# Patient Record
Sex: MALE | Race: WHITE | NOT HISPANIC OR LATINO | Employment: UNEMPLOYED | URBAN - METROPOLITAN AREA
[De-identification: names, ages, dates, MRNs, and addresses within clinical notes are randomized per-mention and may not be internally consistent; named-entity substitution may affect disease eponyms.]

---

## 2017-04-24 ENCOUNTER — HOSPITAL ENCOUNTER (OUTPATIENT)
Dept: RADIOLOGY | Facility: MEDICAL CENTER | Age: 62
End: 2017-04-24
Attending: INTERNAL MEDICINE
Payer: MEDICAID

## 2017-04-24 DIAGNOSIS — N18.30 CHRONIC KIDNEY DISEASE, STAGE III (MODERATE) (HCC): ICD-10-CM

## 2017-04-24 PROCEDURE — 76775 US EXAM ABDO BACK WALL LIM: CPT

## 2017-05-30 ENCOUNTER — HOSPITAL ENCOUNTER (OUTPATIENT)
Dept: CARDIOLOGY | Facility: MEDICAL CENTER | Age: 62
End: 2017-05-30
Attending: FAMILY MEDICINE
Payer: MEDICAID

## 2017-05-30 DIAGNOSIS — I25.2 OLD MYOCARDIAL INFARCTION: ICD-10-CM

## 2017-05-30 DIAGNOSIS — I25.10 ASCVD (ARTERIOSCLEROTIC CARDIOVASCULAR DISEASE): ICD-10-CM

## 2017-05-30 DIAGNOSIS — R60.0 LOCALIZED EDEMA: ICD-10-CM

## 2017-05-30 LAB
LV EJECT FRACT  99904: 60
LV EJECT FRACT MOD 2C 99903: 65.08
LV EJECT FRACT MOD 4C 99902: 52.84
LV EJECT FRACT MOD BP 99901: 58.17

## 2017-05-30 PROCEDURE — 93306 TTE W/DOPPLER COMPLETE: CPT

## 2017-05-30 PROCEDURE — 93306 TTE W/DOPPLER COMPLETE: CPT | Mod: 26 | Performed by: INTERNAL MEDICINE

## 2021-03-03 DIAGNOSIS — Z23 NEED FOR VACCINATION: ICD-10-CM

## 2021-10-11 RX ORDER — AMLODIPINE BESYLATE 10 MG/1
10 TABLET ORAL DAILY
COMMUNITY
End: 2021-10-11 | Stop reason: SDUPTHER

## 2021-10-11 RX ORDER — AMITRIPTYLINE HYDROCHLORIDE 10 MG/1
10 TABLET, FILM COATED ORAL NIGHTLY
COMMUNITY
End: 2021-10-11 | Stop reason: SDUPTHER

## 2021-10-11 NOTE — TELEPHONE ENCOUNTER
Received request via: Patient    Was the patient seen in the last year in this department? Yes    Does the patient have an active prescription (recently filled or refills available) for medication(s) requested? No     Pt requesting refill on Amlodapine 10mg and Amitriptyline 10mg

## 2021-10-14 RX ORDER — AMITRIPTYLINE HYDROCHLORIDE 10 MG/1
10 TABLET, FILM COATED ORAL 2 TIMES DAILY
Qty: 120 TABLET | Refills: 0 | Status: SHIPPED | OUTPATIENT
Start: 2021-10-14 | End: 2021-11-15

## 2021-10-14 RX ORDER — AMLODIPINE BESYLATE 10 MG/1
10 TABLET ORAL DAILY
Qty: 30 TABLET | Refills: 0 | Status: SHIPPED | OUTPATIENT
Start: 2021-10-14 | End: 2021-12-30

## 2021-11-15 RX ORDER — AMITRIPTYLINE HYDROCHLORIDE 10 MG/1
TABLET, FILM COATED ORAL
Qty: 120 TABLET | Refills: 0 | Status: SHIPPED | OUTPATIENT
Start: 2021-11-15 | End: 2022-07-07 | Stop reason: SDUPTHER

## 2021-12-02 RX ORDER — ATORVASTATIN CALCIUM 40 MG/1
40 TABLET, FILM COATED ORAL NIGHTLY
COMMUNITY
End: 2021-12-02 | Stop reason: SDUPTHER

## 2021-12-03 RX ORDER — ATORVASTATIN CALCIUM 40 MG/1
40 TABLET, FILM COATED ORAL DAILY
Qty: 90 TABLET | Refills: 3 | Status: SHIPPED | OUTPATIENT
Start: 2021-12-03 | End: 2022-03-03

## 2021-12-30 RX ORDER — AMLODIPINE BESYLATE 10 MG/1
TABLET ORAL
Qty: 30 TABLET | Refills: 0 | Status: SHIPPED | OUTPATIENT
Start: 2021-12-30 | End: 2022-02-01 | Stop reason: SDUPTHER

## 2022-02-01 ENCOUNTER — OFFICE VISIT (OUTPATIENT)
Dept: MEDICAL GROUP | Facility: CLINIC | Age: 67
End: 2022-02-01
Payer: MEDICARE

## 2022-02-01 VITALS
HEIGHT: 70 IN | SYSTOLIC BLOOD PRESSURE: 130 MMHG | BODY MASS INDEX: 45.1 KG/M2 | DIASTOLIC BLOOD PRESSURE: 70 MMHG | OXYGEN SATURATION: 95 % | HEART RATE: 102 BPM | TEMPERATURE: 98.6 F | WEIGHT: 315 LBS

## 2022-02-01 DIAGNOSIS — N18.30 TYPE 2 DIABETES MELLITUS WITH STAGE 3 CHRONIC KIDNEY DISEASE, UNSPECIFIED WHETHER LONG TERM INSULIN USE, UNSPECIFIED WHETHER STAGE 3A OR 3B CKD (HCC): ICD-10-CM

## 2022-02-01 DIAGNOSIS — I10 ESSENTIAL HYPERTENSION: ICD-10-CM

## 2022-02-01 DIAGNOSIS — E11.22 TYPE 2 DIABETES MELLITUS WITH STAGE 3 CHRONIC KIDNEY DISEASE, UNSPECIFIED WHETHER LONG TERM INSULIN USE, UNSPECIFIED WHETHER STAGE 3A OR 3B CKD (HCC): ICD-10-CM

## 2022-02-01 PROCEDURE — 99214 OFFICE O/P EST MOD 30 MIN: CPT | Mod: GC | Performed by: STUDENT IN AN ORGANIZED HEALTH CARE EDUCATION/TRAINING PROGRAM

## 2022-02-01 RX ORDER — ALBUTEROL SULFATE 90 UG/1
AEROSOL, METERED RESPIRATORY (INHALATION)
COMMUNITY
End: 2023-01-19

## 2022-02-01 RX ORDER — ASPIRIN 81 MG/1
81 TABLET ORAL DAILY
COMMUNITY
End: 2023-01-19

## 2022-02-01 RX ORDER — AMLODIPINE BESYLATE 10 MG/1
10 TABLET ORAL DAILY
Qty: 90 TABLET | Refills: 3 | Status: SHIPPED | OUTPATIENT
Start: 2022-02-01 | End: 2023-01-19

## 2022-02-01 ASSESSMENT — PATIENT HEALTH QUESTIONNAIRE - PHQ9: CLINICAL INTERPRETATION OF PHQ2 SCORE: 0

## 2022-02-01 NOTE — ASSESSMENT & PLAN NOTE
- Hx of DM2   - Metformin 1000mg BID  - Patient states he uses 100U in the am, 75U in afternoon, and 50U at night.    Follows up with Dr. Puente   Doesn't know what type of insulin  - Doesn't know last time he had blood work done  - He follows with nephrology   - Saw podiatry 12/2021  - Saw optometry 09/2021    - Labs ordered today  - F/u after lab work done

## 2022-02-01 NOTE — ASSESSMENT & PLAN NOTE
- Patient w/ HTN hx   metoprolol 25mg BID    Amlodipine 10mg daily   - BP normally 120s systolic at home    - Continue current regiment

## 2022-02-01 NOTE — PROGRESS NOTES
[facility] MEDICINE     PATIENT ID:  NAME:  Yoandy Peace  MRN:               [id number]  YOB: 1955    Date: 9:27 AM      Resident: Michael Frias DO    CC:  F/u DM      HPI: Yoandy Peace is a 66 y.o. male who presented for DM2 f/u     Problem   Essential Hypertension    - Patient w/ HTN hx   metoprolol 25mg BID    Amlodipine 10mg daily   - BP normally 120s systolic at home     Type 2 Diabetes Mellitus With Diabetic Chronic Kidney Disease (Hcc)    - Hx of DM2   - Metformin 1000mg BID  - Patient states he uses 100U in the am, 75U in afternoon, and 50U at night.    Follows up with Dr. Puente   Doesn't know what type of insulin  - Doesn't know last time he had blood work done  - He follows with nephrology   - Saw podiatry 12/2021  - Saw optometry 09/2021         REVIEW OF SYSTEMS:   Ten systems reviewed and were negative except as noted in the HPI.                PROBLEM LIST  Patient Active Problem List   Diagnosis   • Hematochezia   • Hematuria   • Chest pressure   • CKD (chronic kidney disease) stage 3, GFR 30-59 ml/min ([facility])   • BPH (benign prostatic hyperplasia)   • Essential hypertension   • Type 2 diabetes mellitus with diabetic chronic kidney disease (HCC)        PAST SURGICAL HISTORY:  No past surgical history on file.    FAMILY HISTORY:  No family history on file.    SOCIAL HISTORY:   Social History     Tobacco Use   • Smoking status: Current Every Day Smoker     Packs/day: 1.00     Types: Cigarettes   • Smokeless tobacco: Never Used   Substance Use Topics   • Alcohol use: No       ALLERGIES:  No Known Allergies    OUTPATIENT MEDICATIONS:    Current Outpatient Medications:   •  albuterol 108 (90 Base) MCG/ACT Aero Soln inhalation aerosol, albuterol sulfate HFA 90 mcg/actuation aerosol inhaler  INHALE 2 PUFFS BY MOUTH 4 TIMES DAILY AS NEEDED FOR WHEEZING, Disp: , Rfl:   •  aspirin (ASPIR-LOW) 81 MG EC tablet, Take 81 mg by mouth every day. 1 po daily, Disp: , Rfl:   •  amLODIPine (NORVASC) 10 MG Tab,  "Take 1 Tablet by mouth every day., Disp: 90 Tablet, Rfl: 3  •  atorvastatin (LIPITOR) 40 MG Tab, Take 1 Tablet by mouth every day for 90 days. 1 po at bed time, Disp: 90 Tablet, Rfl: 3  •  amitriptyline (ELAVIL) 10 MG Tab, Take 1 tablet by mouth twice daily, Disp: 120 Tablet, Rfl: 0  •  tramadol (ULTRAM) 50 MG Tab, Take 50 mg by mouth every four hours as needed., Disp: , Rfl:   •  tamsulosin (FLOMAX) 0.4 MG capsule, Take 0.4 mg by mouth ONE-HALF HOUR AFTER BREAKFAST., Disp: , Rfl:   •  metformin (GLUCOPHAGE) 1000 MG tablet, Take 1,000 mg by mouth 2 times a day, with meals., Disp: , Rfl:   •  metoprolol (LOPRESSOR) 25 MG TABS, Take 1 Tab by mouth 2 Times a Day., Disp: 60 Tab, Rfl: 11  •  finasteride (PROSCAR) 5 MG TABS, Take 1 Tab by mouth every day., Disp: 30 Tab, Rfl: 0  •  lisinopril-hydrochlorothiazide (PRINZIDE, ZESTORETIC) 20-12.5 MG per tablet, Take 2 Tabs by mouth every day., Disp: , Rfl:   •  vitamin D (CHOLECALCIFEROL) 1000 UNIT TABS, Take 2,000 Units by mouth every day., Disp: , Rfl:     PHYSICAL EXAM:  Vitals:    02/01/22 0843   BP: 130/70   BP Location: Right arm   Pulse: (!) 102   Temp: 37 °C (98.6 °F)   SpO2: 95%   Weight: (!) 146 kg (321 lb)   Height: 1.778 m (5' 10\")       General: Pt resting in NAD, cooperative   Skin:  Pink, warm and dry.  HEENT: NC/AT. EOMI.  Lungs:  Symmetrical.  CTAB, good air movement   Cardiovascular:  S1/S2 RRR   Extremities:  Full range of motion.  CNS:  Muscle tone is normal. No gross focal neurologic deficits      ASSESSMENT/PLAN:   66 y.o. male     Problem List Items Addressed This Visit     Essential hypertension     - Patient w/ HTN hx   metoprolol 25mg BID    Amlodipine 10mg daily   - BP normally 120s systolic at home    - Continue current regiment          Relevant Medications    amLODIPine (NORVASC) 10 MG Tab    Type 2 diabetes mellitus with diabetic chronic kidney disease (HCC)     - Hx of DM2   - Metformin 1000mg BID  - Patient states he uses 100U in the am, 75U in " afternoon, and 50U at night.    Follows up with Dr. Puente   Doesn't know what type of insulin  - Doesn't know last time he had blood work done  - He follows with nephrology   - Saw podiatry 12/2021  - Saw optometry 09/2021    - Labs ordered today  - F/u after lab work done           Relevant Orders    MICROALBUM. UR RANDOM    Comp Metabolic Panel    HEMOGLOBIN A1C    Lipid Profile    Referral for Orthotics          Michael Frias, DO  PGY-3  [facility]

## 2022-02-22 LAB — HBA1C MFR BLD: 7.1 % (ref 0–5.6)

## 2022-03-28 ENCOUNTER — OFFICE VISIT (OUTPATIENT)
Dept: MEDICAL GROUP | Facility: CLINIC | Age: 67
End: 2022-03-28
Payer: COMMERCIAL

## 2022-03-28 VITALS
TEMPERATURE: 97.6 F | RESPIRATION RATE: 18 BRPM | BODY MASS INDEX: 45.1 KG/M2 | DIASTOLIC BLOOD PRESSURE: 80 MMHG | HEART RATE: 100 BPM | SYSTOLIC BLOOD PRESSURE: 122 MMHG | OXYGEN SATURATION: 95 % | WEIGHT: 315 LBS | HEIGHT: 70 IN

## 2022-03-28 DIAGNOSIS — M77.11 EPICONDYLITIS, LATERAL, RIGHT: ICD-10-CM

## 2022-03-28 PROCEDURE — 99213 OFFICE O/P EST LOW 20 MIN: CPT | Mod: GE

## 2022-03-28 NOTE — PROGRESS NOTES
[facility] MEDICINE     PATIENT ID:  NAME:  Yoandy Peace  MRN:               [id number]  YOB: 1955    Date: 11:32 AM      Resident: Cristhian Walter MD     CC:  Right elbow pain      HPI: Yoandy Peace is a 66 y.o. male who presents to clinic complaining of right-sided elbow pain going on for a little over the past month.  The patient states that the pain comes and goes but seems to be exacerbated when he is more active and somewhat relieved with rest.  He reports no history of similar pain in the past.  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  He denies any swelling or redness to the elbow.  He denies any recent trauma, fever, chills or any other significant complaints or concerns.  The patient has not taken any medications at home for this pain but does take tramadol daily.      REVIEW OF SYSTEMS:   Ten systems reviewed and were negative except as noted in the HPI.                PROBLEM LIST  Patient Active Problem List   Diagnosis   • Hematochezia   • Hematuria   • Chest pressure   • CKD (chronic kidney disease) stage 3, GFR 30-59 ml/min ([facility])   • BPH (benign prostatic hyperplasia)   • Essential hypertension   • Type 2 diabetes mellitus with diabetic chronic kidney disease (HCC)        PAST SURGICAL HISTORY:  No past surgical history on file.    FAMILY HISTORY:  No family history on file.    SOCIAL HISTORY:   Social History     Tobacco Use   • Smoking status: Current Every Day Smoker     Packs/day: 1.00     Types: Cigarettes   • Smokeless tobacco: Never Used   Substance Use Topics   • Alcohol use: No       ALLERGIES:  No Known Allergies    OUTPATIENT MEDICATIONS:    Current Outpatient Medications:   •  albuterol 108 (90 Base) MCG/ACT Aero Soln inhalation aerosol, albuterol sulfate HFA 90 mcg/actuation aerosol inhaler  INHALE 2 PUFFS BY MOUTH 4 TIMES DAILY AS NEEDED FOR WHEEZING, Disp: , Rfl:   •  aspirin 81 MG EC tablet, Take 81 mg by mouth every  "day. 1 po daily, Disp: , Rfl:   •  amLODIPine (NORVASC) 10 MG Tab, Take 1 Tablet by mouth every day., Disp: 90 Tablet, Rfl: 3  •  amitriptyline (ELAVIL) 10 MG Tab, Take 1 tablet by mouth twice daily, Disp: 120 Tablet, Rfl: 0  •  tramadol (ULTRAM) 50 MG Tab, Take 50 mg by mouth every four hours as needed., Disp: , Rfl:   •  vitamin D (CHOLECALCIFEROL) 1000 UNIT TABS, Take 2,000 Units by mouth every day., Disp: , Rfl:   •  tamsulosin (FLOMAX) 0.4 MG capsule, Take 0.4 mg by mouth ONE-HALF HOUR AFTER BREAKFAST., Disp: , Rfl:   •  metformin (GLUCOPHAGE) 1000 MG tablet, Take 1,000 mg by mouth 2 times a day, with meals., Disp: , Rfl:   •  metoprolol (LOPRESSOR) 25 MG TABS, Take 1 Tab by mouth 2 Times a Day., Disp: 60 Tab, Rfl: 11  •  finasteride (PROSCAR) 5 MG TABS, Take 1 Tab by mouth every day., Disp: 30 Tab, Rfl: 0  •  lisinopril-hydrochlorothiazide (PRINZIDE, ZESTORETIC) 20-12.5 MG per tablet, Take 2 Tabs by mouth every day., Disp: , Rfl:     PHYSICAL EXAM:  Vitals:    03/28/22 1051   BP: 122/80   BP Location: Left arm   Patient Position: Sitting   BP Cuff Size: Large adult   Pulse: 100   Resp: 18   Temp: 36.4 °C (97.6 °F)   TempSrc: Temporal   SpO2: 95%   Weight: (!) 145 kg (320 lb 11.2 oz)   Height: 1.778 m (5' 10\")       General: Pt resting in NAD, pleasant and cooperative   Skin:  Pink, warm and dry.  HEENT: NC/AT. EOMI. PERRLA, neck supple.  Lungs:  Symmetrical.  CTAB, good air movement, no respiratory distress  Cardiovascular:  S1/S2 RRR, no murmurs rubs or gallops, warm and well-perfused  Abdomen:  Abdomen is soft, nontender  Extremities:  Full range of motion.  Mild tenderness to palpation of the right lateral epicondyle, no tenderness to palpation of the muscles of the right forearm or right biceps/triceps, no swelling or redness of the right elbow.  CNS:  Muscle tone is normal. No gross focal neurologic deficits      ASSESSMENT/PLAN:   66 y.o. male who presents to the clinic complaining of right elbow pain " over the past month.  Patient denies any recent trauma, swelling or redness.  Patient denies any similar pain in the past.  On exam the patient does have some tenderness of the lateral epicondyle.  Will provide patient with stretching exercises targeted at lateral epicondylitis and have advised him to try capsaicin cream and Tylenol for increased pain control.  We will have the patient try this regimen over the next 2 weeks and if his symptoms are not improving throughout this time we will have him return to clinic for discussion of possible Voltaren gel versus joint injection.  Have discussed with patient that due to his history of chronic kidney disease we will attempt other modalities before topical NSAID therapy to preserve kidney function.  Patient states he understands and agrees with this course.  Patient will follow up between 2 to 4 weeks if symptoms or not improving.      Visit Diagnoses     Epicondylitis, lateral, right              Cristhian Walter MD   PGY-1  [facility]

## 2022-03-28 NOTE — PATIENT INSTRUCTIONS
[facility]  Ask your health care provider which exercises are safe for you. Do exercises exactly as told by your health care provider and adjust them as directed. It is normal to feel mild stretching, pulling, tightness, or discomfort as you do these exercises. Stop right away if you feel sudden pain or your pain gets worse. Do not begin these exercises until told by your health care provider.  Stretching and range-of-motion exercises  These exercises warm up your muscles and joints and improve the movement and flexibility of your elbow. These exercises also help to relieve pain, numbness, and tingling.  Wrist flexion, assisted    1. Straighten your left / right elbow in front of you with your palm facing down toward the floor.  ? If told by your health care provider, bend your left / right elbow to a 90-degree angle (right angle) at your side.  2. With your other hand, gently push over the back of your left / right hand so your fingers point toward the floor (flexion). Stop when you feel a gentle stretch on the back of your forearm.  3. Hold this position for __________ seconds.  Repeat __________ times. Complete this exercise __________ times a day.  Wrist extension, assisted    1. Straighten your left / right elbow in front of you with your palm facing up toward the ceiling.  ? If told by your health care provider, bend your left / right elbow to a 90-degree angle (right angle) at your side.  2. With your other hand, gently pull your left / right hand and fingers toward the floor (extension). Stop when you feel a gentle stretch on the palm side of your forearm.  3. Hold this position for __________ seconds.  Repeat __________ times. Complete this exercise __________ times a day.  Assisted forearm rotation, supination  1. Sit or stand with your left / right elbow bent to a 90-degree angle (right angle) at your side.  2. Using your uninjured hand, turn (rotate) your left / right palm up toward the ceiling  (supination) until you feel a gentle stretch along the inside of your forearm.  3. Hold this position for __________ seconds.  Repeat __________ times. Complete this exercise __________ times a day.  Assisted forearm rotation, pronation  1. Sit or stand with your left / right elbow bent to a 90-degree angle (right angle) at your side.  2. Using your uninjured hand, rotate your left / right palm down toward the floor (pronation) until you feel a gentle stretch along the outside of your forearm.  3. Hold this position for __________ seconds.  Repeat __________ times. Complete this exercise __________ times a day.  Strengthening exercises  These exercises build strength and endurance in your forearm and elbow. Endurance is the ability to use your muscles for a long time, even after they get tired.  Radial deviation    1. Stand with a __________ weight or a hammer in your left / right hand. Or, sit while holding a rubber exercise band or tubing, with your left / right forearm supported on a table or countertop.  ? If you are standing, position your forearm so that your thumb is facing forward. If you are sitting, position your forearm so that the thumb is facing the ceiling. This is the neutral position.  2. Raise your hand upward in front of you so your thumb moves toward the ceiling (radial deviation), or pull up on the rubber tubing. Keep your forearm and elbow still while you move your wrist only.  3. Hold this position for __________ seconds.  4. Slowly return to the starting position.  Repeat __________ times. Complete this exercise __________ times a day.  Wrist extension, eccentric  1. Sit with your left / right forearm palm-down and supported on a table or other surface. Let your left / right wrist extend over the edge of the surface.  2. Hold a __________ weight or a piece of exercise band or tubing in your left / right hand.  ? If using a rubber exercise band or tubing, hold the other end of the tubing with  your other hand.  3. Use your uninjured hand to move your left / right hand up toward the ceiling.  4. Take your uninjured hand away and slowly return to the starting position using only your left / right hand. Lowering your arm under tension is called eccentric extension.  Repeat __________ times. Complete this exercise __________ times a day.  Wrist extension  Do not do this exercise if it causes pain at the outside of your elbow. Only do this exercise once instructed by your health care provider.  1. Sit with your left / right forearm supported on a table or other surface and your palm turned down toward the floor. Let your left / right wrist extend over the edge of the surface.  2. Hold a __________ weight or a piece of rubber exercise band or tubing.  ? If you are using a rubber exercise band or tubing, hold the band or tubing in place with your other hand to provide resistance.  3. Slowly bend your wrist so your hand moves up toward the ceiling (extension). Move only your wrist, keeping your forearm and elbow still.  4. Hold this position for __________ seconds.  5. Slowly return to the starting position.  Repeat __________ times. Complete this exercise __________ times a day.  Forearm rotation, supination  To do this exercise, you will need a lightweight hammer or rubber mallet.  1. Sit with your left / right forearm supported on a table or other surface. Bend your elbow to a 90-degree angle (right angle). Position your forearm so that your palm is facing down toward the floor, with your hand resting over the edge of the table.  2. Hold a hammer in your left / right hand.  ? To make this exercise easier, hold the hammer near the head of the hammer.  ? To make this exercise harder, hold the hammer near the end of the handle.  3. Without moving your wrist or elbow, slowly rotate your forearm so your palm faces up toward the ceiling (supination).  4. Hold this position for __________ seconds.  5. Slowly return  to the starting position.  Repeat __________ times. Complete this exercise __________ times a day.  Shoulder blade squeeze  1. Sit in a stable chair or stand with good posture. If you are sitting down, do not let your back touch the back of the chair.  2. Your arms should be at your sides with your elbows bent to a 90-degree angle (right angle). Position your forearms so that your thumbs are facing the ceiling (neutral position).  3. Without lifting your shoulders up, squeeze your shoulder blades tightly together.  4. Hold this position for __________ seconds.  5. Slowly release and return to the starting position.  Repeat __________ times. Complete this exercise __________ times a day.  This information is not intended to replace advice given to you by your health care provider. Make sure you discuss any questions you have with your health care provider.  Document Released: 12/18/2006 Document Revised: 04/09/2020 Document Reviewed: 02/11/2020  Elsevier Patient Education © 2020 [employer]

## 2022-07-01 RX ORDER — AMITRIPTYLINE HYDROCHLORIDE 10 MG/1
10 TABLET, FILM COATED ORAL 2 TIMES DAILY
Qty: 120 TABLET | Refills: 0 | OUTPATIENT
Start: 2022-07-01

## 2022-07-07 RX ORDER — AMITRIPTYLINE HYDROCHLORIDE 10 MG/1
10 TABLET, FILM COATED ORAL 2 TIMES DAILY
Qty: 120 TABLET | Refills: 0 | Status: SHIPPED | OUTPATIENT
Start: 2022-07-07 | End: 2022-10-25

## 2022-07-15 ENCOUNTER — OFFICE VISIT (OUTPATIENT)
Dept: MEDICAL GROUP | Facility: CLINIC | Age: 67
End: 2022-07-15
Payer: COMMERCIAL

## 2022-07-15 VITALS
OXYGEN SATURATION: 99 % | BODY MASS INDEX: 45.1 KG/M2 | TEMPERATURE: 97.2 F | WEIGHT: 315 LBS | HEIGHT: 70 IN | SYSTOLIC BLOOD PRESSURE: 122 MMHG | HEART RATE: 94 BPM | DIASTOLIC BLOOD PRESSURE: 74 MMHG

## 2022-07-15 DIAGNOSIS — N18.30 TYPE 2 DIABETES MELLITUS WITH STAGE 3 CHRONIC KIDNEY DISEASE, UNSPECIFIED WHETHER LONG TERM INSULIN USE, UNSPECIFIED WHETHER STAGE 3A OR 3B CKD (HCC): ICD-10-CM

## 2022-07-15 DIAGNOSIS — I10 ESSENTIAL HYPERTENSION: ICD-10-CM

## 2022-07-15 DIAGNOSIS — E11.22 TYPE 2 DIABETES MELLITUS WITH STAGE 3 CHRONIC KIDNEY DISEASE, UNSPECIFIED WHETHER LONG TERM INSULIN USE, UNSPECIFIED WHETHER STAGE 3A OR 3B CKD (HCC): ICD-10-CM

## 2022-07-15 DIAGNOSIS — N18.32 STAGE 3B CHRONIC KIDNEY DISEASE: ICD-10-CM

## 2022-07-15 PROCEDURE — 99213 OFFICE O/P EST LOW 20 MIN: CPT | Mod: GE | Performed by: STUDENT IN AN ORGANIZED HEALTH CARE EDUCATION/TRAINING PROGRAM

## 2022-07-15 RX ORDER — DAPAGLIFLOZIN 5 MG/1
1 TABLET, FILM COATED ORAL DAILY
COMMUNITY
Start: 2021-07-28 | End: 2023-01-19

## 2022-07-15 RX ORDER — LINAGLIPTIN 5 MG/1
1 TABLET, FILM COATED ORAL DAILY
COMMUNITY
End: 2023-01-19

## 2022-07-15 RX ORDER — ATORVASTATIN CALCIUM 40 MG/1
1 TABLET, FILM COATED ORAL DAILY
COMMUNITY
End: 2023-01-19

## 2022-07-15 NOTE — ASSESSMENT & PLAN NOTE
Will request labs from [email or web address] on [address]  Continue to follow with nephrology  Continue to take losartan, and amlodipine. HTN well controlled.

## 2022-07-15 NOTE — ASSESSMENT & PLAN NOTE
Longstanding history of DM2, follows with endocrinology Dr. Puente  Regimen:   - Metformin 1000mg BID  - Patient states he uses Humalin 100U in the am, 75U in afternoon, and 50U at night.   - Trulicity and farxiga  - follows with podiatry  - follows with optometry: 09/2021

## 2022-07-15 NOTE — PROGRESS NOTES
Subjective:     CC: routine follow up    HPI:   Yoandy presents today for routine follow up    Problem   Ckd (Chronic Kidney Disease) Stage 3, Gfr 30-59 Ml/Min (Hcc)    Longstanding history of CKD, secondary to T2DM and HTN. Followed by Dr. Benton. He reports that he sees nephrology at least 3 times per year and they monitor his labs.              Essential Hypertension    Longstanding history of HTN, follows with nephrology.   metoprolol 25mg BID    Amlodipine 10mg daily    Losartan 25 mg daily  BP at home typically 120s systolic      Type 2 Diabetes Mellitus With Diabetic Chronic Kidney Disease (Hcc)    Longstanding history of DM2, follows with endocrinology Dr. Puente  Regimen:   - Metformin 1000mg BID  - Patient states he uses Humalin 100U in the am, 75U in afternoon, and 50U at night.   - On trulicity and farxiga  - follows with podiatry  - follows with optometry: 09/2021         Current Outpatient Medications Ordered in Epic   Medication Sig Dispense Refill   • amitriptyline (ELAVIL) 10 MG Tab Take 1 Tablet by mouth 2 times a day. 120 Tablet 0   • albuterol 108 (90 Base) MCG/ACT Aero Soln inhalation aerosol albuterol sulfate HFA 90 mcg/actuation aerosol inhaler   INHALE 2 PUFFS BY MOUTH 4 TIMES DAILY AS NEEDED FOR WHEEZING     • aspirin 81 MG EC tablet Take 81 mg by mouth every day. 1 po daily     • amLODIPine (NORVASC) 10 MG Tab Take 1 Tablet by mouth every day. 90 Tablet 3   • tramadol (ULTRAM) 50 MG Tab Take 50 mg by mouth every four hours as needed.     • vitamin D (CHOLECALCIFEROL) 1000 UNIT TABS Take 2,000 Units by mouth every day.     • tamsulosin (FLOMAX) 0.4 MG capsule Take 0.4 mg by mouth ONE-HALF HOUR AFTER BREAKFAST.     • metformin (GLUCOPHAGE) 1000 MG tablet Take 1,000 mg by mouth 2 times a day, with meals.     • metoprolol (LOPRESSOR) 25 MG TABS Take 1 Tab by mouth 2 Times a Day. 60 Tab 11   • finasteride (PROSCAR) 5 MG TABS Take 1 Tab by mouth every day. 30 Tab 0   •  "lisinopril-hydrochlorothiazide (PRINZIDE, ZESTORETIC) 20-12.5 MG per tablet Take 2 Tabs by mouth every day.       No current [facility]-ordered facility-administered medications on file.       Health Maintenance:   -Colonoscopy last done 3 years ago with no abnormal result per patient  -S/p COVID x 3 doses  -Needs zoster, has pneumococcal up to date  -Optometry due 09/22  -Follows with podiatry for routine foot exams  -Still smoking 1+ ppd cigarettes, not interested in quitting at this time, he knows cutting back is still good for his health.     ROS:  Gen: no fevers/chills, no changes in weight  Eyes: no changes in vision  ENT: no sore throat, no hearing loss, no bloody nose  Pulm: no sob, no cough  CV: no chest pain, no palpitations  GI: no nausea/vomiting, no diarrhea  : no dysuria  MSk: no myalgias  Skin: no rash  Neuro: no headaches, no numbness/tingling  Heme/Lymph: no easy bruising      Objective:     Exam:  /74 (BP Location: Left arm, BP Cuff Size: Large adult)   Pulse 94   Temp 36.2 °C (97.2 °F)   Ht 1.778 m (5' 10\")   Wt (!) 147 kg (323 lb)   SpO2 99%   BMI 46.35 kg/m²  Body mass index is 46.35 kg/m².    Gen: Alert and oriented, No apparent distress.  Neck: Neck is supple without lymphadenopathy.  Lungs: Normal effort, CTA bilaterally, no wheezes, rhonchi, or rales  CV: Regular rate and rhythm. No murmurs, rubs, or gallops.  Ext: No clubbing, cyanosis, edema.      Labs: A1C 7.1 in 2/22/22    Assessment & Plan:     67 y.o. male with the following -     Problem List Items Addressed This Visit     CKD (chronic kidney disease) stage 3, GFR 30-59 ml/min ([facility])     Will request labs from [facility] on [address]  Continue to follow with nephrology  Continue to take losartan, and amlodipine. HTN well controlled.            Essential hypertension     Continue current regimen, request records from nephrology labs           Relevant Medications    atorvastatin (LIPITOR) 40 MG Tab    Other Relevant Orders    Lipid " Profile    Type 2 diabetes mellitus with diabetic chronic kidney disease (HCC)     Longstanding history of DM2, follows with endocrinology Dr. Puente  Regimen:   - Metformin 1000mg BID  - Patient states he uses Humalin 100U in the am, 75U in afternoon, and 50U at night.   - Trulicity and farxiga  - follows with podiatry  - follows with optometry: 09/2021           Relevant Medications    Dapagliflozin Propanediol (FARXIGA) 5 MG Tab    linagliptin (TRADJENTA) 5 MG Tab tablet        Cholesterol panel due, I suspect his endocrinologist and nephrologist have not completed this lab and will order while requesting records from [email or web address]

## 2022-09-06 NOTE — RESULT ENCOUNTER NOTE
Mandi Lyon,    Would you call to let Yoandy know that I received his cholesterol labs and that his triglycerides are a little elevated but not concerning. His total cholesterol level is normal. His healthy cholesterol is a little low. He should continue to avoid saturated fats, high sugar and high calorie foods, and continue taking his cholesterol medications.      Thank you,  Kacy Sesay

## 2022-10-25 RX ORDER — AMITRIPTYLINE HYDROCHLORIDE 10 MG/1
TABLET, FILM COATED ORAL
Qty: 120 TABLET | Refills: 0 | Status: SHIPPED | OUTPATIENT
Start: 2022-10-25 | End: 2023-01-21

## 2022-12-15 ENCOUNTER — APPOINTMENT (OUTPATIENT)
Dept: MEDICAL GROUP | Facility: CLINIC | Age: 67
End: 2022-12-15
Payer: COMMERCIAL

## 2022-12-22 ENCOUNTER — TELEPHONE (OUTPATIENT)
Dept: MEDICAL GROUP | Facility: CLINIC | Age: 67
End: 2022-12-22
Payer: COMMERCIAL

## 2022-12-22 NOTE — TELEPHONE ENCOUNTER
VOICEMAIL  1. Caller Name: Yoandy Peace                       Call Back Number: [phone] (home)      2. Message:   Patient state he was on [facility] for 5 days , he was discharge on 12/02/2022. Pt needs a referral for a Nurse come home, to [facility] .   Discharge hospital notes is been scanned on the chart.  Advise tp to schedule to schedule apt . But he says he can not walk and he is wearing oxigen .  Forward to Dr Sesay.

## 2022-12-23 ENCOUNTER — APPOINTMENT (OUTPATIENT)
Dept: RADIOLOGY | Facility: MEDICAL CENTER | Age: 67
DRG: 291 | End: 2022-12-23
Attending: EMERGENCY MEDICINE
Payer: COMMERCIAL

## 2022-12-23 ENCOUNTER — HOSPITAL ENCOUNTER (INPATIENT)
Facility: MEDICAL CENTER | Age: 67
LOS: 21 days | DRG: 291 | End: 2023-01-13
Attending: EMERGENCY MEDICINE | Admitting: STUDENT IN AN ORGANIZED HEALTH CARE EDUCATION/TRAINING PROGRAM
Payer: COMMERCIAL

## 2022-12-23 DIAGNOSIS — N17.9 ACUTE RENAL FAILURE, UNSPECIFIED ACUTE RENAL FAILURE TYPE (HCC): ICD-10-CM

## 2022-12-23 DIAGNOSIS — Z99.2 ANEMIA DUE TO CHRONIC KIDNEY DISEASE, ON CHRONIC DIALYSIS (HCC): ICD-10-CM

## 2022-12-23 DIAGNOSIS — A41.9 SEPSIS, DUE TO UNSPECIFIED ORGANISM, UNSPECIFIED WHETHER ACUTE ORGAN DYSFUNCTION PRESENT (HCC): ICD-10-CM

## 2022-12-23 DIAGNOSIS — J96.21 ACUTE ON CHRONIC RESPIRATORY FAILURE WITH HYPOXIA AND HYPERCAPNIA (HCC): ICD-10-CM

## 2022-12-23 DIAGNOSIS — I48.91 ATRIAL FIBRILLATION, UNSPECIFIED TYPE (HCC): ICD-10-CM

## 2022-12-23 DIAGNOSIS — J96.01 ACUTE RESPIRATORY FAILURE WITH HYPOXIA (HCC): ICD-10-CM

## 2022-12-23 DIAGNOSIS — N18.6 ANEMIA DUE TO CHRONIC KIDNEY DISEASE, ON CHRONIC DIALYSIS (HCC): ICD-10-CM

## 2022-12-23 DIAGNOSIS — I25.10 CORONARY ARTERY DISEASE INVOLVING NATIVE CORONARY ARTERY OF NATIVE HEART WITHOUT ANGINA PECTORIS: ICD-10-CM

## 2022-12-23 DIAGNOSIS — D63.1 ANEMIA DUE TO CHRONIC KIDNEY DISEASE, ON CHRONIC DIALYSIS (HCC): ICD-10-CM

## 2022-12-23 DIAGNOSIS — J96.22 ACUTE ON CHRONIC RESPIRATORY FAILURE WITH HYPOXIA AND HYPERCAPNIA (HCC): ICD-10-CM

## 2022-12-23 PROBLEM — J81.0 ACUTE PULMONARY EDEMA (HCC): Status: ACTIVE | Noted: 2022-12-23

## 2022-12-23 PROBLEM — I10 HYPERTENSION: Status: ACTIVE | Noted: 2022-12-23

## 2022-12-23 PROBLEM — E11.9 DM (DIABETES MELLITUS) (HCC): Status: ACTIVE | Noted: 2022-12-23

## 2022-12-23 PROBLEM — J96.90 RESPIRATORY FAILURE (HCC): Status: ACTIVE | Noted: 2022-12-23

## 2022-12-23 PROBLEM — J44.1 ACUTE EXACERBATION OF CHRONIC OBSTRUCTIVE PULMONARY DISEASE (COPD) (HCC): Status: ACTIVE | Noted: 2022-12-23

## 2022-12-23 PROBLEM — I50.9 ACUTE ON CHRONIC CONGESTIVE HEART FAILURE (HCC): Status: ACTIVE | Noted: 2022-12-23

## 2022-12-23 PROBLEM — R60.0 BILATERAL LEG EDEMA: Status: ACTIVE | Noted: 2022-12-23

## 2022-12-23 PROBLEM — I13.0 CARDIORENAL SYNDROME, STAGE 1-4 OR UNSPECIFIED CHRONIC KIDNEY DISEASE, WITH HEART FAILURE (HCC): Status: ACTIVE | Noted: 2022-12-23

## 2022-12-23 PROBLEM — R79.89 ELEVATED TROPONIN: Status: ACTIVE | Noted: 2022-12-23

## 2022-12-23 PROBLEM — G47.33 OSA ON CPAP: Status: ACTIVE | Noted: 2022-12-23

## 2022-12-23 PROBLEM — Z71.89 ACP (ADVANCE CARE PLANNING): Status: ACTIVE | Noted: 2022-12-23

## 2022-12-23 PROBLEM — E66.01 CLASS 3 SEVERE OBESITY IN ADULT (HCC): Status: ACTIVE | Noted: 2022-12-23

## 2022-12-23 PROBLEM — Z72.0 TOBACCO ABUSE: Status: ACTIVE | Noted: 2022-12-23

## 2022-12-23 LAB
ALBUMIN SERPL BCP-MCNC: 3.6 G/DL (ref 3.2–4.9)
ALBUMIN/GLOB SERPL: 0.9 G/DL
ALP SERPL-CCNC: 100 U/L (ref 30–99)
ALT SERPL-CCNC: 18 U/L (ref 2–50)
AMORPH CRY #/AREA URNS HPF: PRESENT /HPF
ANION GAP SERPL CALC-SCNC: 11 MMOL/L (ref 7–16)
APPEARANCE UR: ABNORMAL
AST SERPL-CCNC: 16 U/L (ref 12–45)
BASE EXCESS BLDV CALC-SCNC: -7 MMOL/L
BASOPHILS # BLD AUTO: 0 % (ref 0–1.8)
BASOPHILS # BLD: 0 K/UL (ref 0–0.12)
BILIRUB SERPL-MCNC: 0.2 MG/DL (ref 0.1–1.5)
BILIRUB UR QL STRIP.AUTO: NEGATIVE
BODY TEMPERATURE: 36.2 CENTIGRADE
BUN SERPL-MCNC: 34 MG/DL (ref 8–22)
CALCIUM ALBUM COR SERPL-MCNC: 8.2 MG/DL (ref 8.5–10.5)
CALCIUM SERPL-MCNC: 7.9 MG/DL (ref 8.5–10.5)
CHLORIDE SERPL-SCNC: 105 MMOL/L (ref 96–112)
CO2 SERPL-SCNC: 21 MMOL/L (ref 20–33)
COLOR UR: YELLOW
CORTIS SERPL-MCNC: 23.8 UG/DL (ref 0–23)
CREAT SERPL-MCNC: 3.31 MG/DL (ref 0.5–1.4)
CRP SERPL HS-MCNC: 1.13 MG/DL (ref 0–0.75)
EKG IMPRESSION: NORMAL
EKG IMPRESSION: NORMAL
EOSINOPHIL # BLD AUTO: 0 K/UL (ref 0–0.51)
EOSINOPHIL NFR BLD: 0 % (ref 0–6.9)
EPI CELLS #/AREA URNS HPF: ABNORMAL /HPF
ERYTHROCYTE [DISTWIDTH] IN BLOOD BY AUTOMATED COUNT: 49.7 FL (ref 35.9–50)
FLUAV RNA SPEC QL NAA+PROBE: NEGATIVE
FLUBV RNA SPEC QL NAA+PROBE: NEGATIVE
GFR SERPLBLD CREATININE-BSD FMLA CKD-EPI: 19 ML/MIN/1.73 M 2
GLOBULIN SER CALC-MCNC: 4 G/DL (ref 1.9–3.5)
GLUCOSE SERPL-MCNC: 125 MG/DL (ref 65–99)
GLUCOSE UR STRIP.AUTO-MCNC: NEGATIVE MG/DL
GRAM STN SPEC: NORMAL
GRAN CASTS #/AREA URNS LPF: ABNORMAL /LPF
HCO3 BLDV-SCNC: 20 MMOL/L (ref 24–28)
HCT VFR BLD AUTO: 33.4 % (ref 42–52)
HGB BLD-MCNC: 9.9 G/DL (ref 14–18)
HYALINE CASTS #/AREA URNS LPF: ABNORMAL /LPF
KETONES UR STRIP.AUTO-MCNC: NEGATIVE MG/DL
LACTATE SERPL-SCNC: 1.6 MMOL/L (ref 0.5–2)
LACTATE SERPL-SCNC: 1.9 MMOL/L (ref 0.5–2)
LEUKOCYTE ESTERASE UR QL STRIP.AUTO: ABNORMAL
LYMPHOCYTES # BLD AUTO: 5.48 K/UL (ref 1–4.8)
LYMPHOCYTES NFR BLD: 31.3 % (ref 22–41)
MANUAL DIFF BLD: NORMAL
MCH RBC QN AUTO: 26.2 PG (ref 27–33)
MCHC RBC AUTO-ENTMCNC: 29.6 G/DL (ref 33.7–35.3)
MCV RBC AUTO: 88.4 FL (ref 81.4–97.8)
MICRO URNS: ABNORMAL
MONOCYTES # BLD AUTO: 1.19 K/UL (ref 0–0.85)
MONOCYTES NFR BLD AUTO: 6.8 % (ref 0–13.4)
MORPHOLOGY BLD-IMP: NORMAL
NEUTROPHILS # BLD AUTO: 10.83 K/UL (ref 1.82–7.42)
NEUTROPHILS NFR BLD: 61.9 % (ref 44–72)
NITRITE UR QL STRIP.AUTO: NEGATIVE
NRBC # BLD AUTO: 0 K/UL
NRBC BLD-RTO: 0 /100 WBC
NT-PROBNP SERPL IA-MCNC: 6487 PG/ML (ref 0–125)
PCO2 BLDV: 51.1 MMHG (ref 41–51)
PCO2 TEMP ADJ BLDV: 49.3 MMHG (ref 41–51)
PH BLDV: 7.22 [PH] (ref 7.31–7.45)
PH TEMP ADJ BLDV: 7.23 [PH] (ref 7.31–7.45)
PH UR STRIP.AUTO: 5 [PH] (ref 5–8)
PLATELET # BLD AUTO: 474 K/UL (ref 164–446)
PLATELET BLD QL SMEAR: NORMAL
PMV BLD AUTO: 9.3 FL (ref 9–12.9)
PO2 BLDV: 23.1 MMHG (ref 25–40)
PO2 TEMP ADJ BLDV: 21.8 MMHG (ref 25–40)
POTASSIUM SERPL-SCNC: 4.7 MMOL/L (ref 3.6–5.5)
PROCALCITONIN SERPL-MCNC: 0.15 NG/ML
PROCALCITONIN SERPL-MCNC: 0.16 NG/ML
PROT SERPL-MCNC: 7.6 G/DL (ref 6–8.2)
PROT UR QL STRIP: 300 MG/DL
RBC # BLD AUTO: 3.78 M/UL (ref 4.7–6.1)
RBC # URNS HPF: ABNORMAL /HPF
RBC BLD AUTO: NORMAL
RBC UR QL AUTO: ABNORMAL
RSV RNA SPEC QL NAA+PROBE: NEGATIVE
SAO2 % BLDV: 35.1 %
SARS-COV-2 RNA RESP QL NAA+PROBE: NOTDETECTED
SIGNIFICANT IND 70042: NORMAL
SITE SITE: NORMAL
SODIUM SERPL-SCNC: 137 MMOL/L (ref 135–145)
SOURCE SOURCE: NORMAL
SP GR UR STRIP.AUTO: 1.01
SPECIMEN SOURCE: NORMAL
TROPONIN T SERPL-MCNC: 459 NG/L (ref 6–19)
TROPONIN T SERPL-MCNC: 473 NG/L (ref 6–19)
UROBILINOGEN UR STRIP.AUTO-MCNC: 0.2 MG/DL
WBC # BLD AUTO: 17.5 K/UL (ref 4.8–10.8)
WBC #/AREA URNS HPF: ABNORMAL /HPF

## 2022-12-23 PROCEDURE — 96376 TX/PRO/DX INJ SAME DRUG ADON: CPT

## 2022-12-23 PROCEDURE — 82533 TOTAL CORTISOL: CPT

## 2022-12-23 PROCEDURE — 81001 URINALYSIS AUTO W/SCOPE: CPT

## 2022-12-23 PROCEDURE — 93005 ELECTROCARDIOGRAM TRACING: CPT | Performed by: EMERGENCY MEDICINE

## 2022-12-23 PROCEDURE — 700111 HCHG RX REV CODE 636 W/ 250 OVERRIDE (IP): Performed by: STUDENT IN AN ORGANIZED HEALTH CARE EDUCATION/TRAINING PROGRAM

## 2022-12-23 PROCEDURE — 96368 THER/DIAG CONCURRENT INF: CPT

## 2022-12-23 PROCEDURE — 82803 BLOOD GASES ANY COMBINATION: CPT

## 2022-12-23 PROCEDURE — 94640 AIRWAY INHALATION TREATMENT: CPT

## 2022-12-23 PROCEDURE — 700101 HCHG RX REV CODE 250: Performed by: STUDENT IN AN ORGANIZED HEALTH CARE EDUCATION/TRAINING PROGRAM

## 2022-12-23 PROCEDURE — 96365 THER/PROPH/DIAG IV INF INIT: CPT

## 2022-12-23 PROCEDURE — 71045 X-RAY EXAM CHEST 1 VIEW: CPT

## 2022-12-23 PROCEDURE — A9270 NON-COVERED ITEM OR SERVICE: HCPCS | Performed by: STUDENT IN AN ORGANIZED HEALTH CARE EDUCATION/TRAINING PROGRAM

## 2022-12-23 PROCEDURE — 86738 MYCOPLASMA ANTIBODY: CPT

## 2022-12-23 PROCEDURE — 86140 C-REACTIVE PROTEIN: CPT

## 2022-12-23 PROCEDURE — 93005 ELECTROCARDIOGRAM TRACING: CPT

## 2022-12-23 PROCEDURE — C9803 HOPD COVID-19 SPEC COLLECT: HCPCS | Performed by: EMERGENCY MEDICINE

## 2022-12-23 PROCEDURE — 84484 ASSAY OF TROPONIN QUANT: CPT | Mod: 91

## 2022-12-23 PROCEDURE — 700111 HCHG RX REV CODE 636 W/ 250 OVERRIDE (IP): Performed by: EMERGENCY MEDICINE

## 2022-12-23 PROCEDURE — 0241U HCHG SARS-COV-2 COVID-19 NFCT DS RESP RNA 4 TRGT MIC: CPT

## 2022-12-23 PROCEDURE — 770022 HCHG ROOM/CARE - ICU (200)

## 2022-12-23 PROCEDURE — 96366 THER/PROPH/DIAG IV INF ADDON: CPT

## 2022-12-23 PROCEDURE — 96375 TX/PRO/DX INJ NEW DRUG ADDON: CPT

## 2022-12-23 PROCEDURE — 700105 HCHG RX REV CODE 258: Performed by: EMERGENCY MEDICINE

## 2022-12-23 PROCEDURE — 85025 COMPLETE CBC W/AUTO DIFF WBC: CPT

## 2022-12-23 PROCEDURE — 83880 ASSAY OF NATRIURETIC PEPTIDE: CPT

## 2022-12-23 PROCEDURE — 94760 N-INVAS EAR/PLS OXIMETRY 1: CPT

## 2022-12-23 PROCEDURE — 87205 SMEAR GRAM STAIN: CPT

## 2022-12-23 PROCEDURE — 94660 CPAP INITIATION&MGMT: CPT

## 2022-12-23 PROCEDURE — 96372 THER/PROPH/DIAG INJ SC/IM: CPT

## 2022-12-23 PROCEDURE — 80053 COMPREHEN METABOLIC PANEL: CPT

## 2022-12-23 PROCEDURE — 83605 ASSAY OF LACTIC ACID: CPT

## 2022-12-23 PROCEDURE — 700102 HCHG RX REV CODE 250 W/ 637 OVERRIDE(OP): Performed by: STUDENT IN AN ORGANIZED HEALTH CARE EDUCATION/TRAINING PROGRAM

## 2022-12-23 PROCEDURE — 99291 CRITICAL CARE FIRST HOUR: CPT | Performed by: EMERGENCY MEDICINE

## 2022-12-23 PROCEDURE — 99291 CRITICAL CARE FIRST HOUR: CPT

## 2022-12-23 PROCEDURE — 85007 BL SMEAR W/DIFF WBC COUNT: CPT

## 2022-12-23 PROCEDURE — 99292 CRITICAL CARE ADDL 30 MIN: CPT | Performed by: EMERGENCY MEDICINE

## 2022-12-23 PROCEDURE — 36415 COLL VENOUS BLD VENIPUNCTURE: CPT

## 2022-12-23 PROCEDURE — 87040 BLOOD CULTURE FOR BACTERIA: CPT | Mod: 91

## 2022-12-23 PROCEDURE — 84145 PROCALCITONIN (PCT): CPT

## 2022-12-23 PROCEDURE — 99223 1ST HOSP IP/OBS HIGH 75: CPT | Mod: AI | Performed by: STUDENT IN AN ORGANIZED HEALTH CARE EDUCATION/TRAINING PROGRAM

## 2022-12-23 RX ORDER — ONDANSETRON 4 MG/1
4 TABLET, ORALLY DISINTEGRATING ORAL EVERY 4 HOURS PRN
Status: DISCONTINUED | OUTPATIENT
Start: 2022-12-23 | End: 2023-01-13 | Stop reason: HOSPADM

## 2022-12-23 RX ORDER — FUROSEMIDE 10 MG/ML
60 INJECTION INTRAMUSCULAR; INTRAVENOUS
Status: DISCONTINUED | OUTPATIENT
Start: 2022-12-24 | End: 2022-12-24

## 2022-12-23 RX ORDER — AMLODIPINE BESYLATE 10 MG/1
10 TABLET ORAL EVERY MORNING
Status: ON HOLD | COMMUNITY
End: 2023-01-09

## 2022-12-23 RX ORDER — LOSARTAN POTASSIUM 25 MG/1
25 TABLET ORAL 2 TIMES DAILY
Status: ON HOLD | COMMUNITY
End: 2023-01-09

## 2022-12-23 RX ORDER — NICOTINE 21 MG/24HR
21 PATCH, TRANSDERMAL 24 HOURS TRANSDERMAL
Status: DISCONTINUED | OUTPATIENT
Start: 2022-12-24 | End: 2023-01-13 | Stop reason: HOSPADM

## 2022-12-23 RX ORDER — DAPAGLIFLOZIN 10 MG/1
5 TABLET, FILM COATED ORAL EVERY MORNING
Status: DISCONTINUED | OUTPATIENT
Start: 2022-12-24 | End: 2022-12-24

## 2022-12-23 RX ORDER — IPRATROPIUM BROMIDE AND ALBUTEROL SULFATE 2.5; .5 MG/3ML; MG/3ML
3 SOLUTION RESPIRATORY (INHALATION)
Status: DISCONTINUED | OUTPATIENT
Start: 2022-12-23 | End: 2022-12-24

## 2022-12-23 RX ORDER — UMECLIDINIUM BROMIDE AND VILANTEROL TRIFENATATE 62.5; 25 UG/1; UG/1
1 POWDER RESPIRATORY (INHALATION) DAILY
COMMUNITY
End: 2023-01-21

## 2022-12-23 RX ORDER — HEPARIN SODIUM 5000 [USP'U]/ML
5000 INJECTION, SOLUTION INTRAVENOUS; SUBCUTANEOUS EVERY 8 HOURS
Status: DISCONTINUED | OUTPATIENT
Start: 2022-12-23 | End: 2022-12-26

## 2022-12-23 RX ORDER — DOXYCYCLINE HYCLATE 100 MG
100 TABLET ORAL 2 TIMES DAILY
Status: ON HOLD | COMMUNITY
Start: 2022-12-02 | End: 2023-01-09

## 2022-12-23 RX ORDER — AMMONIUM LACTATE 12 G/100G
1 LOTION TOPICAL 2 TIMES DAILY
Status: ON HOLD | COMMUNITY
End: 2023-01-09

## 2022-12-23 RX ORDER — NITROGLYCERIN 20 MG/100ML
0-200 INJECTION INTRAVENOUS CONTINUOUS
Status: DISCONTINUED | OUTPATIENT
Start: 2022-12-23 | End: 2022-12-24

## 2022-12-23 RX ORDER — FLUTICASONE PROPIONATE 44 UG/1
2 AEROSOL, METERED RESPIRATORY (INHALATION)
Status: DISCONTINUED | OUTPATIENT
Start: 2022-12-24 | End: 2022-12-28

## 2022-12-23 RX ORDER — DAPAGLIFLOZIN 5 MG/1
5 TABLET, FILM COATED ORAL EVERY MORNING
Status: ON HOLD | COMMUNITY
End: 2023-01-09

## 2022-12-23 RX ORDER — LABETALOL HYDROCHLORIDE 5 MG/ML
10 INJECTION, SOLUTION INTRAVENOUS EVERY 4 HOURS PRN
Status: DISCONTINUED | OUTPATIENT
Start: 2022-12-23 | End: 2022-12-24

## 2022-12-23 RX ORDER — METHYLPREDNISOLONE SODIUM SUCCINATE 125 MG/2ML
62.5 INJECTION, POWDER, LYOPHILIZED, FOR SOLUTION INTRAMUSCULAR; INTRAVENOUS EVERY 8 HOURS
Status: DISCONTINUED | OUTPATIENT
Start: 2022-12-24 | End: 2022-12-24

## 2022-12-23 RX ORDER — AMOXICILLIN 250 MG
2 CAPSULE ORAL 2 TIMES DAILY
Status: DISCONTINUED | OUTPATIENT
Start: 2022-12-23 | End: 2022-12-23

## 2022-12-23 RX ORDER — ALBUTEROL SULFATE 90 UG/1
1-2 AEROSOL, METERED RESPIRATORY (INHALATION) EVERY 6 HOURS PRN
COMMUNITY

## 2022-12-23 RX ORDER — METHYLPREDNISOLONE SODIUM SUCCINATE 125 MG/2ML
62.5 INJECTION, POWDER, LYOPHILIZED, FOR SOLUTION INTRAMUSCULAR; INTRAVENOUS EVERY 8 HOURS
Status: DISCONTINUED | OUTPATIENT
Start: 2022-12-23 | End: 2022-12-23

## 2022-12-23 RX ORDER — IPRATROPIUM BROMIDE AND ALBUTEROL SULFATE 2.5; .5 MG/3ML; MG/3ML
3 SOLUTION RESPIRATORY (INHALATION)
Status: DISCONTINUED | OUTPATIENT
Start: 2022-12-23 | End: 2023-01-08

## 2022-12-23 RX ORDER — IPRATROPIUM BROMIDE AND ALBUTEROL SULFATE 2.5; .5 MG/3ML; MG/3ML
3 SOLUTION RESPIRATORY (INHALATION)
Status: DISCONTINUED | OUTPATIENT
Start: 2022-12-23 | End: 2022-12-23

## 2022-12-23 RX ORDER — BISACODYL 10 MG
10 SUPPOSITORY, RECTAL RECTAL
Status: DISCONTINUED | OUTPATIENT
Start: 2022-12-23 | End: 2022-12-23

## 2022-12-23 RX ORDER — ATORVASTATIN CALCIUM 40 MG/1
40 TABLET, FILM COATED ORAL NIGHTLY
COMMUNITY
End: 2023-05-10

## 2022-12-23 RX ORDER — ALBUTEROL SULFATE 90 UG/1
2 AEROSOL, METERED RESPIRATORY (INHALATION) EVERY 6 HOURS PRN
Status: DISCONTINUED | OUTPATIENT
Start: 2022-12-23 | End: 2022-12-24

## 2022-12-23 RX ORDER — AMOXICILLIN 250 MG
2 CAPSULE ORAL 2 TIMES DAILY
Status: DISCONTINUED | OUTPATIENT
Start: 2022-12-23 | End: 2023-01-13 | Stop reason: HOSPADM

## 2022-12-23 RX ORDER — FUROSEMIDE 10 MG/ML
40 INJECTION INTRAMUSCULAR; INTRAVENOUS ONCE
Status: COMPLETED | OUTPATIENT
Start: 2022-12-23 | End: 2022-12-23

## 2022-12-23 RX ORDER — NITROGLYCERIN 0.4 MG/1
0.4 TABLET SUBLINGUAL
Status: DISCONTINUED | OUTPATIENT
Start: 2022-12-23 | End: 2022-12-24

## 2022-12-23 RX ORDER — LINAGLIPTIN 5 MG/1
5 TABLET, FILM COATED ORAL DAILY
COMMUNITY

## 2022-12-23 RX ORDER — GUAIFENESIN/DEXTROMETHORPHAN 100-10MG/5
10 SYRUP ORAL EVERY 6 HOURS PRN
Status: DISCONTINUED | OUTPATIENT
Start: 2022-12-23 | End: 2023-01-13 | Stop reason: HOSPADM

## 2022-12-23 RX ORDER — ONDANSETRON 2 MG/ML
4 INJECTION INTRAMUSCULAR; INTRAVENOUS EVERY 4 HOURS PRN
Status: DISCONTINUED | OUTPATIENT
Start: 2022-12-23 | End: 2023-01-13 | Stop reason: HOSPADM

## 2022-12-23 RX ORDER — POLYETHYLENE GLYCOL 3350 17 G/17G
1 POWDER, FOR SOLUTION ORAL
Status: DISCONTINUED | OUTPATIENT
Start: 2022-12-23 | End: 2023-01-13 | Stop reason: HOSPADM

## 2022-12-23 RX ORDER — KETOCONAZOLE 20 MG/G
1 CREAM TOPICAL 2 TIMES DAILY
Status: ON HOLD | COMMUNITY
End: 2023-01-09

## 2022-12-23 RX ORDER — ATORVASTATIN CALCIUM 40 MG/1
40 TABLET, FILM COATED ORAL NIGHTLY
Status: DISCONTINUED | OUTPATIENT
Start: 2022-12-23 | End: 2023-01-13 | Stop reason: HOSPADM

## 2022-12-23 RX ORDER — BISACODYL 10 MG
10 SUPPOSITORY, RECTAL RECTAL
Status: DISCONTINUED | OUTPATIENT
Start: 2022-12-23 | End: 2023-01-13 | Stop reason: HOSPADM

## 2022-12-23 RX ORDER — FLUTICASONE FUROATE, UMECLIDINIUM BROMIDE AND VILANTEROL TRIFENATATE 100; 62.5; 25 UG/1; UG/1; UG/1
1 POWDER RESPIRATORY (INHALATION) EVERY MORNING
COMMUNITY

## 2022-12-23 RX ORDER — AMITRIPTYLINE HYDROCHLORIDE 10 MG/1
10 TABLET, FILM COATED ORAL 2 TIMES DAILY
COMMUNITY
End: 2023-01-19

## 2022-12-23 RX ORDER — ISOSORBIDE MONONITRATE 30 MG/1
30 TABLET, EXTENDED RELEASE ORAL
Status: DISCONTINUED | OUTPATIENT
Start: 2022-12-24 | End: 2022-12-23

## 2022-12-23 RX ORDER — AMITRIPTYLINE HYDROCHLORIDE 10 MG/1
10 TABLET, FILM COATED ORAL 2 TIMES DAILY
Status: DISCONTINUED | OUTPATIENT
Start: 2022-12-23 | End: 2023-01-13 | Stop reason: HOSPADM

## 2022-12-23 RX ORDER — AMLODIPINE BESYLATE 10 MG/1
10 TABLET ORAL EVERY MORNING
Status: DISCONTINUED | OUTPATIENT
Start: 2022-12-24 | End: 2022-12-27

## 2022-12-23 RX ORDER — MORPHINE SULFATE 4 MG/ML
2-4 INJECTION INTRAVENOUS
Status: DISCONTINUED | OUTPATIENT
Start: 2022-12-23 | End: 2022-12-24

## 2022-12-23 RX ORDER — POLYETHYLENE GLYCOL 3350 17 G/17G
1 POWDER, FOR SOLUTION ORAL
Status: DISCONTINUED | OUTPATIENT
Start: 2022-12-23 | End: 2022-12-23

## 2022-12-23 RX ORDER — ACETAMINOPHEN 325 MG/1
650 TABLET ORAL EVERY 6 HOURS PRN
Status: DISCONTINUED | OUTPATIENT
Start: 2022-12-23 | End: 2023-01-13 | Stop reason: HOSPADM

## 2022-12-23 RX ORDER — METHYLPREDNISOLONE 4 MG/1
4-8 TABLET ORAL DAILY
Status: ON HOLD | COMMUNITY
Start: 2022-12-02 | End: 2023-01-09

## 2022-12-23 RX ORDER — FUROSEMIDE 10 MG/ML
40 INJECTION INTRAMUSCULAR; INTRAVENOUS
Status: DISCONTINUED | OUTPATIENT
Start: 2022-12-24 | End: 2022-12-23

## 2022-12-23 RX ORDER — DOXYCYCLINE 100 MG/1
100 TABLET ORAL EVERY 12 HOURS
Status: DISCONTINUED | OUTPATIENT
Start: 2022-12-23 | End: 2022-12-24

## 2022-12-23 RX ADMIN — IPRATROPIUM BROMIDE AND ALBUTEROL SULFATE 3 ML: 2.5; .5 SOLUTION RESPIRATORY (INHALATION) at 19:39

## 2022-12-23 RX ADMIN — PIPERACILLIN AND TAZOBACTAM 4.5 G: 4; .5 INJECTION, POWDER, LYOPHILIZED, FOR SOLUTION INTRAVENOUS; PARENTERAL at 16:32

## 2022-12-23 RX ADMIN — FUROSEMIDE 40 MG: 10 INJECTION, SOLUTION INTRAMUSCULAR; INTRAVENOUS at 14:15

## 2022-12-23 RX ADMIN — HEPARIN SODIUM 5000 UNITS: 5000 INJECTION, SOLUTION INTRAVENOUS; SUBCUTANEOUS at 21:20

## 2022-12-23 RX ADMIN — IPRATROPIUM BROMIDE AND ALBUTEROL SULFATE 3 ML: 2.5; .5 SOLUTION RESPIRATORY (INHALATION) at 23:31

## 2022-12-23 RX ADMIN — AMITRIPTYLINE HYDROCHLORIDE 10 MG: 10 TABLET, FILM COATED ORAL at 18:39

## 2022-12-23 RX ADMIN — NITROGLYCERIN 50 MCG/MIN: 20 INJECTION INTRAVENOUS at 14:20

## 2022-12-23 RX ADMIN — FUROSEMIDE 40 MG: 10 INJECTION, SOLUTION INTRAMUSCULAR; INTRAVENOUS at 18:34

## 2022-12-23 ASSESSMENT — ENCOUNTER SYMPTOMS
BRUISES/BLEEDS EASILY: 0
PALPITATIONS: 0
DIZZINESS: 0
MYALGIAS: 0
SPUTUM PRODUCTION: 0
SHORTNESS OF BREATH: 1
ABDOMINAL PAIN: 0
BLURRED VISION: 0
WEAKNESS: 1
FEVER: 0
DOUBLE VISION: 0
NAUSEA: 0
DEPRESSION: 0
CHILLS: 1
HEADACHES: 0
ORTHOPNEA: 1
HEARTBURN: 0
COUGH: 1
VOMITING: 0

## 2022-12-23 ASSESSMENT — LIFESTYLE VARIABLES
DO YOU DRINK ALCOHOL: NO
SUBSTANCE_ABUSE: 0

## 2022-12-23 ASSESSMENT — PULMONARY FUNCTION TESTS
EPAP_CMH2O: 10
EPAP_CMH2O: 10
EPAP_CMH2O: 7

## 2022-12-23 NOTE — ED NOTES
Lab called with critical result of trop: 473 at 1455. Critical lab result read back to lab.   Dr. Mayorga notified of critical lab result at 1455.  Critical lab result read back by Dr. Mayorga.

## 2022-12-23 NOTE — ED PROVIDER NOTES
ED Provider Note    CHIEF COMPLAINT  Chief Complaint   Patient presents with    Shortness of Breath     BIB EMS from home for SOB x 1 hour. Hx of COPD.   O2 SAT 88% on baseline 4L PTA. Improved with CPAP.   Recevied 125 mg solumedrol, 2 duoneb and 2 albuterol.     Edema     Abd swelling, BLE pitting edema.        LIMITATION TO HISTORY   Select: Altered mental status / Confusion    [facility]  Lionel Hi is a 67 y.o. male who presents with shortness of breath.  Patient reportedly was diagnosed with pneumonia 1 week ago and took a course of antibiotics.  He was improving but over the course of the past day he became more short of breath.  He notes that he has been coughing but denies any fever or chest pain.  He has not had any nausea, vomiting, diarrhea.  He smokes cigarettes daily.  He has a history of COPD.  He does not have a history of CHF nor is he prescribed a diuretic.  EMS was contacted and he was found to be 88% on his baseline 4 L of supplemental oxygen.  He was placed on a nonrebreather but his oxygen saturations did not improve and so he was started on noninvasive positive pressure ventilation.  He was noted to be wheezy and was given 2 DuoNeb's, and 2 albuterol treatments, and 125 mg of Solu-Medrol.  He otherwise cannot provide any information about his history.    OUTSIDE HISTORIAN(S):  Select: EMS      EXTERNAL RECORDS REVIEWED  Select: Other -none available    REVIEW OF SYSTEMS  See HPI for further details.  Shortness of breath.  Cough.  All other systems are negative.     PAST MEDICAL HISTORY   has a past medical history of Chronic obstructive pulmonary disease (HCC).    SURGICAL HISTORY  patient denies any surgical history    FAMILY HISTORY  History reviewed. No pertinent family history.    SOCIAL HISTORY  Social History     Tobacco Use    Smoking status: Every Day     Types: Cigarettes    Smokeless tobacco: Never   Substance and Sexual Activity    Alcohol use: Not Currently    Drug use: Yes      "Types: Inhaled     Comment: thc    Sexual activity: Not on file       CURRENT MEDICATIONS  Home Medications       Reviewed by Peewee Mcmillan R.N. (Registered Nurse) on 12/23/22 at 1409  Med List Status: Partial     Medication Last Dose Status        Patient Enrico Taking any Medications                           ALLERGIES  No Known Allergies    PHYSICAL EXAM  VITAL SIGNS: BP (!) 182/86   Pulse (!) 121   Resp (!) 28   Ht 1.778 m (5' 10\")   Wt (!) 147 kg (325 lb)   SpO2 100%   BMI 46.63 kg/m²    Physical Exam  Vitals and nursing note reviewed.   Constitutional:       General: He is in acute distress.      Appearance: He is obese. He is ill-appearing and toxic-appearing.   HENT:      Head: Normocephalic and atraumatic.      Mouth/Throat:      Comments: Dry mucous membranes  Eyes:      Extraocular Movements: Extraocular movements intact.      Pupils: Pupils are equal, round, and reactive to light.   Cardiovascular:      Rate and Rhythm: Regular rhythm. Tachycardia present.      Heart sounds: No murmur heard.    No friction rub. No gallop.   Pulmonary:      Breath sounds: Decreased breath sounds, wheezing and rhonchi present.      Comments: Increased work of breathing with tachypnea, nasal flaring, accessory muscle use.  CPAP mask in place.  Decreased breath sounds bilaterally.  Rhonchorous and wheezing bilaterally.  Abdominal:      Comments: Distended and nontender.  Normal bowel sounds.  Soft, easily reducible umbilical hernia.   Musculoskeletal:      Cervical back: Normal range of motion and neck supple.      Comments: 3+ pitting edema in bilateral lower extremities.   Skin:     General: Skin is warm and dry.      Comments: Erythematous plaques over the bilateral anterior lower extremities.   Neurological:      General: No focal deficit present.      Mental Status: He is alert.   Psychiatric:         Mood and Affect: Mood is anxious.     DIAGNOSTIC STUDIES / PROCEDURES    LABS  Results for orders placed or " performed during the hospital encounter of 12/23/22   CBC WITH DIFFERENTIAL   Result Value Ref Range    WBC 17.5 (H) 4.8 - 10.8 K/uL    RBC 3.78 (L) 4.70 - 6.10 M/uL    Hemoglobin 9.9 (L) 14.0 - 18.0 g/dL    Hematocrit 33.4 (L) 42.0 - 52.0 %    MCV 88.4 81.4 - 97.8 fL    MCH 26.2 (L) 27.0 - 33.0 pg    MCHC 29.6 (L) 33.7 - 35.3 g/dL    RDW 49.7 35.9 - 50.0 fL    Platelet Count 474 (H) 164 - 446 K/uL    MPV 9.3 9.0 - 12.9 fL    Neutrophils-Polys 61.90 44.00 - 72.00 %    Lymphocytes 31.30 22.00 - 41.00 %    Monocytes 6.80 0.00 - 13.40 %    Eosinophils 0.00 0.00 - 6.90 %    Basophils 0.00 0.00 - 1.80 %    Nucleated RBC 0.00 /100 WBC    Neutrophils (Absolute) 10.83 (H) 1.82 - 7.42 K/uL    Lymphs (Absolute) 5.48 (H) 1.00 - 4.80 K/uL    Monos (Absolute) 1.19 (H) 0.00 - 0.85 K/uL    Eos (Absolute) 0.00 0.00 - 0.51 K/uL    Baso (Absolute) 0.00 0.00 - 0.12 K/uL    NRBC (Absolute) 0.00 K/uL   Comp Metabolic Panel   Result Value Ref Range    Sodium 137 135 - 145 mmol/L    Potassium 4.7 3.6 - 5.5 mmol/L    Chloride 105 96 - 112 mmol/L    Co2 21 20 - 33 mmol/L    Anion Gap 11.0 7.0 - 16.0    Glucose 125 (H) 65 - 99 mg/dL    Bun 34 (H) 8 - 22 mg/dL    Creatinine 3.31 (H) 0.50 - 1.40 mg/dL    Calcium 7.9 (L) 8.5 - 10.5 mg/dL    AST(SGOT) 16 12 - 45 U/L    ALT(SGPT) 18 2 - 50 U/L    Alkaline Phosphatase 100 (H) 30 - 99 U/L    Total Bilirubin 0.2 0.1 - 1.5 mg/dL    Albumin 3.6 3.2 - 4.9 g/dL    Total Protein 7.6 6.0 - 8.2 g/dL    Globulin 4.0 (H) 1.9 - 3.5 g/dL    A-G Ratio 0.9 g/dL   TROPONIN   Result Value Ref Range    Troponin T 473 (H) 6 - 19 ng/L   Lactic Acid   Result Value Ref Range    Lactic Acid 1.9 0.5 - 2.0 mmol/L   proBrain Natriuretic Peptide, NT   Result Value Ref Range    NT-proBNP 6487 (H) 0 - 125 pg/mL   PROCALCITONIN   Result Value Ref Range    Procalcitonin 0.15 <0.25 ng/mL   LACTIC ACID   Result Value Ref Range    Lactic Acid 1.6 0.5 - 2.0 mmol/L   CORRECTED CALCIUM   Result Value Ref Range    Correct Calcium 8.2  (L) 8.5 - 10.5 mg/dL   ESTIMATED GFR   Result Value Ref Range    GFR (CKD-EPI) 19 (A) >60 mL/min/1.73 m 2   DIFFERENTIAL MANUAL   Result Value Ref Range    Manual Diff Status PERFORMED    PERIPHERAL SMEAR REVIEW   Result Value Ref Range    Peripheral Smear Review see below    PLATELET ESTIMATE   Result Value Ref Range    Plt Estimation Increased    MORPHOLOGY   Result Value Ref Range    RBC Morphology Normal    CoV-2, FLU A/B, and RSV by PCR (2-4 Hours CEPHEID) : Collect NP swab in VTM    Specimen: Respirate   Result Value Ref Range    Influenza virus A RNA Negative Negative    Influenza virus B, PCR Negative Negative    RSV, PCR Negative Negative    SARS-CoV-2 by PCR NotDetected     SARS-CoV-2 Source NP Swab    TROPONIN   Result Value Ref Range    Troponin T 459 (H) 6 - 19 ng/L   Cortisol   Result Value Ref Range    Cortisol 23.8 (H) 0.0 - 23.0 ug/dL   Procalcitonin   Result Value Ref Range    Procalcitonin 0.16 <0.25 ng/mL   CRP QUANTITIVE (NON-CARDIAC)   Result Value Ref Range    Stat C-Reactive Protein 1.13 (H) 0.00 - 0.75 mg/dL   VENOUS BLOOD GAS   Result Value Ref Range    Venous Bg Ph 7.22 (L) 7.31 - 7.45    Venous Bg Ph Temp Corrected 7.23 (L) 7.31 - 7.45    Venous Bg Pco2 51.1 (H) 41.0 - 51.0 mmHg    Venous Bg Pco2 Temp Corrected 49.3 41.0 - 51.0 mmHg    Venous Bg Po2 23.1 (L) 25.0 - 40.0 mmHg    Venous Bg Po2 Temp Corrected 21.8 (L) 25.0 - 40.0 mmHg    Venous Bg O2 Saturation 35.1 %    Venous Bg Hco3 20 (L) 24 - 28 mmol/L    Venous Bg Base Excess -7 mmol/L    Body Temp 36.2 Centigrade   EKG   Result Value Ref Range    Report       [facility] Emergency Dept.    Test Date:  2022  Pt Name:    BROOK CAIN            Department: ER  MRN:        [id number]                      Room:       RD 10  Gender:     Male                         Technician: [id number]  :        1955                   Requested By:DECLAN MARSH  Order #:    [id number]                    Reading MD: Declan  MD Mukesh    Measurements  Intervals                                Axis  Rate:       117                          P:          73  IL:         185                          QRS:        88  QRSD:       134                          T:          -82  QT:         353  QTc:        493    Interpretive Statements  Sinus tachycardia  Right bundle branch block  Lateral leads are also involved  No previous ECG available for comparison  Electronically Signed On 2022 15:08:25 PST by Declan Marsh MD     EKG (NOW)   Result Value Ref Range    Report       [facility] Emergency Dept.    Test Date:  2022  Pt Name:    BROOK CAIN            Department: ER  MRN:        [id number]                      Room:        10  Gender:     Male                         Technician: [id number]  :        1955                   Requested By:DECLAN MARSH  Order #:    [id number]                    Reading MD:    Measurements  Intervals                                Axis  Rate:       104                          P:          74  IL:         186                          QRS:        88  QRSD:       135                          T:          240  QT:         336  QTc:        442    Interpretive Statements  Sinus tachycardia  Right bundle branch block  Nonspecific repol abnormality, lateral leads  Compared to ECG 2022 14:12:06  Early repolarization now present       RADIOLOGY  I have independently interpreted the diagnostic imaging associated with this visit and am waiting the final reading from the radiologist. Select: X-ray(s) -diffuse pulmonary edema  DX-CHEST-PORTABLE (1 VIEW)   Final Result      1.  Cardiomegaly with interstitial infiltrates likely representing pulmonary edema.      2.  Bibasilar atelectasis and small bilateral pleural effusions.      EC-ECHOCARDIOGRAM COMPLETE W/O CONT    (Results Pending)     COURSE & MEDICAL DECISION MAKING  Pertinent Labs & Imaging studies reviewed. (See chart for  details)  This is a 67-year-old gentleman who I was asked to emergently evaluate as he came in and acute hypoxic respiratory failure requiring noninvasive positive pressure ventilation.  He arrives tachycardic but afebrile.  He is tachypneic, using accessory muscles to breathe, and has nasal flaring.  He has decreased breath sounds bilaterally with expiratory wheezes.  His bilateral lower extremities have significant pitting.  He denies any history of CHF.  I attempted to review his prior records and there unfortunately are none available as he is here under an ileus.  Unclear whether or not he has a history of heart failure and he denies taking a diuretic but clinically this appears to represent CHF with fluid overload.  A chest x-ray was ordered stat to the bedside and my read is consistent with diffuse pulmonary edema.  Patient was given a dose of Lasix and started on a nitroglycerin drip as he was hypertensive.    EKG reveals sinus tachycardia.  There is no evidence for STEMI but there are some ST depressions in his anterior lateral leads.    CBC reveals leukocytosis with neutrophilic predominance. Metabolic panel demonstrates normal electrolytes. He has a normal CO2 and anion gap. Glucose is slightly elevated to 125. BUN/Cr elevated to 34/3.31. BNP elevated to 6487 and troponin elevated to 473. Procalcitonin is negative.    Given the patient's clinical picture as well as leukocytosis, I elected to start him on zosyn to cover for pneumonia. He does have pseudomonas risk factors. Blood cultures were drawn before administration.    Viral swabs are all negative.    Lactic acid x2 are negative.    Patient was able to transition off NIPPV to nasal cannula but was noted to have continued increased work of breathing although his O2 sats were improved to the mid 90s on 5L.     Patient was discussed with Dr. Stover, the intensivist. He has recommended initiation of HFNC which was done with improvement in patient's work  of breathing.     I discussed the patient with on-call cardiologist, Dr. Singer, who reviewed the EKG. No STEMI. He has recommended a second troponin and if increasing we will start heparin. He does not feel the patient requires cardiac catheterization presently.    Patient reevaluated multiple times and continues to appear improved. I spoke with the  resident to discuss admission to their service. Dr. Stover will see the patient in the ER to determine if he thinks he requires ICU level care.    Patient care turned over to my partner, Dr. Barnes pending evaluation by critical care. He will determine final dispo based on recommendations from Dr. Stover.    Patient is critically ill.   The patient continues to have: Hypoxic respiratory failure with elevated troponin  The vital organ system that is affected is the: All are at risk  If untreated there is a high chance of deterioration into: Cardiac arrest, respiratory arrest, and eventually death.   The critical care that I am providing today is: Review of medical records (once patient's chart was reconciled), extensive initial bedside evaluation and resuscitation, interpretation of lab/imaging results, medication management, frequent and multiple bedside reevaluations, discussion with cardiologist, intensivist, and family medicine resident, and preparation of the medical record.  The critical that has been undertaken is medically complex.   There has been no overlap in critical care time.   Critical Care Time not including procedures: 47 minutes    FINAL IMPRESSION  Fluid overload  NSTEMI  FARIHA  Hypoxic respiratory failure       Electronically signed by: Juan Mayorga M.D., 12/23/2022 2:12 PM

## 2022-12-24 ENCOUNTER — APPOINTMENT (OUTPATIENT)
Dept: RADIOLOGY | Facility: MEDICAL CENTER | Age: 67
DRG: 291 | End: 2022-12-24
Attending: STUDENT IN AN ORGANIZED HEALTH CARE EDUCATION/TRAINING PROGRAM
Payer: COMMERCIAL

## 2022-12-24 ENCOUNTER — APPOINTMENT (OUTPATIENT)
Dept: CARDIOLOGY | Facility: MEDICAL CENTER | Age: 67
DRG: 291 | End: 2022-12-24
Attending: STUDENT IN AN ORGANIZED HEALTH CARE EDUCATION/TRAINING PROGRAM
Payer: COMMERCIAL

## 2022-12-24 LAB
ALBUMIN SERPL BCP-MCNC: 3 G/DL (ref 3.2–4.9)
ALBUMIN/GLOB SERPL: 0.9 G/DL
ALP SERPL-CCNC: 82 U/L (ref 30–99)
ALT SERPL-CCNC: 18 U/L (ref 2–50)
AMPHET UR QL SCN: NEGATIVE
ANION GAP SERPL CALC-SCNC: 12 MMOL/L (ref 7–16)
AST SERPL-CCNC: 20 U/L (ref 12–45)
BARBITURATES UR QL SCN: NEGATIVE
BASOPHILS # BLD AUTO: 0.2 % (ref 0–1.8)
BASOPHILS # BLD: 0.02 K/UL (ref 0–0.12)
BENZODIAZ UR QL SCN: NEGATIVE
BILIRUB SERPL-MCNC: 0.2 MG/DL (ref 0.1–1.5)
BUN SERPL-MCNC: 37 MG/DL (ref 8–22)
BZE UR QL SCN: NEGATIVE
CALCIUM ALBUM COR SERPL-MCNC: 8.4 MG/DL (ref 8.5–10.5)
CALCIUM SERPL-MCNC: 7.6 MG/DL (ref 8.5–10.5)
CANNABINOIDS UR QL SCN: NEGATIVE
CHLORIDE SERPL-SCNC: 106 MMOL/L (ref 96–112)
CHOLEST SERPL-MCNC: 90 MG/DL (ref 100–199)
CO2 SERPL-SCNC: 17 MMOL/L (ref 20–33)
CREAT SERPL-MCNC: 3.66 MG/DL (ref 0.5–1.4)
EKG IMPRESSION: NORMAL
EOSINOPHIL # BLD AUTO: 0 K/UL (ref 0–0.51)
EOSINOPHIL NFR BLD: 0 % (ref 0–6.9)
ERYTHROCYTE [DISTWIDTH] IN BLOOD BY AUTOMATED COUNT: 48.9 FL (ref 35.9–50)
GFR SERPLBLD CREATININE-BSD FMLA CKD-EPI: 17 ML/MIN/1.73 M 2
GLOBULIN SER CALC-MCNC: 3.5 G/DL (ref 1.9–3.5)
GLUCOSE BLD STRIP.AUTO-MCNC: 192 MG/DL (ref 65–99)
GLUCOSE BLD STRIP.AUTO-MCNC: 202 MG/DL (ref 65–99)
GLUCOSE BLD STRIP.AUTO-MCNC: 229 MG/DL (ref 65–99)
GLUCOSE BLD STRIP.AUTO-MCNC: 249 MG/DL (ref 65–99)
GLUCOSE SERPL-MCNC: 125 MG/DL (ref 65–99)
HCT VFR BLD AUTO: 27.3 % (ref 42–52)
HDLC SERPL-MCNC: 40 MG/DL
HGB BLD-MCNC: 8.3 G/DL (ref 14–18)
IMM GRANULOCYTES # BLD AUTO: 0.07 K/UL (ref 0–0.11)
IMM GRANULOCYTES NFR BLD AUTO: 0.6 % (ref 0–0.9)
LDLC SERPL CALC-MCNC: 42 MG/DL
LYMPHOCYTES # BLD AUTO: 0.66 K/UL (ref 1–4.8)
LYMPHOCYTES NFR BLD: 5.5 % (ref 22–41)
MAGNESIUM SERPL-MCNC: 1.8 MG/DL (ref 1.5–2.5)
MCH RBC QN AUTO: 26.4 PG (ref 27–33)
MCHC RBC AUTO-ENTMCNC: 30.4 G/DL (ref 33.7–35.3)
MCV RBC AUTO: 86.9 FL (ref 81.4–97.8)
METHADONE UR QL SCN: NEGATIVE
MONOCYTES # BLD AUTO: 0.09 K/UL (ref 0–0.85)
MONOCYTES NFR BLD AUTO: 0.7 % (ref 0–13.4)
NEUTROPHILS # BLD AUTO: 11.21 K/UL (ref 1.82–7.42)
NEUTROPHILS NFR BLD: 93 % (ref 44–72)
NRBC # BLD AUTO: 0 K/UL
NRBC BLD-RTO: 0 /100 WBC
OPIATES UR QL SCN: NEGATIVE
OXYCODONE UR QL SCN: NEGATIVE
PCP UR QL SCN: NEGATIVE
PHOSPHATE SERPL-MCNC: 3.6 MG/DL (ref 2.5–4.5)
PLATELET # BLD AUTO: 344 K/UL (ref 164–446)
PMV BLD AUTO: 9.3 FL (ref 9–12.9)
POTASSIUM SERPL-SCNC: 5.1 MMOL/L (ref 3.6–5.5)
PROPOXYPH UR QL SCN: NEGATIVE
PROT SERPL-MCNC: 6.5 G/DL (ref 6–8.2)
RBC # BLD AUTO: 3.14 M/UL (ref 4.7–6.1)
SODIUM SERPL-SCNC: 135 MMOL/L (ref 135–145)
TRIGL SERPL-MCNC: 41 MG/DL (ref 0–149)
TROPONIN T SERPL-MCNC: 404 NG/L (ref 6–19)
WBC # BLD AUTO: 12.1 K/UL (ref 4.8–10.8)

## 2022-12-24 PROCEDURE — 84100 ASSAY OF PHOSPHORUS: CPT

## 2022-12-24 PROCEDURE — 700102 HCHG RX REV CODE 250 W/ 637 OVERRIDE(OP): Performed by: EMERGENCY MEDICINE

## 2022-12-24 PROCEDURE — 96366 THER/PROPH/DIAG IV INF ADDON: CPT

## 2022-12-24 PROCEDURE — 93970 EXTREMITY STUDY: CPT

## 2022-12-24 PROCEDURE — 94660 CPAP INITIATION&MGMT: CPT

## 2022-12-24 PROCEDURE — 87899 AGENT NOS ASSAY W/OPTIC: CPT

## 2022-12-24 PROCEDURE — 94760 N-INVAS EAR/PLS OXIMETRY 1: CPT

## 2022-12-24 PROCEDURE — 94640 AIRWAY INHALATION TREATMENT: CPT

## 2022-12-24 PROCEDURE — 770022 HCHG ROOM/CARE - ICU (200)

## 2022-12-24 PROCEDURE — 700105 HCHG RX REV CODE 258: Performed by: INTERNAL MEDICINE

## 2022-12-24 PROCEDURE — 700111 HCHG RX REV CODE 636 W/ 250 OVERRIDE (IP): Performed by: INTERNAL MEDICINE

## 2022-12-24 PROCEDURE — 99406 BEHAV CHNG SMOKING 3-10 MIN: CPT

## 2022-12-24 PROCEDURE — 80053 COMPREHEN METABOLIC PANEL: CPT

## 2022-12-24 PROCEDURE — 99291 CRITICAL CARE FIRST HOUR: CPT | Performed by: INTERNAL MEDICINE

## 2022-12-24 PROCEDURE — A9270 NON-COVERED ITEM OR SERVICE: HCPCS | Performed by: STUDENT IN AN ORGANIZED HEALTH CARE EDUCATION/TRAINING PROGRAM

## 2022-12-24 PROCEDURE — 80307 DRUG TEST PRSMV CHEM ANLYZR: CPT

## 2022-12-24 PROCEDURE — 85025 COMPLETE CBC W/AUTO DIFF WBC: CPT

## 2022-12-24 PROCEDURE — 93970 EXTREMITY STUDY: CPT | Mod: 26 | Performed by: INTERNAL MEDICINE

## 2022-12-24 PROCEDURE — 700102 HCHG RX REV CODE 250 W/ 637 OVERRIDE(OP): Performed by: INTERNAL MEDICINE

## 2022-12-24 PROCEDURE — 96376 TX/PRO/DX INJ SAME DRUG ADON: CPT

## 2022-12-24 PROCEDURE — 700102 HCHG RX REV CODE 250 W/ 637 OVERRIDE(OP): Performed by: STUDENT IN AN ORGANIZED HEALTH CARE EDUCATION/TRAINING PROGRAM

## 2022-12-24 PROCEDURE — A9270 NON-COVERED ITEM OR SERVICE: HCPCS | Performed by: INTERNAL MEDICINE

## 2022-12-24 PROCEDURE — 96375 TX/PRO/DX INJ NEW DRUG ADDON: CPT

## 2022-12-24 PROCEDURE — A9270 NON-COVERED ITEM OR SERVICE: HCPCS | Performed by: EMERGENCY MEDICINE

## 2022-12-24 PROCEDURE — 87449 NOS EACH ORGANISM AG IA: CPT

## 2022-12-24 PROCEDURE — 93010 ELECTROCARDIOGRAM REPORT: CPT | Performed by: INTERNAL MEDICINE

## 2022-12-24 PROCEDURE — 93306 TTE W/DOPPLER COMPLETE: CPT

## 2022-12-24 PROCEDURE — 700101 HCHG RX REV CODE 250: Performed by: STUDENT IN AN ORGANIZED HEALTH CARE EDUCATION/TRAINING PROGRAM

## 2022-12-24 PROCEDURE — 84484 ASSAY OF TROPONIN QUANT: CPT

## 2022-12-24 PROCEDURE — 700111 HCHG RX REV CODE 636 W/ 250 OVERRIDE (IP): Performed by: STUDENT IN AN ORGANIZED HEALTH CARE EDUCATION/TRAINING PROGRAM

## 2022-12-24 PROCEDURE — 82962 GLUCOSE BLOOD TEST: CPT | Mod: 91

## 2022-12-24 PROCEDURE — 94664 DEMO&/EVAL PT USE INHALER: CPT

## 2022-12-24 PROCEDURE — 36415 COLL VENOUS BLD VENIPUNCTURE: CPT

## 2022-12-24 PROCEDURE — 83735 ASSAY OF MAGNESIUM: CPT

## 2022-12-24 PROCEDURE — 700117 HCHG RX CONTRAST REV CODE 255: Performed by: STUDENT IN AN ORGANIZED HEALTH CARE EDUCATION/TRAINING PROGRAM

## 2022-12-24 PROCEDURE — 96372 THER/PROPH/DIAG INJ SC/IM: CPT

## 2022-12-24 PROCEDURE — 80061 LIPID PANEL: CPT

## 2022-12-24 PROCEDURE — 93005 ELECTROCARDIOGRAM TRACING: CPT | Performed by: STUDENT IN AN ORGANIZED HEALTH CARE EDUCATION/TRAINING PROGRAM

## 2022-12-24 RX ORDER — METHYLPREDNISOLONE SODIUM SUCCINATE 40 MG/ML
40 INJECTION, POWDER, LYOPHILIZED, FOR SOLUTION INTRAMUSCULAR; INTRAVENOUS EVERY 8 HOURS
Status: DISCONTINUED | OUTPATIENT
Start: 2022-12-24 | End: 2022-12-25

## 2022-12-24 RX ORDER — IPRATROPIUM BROMIDE AND ALBUTEROL SULFATE 2.5; .5 MG/3ML; MG/3ML
3 SOLUTION RESPIRATORY (INHALATION)
Status: DISCONTINUED | OUTPATIENT
Start: 2022-12-25 | End: 2022-12-27

## 2022-12-24 RX ORDER — FUROSEMIDE 10 MG/ML
60 INJECTION INTRAMUSCULAR; INTRAVENOUS EVERY 8 HOURS
Status: DISCONTINUED | OUTPATIENT
Start: 2022-12-24 | End: 2022-12-25

## 2022-12-24 RX ADMIN — ASPIRIN 81 MG: 81 TABLET, COATED ORAL at 08:33

## 2022-12-24 RX ADMIN — IPRATROPIUM BROMIDE AND ALBUTEROL SULFATE 3 ML: 2.5; .5 SOLUTION RESPIRATORY (INHALATION) at 16:17

## 2022-12-24 RX ADMIN — INSULIN HUMAN 3 UNITS: 100 INJECTION, SOLUTION PARENTERAL at 11:50

## 2022-12-24 RX ADMIN — IPRATROPIUM BROMIDE AND ALBUTEROL SULFATE 3 ML: 2.5; .5 SOLUTION RESPIRATORY (INHALATION) at 19:39

## 2022-12-24 RX ADMIN — HUMAN ALBUMIN MICROSPHERES AND PERFLUTREN 3 ML: 10; .22 INJECTION, SOLUTION INTRAVENOUS at 21:15

## 2022-12-24 RX ADMIN — METOPROLOL TARTRATE 25 MG: 25 TABLET, FILM COATED ORAL at 17:20

## 2022-12-24 RX ADMIN — FUROSEMIDE 60 MG: 10 INJECTION, SOLUTION INTRAMUSCULAR; INTRAVENOUS at 13:13

## 2022-12-24 RX ADMIN — NITROGLYCERIN 0.5 INCH: 20 OINTMENT TOPICAL at 11:55

## 2022-12-24 RX ADMIN — IPRATROPIUM BROMIDE AND ALBUTEROL SULFATE 3 ML: 2.5; .5 SOLUTION RESPIRATORY (INHALATION) at 06:02

## 2022-12-24 RX ADMIN — FUROSEMIDE 60 MG: 10 INJECTION, SOLUTION INTRAMUSCULAR; INTRAVENOUS at 08:38

## 2022-12-24 RX ADMIN — METHYLPREDNISOLONE SODIUM SUCCINATE 40 MG: 40 INJECTION, POWDER, FOR SOLUTION INTRAMUSCULAR; INTRAVENOUS at 05:12

## 2022-12-24 RX ADMIN — AMLODIPINE BESYLATE 10 MG: 10 TABLET ORAL at 08:34

## 2022-12-24 RX ADMIN — IPRATROPIUM BROMIDE AND ALBUTEROL SULFATE 3 ML: 2.5; .5 SOLUTION RESPIRATORY (INHALATION) at 11:51

## 2022-12-24 RX ADMIN — METHYLPREDNISOLONE SODIUM SUCCINATE 40 MG: 40 INJECTION, POWDER, FOR SOLUTION INTRAMUSCULAR; INTRAVENOUS at 13:13

## 2022-12-24 RX ADMIN — IPRATROPIUM BROMIDE AND ALBUTEROL SULFATE 3 ML: 2.5; .5 SOLUTION RESPIRATORY (INHALATION) at 02:31

## 2022-12-24 RX ADMIN — FUROSEMIDE 60 MG: 10 INJECTION, SOLUTION INTRAMUSCULAR; INTRAVENOUS at 21:06

## 2022-12-24 RX ADMIN — NITROGLYCERIN 0.5 INCH: 20 OINTMENT TOPICAL at 08:50

## 2022-12-24 RX ADMIN — CHLOROTHIAZIDE SODIUM 500 MG: 500 INJECTION INTRAVENOUS at 09:28

## 2022-12-24 RX ADMIN — METOPROLOL TARTRATE 25 MG: 25 TABLET, FILM COATED ORAL at 08:33

## 2022-12-24 RX ADMIN — HEPARIN SODIUM 5000 UNITS: 5000 INJECTION, SOLUTION INTRAVENOUS; SUBCUTANEOUS at 05:11

## 2022-12-24 RX ADMIN — METHYLPREDNISOLONE SODIUM SUCCINATE 40 MG: 40 INJECTION, POWDER, FOR SOLUTION INTRAMUSCULAR; INTRAVENOUS at 21:06

## 2022-12-24 RX ADMIN — INSULIN HUMAN 2 UNITS: 100 INJECTION, SOLUTION PARENTERAL at 08:46

## 2022-12-24 RX ADMIN — INSULIN HUMAN 3 UNITS: 100 INJECTION, SOLUTION PARENTERAL at 23:39

## 2022-12-24 RX ADMIN — ATORVASTATIN CALCIUM 40 MG: 40 TABLET, FILM COATED ORAL at 21:07

## 2022-12-24 RX ADMIN — AMITRIPTYLINE HYDROCHLORIDE 10 MG: 10 TABLET, FILM COATED ORAL at 17:20

## 2022-12-24 RX ADMIN — HEPARIN SODIUM 5000 UNITS: 5000 INJECTION, SOLUTION INTRAVENOUS; SUBCUTANEOUS at 13:13

## 2022-12-24 RX ADMIN — HEPARIN SODIUM 5000 UNITS: 5000 INJECTION, SOLUTION INTRAVENOUS; SUBCUTANEOUS at 21:07

## 2022-12-24 RX ADMIN — INSULIN HUMAN 3 UNITS: 100 INJECTION, SOLUTION PARENTERAL at 17:20

## 2022-12-24 ASSESSMENT — PAIN DESCRIPTION - PAIN TYPE
TYPE: ACUTE PAIN

## 2022-12-24 ASSESSMENT — ENCOUNTER SYMPTOMS
DEPRESSION: 0
WEIGHT LOSS: 0
NERVOUS/ANXIOUS: 0
HEADACHES: 0
ABDOMINAL PAIN: 0
COUGH: 1
PND: 1
SHORTNESS OF BREATH: 1
WHEEZING: 1
EYE DISCHARGE: 0
ORTHOPNEA: 1
EYE REDNESS: 0
BLURRED VISION: 0
MYALGIAS: 0
BACK PAIN: 0
MEMORY LOSS: 0
BRUISES/BLEEDS EASILY: 0
DIZZINESS: 0
CHILLS: 0
INSOMNIA: 1
PALPITATIONS: 0
SPUTUM PRODUCTION: 0
NAUSEA: 0
SORE THROAT: 0
VOMITING: 0
FEVER: 0
SPEECH CHANGE: 0
SENSORY CHANGE: 0

## 2022-12-24 ASSESSMENT — PULMONARY FUNCTION TESTS
EPAP_CMH2O: 10
EPAP_CMH2O: 10

## 2022-12-24 ASSESSMENT — FIBROSIS 4 INDEX: FIB4 SCORE: 0.93

## 2022-12-24 NOTE — ED NOTES
Medicated Pt according to MAR.    Rounded on Pt.    Informed Pt of need for sputum recollect.    Updated Pt on plan of care. Pt verbalized understanding.  No acute distress at this time.  Will continue to monitor.

## 2022-12-24 NOTE — ED NOTES
Pt medicated per MAR. Tolerated well. Pt provided sandwich at this time. Awaiting breakfast tray.   Pt states he feels fine without bipap and on 5L NC. sats WNL. Informed pt to notify RN if feeling more SOB or in distress. Pt agreeable. Remains on all monitoring, call light within reach.

## 2022-12-24 NOTE — ED NOTES
Labs collected and sent.   Sputum sample collected and sent.   Pt attempting to use urinal now.     Admitting MD at bedside.

## 2022-12-24 NOTE — ASSESSMENT & PLAN NOTE
Suspected  Cr 3.31 now slightly worse 3.66   Reduce Diuresis to IV lasix 60 mg BID  Monitor renal function while on Diuretic

## 2022-12-24 NOTE — PROGRESS NOTES
Patient to S-121 on transport monitor with ACLS RN. Arrived to unit at 0945.   Vital Signs:   HR: 103  BP: 121/72  O2 saturation: 92% on 5L nasal cannula  RR: 27  Temp: 97.3   Weight:  153.3 kg.      Patient belongings: Socks, flannel shirt, sweatpants. Cash: $1475--counted with patient and Hao and Marielle HUNG RN. Patient verbalized he will have his girlfriend take the cash home once she arrives, in the meantime, patient verbalized consent with storing cash in locked storage drawer.     4 Eyes Skin Assessment Completed by MICHAEL Bui and Marielle HUNG RN.    Head WDL  Ears WDL  Nose WDL  Mouth WDL  Neck WDL  Breast/Chest WDL  Shoulder Blades WDL  Spine WDL  (R) Arm/Elbow/Hand WDL  (L) Arm/Elbow/Hand WDL  Abdomen WDL  Groin WDL  Scrotum/Coccyx/Buttocks WDL  (R) Leg Redness, Rash, and Swelling--excoriation to shin, patient states this resulted from wearing compression stockings.  (L) Leg Redness, Rash, and Swelling--excoriation to shin, patient states this is from wearing compression stockings.  (R) Heel/Foot/Toe WDL  (L) Heel/Foot/Toe WDL        Devices In Places ECG, Blood Pressure Cuff, Pulse Ox, SCD's, and Nasal Cannula      Interventions In Place Gray Ear Foams, NC W/Ear Foams, Sacral Mepilex, Pillows, Q2 Turns, Heels Loaded W/Pillows, and Pressure Redistribution Mattress    Possible Skin Injury Yes    Pictures Uploaded Into Epic Yes  Wound Consult Placed Yes  RN Wound Prevention Protocol Ordered Yes

## 2022-12-24 NOTE — CONSULTS
Critical Care Consultation    Date of consult: 12/23/2022    Referring Physician  Hao Urena M.D.    Reason for Consultation  [facility]    History of Presenting Illness  67 y.o. male who presented 12/23/2022 with history of COPD, 4 L dependence who also has , DM CAD, CHF, ALONDRA OHS, uses nighttime CPAP presented with dyspnea and lower extremity edema that has been progressive over days.  Patient denies any chest pain, but says that his breathing has become worsening.  On arrival to ED he was hypoxemic, reportedly 88% on his home O2, and was working to breathe and was in respiratory distress at arrival to the emergency department.  He was given Solu-Medrol, nebulized bronchodilators and placed on bilevel ventilation for work of breathing and respiratory failure.    MI evaluation the patient has been weaned to high flow nasal oxygen looks fairly comfortable but does have very shallow respirations, and mild work of breathing with a little bit of accessory muscle use.  He says he feels better denies chest pain denies fevers or chills has no prodromal symptoms.    Patient was on high flow nasal oxygen but had increasing work of breathing and respiratory acidosis was placed back on BiPAP and admitted to ICU for BiPAP.    In the ED was found to have a creatinine of 3.31, unknown baseline at this time, bicarbonate of 21 glucose of 125 BUN of 34 troponin was 473 and then 459, BNP 6487.  EKG nonischemic.  He was given 40 of Lasix prior to arrival and I gave him 40 more at my evaluation.      Code Status  Full Code    Review of Systems  Review of Systems   All other systems reviewed and are negative.    Past Medical History   has a past medical history of Chronic obstructive pulmonary disease (HCC).    Surgical History   has no past surgical history on file.    Family History  family history is not on file.    Social History   reports that he has been smoking cigarettes. He has never used smokeless tobacco. He reports that he does  not currently use alcohol. He reports current drug use. Drug: Inhaled.    Medications  Home Medications       Reviewed by Pasquale Hess (Pharmacy Tech) on 12/23/22 at 1633  Med List Status: Complete     Medication Last Dose Status   albuterol 108 (90 Base) MCG/ACT Aero Soln inhalation aerosol PRN Active   amitriptyline (ELAVIL) 10 MG Tab 12/23/2022 Active   amLODIPine (NORVASC) 10 MG Tab 12/23/2022 Active   ammonium lactate (LAC-HYDRIN) 12 % Lotion UNK Active   aspirin EC (ECOTRIN) 81 MG Tablet Delayed Response 12/23/2022 Active   atorvastatin (LIPITOR) 40 MG Tab 12/22/2022 Active   dapagliflozin propanediol (FARXIGA) 5 MG Tab 12/23/2022 Active   doxycycline (VIBRAMYCIN) 100 MG Tab COMPLETE Active   fluticasone-umeclidin-vilant (TRELEGY ELLIPTA) 100-62.5-25 MCG/ACT AEROSOL POWDER, BREATH ACTIVATED inhalation 12/23/2022 Active   insulin NPH (HUMULIN/NOVOLIN) 100 UNIT/ML Suspension UNK Active   ketoconazole (NIZORAL) 2 % Cream UNK Active   linagliptin (TRADJENTA) 5 MG Tab tablet 12/23/2022 Active   losartan (COZAAR) 25 MG Tab 12/23/2022 Active   methylPREDNISolone (MEDROL DOSEPAK) 4 MG Tablet Therapy Pack COMPLETE Active   umeclidinium-vilanterol (ANORO ELLIPTA) 62.5-25 MCG/ACT AEROSOL POWDER, BREATH ACTIVATED inhaler 12/23/2022 Active                  Current Facility-Administered Medications   Medication Dose Route Frequency Provider Last Rate Last Admin    nitroglycerin 50 mg in D5W 250 ml infusion  0-200 mcg/min Intravenous Continuous Hao Urena M.D. 6 mL/hr at 12/23/22 1531 20 mcg/min at 12/23/22 1531    senna-docusate (PERICOLACE or SENOKOT S) 8.6-50 MG per tablet 2 Tablet  2 Tablet Oral BID Hao Urena M.D.        And    polyethylene glycol/lytes (MIRALAX) PACKET 1 Packet  1 Packet Oral QDAY PRN Hao Urena M.D.        And    magnesium hydroxide (MILK OF MAGNESIA) suspension 30 mL  30 mL Oral QDAY PRN Hao Urena M.D.        And    bisacodyl (DULCOLAX) suppository 10 mg  10 mg Rectal QDAY PRN  Hao Urena M.D.        heparin injection 5,000 Units  5,000 Units Subcutaneous Q8HRS Hao Urena M.D.   5,000 Units at 12/23/22 2120    acetaminophen (Tylenol) tablet 650 mg  650 mg Oral Q6HRS PRN Hao Urena M.D.        labetalol (NORMODYNE/TRANDATE) injection 10 mg  10 mg Intravenous Q4HRS PRN Hao Urena M.D.        ondansetron (ZOFRAN) syringe/vial injection 4 mg  4 mg Intravenous Q4HRS PRN Hao Urena M.D.        ondansetron (ZOFRAN ODT) dispertab 4 mg  4 mg Oral Q4HRS PRN Hao Urena M.D.        guaiFENesin dextromethorphan (ROBITUSSIN DM) 100-10 MG/5ML syrup 10 mL  10 mL Oral Q6HRS PRN Hao Urena M.D.        albuterol inhaler 2 Puff  2 Puff Inhalation Q6HRS PRN Hao Urena M.D.        amitriptyline (ELAVIL) tablet 10 mg  10 mg Oral BID Hao Urena M.D.   10 mg at 12/23/22 1839    [START ON 12/24/2022] aspirin EC (ECOTRIN) tablet 81 mg  81 mg Oral QAM Hao Urena M.D.        atorvastatin (LIPITOR) tablet 40 mg  40 mg Oral Nightly Hao Urena M.D.        ipratropium-albuterol (DUONEB) nebulizer solution  3 mL Nebulization Q4HRS (RT) Hao Urena M.D.   3 mL at 12/23/22 2331    ipratropium-albuterol (DUONEB) nebulizer solution  3 mL Nebulization Q2HRS PRN (RT) Hao Urena M.D.        [START ON 12/24/2022] umeclidinium-vilanterol (Anoro Ellipta) inhaler 1 Puff  1 Puff Inhalation QDAILY (RT) Hao Urena M.D.        And    [START ON 12/24/2022] fluticasone (FLOVENT HFA) 44 MCG/ACT inhaler 88 mcg  2 Puff Inhalation QDAILY (RT) Hao Urena M.D.        nitroglycerin (NITROSTAT) tablet 0.4 mg  0.4 mg Sublingual Q5 MIN PRN Hao Urena M.D.        morphine 4 MG/ML injection 2-4 mg  2-4 mg Intravenous Q5 MIN PRN Hao Urena M.D.        [START ON 12/24/2022] furosemide (LASIX) injection 60 mg  60 mg Intravenous BID DIURETIC Hao Urena M.D.        [START ON 12/24/2022] nicotine (NICODERM) 21 MG/24HR 21 mg  21 mg Transdermal Daily-0600 Hao Urena M.D.        And    nicotine polacrilex  (NICORETTE) 2 MG piece 2 mg  2 mg Oral Q HOUR PRN Hao Urena M.D.        [START ON 12/24/2022] methylPREDNISolone sod succ (SOLU-MEDROL) 125 MG injection 62.5 mg  62.5 mg Intravenous Q8HRS Hao Urena M.D.        Respiratory Therapy Consult   Nebulization Continuous RT Gary Stover M.D.        insulin regular (HumuLIN R,NovoLIN R) injection  2-9 Units Subcutaneous Q6HRS Gary Stover M.D.        And    dextrose 10 % BOLUS 25 g  25 g Intravenous Q15 MIN PRN Gary Stover M.D.        [START ON 12/24/2022] amLODIPine (NORVASC) tablet 10 mg  10 mg Oral ARIANNE Stover M.D.        [START ON 12/24/2022] dapagliflozin propanediol (Farxiga) tablet 5 mg  5 mg Oral ARIANNE Stover M.D.        [START ON 12/24/2022] SITagliptin (JANUVIA) tablet 50 mg  50 mg Oral DAILY Gary Stover M.D.        doxycycline monohydrate (ADOXA) tablet 100 mg  100 mg Oral Q12HRS Gary Stover M.D.         Current Outpatient Medications   Medication Sig Dispense Refill    aspirin EC (ECOTRIN) 81 MG Tablet Delayed Response Take 81 mg by mouth every morning.      fluticasone-umeclidin-vilant (TRELEGY ELLIPTA) 100-62.5-25 MCG/ACT AEROSOL POWDER, BREATH ACTIVATED inhalation Inhale 1 Inhalation every morning.      doxycycline (VIBRAMYCIN) 100 MG Tab Take 100 mg by mouth 2 times a day. 5 days      methylPREDNISolone (MEDROL DOSEPAK) 4 MG Tablet Therapy Pack Take 4-8 mg by mouth every day. Day 1  2 tablets before breakfast, 1 tablet after lunch and after supper, 2 tablets at bedtime  Day 2  1 tablet before breakfast, 1 tablet after lunch and after supper, 2 tablets at bedtime  Day 3  1 tablet before breakfast and 1 tablet after lunch, after supper and at bedtime  Day 4  1 tablet before breakfast, after lunch, and at bedtime  Day 5  1 tablet before breakfast and at bedtime  Day 6  1 tablet before breakfast      dapagliflozin propanediol (FARXIGA) 5 MG Tab Take 5 mg by mouth every morning.      atorvastatin  (LIPITOR) 40 MG Tab Take 40 mg by mouth every evening.      umeclidinium-vilanterol (ANORO ELLIPTA) 62.5-25 MCG/ACT AEROSOL POWDER, BREATH ACTIVATED inhaler Inhale 1 Puff every day.      amitriptyline (ELAVIL) 10 MG Tab Take 10 mg by mouth 2 times a day.      amLODIPine (NORVASC) 10 MG Tab Take 10 mg by mouth every morning.      losartan (COZAAR) 25 MG Tab Take 25 mg by mouth 2 times a day.      ammonium lactate (LAC-HYDRIN) 12 % Lotion Apply 1 Application topically 2 times a day.      linagliptin (TRADJENTA) 5 MG Tab tablet Take 5 mg by mouth every day.      ketoconazole (NIZORAL) 2 % Cream Apply 1 Application topically 2 times a day.      albuterol 108 (90 Base) MCG/ACT Aero Soln inhalation aerosol Inhale 2 Puffs every 6 hours as needed for Shortness of Breath.      insulin NPH (HUMULIN/NOVOLIN) 100 UNIT/ML Suspension Inject  Units under the skin 3 times a day. 100 units in the morning, 75 units at lunch & 50 units at bedtime         Allergies  No Known Allergies    Vital Signs last 24 hours  Temp:  [36.2 °C (97.1 °F)-36.5 °C (97.7 °F)] 36.2 °C (97.1 °F)  Pulse:  [] 108  Resp:  [20-39] 23  BP: ()/(50-89) 142/71  SpO2:  [91 %-100 %] 95 %    Physical Exam  Physical Exam  Constitutional:       General: He is in acute distress.      Appearance: He is ill-appearing.   HENT:      Head: Normocephalic and atraumatic.      Nose: Nose normal.      Mouth/Throat:      Mouth: Mucous membranes are dry.   Eyes:      Extraocular Movements: Extraocular movements intact.      Pupils: Pupils are equal, round, and reactive to light.   Cardiovascular:      Rate and Rhythm: Normal rate and regular rhythm.      Pulses: Normal pulses.      Heart sounds: No murmur heard.  Pulmonary:      Comments: Severe respiratory distress, with very minimal air exchange, some expiratory sounds but also rhonchi and rales throughout lung fields  Abdominal:      General: There is distension.      Tenderness: There is no abdominal  tenderness.   Musculoskeletal:      Cervical back: Neck supple.      Right lower leg: Edema present.      Left lower leg: Edema present.   Skin:     General: Skin is warm.      Capillary Refill: Capillary refill takes less than 2 seconds.   Neurological:      General: No focal deficit present.      Mental Status: He is alert and oriented to person, place, and time.       Fluids  No intake or output data in the 24 hours ending 12/23/22 2348    Laboratory  Recent Results (from the past 48 hour(s))   CBC WITH DIFFERENTIAL    Collection Time: 12/23/22  2:00 PM   Result Value Ref Range    WBC 17.5 (H) 4.8 - 10.8 K/uL    RBC 3.78 (L) 4.70 - 6.10 M/uL    Hemoglobin 9.9 (L) 14.0 - 18.0 g/dL    Hematocrit 33.4 (L) 42.0 - 52.0 %    MCV 88.4 81.4 - 97.8 fL    MCH 26.2 (L) 27.0 - 33.0 pg    MCHC 29.6 (L) 33.7 - 35.3 g/dL    RDW 49.7 35.9 - 50.0 fL    Platelet Count 474 (H) 164 - 446 K/uL    MPV 9.3 9.0 - 12.9 fL    Neutrophils-Polys 61.90 44.00 - 72.00 %    Lymphocytes 31.30 22.00 - 41.00 %    Monocytes 6.80 0.00 - 13.40 %    Eosinophils 0.00 0.00 - 6.90 %    Basophils 0.00 0.00 - 1.80 %    Nucleated RBC 0.00 /100 WBC    Neutrophils (Absolute) 10.83 (H) 1.82 - 7.42 K/uL    Lymphs (Absolute) 5.48 (H) 1.00 - 4.80 K/uL    Monos (Absolute) 1.19 (H) 0.00 - 0.85 K/uL    Eos (Absolute) 0.00 0.00 - 0.51 K/uL    Baso (Absolute) 0.00 0.00 - 0.12 K/uL    NRBC (Absolute) 0.00 K/uL   Comp Metabolic Panel    Collection Time: 12/23/22  2:00 PM   Result Value Ref Range    Sodium 137 135 - 145 mmol/L    Potassium 4.7 3.6 - 5.5 mmol/L    Chloride 105 96 - 112 mmol/L    Co2 21 20 - 33 mmol/L    Anion Gap 11.0 7.0 - 16.0    Glucose 125 (H) 65 - 99 mg/dL    Bun 34 (H) 8 - 22 mg/dL    Creatinine 3.31 (H) 0.50 - 1.40 mg/dL    Calcium 7.9 (L) 8.5 - 10.5 mg/dL    AST(SGOT) 16 12 - 45 U/L    ALT(SGPT) 18 2 - 50 U/L    Alkaline Phosphatase 100 (H) 30 - 99 U/L    Total Bilirubin 0.2 0.1 - 1.5 mg/dL    Albumin 3.6 3.2 - 4.9 g/dL    Total Protein 7.6 6.0 -  8.2 g/dL    Globulin 4.0 (H) 1.9 - 3.5 g/dL    A-G Ratio 0.9 g/dL   TROPONIN    Collection Time: 22  2:00 PM   Result Value Ref Range    Troponin T 473 (H) 6 - 19 ng/L   proBrain Natriuretic Peptide, NT    Collection Time: 22  2:00 PM   Result Value Ref Range    NT-proBNP 6487 (H) 0 - 125 pg/mL   PROCALCITONIN    Collection Time: 22  2:00 PM   Result Value Ref Range    Procalcitonin 0.15 <0.25 ng/mL   LACTIC ACID    Collection Time: 22  2:00 PM   Result Value Ref Range    Lactic Acid 1.6 0.5 - 2.0 mmol/L   CORRECTED CALCIUM    Collection Time: 22  2:00 PM   Result Value Ref Range    Correct Calcium 8.2 (L) 8.5 - 10.5 mg/dL   ESTIMATED GFR    Collection Time: 22  2:00 PM   Result Value Ref Range    GFR (CKD-EPI) 19 (A) >60 mL/min/1.73 m 2   DIFFERENTIAL MANUAL    Collection Time: 22  2:00 PM   Result Value Ref Range    Manual Diff Status PERFORMED    PERIPHERAL SMEAR REVIEW    Collection Time: 22  2:00 PM   Result Value Ref Range    Peripheral Smear Review see below    PLATELET ESTIMATE    Collection Time: 22  2:00 PM   Result Value Ref Range    Plt Estimation Increased    MORPHOLOGY    Collection Time: 22  2:00 PM   Result Value Ref Range    RBC Morphology Normal    EKG    Collection Time: 22  2:12 PM   Result Value Ref Range    Report       [facility] Emergency Dept[facility]    Test Date:  2022  Pt Name:    BROOK CAIN            Department: ER  MRN:        [id number]                      Room:       RD 10  Gender:     Male                         Technician: [id number]  :        1955                   Requested By:DECLAN MARSH  Order #:    [id number]                    Reading MD: Declan Marsh MD    Measurements  Intervals                                Axis  Rate:       117                          P:          73  TX:         185                          QRS:        88  QRSD:       134                          T:           -82  QT:         353  QTc:        493    Interpretive Statements  Sinus tachycardia  Right bundle branch block  Lateral leads are also involved  No previous ECG available for comparison  Electronically Signed On 2022 15:08:25 PST by Declan Marsh MD     EKG (NOW)    Collection Time: 22  3:15 PM   Result Value Ref Range    Report       [facility] Emergency Dept.    Test Date:  2022  Pt Name:    FERNANDO VAZQUEZ                   Department: ER  MRN:        [id number]                      Room:        10  Gender:     M                            Technician: [id number]  :        1955                   Requested By:DECLAN MARSH  Order #:    [id number]                    Reading MD: Declan Marsh MD    Measurements  Intervals                                Axis  Rate:       104                          P:          74  TX:         186                          QRS:        88  QRSD:       135                          T:          240  QT:         336  QTc:        442    Interpretive Statements  Sinus tachycardia  Right bundle branch block  Nonspecific repol abnormality, lateral leads  Compared to ECG 2022 14:12:06  Early repolarization now present  Electronically Signed On 2022 18:58:19 PST by Declan Marsh MD     CoV-2, FLU A/B, and RSV by PCR (2-4 Hours CEPHEID) : Collect NP swab in VTM    Collection Time: 22  3:36 PM    Specimen: Respirate   Result Value Ref Range    Influenza virus A RNA Negative Negative    Influenza virus B, PCR Negative Negative    RSV, PCR Negative Negative    SARS-CoV-2 by PCR NotDetected     SARS-CoV-2 Source NP Swab    Lactic Acid    Collection Time: 22  4:24 PM   Result Value Ref Range    Lactic Acid 1.9 0.5 - 2.0 mmol/L   TROPONIN    Collection Time: 22  4:24 PM   Result Value Ref Range    Troponin T 459 (H) 6 - 19 ng/L   Cortisol    Collection Time: 22  5:49 PM   Result Value Ref Range    Cortisol 23.8 (H) 0.0 - 23.0 ug/dL    Procalcitonin    Collection Time: 12/23/22  5:49 PM   Result Value Ref Range    Procalcitonin 0.16 <0.25 ng/mL   CRP QUANTITIVE (NON-CARDIAC)    Collection Time: 12/23/22  5:49 PM   Result Value Ref Range    Stat C-Reactive Protein 1.13 (H) 0.00 - 0.75 mg/dL   VENOUS BLOOD GAS    Collection Time: 12/23/22  5:49 PM   Result Value Ref Range    Venous Bg Ph 7.22 (L) 7.31 - 7.45    Venous Bg Ph Temp Corrected 7.23 (L) 7.31 - 7.45    Venous Bg Pco2 51.1 (H) 41.0 - 51.0 mmHg    Venous Bg Pco2 Temp Corrected 49.3 41.0 - 51.0 mmHg    Venous Bg Po2 23.1 (L) 25.0 - 40.0 mmHg    Venous Bg Po2 Temp Corrected 21.8 (L) 25.0 - 40.0 mmHg    Venous Bg O2 Saturation 35.1 %    Venous Bg Hco3 20 (L) 24 - 28 mmol/L    Venous Bg Base Excess -7 mmol/L    Body Temp 36.2 Centigrade   GRAM STAIN    Collection Time: 12/23/22  6:00 PM    Specimen: Respirate   Result Value Ref Range    Significant Indicator .     Source RESP     Site SPUTUM     Gram Stain Result       Sputum Gram stain quality score is <1, probable  oropharyngeal contamination. Culture not performed.  Recollect if clinically indicated.     Urinalysis    Collection Time: 12/23/22  8:04 PM    Specimen: Urine   Result Value Ref Range    Color Yellow     Character Cloudy (A)     Specific Gravity 1.012 <1.035    Ph 5.0 5.0 - 8.0    Glucose Negative Negative mg/dL    Ketones Negative Negative mg/dL    Protein 300 (A) Negative mg/dL    Bilirubin Negative Negative    Urobilinogen, Urine 0.2 Negative    Nitrite Negative Negative    Leukocyte Esterase Trace (A) Negative    Occult Blood Trace (A) Negative    Micro Urine Req Microscopic    URINE MICROSCOPIC (W/UA)    Collection Time: 12/23/22  8:04 PM   Result Value Ref Range    WBC 2-5 (A) /hpf    RBC 0-2 (A) /hpf    Epithelial Cells Few /hpf    Amorphous Crystal Present /hpf    Hyaline Cast 0-2 /lpf    Granular Casts 0-2 /lpf       Imaging  DX-CHEST-PORTABLE (1 VIEW)   Final Result      1.  Cardiomegaly with interstitial infiltrates  likely representing pulmonary edema.      2.  Bibasilar atelectasis and small bilateral pleural effusions.      EC-ECHOCARDIOGRAM COMPLETE W/O CONT    (Results Pending)   US-EXTREMITY VENOUS LOWER BILAT    (Results Pending)       Assessment/Plan  Elevated troponin  Assessment & Plan  Patient with elevated troponin in the 400s, stable and repeated.  Absence of any characteristic chest pain or syndrome consistent with acute coronary syndrome, EKG nonischemic    Likely felt related to hypoxemia or demand  Follow-up TTE    Acute exacerbation of chronic obstructive pulmonary disease (COPD) (HCC)  Assessment & Plan  Patient with a history of COPD and home O2 requirement of 4 L.  Clinical exam suggestive of bronchospasm, sponsored to bilevel ventilation steroids and bronchodilators in the ED.  Currently BiPAP dependent    - F/u Sputum Cx, BCx  - Continuous pulse Ox  - O2 via NC to maintain SpO2 between 88-92%,   - Duonebs q4hrs ATC and prn  - Methylprednisolone 125mg IV now  - Start Methylpred/prednisone 60mg daily, will be tapered q2-3 days  - Chest physiotherapy  - Pneumovax prior to DC      ALONDRA on CPAP  Assessment & Plan  CPAP at bedtime  Currently on rescue BiPAP    Acute on chronic congestive heart failure (HCC)  Assessment & Plan  Patient presents with hypoxemic respiratory failure, with a known history of CHF and COPD.  Patient's with elevated BNP, pulmonary edema on chest x-ray, peripheral edema.  Not clear what prior function or type of CHF this is.  Not currently on GDMT.    Follow-up TTE ordered  Pending results consider addition of GDMT     CAD (coronary artery disease)  Assessment & Plan  Per chart review patient had a cardiac stent placed RCA many years ago in the 90s.  No evidence of acute coronary syndrome today    Resume statin and home medications as soon as clinically stable  Trop 473>>459, flat  BNP 6487     Likely secondary to acute illness/CHF/volume overload  Trend CE, EKG  ASA/statin  May consider  heparin gtt and cardiology consult if rising trop      DM (diabetes mellitus) (HCC)  Assessment & Plan  Home meds  ISS        Discussed patient condition and risk of morbidity and/or mortality with Family, RN, RT, Therapies, and Patient.    The patient remains critically ill.  Critical care time = 85 minutes in directly providing and coordinating critical care and extensive data review.  No time overlap and excludes procedures.

## 2022-12-24 NOTE — PROGRESS NOTES
"Critical Care Progress Note    Date of admission  12/23/2022    Chief Complaint  67 y.o. male admitted 12/23/2022 with acute on chronic respiratory failure    Hospital Course  \"67 y.o. male who presented 12/23/2022 with history of COPD, 4 L dependence who also has , DM CAD, CHF, ALONDRA OHS, uses nighttime CPAP presented with dyspnea and lower extremity edema that has been progressive over days.  Patient denies any chest pain, but says that his breathing has become worsening.  On arrival to ED he was hypoxemic, reportedly 88% on his home O2, and was working to breathe and was in respiratory distress at arrival to the emergency department.  He was given Solu-Medrol, nebulized bronchodilators and placed on bilevel ventilation for work of breathing and respiratory failure.     MI evaluation the patient has been weaned to high flow nasal oxygen looks fairly comfortable but does have very shallow respirations, and mild work of breathing with a little bit of accessory muscle use.  He says he feels better denies chest pain denies fevers or chills has no prodromal symptoms.     Patient was on high flow nasal oxygen but had increasing work of breathing and respiratory acidosis was placed back on BiPAP and admitted to ICU for BiPAP.\" - Dr. Stover 12/23    Interval Problem Update  Reviewed last 24 hour events:   - remained on BiPAP in the ED overnight   - AF   - sinus tach, SBP    - WBC down to 12   - Hgb down to 8.3   - FARIHA unchanged   - low Mag   - troponin trending down   - AAOx4, denies CP    Review of Systems  Review of Systems   Constitutional:  Positive for malaise/fatigue. Negative for chills and fever.   HENT:  Negative for congestion and sore throat.    Eyes:  Negative for blurred vision.   Respiratory:  Positive for cough and shortness of breath. Negative for sputum production.    Cardiovascular:  Positive for orthopnea, leg swelling and PND. Negative for chest pain and palpitations.   Gastrointestinal:  Negative " for abdominal pain, nausea and vomiting.   Genitourinary:  Negative for dysuria.   Musculoskeletal:  Negative for back pain and myalgias.   Skin:  Negative for rash.   Neurological:  Negative for dizziness, sensory change, speech change and headaches.   Endo/Heme/Allergies:  Does not bruise/bleed easily.   Psychiatric/Behavioral:  Negative for depression. The patient has insomnia. The patient is not nervous/anxious.    All other systems reviewed and are negative.     Vital Signs for last 24 hours   Temp:  [36.2 °C (97.1 °F)-36.5 °C (97.7 °F)] 36.2 °C (97.1 °F)  Pulse:  [] 108  Resp:  [12-39] 27  BP: ()/(50-89) 139/79  SpO2:  [89 %-100 %] 97 %    Respiratory Information for the last 24 hours   BiPAP 14/10, 35%    Physical Exam   Physical Exam  Vitals and nursing note reviewed.   Constitutional:       General: He is awake. He is in acute distress.      Appearance: He is well-developed. He is obese. He is not toxic-appearing.      Interventions: Face mask in place.   HENT:      Head: Normocephalic and atraumatic.      Nose: Nose normal. No congestion.      Mouth/Throat:      Mouth: Mucous membranes are moist.      Pharynx: Oropharynx is clear. No oropharyngeal exudate.      Comments: Full face BiPAP mask in place  Eyes:      General: No scleral icterus.     Conjunctiva/sclera: Conjunctivae normal.      Pupils: Pupils are equal, round, and reactive to light.   Neck:      Vascular: No JVD.   Cardiovascular:      Rate and Rhythm: Regular rhythm. Tachycardia present. Occasional Extrasystoles are present.     Chest Wall: PMI is displaced.      Pulses: Normal pulses.      Heart sounds: Heart sounds are distant. No murmur heard.  Pulmonary:      Effort: Respiratory distress present. No tachypnea.      Breath sounds: No stridor. Examination of the right-middle field reveals rales. Examination of the left-middle field reveals rales. Examination of the right-lower field reveals rales. Examination of the left-lower  field reveals rales. Decreased breath sounds and rales present. No wheezing.      Comments: No wheezing at this time, crackles, tolerating BiPAP with adequate tidal volumes  Abdominal:      General: Bowel sounds are normal. There is no distension.      Palpations: Abdomen is soft.      Tenderness: There is no abdominal tenderness. There is no guarding.   Musculoskeletal:         General: No tenderness.      Cervical back: Neck supple. No tenderness.      Right lower le+ Pitting Edema present.      Left lower le+ Pitting Edema present.      Comments: Chronic venous stasis changes of lower extremities   Skin:     General: Skin is warm and dry.      Capillary Refill: Capillary refill takes less than 2 seconds.      Coloration: Skin is not pale.   Neurological:      General: No focal deficit present.      Mental Status: He is alert and oriented to person, place, and time.      Cranial Nerves: No cranial nerve deficit.      Sensory: No sensory deficit.   Psychiatric:         Mood and Affect: Mood normal.         Behavior: Behavior normal. Behavior is cooperative.         Thought Content: Thought content normal.       Medications  Current Facility-Administered Medications   Medication Dose Route Frequency Provider Last Rate Last Admin    methylPREDNISolone (SOLU-MEDROL) 40 MG injection 40 mg  40 mg Intravenous Q8HRS Philip Kumar M.D.   40 mg at 22 0512    nitroglycerin 50 mg in D5W 250 ml infusion  0-200 mcg/min Intravenous Continuous Hao Urena M.D. 6 mL/hr at 22 1531 20 mcg/min at 22 1531    senna-docusate (PERICOLACE or SENOKOT S) 8.6-50 MG per tablet 2 Tablet  2 Tablet Oral BID Hao Urena M.D.        And    polyethylene glycol/lytes (MIRALAX) PACKET 1 Packet  1 Packet Oral QDAY PRN Hao Urena M.D.        And    magnesium hydroxide (MILK OF MAGNESIA) suspension 30 mL  30 mL Oral QDAY PRN Hao Urena M.D.        And    bisacodyl (DULCOLAX) suppository 10 mg  10 mg Rectal QDAY  PRN Hao Urena M.D.        heparin injection 5,000 Units  5,000 Units Subcutaneous Q8HRS Hao Urena M.D.   5,000 Units at 12/24/22 0511    acetaminophen (Tylenol) tablet 650 mg  650 mg Oral Q6HRS PRN Hao Urena M.D.        ondansetron (ZOFRAN) syringe/vial injection 4 mg  4 mg Intravenous Q4HRS PRN Hao Urena M.D.        ondansetron (ZOFRAN ODT) dispertab 4 mg  4 mg Oral Q4HRS PRN Hao Urena M.D.        guaiFENesin dextromethorphan (ROBITUSSIN DM) 100-10 MG/5ML syrup 10 mL  10 mL Oral Q6HRS PRN Hao Urena M.D.        amitriptyline (ELAVIL) tablet 10 mg  10 mg Oral BID Hao Urena M.D.   10 mg at 12/23/22 1839    aspirin EC (ECOTRIN) tablet 81 mg  81 mg Oral QAM Hao Urena M.D.        atorvastatin (LIPITOR) tablet 40 mg  40 mg Oral Nightly Hao Urena M.D.        ipratropium-albuterol (DUONEB) nebulizer solution  3 mL Nebulization Q4HRS (RT) Hao Urena M.D.   3 mL at 12/24/22 0602    ipratropium-albuterol (DUONEB) nebulizer solution  3 mL Nebulization Q2HRS PRN (RT) Hao Urena M.D.        umeclidinium-vilanterol (Anoro Ellipta) inhaler 1 Puff  1 Puff Inhalation QDAILY (RT) Hao Urena M.D.        And    fluticasone (FLOVENT HFA) 44 MCG/ACT inhaler 88 mcg  2 Puff Inhalation QDAILY (RT) Hao Urena M.D.        furosemide (LASIX) injection 60 mg  60 mg Intravenous BID DIURETIC Hao Urena M.D.        nicotine (NICODERM) 21 MG/24HR 21 mg  21 mg Transdermal Daily-0600 Hao Urena M.D.        And    nicotine polacrilex (NICORETTE) 2 MG piece 2 mg  2 mg Oral Q HOUR PRN Hao Urena M.D.        Respiratory Therapy Consult   Nebulization Continuous RT Gary Stover M.D.        insulin regular (HumuLIN R,NovoLIN R) injection  2-9 Units Subcutaneous Q6HRS Gary Stover M.D.        And    dextrose 10 % BOLUS 25 g  25 g Intravenous Q15 MIN PRN Gary Stover M.D.        amLODIPine (NORVASC) tablet 10 mg  10 mg Oral QAM Gary Stover M.D.         Current Outpatient  Medications   Medication Sig Dispense Refill    aspirin EC (ECOTRIN) 81 MG Tablet Delayed Response Take 81 mg by mouth every morning.      fluticasone-umeclidin-vilant (TRELEGY ELLIPTA) 100-62.5-25 MCG/ACT AEROSOL POWDER, BREATH ACTIVATED inhalation Inhale 1 Inhalation every morning.      doxycycline (VIBRAMYCIN) 100 MG Tab Take 100 mg by mouth 2 times a day. 5 days      methylPREDNISolone (MEDROL DOSEPAK) 4 MG Tablet Therapy Pack Take 4-8 mg by mouth every day. Day 1  2 tablets before breakfast, 1 tablet after lunch and after supper, 2 tablets at bedtime  Day 2  1 tablet before breakfast, 1 tablet after lunch and after supper, 2 tablets at bedtime  Day 3  1 tablet before breakfast and 1 tablet after lunch, after supper and at bedtime  Day 4  1 tablet before breakfast, after lunch, and at bedtime  Day 5  1 tablet before breakfast and at bedtime  Day 6  1 tablet before breakfast      dapagliflozin propanediol (FARXIGA) 5 MG Tab Take 5 mg by mouth every morning.      atorvastatin (LIPITOR) 40 MG Tab Take 40 mg by mouth every evening.      umeclidinium-vilanterol (ANORO ELLIPTA) 62.5-25 MCG/ACT AEROSOL POWDER, BREATH ACTIVATED inhaler Inhale 1 Puff every day.      amitriptyline (ELAVIL) 10 MG Tab Take 10 mg by mouth 2 times a day.      amLODIPine (NORVASC) 10 MG Tab Take 10 mg by mouth every morning.      losartan (COZAAR) 25 MG Tab Take 25 mg by mouth 2 times a day.      ammonium lactate (LAC-HYDRIN) 12 % Lotion Apply 1 Application topically 2 times a day.      linagliptin (TRADJENTA) 5 MG Tab tablet Take 5 mg by mouth every day.      ketoconazole (NIZORAL) 2 % Cream Apply 1 Application topically 2 times a day.      albuterol 108 (90 Base) MCG/ACT Aero Soln inhalation aerosol Inhale 2 Puffs every 6 hours as needed for Shortness of Breath.      insulin NPH (HUMULIN/NOVOLIN) 100 UNIT/ML Suspension Inject  Units under the skin 3 times a day. 100 units in the morning, 75 units at lunch & 50 units at bedtime          Fluids  No intake or output data in the 24 hours ending 12/24/22 0653    Laboratory          Recent Labs     12/23/22  1400 12/24/22  0015   SODIUM 137 135   POTASSIUM 4.7 5.1   CHLORIDE 105 106   CO2 21 17*   BUN 34* 37*   CREATININE 3.31* 3.66*   MAGNESIUM  --  1.8   PHOSPHORUS  --  3.6   CALCIUM 7.9* 7.6*     Recent Labs     12/23/22  1400 12/24/22  0015   ALTSGPT 18 18   ASTSGOT 16 20   ALKPHOSPHAT 100* 82   TBILIRUBIN 0.2 0.2   GLUCOSE 125* 125*     Recent Labs     12/23/22  1400 12/24/22  0015   WBC 17.5* 12.1*   NEUTSPOLYS 61.90 93.00*   LYMPHOCYTES 31.30 5.50*   MONOCYTES 6.80 0.70   EOSINOPHILS 0.00 0.00   BASOPHILS 0.00 0.20   ASTSGOT 16 20   ALTSGPT 18 18   ALKPHOSPHAT 100* 82   TBILIRUBIN 0.2 0.2     Recent Labs     12/23/22  1400 12/24/22  0015   RBC 3.78* 3.14*   HEMOGLOBIN 9.9* 8.3*   HEMATOCRIT 33.4* 27.3*   PLATELETCT 474* 344       Imaging  X-Ray:  I have personally reviewed the images and compared with prior images. and My impression is: Enlarged cardiac silhouette with moderate diffuse pulmonary edema and bilateral effusions, no tubes nor lines    Assessment/Plan  * Acute on chronic respiratory failure with hypoxia (HCC)  Assessment & Plan  Secondary to COPD exacerbation and congestive heart failure  Continue noninvasive ventilatory support with breaks today for diet  RT/O2 protocol  Continue to correct underlying causes  Avoid sedatives    Acute exacerbation of chronic obstructive pulmonary disease (COPD) (HCC)  Assessment & Plan  Patient with a history of COPD and home O2 requirement of 4 L with acute exacerbation  Continue systemic steroids and scheduled and as needed bronchodilators  RT/O2 protocol  Outpatient COPD follow-up  Continue Flovent, Ellipta    Acute on chronic congestive heart failure (HCC)  Assessment & Plan  Continue aggressive diuresis, added Diuril today  Resume outpatient metoprolol and losartan  Echocardiogram    Acute pulmonary edema (HCC)  Assessment & Plan  Continue  aggressive forced diuresis today with strict ins and outs    Hypertension  Assessment & Plan  Discontinue nitroglycerin drip  Continue amlodipine, metoprolol  Resume losartan    Class 3 severe obesity in adult ([facility])  Assessment & Plan  Encourage behavioral and dietary modification for weight loss  RD consultation    Cardiorenal syndrome, stage 1-4 or unspecified chronic kidney disease, with heart failure ([facility])  Assessment & Plan  Unchanged  Monitor with maintenance of perfusion and oxygenation to the kidneys  Avoid nephrotoxins  Monitor creatinine, urine output, electrolytes closely    CAD (coronary artery disease)  Assessment & Plan  With acute demand ischemia  Cardiology consulted by ER physician, troponin improving  Follow-up on echo  Continue statin aspirin, resume beta-blocker      DM (diabetes mellitus) ([facility])  Assessment & Plan  Continue insulin sliding scale for now  Diabetic diet    Elevated troponin  Assessment & Plan  Secondary to demand ischemia    Sepsis ([facility])  Assessment & Plan  Ruled out    Bilateral leg edema  Assessment & Plan  Diuresis    Tobacco abuse  Assessment & Plan  Tobacco cessation education and resources to be provided when appropriate  Nicotine replacement patch    ACP (advance care planning)  Assessment & Plan  Full code    ALONDRA on CPAP  Assessment & Plan  Resume nocturnal CPAP once no longer needs rescue BiPAP         VTE:  Heparin  Ulcer: Not Indicated  Lines: None    I have performed a physical exam and reviewed and updated ROS and Plan today (12/24/2022). In review of yesterday's note (12/23/2022), there are no changes except as documented above.     Patient remains critically ill today requiring active titration of noninvasive ventilatory support as well as acute exacerbation of CHF and COPD exacerbation management. High risk of deterioration and worsening vital organ dysfunction and death without the above critical care interventions.    Discussed patient condition and risk of  morbidity and/or mortality with RN, Charge nurse / hot rounds, Patient, and cardiology.  Critical care time = 32 minutes in directly providing and coordinating critical care and extensive data review.  No time overlap and excludes procedures.    Please note that this dictation was created using voice recognition software. I have made every reasonable attempt to correct obvious errors, but there may be errors of grammar and possibly content that I did not discover before finalizing the note.

## 2022-12-24 NOTE — HOSPITAL COURSE
"\"67 y.o. male who presented 12/23/2022 with history of COPD, 4 L dependence who also has , DM CAD, CHF, ALONDRA OHS, uses nighttime CPAP presented with dyspnea and lower extremity edema that has been progressive over days.  Patient denies any chest pain, but says that his breathing has become worsening.  On arrival to ED he was hypoxemic, reportedly 88% on his home O2, and was working to breathe and was in respiratory distress at arrival to the emergency department.  He was given Solu-Medrol, nebulized bronchodilators and placed on bilevel ventilation for work of breathing and respiratory failure.     MI evaluation the patient has been weaned to high flow nasal oxygen looks fairly comfortable but does have very shallow respirations, and mild work of breathing with a little bit of accessory muscle use.  He says he feels better denies chest pain denies fevers or chills has no prodromal symptoms.     Patient was on high flow nasal oxygen but had increasing work of breathing and respiratory acidosis was placed back on BiPAP and admitted to ICU for BiPAP.\" - Dr. Stover 12/23  "

## 2022-12-24 NOTE — ED PROVIDER NOTES
ED PROVIDER NOTE    Scribed for Almas Ansari M.D. by Teresa Johnson. 12/23/2022, 5:38 PM.    This is an addendum to the note on Yo[facility] Peace. For further details and full chart entry, see the previously signed ED Provider Note written by Dr. Mayorga (ERP).      5:20 PM - I discussed the patient's case with Dr. Mayorga  (ERP) who will transfer care of the patient to me at this time.        5:25 - The patient's continuing management included evaluation by Critical Care. Critical Care feels he can go to [facility] [place].      5:39 PM I discussed the patient's case and the above findings with Dr. Urena (Hospitalist) who will assess the patient for hospitalization.       FINAL IMPRESSION   1. Acute respiratory failure with hypoxia (HCC)         Teresa RENDON (Scribe), am scribing for, and in the presence of, Hao Urena M.D..    Electronically signed by: Teresa Johnson (Scribe), 12/23/2022    Hao RENDON M.D. personally performed the services described in this documentation, as scribed by Teresa Johnson in my presence, and it is both accurate and complete.    The note accurately reflects work and decisions made by me.  Almas Ansari M.D.  12/24/2022  1:23 AM

## 2022-12-24 NOTE — CONSULTS
Pulmonary Consultation    Date of consult: 12/24/2022    Referring Physician  Jeremy M Gonda, M.D.    Reason for Consultation  COPD exacerbation    History of Presenting Illness  67 y.o. male who presented 12/23/2022 with a hx of progressive SOB and bilateral lower ext swelling. Recently admitted to OSH for 5 days aout 1 month ago for pneumonia and /or COPD exacerbation. Since discharge has been declining to the point of not being able to make it to the bathroom.  At the ED was given steroids IV, albuterol, multiple nebs and was place on bipap alternating with HFNC with a cxr concerning for vascular congestion and pulm edema and pro bno into 6k range. Patient was admitted under ICU care  and transferred to the floor on 12/25/22.  His respiratory status improved and only on 6lt NC (baseline is 4 lt nc)  He carried the diagnosis of COPD, HOT 4 lts, CAD, CHF, ALONDRA/OHS on CPAP.  Today he feels better, back to home baseline 4 lt, no wheezing    Code Status  Full Code    Review of Systems  Review of Systems   Constitutional:  Negative for chills, fever and weight loss.   HENT:  Negative for congestion, nosebleeds and sore throat.    Eyes:  Negative for discharge and redness.   Respiratory:  Positive for cough, shortness of breath and wheezing.    Cardiovascular:  Negative for chest pain, palpitations and leg swelling.   Gastrointestinal:  Negative for nausea and vomiting.   Genitourinary:  Negative for dysuria.   Musculoskeletal:  Negative for myalgias.   Skin:  Negative for rash.   Neurological:  Negative for dizziness and headaches.   Endo/Heme/Allergies:  Does not bruise/bleed easily.   Psychiatric/Behavioral:  Negative for memory loss.    All other systems reviewed and are negative.    Past Medical History   has a past medical history of CAD (coronary artery disease), CHF (congestive heart failure) (HCC), Chronic obstructive pulmonary disease (HCC), Diabetes (HCC), Hyperlipidemia, ALONDRA on CPAP, Pneumonia, and Tobacco  abuse.    Surgical History   has no past surgical history on file.    Family History  family history is not on file.    Social History   reports that he has been smoking cigarettes. He has never used smokeless tobacco. He reports that he does not currently use alcohol. He reports current drug use. Drug: Inhaled.    Medications  Home Medications       Reviewed by Pasquale Hess (Pharmacy Tech) on 12/23/22 at 1633  Med List Status: Complete     Medication Last Dose Status   albuterol 108 (90 Base) MCG/ACT Aero Soln inhalation aerosol PRN Active   amitriptyline (ELAVIL) 10 MG Tab 12/23/2022 Active   amLODIPine (NORVASC) 10 MG Tab 12/23/2022 Active   ammonium lactate (LAC-HYDRIN) 12 % Lotion UNK Active   aspirin EC (ECOTRIN) 81 MG Tablet Delayed Response 12/23/2022 Active   atorvastatin (LIPITOR) 40 MG Tab 12/22/2022 Active   dapagliflozin propanediol (FARXIGA) 5 MG Tab 12/23/2022 Active   doxycycline (VIBRAMYCIN) 100 MG Tab COMPLETE Active   fluticasone-umeclidin-vilant (TRELEGY ELLIPTA) 100-62.5-25 MCG/ACT AEROSOL POWDER, BREATH ACTIVATED inhalation 12/23/2022 Active   insulin NPH (HUMULIN/NOVOLIN) 100 UNIT/ML Suspension UNK Active   ketoconazole (NIZORAL) 2 % Cream UNK Active   linagliptin (TRADJENTA) 5 MG Tab tablet 12/23/2022 Active   losartan (COZAAR) 25 MG Tab 12/23/2022 Active   methylPREDNISolone (MEDROL DOSEPAK) 4 MG Tablet Therapy Pack COMPLETE Active   umeclidinium-vilanterol (ANORO ELLIPTA) 62.5-25 MCG/ACT AEROSOL POWDER, BREATH ACTIVATED inhaler 12/23/2022 Active                  Current Facility-Administered Medications   Medication Dose Route Frequency Provider Last Rate Last Admin    methylPREDNISolone (SOLU-MEDROL) 40 MG injection 40 mg  40 mg Intravenous Q12HRS Elieser Centeno M.D.   40 mg at 12/25/22 0530    metoprolol tartrate (LOPRESSOR) tablet 25 mg  25 mg Oral TWICE DAILY Jeremy M Gonda, M.D.   25 mg at 12/25/22 0607    ipratropium-albuterol (DUONEB) nebulizer solution  3 mL Nebulization  4X/DAY (RT) Jeremy M Gonda, M.D.   3 mL at 12/25/22 1117    senna-docusate (PERICOLACE or SENOKOT S) 8.6-50 MG per tablet 2 Tablet  2 Tablet Oral BID Hao Urena M.D.        And    polyethylene glycol/lytes (MIRALAX) PACKET 1 Packet  1 Packet Oral QDAY PRN Hao Urena M.D.        And    magnesium hydroxide (MILK OF MAGNESIA) suspension 30 mL  30 mL Oral QDAY PRN Hao Urena M.D.        And    bisacodyl (DULCOLAX) suppository 10 mg  10 mg Rectal QDAY PRN Hao Urena M.D.        heparin injection 5,000 Units  5,000 Units Subcutaneous Q8HRS Hao Urena M.D.   5,000 Units at 12/25/22 1556    acetaminophen (Tylenol) tablet 650 mg  650 mg Oral Q6HRS PRN Hao Urena M.D.        ondansetron (ZOFRAN) syringe/vial injection 4 mg  4 mg Intravenous Q4HRS PRN Hao Urena M.D.        ondansetron (ZOFRAN ODT) dispertab 4 mg  4 mg Oral Q4HRS PRN Hao Urena M.D.        guaiFENesin dextromethorphan (ROBITUSSIN DM) 100-10 MG/5ML syrup 10 mL  10 mL Oral Q6HRS PRN Hao Urena M.D.        amitriptyline (ELAVIL) tablet 10 mg  10 mg Oral BID Hao Urena M.D.   10 mg at 12/25/22 0532    aspirin EC (ECOTRIN) tablet 81 mg  81 mg Oral QAM Hao Urena M.D.   81 mg at 12/25/22 0531    atorvastatin (LIPITOR) tablet 40 mg  40 mg Oral Nightly Hao Urena M.D.   40 mg at 12/24/22 2107    ipratropium-albuterol (DUONEB) nebulizer solution  3 mL Nebulization Q2HRS PRN (RT) Hao Urena M.D.        umeclidinium-vilanterol (Anoro Ellipta) inhaler 1 Puff  1 Puff Inhalation QDAILY (RT) Hao Urena M.D.        And    fluticasone (FLOVENT HFA) 44 MCG/ACT inhaler 88 mcg  2 Puff Inhalation QDAILY (RT) Hao Urena M.D.        nicotine (NICODERM) 21 MG/24HR 21 mg  21 mg Transdermal Daily-0600 Hao Urena M.D.   21 mg at 12/25/22 0536    And    nicotine polacrilex (NICORETTE) 2 MG piece 2 mg  2 mg Oral Q HOUR PRN Hao Urena M.D.        Respiratory Therapy Consult   Nebulization Continuous RT Gary Stover M.D.         insulin regular (HumuLIN R,NovoLIN R) injection  2-9 Units Subcutaneous Q6HRS Gary Stover M.D.   5 Units at 12/25/22 1144    And    dextrose 10 % BOLUS 25 g  25 g Intravenous Q15 MIN PRN Gary Stover M.D.        amLODIPine (NORVASC) tablet 10 mg  10 mg Oral QAM Gary Stover M.D.   10 mg at 12/25/22 0532       Allergies  No Known Allergies    Vital Signs last 24 hours  Temp:  [36.4 °C (97.5 °F)-37.1 °C (98.7 °F)] 36.4 °C (97.5 °F)  Pulse:  [] 96  Resp:  [18-46] 18  BP: (108-156)/(60-87) 122/74  SpO2:  [60 %-100 %] 94 %    Physical Exam  Physical Exam  Vitals and nursing note reviewed.   Constitutional:       General: He is not in acute distress.     Appearance: Normal appearance. He is not toxic-appearing.   HENT:      Head: Normocephalic and atraumatic.      Nose: Nose normal.      Mouth/Throat:      Mouth: Mucous membranes are moist.   Eyes:      Extraocular Movements: Extraocular movements intact.   Cardiovascular:      Rate and Rhythm: Normal rate and regular rhythm.      Pulses: Normal pulses.      Heart sounds: No murmur heard.    No friction rub. No gallop.   Pulmonary:      Effort: Pulmonary effort is normal.      Breath sounds: Normal breath sounds.   Abdominal:      General: Bowel sounds are normal.   Musculoskeletal:         General: Normal range of motion.      Cervical back: Normal range of motion. No rigidity.      Right lower leg: No edema.      Left lower leg: No edema.   Skin:     General: Skin is warm.      Capillary Refill: Capillary refill takes less than 2 seconds.      Findings: No rash.   Neurological:      General: No focal deficit present.      Mental Status: He is alert and oriented to person, place, and time.   Psychiatric:         Mood and Affect: Mood normal.       Fluids    Intake/Output Summary (Last 24 hours) at 12/25/2022 0379  Last data filed at 12/25/2022 1400  Gross per 24 hour   Intake 610 ml   Output 1125 ml   Net -515 ml       Laboratory  Recent Results  (from the past 48 hour(s))   GRAM STAIN    Collection Time: 12/23/22  6:00 PM    Specimen: Respirate   Result Value Ref Range    Significant Indicator .     Source RESP     Site SPUTUM     Gram Stain Result       Sputum Gram stain quality score is <1, probable  oropharyngeal contamination. Culture not performed.  Recollect if clinically indicated.     Urinalysis    Collection Time: 12/23/22  8:04 PM    Specimen: Urine   Result Value Ref Range    Color Yellow     Character Cloudy (A)     Specific Gravity 1.012 <1.035    Ph 5.0 5.0 - 8.0    Glucose Negative Negative mg/dL    Ketones Negative Negative mg/dL    Protein 300 (A) Negative mg/dL    Bilirubin Negative Negative    Urobilinogen, Urine 0.2 Negative    Nitrite Negative Negative    Leukocyte Esterase Trace (A) Negative    Occult Blood Trace (A) Negative    Micro Urine Req Microscopic    URINE MICROSCOPIC (W/UA)    Collection Time: 12/23/22  8:04 PM   Result Value Ref Range    WBC 2-5 (A) /hpf    RBC 0-2 (A) /hpf    Epithelial Cells Few /hpf    Amorphous Crystal Present /hpf    Hyaline Cast 0-2 /lpf    Granular Casts 0-2 /lpf   CBC WITH DIFFERENTIAL    Collection Time: 12/24/22 12:15 AM   Result Value Ref Range    WBC 12.1 (H) 4.8 - 10.8 K/uL    RBC 3.14 (L) 4.70 - 6.10 M/uL    Hemoglobin 8.3 (L) 14.0 - 18.0 g/dL    Hematocrit 27.3 (L) 42.0 - 52.0 %    MCV 86.9 81.4 - 97.8 fL    MCH 26.4 (L) 27.0 - 33.0 pg    MCHC 30.4 (L) 33.7 - 35.3 g/dL    RDW 48.9 35.9 - 50.0 fL    Platelet Count 344 164 - 446 K/uL    MPV 9.3 9.0 - 12.9 fL    Neutrophils-Polys 93.00 (H) 44.00 - 72.00 %    Lymphocytes 5.50 (L) 22.00 - 41.00 %    Monocytes 0.70 0.00 - 13.40 %    Eosinophils 0.00 0.00 - 6.90 %    Basophils 0.20 0.00 - 1.80 %    Immature Granulocytes 0.60 0.00 - 0.90 %    Nucleated RBC 0.00 /100 WBC    Neutrophils (Absolute) 11.21 (H) 1.82 - 7.42 K/uL    Lymphs (Absolute) 0.66 (L) 1.00 - 4.80 K/uL    Monos (Absolute) 0.09 0.00 - 0.85 K/uL    Eos (Absolute) 0.00 0.00 - 0.51 K/uL     Baso (Absolute) 0.02 0.00 - 0.12 K/uL    Immature Granulocytes (abs) 0.07 0.00 - 0.11 K/uL    NRBC (Absolute) 0.00 K/uL   Comp Metabolic Panel    Collection Time: 12/24/22 12:15 AM   Result Value Ref Range    Sodium 135 135 - 145 mmol/L    Potassium 5.1 3.6 - 5.5 mmol/L    Chloride 106 96 - 112 mmol/L    Co2 17 (L) 20 - 33 mmol/L    Anion Gap 12.0 7.0 - 16.0    Glucose 125 (H) 65 - 99 mg/dL    Bun 37 (H) 8 - 22 mg/dL    Creatinine 3.66 (H) 0.50 - 1.40 mg/dL    Calcium 7.6 (L) 8.5 - 10.5 mg/dL    AST(SGOT) 20 12 - 45 U/L    ALT(SGPT) 18 2 - 50 U/L    Alkaline Phosphatase 82 30 - 99 U/L    Total Bilirubin 0.2 0.1 - 1.5 mg/dL    Albumin 3.0 (L) 3.2 - 4.9 g/dL    Total Protein 6.5 6.0 - 8.2 g/dL    Globulin 3.5 1.9 - 3.5 g/dL    A-G Ratio 0.9 g/dL   MAGNESIUM    Collection Time: 12/24/22 12:15 AM   Result Value Ref Range    Magnesium 1.8 1.5 - 2.5 mg/dL   PHOSPHORUS    Collection Time: 12/24/22 12:15 AM   Result Value Ref Range    Phosphorus 3.6 2.5 - 4.5 mg/dL   TROPONIN    Collection Time: 12/24/22 12:15 AM   Result Value Ref Range    Troponin T 404 (H) 6 - 19 ng/L   Lipid Profile (Tomorrow AM)    Collection Time: 12/24/22 12:15 AM   Result Value Ref Range    Cholesterol,Tot 90 (L) 100 - 199 mg/dL    Triglycerides 41 0 - 149 mg/dL    HDL 40 >=40 mg/dL    LDL 42 <100 mg/dL   CORRECTED CALCIUM    Collection Time: 12/24/22 12:15 AM   Result Value Ref Range    Correct Calcium 8.4 (L) 8.5 - 10.5 mg/dL   ESTIMATED GFR    Collection Time: 12/24/22 12:15 AM   Result Value Ref Range    GFR (CKD-EPI) 17 (A) >60 mL/min/1.73 m 2   Urine Drug Screen    Collection Time: 12/24/22  3:20 AM   Result Value Ref Range    Amphetamines Urine Negative Negative    Barbiturates Negative Negative    Benzodiazepines Negative Negative    Cocaine Metabolite Negative Negative    Methadone Negative Negative    Opiates Negative Negative    Oxycodone Negative Negative    Phencyclidine -Pcp Negative Negative    Propoxyphene Negative Negative     Cannabinoid Metab Negative Negative   POCT glucose device results    Collection Time: 22  8:32 AM   Result Value Ref Range    POC Glucose, Blood 192 (H) 65 - 99 mg/dL   EKG    Collection Time: 22 10:13 AM   Result Value Ref Range    Report       [facility]    Test Date:  2022  Pt Name:    FERNANDO VAZQUEZ                   Department: ER  MRN:        [id number]                      Room:       [facility]  Gender:     Male                         Technician: TXM  :        [id number]                   Requested By:ROHITH SHELDON  Order #:    [id number]                    Reading MD: Sean Asencio MD    Measurements  Intervals                                Axis  Rate:       103                          P:          84  MI:         189                          QRS:        93  QRSD:       142                          T:          -59  QT:         363  QTc:        475    Interpretive Statements  Sinus tachycardia  RBBB and LPFB  Nonspecific ST-T wave changes  Electronically Signed On 2022 14:09:17 PST by Sean Asencio MD     POCT glucose device results    Collection Time: 22 11:33 AM   Result Value Ref Range    POC Glucose, Blood 229 (H) 65 - 99 mg/dL   POCT glucose device results    Collection Time: 22  4:45 PM   Result Value Ref Range    POC Glucose, Blood 202 (H) 65 - 99 mg/dL   POCT glucose device results    Collection Time: 22 11:38 PM   Result Value Ref Range    POC Glucose, Blood 249 (H) 65 - 99 mg/dL   CBC With Differential    Collection Time: 22  5:36 AM   Result Value Ref Range    WBC 17.3 (H) 4.8 - 10.8 K/uL    RBC 3.39 (L) 4.70 - 6.10 M/uL    Hemoglobin 8.8 (L) 14.0 - 18.0 g/dL    Hematocrit 29.9 (L) 42.0 - 52.0 %    MCV 88.2 81.4 - 97.8 fL    MCH 26.0 (L) 27.0 - 33.0 pg    MCHC 29.4 (L) 33.7 - 35.3 g/dL    RDW 50.5 (H) 35.9 - 50.0 fL    Platelet Count 417 164 - 446 K/uL    MPV 9.6 9.0 - 12.9 fL    Neutrophils-Polys 80.50 (H) 44.00 - 72.00 %    Lymphocytes 13.60 (L) 22.00 -  41.00 %    Monocytes 2.50 0.00 - 13.40 %    Eosinophils 0.00 0.00 - 6.90 %    Basophils 0.00 0.00 - 1.80 %    Nucleated RBC 0.20 /100 WBC    Neutrophils (Absolute) 13.93 (H) 1.82 - 7.42 K/uL    Lymphs (Absolute) 2.35 1.00 - 4.80 K/uL    Monos (Absolute) 0.43 0.00 - 0.85 K/uL    Eos (Absolute) 0.00 0.00 - 0.51 K/uL    Baso (Absolute) 0.00 0.00 - 0.12 K/uL    NRBC (Absolute) 0.04 K/uL   Magnesium    Collection Time: 12/25/22  5:36 AM   Result Value Ref Range    Magnesium 2.3 1.5 - 2.5 mg/dL   Phosphorus    Collection Time: 12/25/22  5:36 AM   Result Value Ref Range    Phosphorus 6.7 (H) 2.5 - 4.5 mg/dL   Basic Metabolic Panel    Collection Time: 12/25/22  5:36 AM   Result Value Ref Range    Sodium 133 (L) 135 - 145 mmol/L    Potassium 5.9 (H) 3.6 - 5.5 mmol/L    Chloride 100 96 - 112 mmol/L    Co2 17 (L) 20 - 33 mmol/L    Glucose 224 (H) 65 - 99 mg/dL    Bun 68 (H) 8 - 22 mg/dL    Creatinine 4.57 (HH) 0.50 - 1.40 mg/dL    Calcium 8.1 (L) 8.5 - 10.5 mg/dL    Anion Gap 16.0 7.0 - 16.0   TROPONIN    Collection Time: 12/25/22  5:36 AM   Result Value Ref Range    Troponin T 446 (H) 6 - 19 ng/L   ESTIMATED GFR    Collection Time: 12/25/22  5:36 AM   Result Value Ref Range    GFR (CKD-EPI) 13 (A) >60 mL/min/1.73 m 2   DIFFERENTIAL MANUAL    Collection Time: 12/25/22  5:36 AM   Result Value Ref Range    Progranulocytes 3.40 %    Manual Diff Status PERFORMED    PERIPHERAL SMEAR REVIEW    Collection Time: 12/25/22  5:36 AM   Result Value Ref Range    Peripheral Smear Review see below    PLATELET ESTIMATE    Collection Time: 12/25/22  5:36 AM   Result Value Ref Range    Plt Estimation Normal    MORPHOLOGY    Collection Time: 12/25/22  5:36 AM   Result Value Ref Range    RBC Morphology Present     Polychromia 1+    PROCALCITONIN    Collection Time: 12/25/22  5:36 AM   Result Value Ref Range    Procalcitonin 0.26 (H) <0.25 ng/mL   POCT glucose device results    Collection Time: 12/25/22  5:56 AM   Result Value Ref Range    POC  Glucose, Blood 215 (H) 65 - 99 mg/dL   URINE SODIUM RANDOM    Collection Time: 12/25/22 10:35 AM   Result Value Ref Range    Sodium, Urine -per volume 46 mmol/L   URINE POTASSIUM RANDOM    Collection Time: 12/25/22 10:35 AM   Result Value Ref Range    Potassium 53.0 mmol/L   URINE CHLORIDE RANDOM    Collection Time: 12/25/22 10:35 AM   Result Value Ref Range    Chloride, Urine-per volume 65 mmol/L   URINE CREATININE RANDOM    Collection Time: 12/25/22 10:35 AM   Result Value Ref Range    Creatinine, Random Urine 83.02 mg/dL   URINE PROTEIN RANDOM    Collection Time: 12/25/22 10:35 AM   Result Value Ref Range    Total Protein, Urine 126.0 (H) 0.0 - 15.0 mg/dL   POCT glucose device results    Collection Time: 12/25/22 11:43 AM   Result Value Ref Range    POC Glucose, Blood 263 (H) 65 - 99 mg/dL   Basic Metabolic Panel    Collection Time: 12/25/22  1:13 PM   Result Value Ref Range    Sodium 133 (L) 135 - 145 mmol/L    Potassium 5.4 3.6 - 5.5 mmol/L    Chloride 97 96 - 112 mmol/L    Co2 17 (L) 20 - 33 mmol/L    Glucose 290 (H) 65 - 99 mg/dL    Bun 81 (H) 8 - 22 mg/dL    Creatinine 4.44 (HH) 0.50 - 1.40 mg/dL    Calcium 7.9 (L) 8.5 - 10.5 mg/dL    Anion Gap 19.0 (H) 7.0 - 16.0   ESTIMATED GFR    Collection Time: 12/25/22  1:13 PM   Result Value Ref Range    GFR (CKD-EPI) 14 (A) >60 mL/min/1.73 m 2       Imaging  US-RENAL   Final Result      1.  Echogenic renal parenchyma bilaterally could relate to medical renal disease.      2.  No hydronephrosis. No renal calculus.      DX-CHEST-PORTABLE (1 VIEW)   Final Result      1.  Enlarged cardiac silhouette and small amounts of bilateral pleural fluid with diffuse interstitial opacities most consistent with pulmonary edema/CHF.   2.  Bibasilar opacities could be due to edema, atelectasis or pneumonitis.   3.  There is atherosclerosis.   4.  Questionable linear calcific plaque in the proximal descending thoracic aorta versus some other device intrinsic or extrinsic to the  patient.      EC-ECHOCARDIOGRAM COMPLETE W/ CONT         US-EXTREMITY VENOUS LOWER BILAT   Final Result      DX-CHEST-PORTABLE (1 VIEW)   Final Result      1.  Cardiomegaly with interstitial infiltrates likely representing pulmonary edema.      2.  Bibasilar atelectasis and small bilateral pleural effusions.          Assessment/Plan  Acute on chronic respiratory failure with hypoxia (HCC)  Assessment & Plan  COPD team recommendations:   -I believe is multifactorial including CHF and COPD, but mainly due to  CHF due to history.  -Patient with acute hypoxic respiratory failure and need for bipap at admission  -Continue titration of oxygen to keep sats 88-92%, no need to increase more than that  -At home he is on trelegy inhalers and should continue while inpatient on ICS/LAMA/LABA  -Continue duonebs  -Agree with steroids, complete a short course with a fast taper  -Will need follow up as outpatient with pulmonary (please provide a non urgent referral if he doesn't have a pulmonologist)  -Will need to continue his home inhaler (trelegy ellipta) at discharge, please provide a prescription and also another one for albuterol inhaler at discharge.  -Viral panel negat for influenza, rsv and covid on 12/23  -No signs of infection on CXR  -consider IS/fluter valve  -Patient will need PFTs in 2-3 months when stable.  -he tells me that follows with [facility] pulmonologist and believes he has an appointment in January.          Given that we have a plan in place with recommendations and addressed the questions initially posted, we will sign off.  Thanks for letting us participate in the care of your patient, please do not hesitate in calling regarding questions or concerns.  We are also available through [employer].      Discussed patient condition and risk of morbidity and/or mortality with Hospitalist, RN, and Patient.    care time = 60 minutes in directly providing and coordinating care and extensive data review.  No time  overlap and excludes procedures.      Ming Bay MD FACP  Pulmonary/Critical Care

## 2022-12-24 NOTE — ASSESSMENT & PLAN NOTE
Tobacco cessation education and resources to be provided when appropriate  Nicotine replacement patch   75

## 2022-12-24 NOTE — ED NOTES
Received report; assumed care of Pt.    Introduced self to Pt; informed him that I am part of his care team.    Respiratory at bedside.    No acute distress at this time.  Will continue to monitor.

## 2022-12-24 NOTE — ASSESSMENT & PLAN NOTE
Patient with a history of COPD and home O2 requirement of 4 L with acute exacerbation  Continue systemic steroids and scheduled and as needed bronchodilators  RT/O2 protocol  Outpatient COPD follow-up  Continue Flovent, Ellipta

## 2022-12-24 NOTE — RESPIRATORY CARE
"COPD EDUCATION by COPD CLINICAL EDUCATOR  12/24/2022  at  3:24 PM by Clary Justice, RRT     Patient interviewed by COPD education team.  Patient unable to participate in full program.  A short intervention has been conducted.  A comprehensive packet including information about COPD, types of treatments to manage their disease and safe home Oxygen usage was provided and reviewed with patient at the bedside.  Reviewed medication and action plan.       Smoking Cessation Intervention and education completed, 8  minutes spent on smoking cessation education with patient.  Provided smoking cessation packet with \"Tips to Quit\" and brochure for \"Free Smoking Cessation Classes\".      COPD Screen  COPD Risk Screening  Do you have a history of COPD?: Yes  Do you have a Pulmonologist?: Yes    COPD Assessment  COPD Clinical Specialists ONLY  COPD Education Initiated: Yes--Short Intervention  DME Company: Doesnt recall name  DME Equipment Type: oxygen 4L and concentrator  Physician Name: SHELL CASTLE M.D.  Pulmonologist Name: [facility]  Referrals Initiated: Yes  Pulmonary Rehab: Yes (referral placed by MD on admit)  Smoking Cessation: Yes  $ Smoking Cessation 3-10 Minutes: Symptomatic  Palliative Care: Yes (ordered 12/23/2022)  Hospice: N/A  Home Health Care:  (TBD)  [facility] Outreach: N/A  [facility]: N/A  [facility]: N/A  Private In-Home Care Agency: N/A  Is this a COPD exacerbation patient?: Yes (per h&p, order set used, ip pulm notified)  $ Demo/Eval of SVN's, MDI's and Aerosols: Yes (spacer)  (OP) Pulmonary Function Testing:  (had one years ago at [facility] (no data) has one schedule at [facility])    PFT Results    No results found for: PFT    Meds to Beds  Would the patient like to opt in for Bedside Medication Delivery at Discharge?: Yes, interested     MY COPD ACTION PLAN     It is recommended that patients and physicians /healthcare providers complete this action plan " "together. This plan should be discussed at each physician visit and updated as needed.    The green, yellow and red zones show groups of symptoms of COPD. This list of symptoms is not comprehensive, and you may experience other symptoms. In the \"Actions\" column, your healthcare provider has recommended actions for you to take based on your symptoms.    Patient Name: Yoandy Peace   YOB: 1955   Last Updated on: 12/24/2022  3:24 PM   Green Zone:  I am doing well today Actions     Usual activitiy and exercise level   Take daily medications     Usual amounts of cough and phlegm/mucus   Use oxygen as prescribed     Sleep well at night   Continue regular exercise/diet plan     Appetite is good   At all times avoid cigarette smoke, inhaled irritants     Daily Medications (these medications are taken every day):   Fluticasone and Umeclidinium and Vilanterol (Trelogy) 1 Puff Once daily     Additional Information:  Rinse and spit after each treatment     Yellow Zone:  I am having a bad day or a COPD flare Actions     More breathless than usual   Continue daily medications     I have less energy for my daily activities   Use quick relief inhaler as ordered     Increased or thicker phlegm/mucus   Use oxygen as prescribed     Using quick relief inhaler/nebulizer more often   Get plenty of rest     Swelling of ankles more than usual   Use pursed lip breathing     More coughing than usual   At all times avoid cigarette smoke, inhaled irritants     I feel like I have a \"chest cold\"     Poor sleep and my symptoms woke me up     My appetite is not good     My medicine is not helping      Call provider immediately if symptoms don’t improve     Continue daily medications, add rescue medications:   Albuterol 2 Puffs Every 6 hours PRN       Medications to be used during a flare up, (as Discussed with Provider):           Additional Information:  Use spacer with each treatment     Red Zone:  I need urgent medical care " Actions     Severe shortness of breath even at rest   Call 911 or seek medical care immediately     Not able to do any activity because of breathing      Fever or shaking chills      Feeling confused or very drowsy       Chest pains      Coughing up blood

## 2022-12-24 NOTE — ASSESSMENT & PLAN NOTE
Trop 473>>459>>404  BNP 6487  Likely secondary to CHF  ASA/statin  Echo unremarkable  Cardiology following

## 2022-12-24 NOTE — ED NOTES
Responded to call bell.  Provided Pt urinal.  Pt tolerating BiPAP well.    Rounded on Pt.    Updated Pt on plan of care. Pt verbalized understanding.  No acute distress at this time.  Will continue to monitor.

## 2022-12-24 NOTE — ASSESSMENT & PLAN NOTE
Unchanged  Monitor with maintenance of perfusion and oxygenation to the kidneys  Avoid nephrotoxins  Monitor creatinine, urine output, electrolytes closely

## 2022-12-24 NOTE — ED NOTES
Medicated Pt according to MAR.    Rounded on Pt.    Provided Pt with cordless phone.    Updated Pt on plan of care. Pt verbalized understanding.  No acute distress at this time.  Will continue to monitor.

## 2022-12-24 NOTE — H&P
Hospital Medicine History & Physical Note    Date of Service  12/23/2022    Primary Care Physician  Ha Diehl M.D.    Consultants  ICU  Palliative care    Code Status  Full Code    Chief Complaint  Chief Complaint   Patient presents with    Shortness of Breath     BIB EMS from home for SOB x 1 hour. Hx of COPD.   O2 SAT 88% on baseline 4L PTA. Improved with CPAP.   Recevied 125 mg solumedrol, 2 duoneb and 2 albuterol.     Edema     Abd swelling, BLE pitting edema.        History of Presenting Illness  Yoandy Peace is a 67 y.o. male with history of COPD dependent on 4 L, tobacco abuse, CAD, CHF, ALONDRA/OHS on bedtime CPAP, who presented 12/23/2022 with evaluations for progressive shortness of breath and lower extremities swelling.  Patient stated he was admitted to [facility] for 5 days approximately a month ago for pneumonia/COPD exacerbation.  Since being released from hospital, he reported progressive decline, shortness of breath at rest and with exertion, no longer able to ambulate to bathroom.  Due to severe shortness of breath earlier today, brought into ER by EMS for evaluation. En route to ER, he received 125 mg Solu-Medrol, multiple nebulizers treatments, desatted to 88% on 4 L.  He was placed on BiPAP in ER --was on BiPAP for about an hour, eventually de-escalated to HFNC 30L.  CXR notable for bilateral vascular congestion with pulmonary edema.  Noted to have elevated proBNP of 6487, flat troponin 473 > 459 with no STEMI ok EKG. initially, ICU was consulted and agreeable for IMCU admission.  However, time of my evaluation, patient appears to be in respiratory distress with the use of accessory muscle and in tripod position.  Subsequent VBG revealed pH of 7.22, PCO2 51, PO2 23 on HFNC.  I discussed with ICU attending, agreeable for ICU level care for rescue BiPAP.  Cardiology was also consulted by ERP for elevated troponin, cardiology requests to be reconsulted if any further assistance needed  by ICU team.  Patient will be admitted to ICU.    I discussed the plan of care with patient, bedside RN, and critical care.    Review of Systems  Review of Systems   Constitutional:  Positive for chills and malaise/fatigue. Negative for fever.   HENT:  Negative for hearing loss and tinnitus.    Eyes:  Negative for blurred vision and double vision.   Respiratory:  Positive for cough and shortness of breath. Negative for sputum production.    Cardiovascular:  Positive for orthopnea and leg swelling. Negative for chest pain and palpitations.   Gastrointestinal:  Negative for abdominal pain, heartburn, nausea and vomiting.   Genitourinary:  Negative for dysuria and hematuria.   Musculoskeletal:  Negative for joint pain and myalgias.   Skin:  Negative for itching and rash.   Neurological:  Positive for weakness. Negative for dizziness and headaches.   Endo/Heme/Allergies:  Negative for environmental allergies. Does not bruise/bleed easily.   Psychiatric/Behavioral:  Negative for depression and substance abuse.    All other systems reviewed and are negative.    Past Medical History   has a past medical history of Chronic obstructive pulmonary disease (HCC).    Surgical History   has no past surgical history on file.     Family History  family history is not on file.   Family history reviewed with patient. There is family history that is pertinent to the chief complaint.   FH of CAD, COPD    Social History   reports that he has been smoking cigarettes. He has never used smokeless tobacco. He reports that he does not currently use alcohol. He reports current drug use. Drug: Inhaled.    Allergies  No Known Allergies    Medications  Prior to Admission Medications   Prescriptions Last Dose Informant Patient Reported? Taking?   albuterol 108 (90 Base) MCG/ACT Aero Soln inhalation aerosol PRN at [facility]N Patient's Home Pharmacy Yes Yes   Sig: Inhale 2 Puffs every 6 hours as needed for Shortness of Breath.   amLODIPine (NORVASC) 10 MG  Tab 12/23/2022 at [facility] Patient's Home Pharmacy Yes Yes   Sig: Take 10 mg by mouth every morning.   amitriptyline (ELAVIL) 10 MG Tab 12/23/2022 at [facility] Patient's Home Pharmacy Yes Yes   Sig: Take 10 mg by mouth 2 times a day.   ammonium lactate (LAC-HYDRIN) 12 % Lotion UNK at [facility] Patient's Home Pharmacy Yes Yes   Sig: Apply 1 Application topically 2 times a day.   aspirin EC (ECOTRIN) 81 MG Tablet Delayed Response 12/23/2022 at [facility] Patient's Home Pharmacy Yes Yes   Sig: Take 81 mg by mouth every morning.   atorvastatin (LIPITOR) 40 MG Tab 12/22/2022 at [facility] Patient's Home Pharmacy Yes Yes   Sig: Take 40 mg by mouth every evening.   dapagliflozin propanediol (FARXIGA) 5 MG Tab 12/23/2022 at [facility] Patient's Home Pharmacy Yes Yes   Sig: Take 5 mg by mouth every morning.   doxycycline (VIBRAMYCIN) 100 MG Tab COMPLETE at [facility] Patient's Home Pharmacy Yes Yes   Sig: Take 100 mg by mouth 2 times a day. 5 days   fluticasone-umeclidin-vilant (TRELEGY ELLIPTA) 100-62.5-25 MCG/ACT AEROSOL POWDER, BREATH ACTIVATED inhalation 12/23/2022 at [facility] Patient's Home Pharmacy Yes Yes   Sig: Inhale 1 Inhalation every morning.   insulin NPH (HUMULIN/NOVOLIN) 100 UNIT/ML Suspension UNK at [facility] Patient's Home Pharmacy Yes Yes   Sig: Inject  Units under the skin 3 times a day. 100 units in the morning, 75 units at lunch & 50 units at bedtime   ketoconazole (NIZORAL) 2 % Cream UNK at [facility] Patient's Home Pharmacy Yes Yes   Sig: Apply 1 Application topically 2 times a day.   linagliptin (TRADJENTA) 5 MG Tab tablet 12/23/2022 at [facility] Patient's Home Pharmacy Yes Yes   Sig: Take 5 mg by mouth every day.   losartan (COZAAR) 25 MG Tab 12/23/2022 at [facility] Patient's Home Pharmacy Yes Yes   Sig: Take 25 mg by mouth 2 times a day.   methylPREDNISolone (MEDROL DOSEPAK) 4 MG Tablet Therapy Pack COMPLETE at [facility] Patient's Home Pharmacy Yes Yes   Sig: Take 4-8 mg by mouth every day. Day 1  2 tablets before breakfast, 1 tablet after lunch and after  supper, 2 tablets at bedtime  Day 2  1 tablet before breakfast, 1 tablet after lunch and after supper, 2 tablets at bedtime  Day 3  1 tablet before breakfast and 1 tablet after lunch, after supper and at bedtime  Day 4  1 tablet before breakfast, after lunch, and at bedtime  Day 5  1 tablet before breakfast and at bedtime  Day 6  1 tablet before breakfast   umeclidinium-vilanterol (ANORO ELLIPTA) 62.5-25 MCG/ACT AEROSOL POWDER, BREATH ACTIVATED inhaler 12/23/2022 at 0630 Patient's Home Pharmacy Yes Yes   Sig: Inhale 1 Puff every day.      Facility-Administered Medications: None       Physical Exam  Temp:  [36.2 °C (97.1 °F)-36.5 °C (97.7 °F)] 36.2 °C (97.1 °F)  Pulse:  [] 117  Resp:  [24-39] 33  BP: ()/(50-89) 135/60  SpO2:  [91 %-100 %] 91 %  Blood Pressure : 129/68   Temperature: 36.2 °C (97.1 °F)   Pulse: (!) 114   Respiration: (!) 24   Pulse Oximetry: 96 %       Physical Exam  Vitals and nursing note reviewed.   Constitutional:       General: He is in acute distress.      Appearance: He is obese.   HENT:      Head: Normocephalic and atraumatic.      Nose: Nose normal.      Mouth/Throat:      Mouth: Mucous membranes are moist.      Pharynx: Oropharynx is clear.   Eyes:      General: No scleral icterus.     Extraocular Movements: Extraocular movements intact.   Cardiovascular:      Rate and Rhythm: Regular rhythm. Tachycardia present.      Pulses: Normal pulses.      Heart sounds:     No friction rub.   Pulmonary:      Effort: Respiratory distress present.      Breath sounds: No stridor.      Comments: Diffuse crackles  Prolonged expiratory phase  Poor air entry  Minimal wheezing  Seen using accessory muscles in tripod position  Chest:      Chest wall: No tenderness.   Abdominal:      General: There is distension.      Tenderness: There is no abdominal tenderness. There is no guarding or rebound.   Musculoskeletal:         General: No tenderness or signs of injury.      Cervical back: Neck supple. No  tenderness.      Comments: +3 pitting edema of lower extremities bilaterally  Chronic venous stasis appearance   Skin:     General: Skin is warm and dry.      Capillary Refill: Capillary refill takes less than 2 seconds.   Neurological:      General: No focal deficit present.      Mental Status: He is alert and oriented to person, place, and time.      Motor: Weakness present.   Psychiatric:         Mood and Affect: Mood normal.       Laboratory:  Recent Labs     12/23/22  1400   WBC 17.5*   RBC 3.78*   HEMOGLOBIN 9.9*   HEMATOCRIT 33.4*   MCV 88.4   MCH 26.2*   MCHC 29.6*   RDW 49.7   PLATELETCT 474*   MPV 9.3     Recent Labs     12/23/22  1400   SODIUM 137   POTASSIUM 4.7   CHLORIDE 105   CO2 21   GLUCOSE 125*   BUN 34*   CREATININE 3.31*   CALCIUM 7.9*     Recent Labs     12/23/22  1400   ALTSGPT 18   ASTSGOT 16   ALKPHOSPHAT 100*   TBILIRUBIN 0.2   GLUCOSE 125*         Recent Labs     12/23/22  1400   NTPROBNP 6487*         Recent Labs     12/23/22  1400 12/23/22  1624   TROPONINT 473* 459*       Imaging:  DX-CHEST-PORTABLE (1 VIEW)   Final Result      1.  Cardiomegaly with interstitial infiltrates likely representing pulmonary edema.      2.  Bibasilar atelectasis and small bilateral pleural effusions.      EC-ECHOCARDIOGRAM COMPLETE W/O CONT    (Results Pending)   US-EXTREMITY VENOUS LOWER BILAT    (Results Pending)       X-Ray:  I have personally reviewed the images and compared with prior images.  EKG:  I have personally reviewed the images and compared with prior images.    Assessment/Plan:  Justification for Admission Status  I anticipate this patient will require at least 2 midnights of hospitalization, therefore appropriate for inpatient status.      * Acute on chronic respiratory failure with hypoxia (HCC)  Assessment & Plan  Dependent on 4 L  Currently needing BiPAP/HFNC --wean off as tolerated  Diuresis as tolerated  Procalcitonin WNL -- s/p Zosyn x1 in ED --de-escalate to doxycyline  Nebs/pulm  toilet  Wean off Solu-Medrol as tolerated    Admit to ICU  ICU f/u appreciated    Acute pulmonary edema (HCC)  Assessment & Plan  Diuresis as tolerated    Nitroglycerin drip-wean off as tolerated    Cardiorenal syndrome, stage 1-4 or unspecified chronic kidney disease, with heart failure ([facility])  Assessment & Plan  Suspected  Cr 3.31 at admission    Diuresis as tolerated  Monitor renal function while on Diuretic    Acute on chronic congestive heart failure ([facility])  Assessment & Plan  Diuresis as tolerated  --lasix 40mg IV x2 in ED  --continued on lasix 60mg IV BID    F/u echo  Optimize CHF meds    Sepsis ([facility])  Assessment & Plan  This is Sepsis Present on admission  SIRS criteria identified on my evaluation include: Tachycardia, with heart rate greater than 90 BPM, Tachypnea, with respirations greater than 20 per minute, Leukocytosis, with WBC greater than 12,000 and Bandemia, greater than 10% bands  Source is likely pulm  Sepsis protocol initiated  Cautious with IVF given volume overload      Acute exacerbation of chronic obstructive pulmonary disease (COPD) ([facility])  Assessment & Plan  Wean off BiPAP  Nebs, pulm toilet, PEP  Wean off solumederol as tolerated  --s/p solumederol 125mg x1 by EMS  --continue solumederol 62.5mg q8hr    CAD (coronary artery disease)  Assessment & Plan  ASA/statin    DM (diabetes mellitus) ([facility])  Assessment & Plan  Home meds  ISS    Elevated troponin  Assessment & Plan  Trop 473>>459, flat  BNP 6487    Likely secondary to acute illness/CHF/volume overload  Trend CE, EKG  ASA/statin  Already on nitro gtt  May consider heparin gtt and cardiology consult if rising trop    Bilateral leg edema  Assessment & Plan  Likely due to volume overload  Diuresis  Check LE Doppler    Hypertension  Assessment & Plan  Amlodipine, Lasix    Tobacco abuse  Assessment & Plan  Cessation counseling provided  Nicotine replacement as needed    ACP (advance care planning)  Assessment & Plan  Discussed in ED -- FULL code  per pt  Palliative care consult for assistance with ACP    Class 3 severe obesity in adult (HCC)  Assessment & Plan  Diet and lifestyle modification    ALONDRA on CPAP  Assessment & Plan  CPAP at bedtime  Currently on rescue BiPAP        VTE prophylaxis: heparin ppx

## 2022-12-24 NOTE — ED NOTES
Pharmacy Medication Reconciliation    ~Med rec updated and complete per patient at bedside & patient home pharmacy  ~Allergies have been verified   ~Pt home pharmacy: [facility]      ~Patient home pharmacy reports that patient received a Medrol DosePak & Doxycycline on 12/02/2022

## 2022-12-24 NOTE — ED NOTES
Dr. Gonda at bedside.   Per MD, ok to stop Nitro gtt. Ok to remove bipap for patient to eat and drink. RT to be notified.   Pt on monitoring, no acute distress, call light within reach.

## 2022-12-24 NOTE — ED NOTES
Bedside report given to ICU RN. Pt transported to ICU on monitor with chart and all belongings on 5L NC with full O2 tank.

## 2022-12-24 NOTE — ASSESSMENT & PLAN NOTE
Continue aggressive diuresis, added Diuril today  Resume outpatient metoprolol and losartan  Echocardiogram

## 2022-12-24 NOTE — CARE PLAN
The patient is Stable - Low risk of patient condition declining or worsening    Shift Goals  Clinical Goals: Maintain hemodynamic and oxygenatoin levels. Notify MD of any critical changes.  Patient Goals: Feel better, and eat.  Family Goals: JENI    Progress made toward(s) clinical / shift goals:  Patient is progressing through goals. Patient is open to education and other interventions.    Patient is not progressing towards the following goals:

## 2022-12-24 NOTE — ASSESSMENT & PLAN NOTE
Secondary to COPD exacerbation and congestive heart failure  Continue noninvasive ventilatory support with breaks today for diet  RT/O2 protocol  Continue to correct underlying causes  Avoid sedatives

## 2022-12-24 NOTE — ED NOTES
Report received from Edi RODRIGUEZ. Rounded on patient. Found sitting on bed, bipap in place. Pt in no acute distress. Denies any current needs however states he is starting to get hungry and his mouth is dry. Nitro gtt running @ 20, pt on all monitoring, call light within reach. Pt understands to not get up out of bed on own and proper use of call light.

## 2022-12-24 NOTE — ASSESSMENT & PLAN NOTE
This is Sepsis Present on admission  SIRS criteria identified on my evaluation include: Tachycardia, with heart rate greater than 90 BPM, Tachypnea, with respirations greater than 20 per minute, Leukocytosis, with WBC greater than 12,000 and Bandemia, greater than 10% bands  Source is likely pulm  Sepsis protocol initiated  Cautious with IVF given volume overload

## 2022-12-24 NOTE — ASSESSMENT & PLAN NOTE
Remains on O2  Continue with Flovent and Anoro  S/p steroids  Duo nebs as needed  Continue with oxygen supplementation to obtain SPO2 >88-90%

## 2022-12-25 ENCOUNTER — APPOINTMENT (OUTPATIENT)
Dept: RADIOLOGY | Facility: MEDICAL CENTER | Age: 67
DRG: 291 | End: 2022-12-25
Attending: STUDENT IN AN ORGANIZED HEALTH CARE EDUCATION/TRAINING PROGRAM
Payer: COMMERCIAL

## 2022-12-25 ENCOUNTER — APPOINTMENT (OUTPATIENT)
Dept: RADIOLOGY | Facility: MEDICAL CENTER | Age: 67
DRG: 291 | End: 2022-12-25
Attending: HOSPITALIST
Payer: COMMERCIAL

## 2022-12-25 PROBLEM — A41.9 SEPSIS (HCC): Status: RESOLVED | Noted: 2022-12-23 | Resolved: 2022-12-25

## 2022-12-25 LAB
ANION GAP SERPL CALC-SCNC: 16 MMOL/L (ref 7–16)
ANION GAP SERPL CALC-SCNC: 19 MMOL/L (ref 7–16)
BASOPHILS # BLD AUTO: 0 % (ref 0–1.8)
BASOPHILS # BLD: 0 K/UL (ref 0–0.12)
BUN SERPL-MCNC: 68 MG/DL (ref 8–22)
BUN SERPL-MCNC: 81 MG/DL (ref 8–22)
CALCIUM SERPL-MCNC: 7.9 MG/DL (ref 8.5–10.5)
CALCIUM SERPL-MCNC: 8.1 MG/DL (ref 8.5–10.5)
CHLORIDE SERPL-SCNC: 100 MMOL/L (ref 96–112)
CHLORIDE SERPL-SCNC: 97 MMOL/L (ref 96–112)
CHLORIDE UR-SCNC: 65 MMOL/L
CO2 SERPL-SCNC: 17 MMOL/L (ref 20–33)
CO2 SERPL-SCNC: 17 MMOL/L (ref 20–33)
CREAT SERPL-MCNC: 4.44 MG/DL (ref 0.5–1.4)
CREAT SERPL-MCNC: 4.57 MG/DL (ref 0.5–1.4)
CREAT UR-MCNC: 83.02 MG/DL
EOSINOPHIL # BLD AUTO: 0 K/UL (ref 0–0.51)
EOSINOPHIL NFR BLD: 0 % (ref 0–6.9)
ERYTHROCYTE [DISTWIDTH] IN BLOOD BY AUTOMATED COUNT: 50.5 FL (ref 35.9–50)
GFR SERPLBLD CREATININE-BSD FMLA CKD-EPI: 13 ML/MIN/1.73 M 2
GFR SERPLBLD CREATININE-BSD FMLA CKD-EPI: 14 ML/MIN/1.73 M 2
GLUCOSE BLD STRIP.AUTO-MCNC: 215 MG/DL (ref 65–99)
GLUCOSE BLD STRIP.AUTO-MCNC: 220 MG/DL (ref 65–99)
GLUCOSE BLD STRIP.AUTO-MCNC: 263 MG/DL (ref 65–99)
GLUCOSE SERPL-MCNC: 224 MG/DL (ref 65–99)
GLUCOSE SERPL-MCNC: 290 MG/DL (ref 65–99)
HCT VFR BLD AUTO: 29.9 % (ref 42–52)
HGB BLD-MCNC: 8.8 G/DL (ref 14–18)
LV EJECT FRACT  99904: 60
LV EJECT FRACT MOD 2C 99903: 53.08
LV EJECT FRACT MOD 4C 99902: 62.58
LV EJECT FRACT MOD BP 99901: 58.28
LYMPHOCYTES # BLD AUTO: 2.35 K/UL (ref 1–4.8)
LYMPHOCYTES NFR BLD: 13.6 % (ref 22–41)
MAGNESIUM SERPL-MCNC: 2.3 MG/DL (ref 1.5–2.5)
MANUAL DIFF BLD: NORMAL
MCH RBC QN AUTO: 26 PG (ref 27–33)
MCHC RBC AUTO-ENTMCNC: 29.4 G/DL (ref 33.7–35.3)
MCV RBC AUTO: 88.2 FL (ref 81.4–97.8)
MONOCYTES # BLD AUTO: 0.43 K/UL (ref 0–0.85)
MONOCYTES NFR BLD AUTO: 2.5 % (ref 0–13.4)
MORPHOLOGY BLD-IMP: NORMAL
NEUTROPHILS # BLD AUTO: 13.93 K/UL (ref 1.82–7.42)
NEUTROPHILS NFR BLD: 80.5 % (ref 44–72)
NRBC # BLD AUTO: 0.04 K/UL
NRBC BLD-RTO: 0.2 /100 WBC
PHOSPHATE SERPL-MCNC: 6.7 MG/DL (ref 2.5–4.5)
PLATELET # BLD AUTO: 417 K/UL (ref 164–446)
PLATELET BLD QL SMEAR: NORMAL
PMV BLD AUTO: 9.6 FL (ref 9–12.9)
POLYCHROMASIA BLD QL SMEAR: NORMAL
POTASSIUM SERPL-SCNC: 5.4 MMOL/L (ref 3.6–5.5)
POTASSIUM SERPL-SCNC: 5.9 MMOL/L (ref 3.6–5.5)
POTASSIUM UR-SCNC: 53 MMOL/L
PROCALCITONIN SERPL-MCNC: 0.26 NG/ML
PROMYELOCYTES NFR BLD MANUAL: 3.4 %
PROT UR-MCNC: 126 MG/DL (ref 0–15)
RBC # BLD AUTO: 3.39 M/UL (ref 4.7–6.1)
RBC BLD AUTO: PRESENT
SODIUM SERPL-SCNC: 133 MMOL/L (ref 135–145)
SODIUM SERPL-SCNC: 133 MMOL/L (ref 135–145)
SODIUM UR-SCNC: 46 MMOL/L
TROPONIN T SERPL-MCNC: 446 NG/L (ref 6–19)
WBC # BLD AUTO: 17.3 K/UL (ref 4.8–10.8)

## 2022-12-25 PROCEDURE — 99233 SBSQ HOSP IP/OBS HIGH 50: CPT | Performed by: INTERNAL MEDICINE

## 2022-12-25 PROCEDURE — 85025 COMPLETE CBC W/AUTO DIFF WBC: CPT

## 2022-12-25 PROCEDURE — 83735 ASSAY OF MAGNESIUM: CPT

## 2022-12-25 PROCEDURE — 84100 ASSAY OF PHOSPHORUS: CPT

## 2022-12-25 PROCEDURE — 700101 HCHG RX REV CODE 250: Performed by: INTERNAL MEDICINE

## 2022-12-25 PROCEDURE — 80048 BASIC METABOLIC PNL TOTAL CA: CPT

## 2022-12-25 PROCEDURE — 700102 HCHG RX REV CODE 250 W/ 637 OVERRIDE(OP): Performed by: STUDENT IN AN ORGANIZED HEALTH CARE EDUCATION/TRAINING PROGRAM

## 2022-12-25 PROCEDURE — 82962 GLUCOSE BLOOD TEST: CPT

## 2022-12-25 PROCEDURE — A9270 NON-COVERED ITEM OR SERVICE: HCPCS | Performed by: HOSPITALIST

## 2022-12-25 PROCEDURE — 36415 COLL VENOUS BLD VENIPUNCTURE: CPT

## 2022-12-25 PROCEDURE — 82436 ASSAY OF URINE CHLORIDE: CPT

## 2022-12-25 PROCEDURE — A9270 NON-COVERED ITEM OR SERVICE: HCPCS | Performed by: EMERGENCY MEDICINE

## 2022-12-25 PROCEDURE — 82570 ASSAY OF URINE CREATININE: CPT

## 2022-12-25 PROCEDURE — 99233 SBSQ HOSP IP/OBS HIGH 50: CPT | Performed by: STUDENT IN AN ORGANIZED HEALTH CARE EDUCATION/TRAINING PROGRAM

## 2022-12-25 PROCEDURE — 84156 ASSAY OF PROTEIN URINE: CPT

## 2022-12-25 PROCEDURE — 94640 AIRWAY INHALATION TREATMENT: CPT

## 2022-12-25 PROCEDURE — 76775 US EXAM ABDO BACK WALL LIM: CPT

## 2022-12-25 PROCEDURE — 700111 HCHG RX REV CODE 636 W/ 250 OVERRIDE (IP): Performed by: STUDENT IN AN ORGANIZED HEALTH CARE EDUCATION/TRAINING PROGRAM

## 2022-12-25 PROCEDURE — 94660 CPAP INITIATION&MGMT: CPT

## 2022-12-25 PROCEDURE — 71045 X-RAY EXAM CHEST 1 VIEW: CPT

## 2022-12-25 PROCEDURE — 84484 ASSAY OF TROPONIN QUANT: CPT

## 2022-12-25 PROCEDURE — 93306 TTE W/DOPPLER COMPLETE: CPT | Mod: 26 | Performed by: INTERNAL MEDICINE

## 2022-12-25 PROCEDURE — 94669 MECHANICAL CHEST WALL OSCILL: CPT

## 2022-12-25 PROCEDURE — A9270 NON-COVERED ITEM OR SERVICE: HCPCS | Performed by: INTERNAL MEDICINE

## 2022-12-25 PROCEDURE — 770020 HCHG ROOM/CARE - TELE (206)

## 2022-12-25 PROCEDURE — 84145 PROCALCITONIN (PCT): CPT

## 2022-12-25 PROCEDURE — 700102 HCHG RX REV CODE 250 W/ 637 OVERRIDE(OP): Performed by: EMERGENCY MEDICINE

## 2022-12-25 PROCEDURE — 84133 ASSAY OF URINE POTASSIUM: CPT

## 2022-12-25 PROCEDURE — 700101 HCHG RX REV CODE 250: Performed by: STUDENT IN AN ORGANIZED HEALTH CARE EDUCATION/TRAINING PROGRAM

## 2022-12-25 PROCEDURE — A9270 NON-COVERED ITEM OR SERVICE: HCPCS | Performed by: STUDENT IN AN ORGANIZED HEALTH CARE EDUCATION/TRAINING PROGRAM

## 2022-12-25 PROCEDURE — 700102 HCHG RX REV CODE 250 W/ 637 OVERRIDE(OP): Performed by: HOSPITALIST

## 2022-12-25 PROCEDURE — 700102 HCHG RX REV CODE 250 W/ 637 OVERRIDE(OP): Performed by: INTERNAL MEDICINE

## 2022-12-25 PROCEDURE — 84300 ASSAY OF URINE SODIUM: CPT

## 2022-12-25 PROCEDURE — 85007 BL SMEAR W/DIFF WBC COUNT: CPT

## 2022-12-25 RX ORDER — FUROSEMIDE 10 MG/ML
60 INJECTION INTRAMUSCULAR; INTRAVENOUS
Status: DISCONTINUED | OUTPATIENT
Start: 2022-12-25 | End: 2022-12-25

## 2022-12-25 RX ORDER — SODIUM POLYSTYRENE SULFONATE 15 G/60ML
15 SUSPENSION ORAL; RECTAL ONCE
Status: COMPLETED | OUTPATIENT
Start: 2022-12-25 | End: 2022-12-25

## 2022-12-25 RX ORDER — METHYLPREDNISOLONE SODIUM SUCCINATE 40 MG/ML
40 INJECTION, POWDER, LYOPHILIZED, FOR SOLUTION INTRAMUSCULAR; INTRAVENOUS EVERY 12 HOURS
Status: DISCONTINUED | OUTPATIENT
Start: 2022-12-25 | End: 2022-12-27

## 2022-12-25 RX ADMIN — ATORVASTATIN CALCIUM 40 MG: 40 TABLET, FILM COATED ORAL at 21:19

## 2022-12-25 RX ADMIN — FUROSEMIDE 60 MG: 10 INJECTION, SOLUTION INTRAMUSCULAR; INTRAVENOUS at 05:31

## 2022-12-25 RX ADMIN — AMITRIPTYLINE HYDROCHLORIDE 10 MG: 10 TABLET, FILM COATED ORAL at 05:32

## 2022-12-25 RX ADMIN — HEPARIN SODIUM 5000 UNITS: 5000 INJECTION, SOLUTION INTRAVENOUS; SUBCUTANEOUS at 05:29

## 2022-12-25 RX ADMIN — HEPARIN SODIUM 5000 UNITS: 5000 INJECTION, SOLUTION INTRAVENOUS; SUBCUTANEOUS at 15:56

## 2022-12-25 RX ADMIN — IPRATROPIUM BROMIDE AND ALBUTEROL SULFATE 3 ML: 2.5; .5 SOLUTION RESPIRATORY (INHALATION) at 11:17

## 2022-12-25 RX ADMIN — IPRATROPIUM BROMIDE AND ALBUTEROL SULFATE 3 ML: 2.5; .5 SOLUTION RESPIRATORY (INHALATION) at 21:55

## 2022-12-25 RX ADMIN — METHYLPREDNISOLONE SODIUM SUCCINATE 40 MG: 40 INJECTION, POWDER, FOR SOLUTION INTRAMUSCULAR; INTRAVENOUS at 05:30

## 2022-12-25 RX ADMIN — HEPARIN SODIUM 5000 UNITS: 5000 INJECTION, SOLUTION INTRAVENOUS; SUBCUTANEOUS at 21:19

## 2022-12-25 RX ADMIN — METOPROLOL TARTRATE 25 MG: 25 TABLET, FILM COATED ORAL at 18:33

## 2022-12-25 RX ADMIN — IPRATROPIUM BROMIDE AND ALBUTEROL SULFATE 3 ML: 2.5; .5 SOLUTION RESPIRATORY (INHALATION) at 07:38

## 2022-12-25 RX ADMIN — NICOTINE TRANSDERMAL SYSTEM 21 MG: 21 PATCH, EXTENDED RELEASE TRANSDERMAL at 05:36

## 2022-12-25 RX ADMIN — IPRATROPIUM BROMIDE AND ALBUTEROL SULFATE 3 ML: 2.5; .5 SOLUTION RESPIRATORY (INHALATION) at 19:18

## 2022-12-25 RX ADMIN — SODIUM POLYSTYRENE SULFONATE 15 G: 15 SUSPENSION ORAL; RECTAL at 10:34

## 2022-12-25 RX ADMIN — METHYLPREDNISOLONE SODIUM SUCCINATE 40 MG: 40 INJECTION, POWDER, FOR SOLUTION INTRAMUSCULAR; INTRAVENOUS at 18:33

## 2022-12-25 RX ADMIN — INSULIN HUMAN 3 UNITS: 100 INJECTION, SOLUTION PARENTERAL at 06:04

## 2022-12-25 RX ADMIN — ASPIRIN 81 MG: 81 TABLET, COATED ORAL at 05:31

## 2022-12-25 RX ADMIN — INSULIN HUMAN 3 UNITS: 100 INJECTION, SOLUTION PARENTERAL at 18:34

## 2022-12-25 RX ADMIN — AMLODIPINE BESYLATE 10 MG: 10 TABLET ORAL at 05:32

## 2022-12-25 RX ADMIN — AMITRIPTYLINE HYDROCHLORIDE 10 MG: 10 TABLET, FILM COATED ORAL at 18:33

## 2022-12-25 RX ADMIN — INSULIN HUMAN 5 UNITS: 100 INJECTION, SOLUTION PARENTERAL at 11:44

## 2022-12-25 RX ADMIN — METOPROLOL TARTRATE 25 MG: 25 TABLET, FILM COATED ORAL at 06:07

## 2022-12-25 ASSESSMENT — ENCOUNTER SYMPTOMS
HEMOPTYSIS: 0
HEADACHES: 0
SHORTNESS OF BREATH: 1
DEPRESSION: 0
CHILLS: 0
FEVER: 0
PALPITATIONS: 0
DIZZINESS: 0
HEARTBURN: 0
BRUISES/BLEEDS EASILY: 0
NECK PAIN: 0
DOUBLE VISION: 0
BLURRED VISION: 0
ORTHOPNEA: 1
NAUSEA: 0
COUGH: 0
MYALGIAS: 0

## 2022-12-25 ASSESSMENT — COGNITIVE AND FUNCTIONAL STATUS - GENERAL
DRESSING REGULAR UPPER BODY CLOTHING: A LITTLE
DRESSING REGULAR LOWER BODY CLOTHING: A LITTLE
MOBILITY SCORE: 15
MOVING FROM LYING ON BACK TO SITTING ON SIDE OF FLAT BED: A LITTLE
WALKING IN HOSPITAL ROOM: A LOT
HELP NEEDED FOR BATHING: A LOT
DAILY ACTIVITIY SCORE: 17
PERSONAL GROOMING: A LITTLE
CLIMB 3 TO 5 STEPS WITH RAILING: TOTAL
TOILETING: A LOT
MOVING TO AND FROM BED TO CHAIR: A LITTLE
SUGGESTED CMS G CODE MODIFIER DAILY ACTIVITY: CK
STANDING UP FROM CHAIR USING ARMS: A LITTLE
SUGGESTED CMS G CODE MODIFIER MOBILITY: CK
TURNING FROM BACK TO SIDE WHILE IN FLAT BAD: A LITTLE

## 2022-12-25 ASSESSMENT — PATIENT HEALTH QUESTIONNAIRE - PHQ9
SUM OF ALL RESPONSES TO PHQ9 QUESTIONS 1 AND 2: 0
1. LITTLE INTEREST OR PLEASURE IN DOING THINGS: NOT AT ALL
SUM OF ALL RESPONSES TO PHQ9 QUESTIONS 1 AND 2: 0
2. FEELING DOWN, DEPRESSED, IRRITABLE, OR HOPELESS: NOT AT ALL
2. FEELING DOWN, DEPRESSED, IRRITABLE, OR HOPELESS: NOT AT ALL
1. LITTLE INTEREST OR PLEASURE IN DOING THINGS: NOT AT ALL

## 2022-12-25 ASSESSMENT — PAIN DESCRIPTION - PAIN TYPE
TYPE: CHRONIC PAIN

## 2022-12-25 ASSESSMENT — LIFESTYLE VARIABLES
AVERAGE NUMBER OF DAYS PER WEEK YOU HAVE A DRINK CONTAINING ALCOHOL: 0
HOW MANY TIMES IN THE PAST YEAR HAVE YOU HAD 5 OR MORE DRINKS IN A DAY: 0
ALCOHOL_USE: NO
EVER HAD A DRINK FIRST THING IN THE MORNING TO STEADY YOUR NERVES TO GET RID OF A HANGOVER: NO
HAVE YOU EVER FELT YOU SHOULD CUT DOWN ON YOUR DRINKING: NO
TOTAL SCORE: 0
EVER FELT BAD OR GUILTY ABOUT YOUR DRINKING: NO
ON A TYPICAL DAY WHEN YOU DRINK ALCOHOL HOW MANY DRINKS DO YOU HAVE: 0
CONSUMPTION TOTAL: NEGATIVE
HAVE PEOPLE ANNOYED YOU BY CRITICIZING YOUR DRINKING: NO
TOTAL SCORE: 0
TOTAL SCORE: 0

## 2022-12-25 ASSESSMENT — FIBROSIS 4 INDEX: FIB4 SCORE: 0.93

## 2022-12-25 NOTE — PROGRESS NOTES
Lionel from Lab called with critical result of Trop 446 at 0703. Critical lab result read back to Lionel.   Dr. Centeno notified of critical lab result at 0706.  Critical lab result read back by Dr. Centeno.

## 2022-12-25 NOTE — PROGRESS NOTES
0700  - Report received from Naima Espinoza RN at patient's bedside. Patient resting in chair quietly with no complaints at this time. Telemetry monitor intact et functioning. Call light and belongings within reach, safety measures intact, white board updated.     0900 - Patient ambulated around room with RN at bedside. Strength WNL for activity, however patient becomes significantly SOB with activity and must sit et rest to recover.     1045 - Urine collected and sent to lab per orders. Oxygen decreased to 5 LPM per NC. Patient tolerating well with sats of 94%.    1230 - Sitting on the side of the bed, denies needs.     1430 - Oxygen decreased to 4.5 LPM per NC. Patient tolerating well with sats of 94%.    1630 - Patient reports that his GF is supposed to be bringing his CPAP from home.     1730 - CPAP here, RT notified.     1850 - Reported off to Angelica RODRIGUEZ ar patient's bedside.

## 2022-12-25 NOTE — PROGRESS NOTES
4 Eyes Skin Assessment Completed by Alexis Perdomo, RN and Ramesh RN.    Head WDL  Ears Redness and Blanching  Nose WDL  Mouth WDL  Neck WDL  Breast/Chest WDL  Shoulder Blades WDL  Spine WDL  (R) Arm/Elbow/Hand WDL  (L) Arm/Elbow/Hand WDL  Abdomen WDL, Umbilical Hernia  Groin WDL  Scrotum/Coccyx/Buttocks WDL  (R) Leg Redness and Non-Blanching, flaky skin, wound intact  (L) Leg Redness and Non-Blanching, flaky skin wound intact  (R) Heel/Foot/Toe WDL  (L) Heel/Foot/Toe WDL          Devices In Places Tele Box, Pulse Ox, and SCD's      Interventions In Place NC W/Ear Foams, TAP System, and Pillows    Possible Skin Injury Yes    Pictures Uploaded Into Epic Yes  Wound Consult Placed Yes  RN Wound Prevention Protocol Ordered No

## 2022-12-25 NOTE — CARE PLAN
The patient is Watcher - Medium risk of patient condition declining or worsening    Shift Goals  Clinical Goals: Stable oxygenation  Patient Goals: Go home  Family Goals: No family present    Progress made toward(s) clinical / shift goals:    Plan of care discussed with day shift RN and patient, maintaining optimal gas exchange, fluid volume being managed with PO fluids and lasix.    Problem: Knowledge Deficit - Standard  Goal: Patient and family/care givers will demonstrate understanding of plan of care, disease process/condition, diagnostic tests and medications  Outcome: Progressing     Problem: Impaired Gas Exchange  Goal: Patient will demonstrate improved ventilation and adequate oxygenation and participate in treatment regimen within the level of ability/situation.  Outcome: Progressing     Problem: Fluid Volume  Goal: Fluid volume balance will be maintained  Outcome: Progressing       Patient is not progressing towards the following goals:

## 2022-12-25 NOTE — CARE PLAN
The patient is Watcher - Medium risk of patient condition declining or worsening    Shift Goals  Clinical Goals: stable oxygenation  Patient Goals: discharge soon  Family Goals: no family present    Progress made toward(s) clinical / shift goals:    Problem: Knowledge Deficit - Standard  Goal: Patient and family/care givers will demonstrate understanding of plan of care, disease process/condition, diagnostic tests and medications  Outcome: Progressing     Problem: Knowledge Deficit - COPD  Goal: Patient/significant other demonstrates understanding of disease process, utilization of the Action Plan, medications and discharge instruction  Outcome: Progressing     Problem: Risk for Infection - COPD  Goal: Patient will remain free from signs and symptoms of infection  Outcome: Progressing     Problem: Nutrition - Advanced  Goal: Patient will display progressive weight gain toward goal have adequate food and fluid intake  Outcome: Progressing       Patient is not progressing towards the following goals:

## 2022-12-25 NOTE — PROGRESS NOTES
Bedside report received from MICHAEL Fu. Patient is alert and oriented x  4. Fall precautions are in place. Call light is in reach.

## 2022-12-25 NOTE — ASSESSMENT & PLAN NOTE
COPD team recommendations:   -I believe is multifactorial including CHF and COPD, but mainly due to  CHF due to history.  -Patient with acute hypoxic respiratory failure and need for bipap at admission  -Continue titration of oxygen to keep sats 88-92%, no need to increase more than that  -At home he is on trelegy inhalers and should continue while inpatient on ICS/LAMA/LABA  -Continue duonebs  -Agree with steroids, complete a short course with a fast taper  -Will need follow up as outpatient with pulmonary (please provide a non urgent referral if he doesn't have a pulmonologist)  -Will need to continue his home inhaler (trelegy ellipta) at discharge, please provide a prescription and also another one for albuterol inhaler at discharge.  -Viral panel negat for influenza, rsv and covid on 12/23  -No signs of infection on CXR  -consider IS/fluter valve  -Patient will need PFTs in 2-3 months when stable.  -he tells me that follows with [facility] pulmonologist and believes he has an appointment in January.

## 2022-12-25 NOTE — DIETARY
NUTRITION SERVICES: BMI - Pt with BMI >40 (=Body mass index is 48.49 kg/m².), Class III obesity. Weight loss counseling not appropriate in acute care setting. RECOMMEND - If appropriate at DC please refer to outpatient nutrition services for weight management.

## 2022-12-25 NOTE — PROGRESS NOTES
Patient was transferred out of ICU early this morning 12/25/2020 to see Dr. Vasquez's note.  This morning laboratory significant for increase in creatinine from 3.68-4.57.  As well as elevated potassium at 5.9.  Kayexalate 15 g given with improvement of potassium to 5.4.  I have stopped IV Lasix.  renal ultrasound showing chronic medical disease renal lites ordered he does follow Dr. Renaldo Babb nephrology outpatient for chronic kidney disease.  Patient is currently at 6 L/min nasal cannula baseline is 4 L/min at home.  He does not appear to be in any respiratory distress.  Chest x-ray reviewed does appear to be minimal pulmonary edema noted.  He was previously on Lasix 60 mg IV twice daily.  Echocardiogram is pending.  Continue with Solu-Medrol IV.  If no improvement of renal function or worsening pulmonary edema requiring further diuresis, I will need to consult nephrology in a.m. from Dr. Renaldo Babb's group.

## 2022-12-25 NOTE — CARE PLAN
The patient is Watcher - Medium risk of patient condition declining or worsening    Shift Goals  Clinical Goals: stable oxygenation  Patient Goals: discharge soon  Family Goals: no family present    Progress made toward(s) clinical / shift goals:  Able to ambulate in room. Significant SOB but recovers with rest. Oxygen at 6 LPM    Problem: Care Map:  Admission Optimal Outcome for the Heart Failure Patient  Goal: Admission:  Optimal Care of the heart failure patient  Outcome: Progressing     Problem: Care Map:  Day 1 Optimal Outcome for the Heart Failure Patient  Goal: Day 1:  Optimal Care of the heart failure patient  Outcome: Progressing     Problem: Care Map:  Day 2 Optimal Outcome for the Heart Failure Patient  Goal: Day 2:  Optimal Care of the heart failure patient  Outcome: Progressing     Problem: Knowledge Deficit - Standard  Goal: Patient and family/care givers will demonstrate understanding of plan of care, disease process/condition, diagnostic tests and medications  Outcome: Progressing     Problem: Knowledge Deficit - COPD  Goal: Patient/significant other demonstrates understanding of disease process, utilization of the Action Plan, medications and discharge instruction  Outcome: Progressing     Problem: Risk for Infection - COPD  Goal: Patient will remain free from signs and symptoms of infection  Outcome: Progressing     Problem: Nutrition - Advanced  Goal: Patient will display progressive weight gain toward goal have adequate food and fluid intake  Outcome: Progressing     Problem: Ineffective Airway Clearance  Goal: Patient will maintain patent airway with clear/clearing breath sounds  Outcome: Progressing     Problem: Impaired Gas Exchange  Goal: Patient will demonstrate improved ventilation and adequate oxygenation and participate in treatment regimen within the level of ability/situation.  Outcome: Progressing     Problem: Risk for Aspiration  Goal: Patient's risk for aspiration will be absent or  decrease  Outcome: Progressing     Problem: Self Care  Goal: Patient will have the ability to perform ADLs independently or with assistance (bathe, groom, dress, toilet and feed)  Outcome: Progressing     Problem: Hemodynamics  Goal: Patient's hemodynamics, fluid balance and neurologic status will be stable or improve  Outcome: Progressing     Problem: Fluid Volume  Goal: Fluid volume balance will be maintained  Outcome: Progressing     Problem: Urinary - Renal Perfusion  Goal: Ability to achieve and maintain adequate renal perfusion and functioning will improve  Outcome: Progressing     Problem: Respiratory  Goal: Patient will achieve/maintain optimum respiratory ventilation and gas exchange  Outcome: Progressing     Problem: Physical Regulation  Goal: Diagnostic test results will improve  Outcome: Progressing  Goal: Signs and symptoms of infection will decrease  Outcome: Progressing     Problem: Skin Integrity  Goal: Skin integrity is maintained or improved  Outcome: Progressing       Problem: Mechanical Ventilation  Goal: Safe management of artificial airway and ventilation  Outcome: Met  Goal: Successful weaning off mechanical ventilator, spontaneously maintains adequate gas exchange  Outcome: Met  Goal: Patient will be able to express needs and understand communication  Outcome: Met       Patient is not progressing towards the following goals:

## 2022-12-25 NOTE — CONSULTS
Hospital Medicine Consultation    Date of Service  12/25/2022    Referring Physician  Jeremy Gonda, MD     Consulting Physician  Elieser Centeno M.D.    Reason for Consultation  Transfer from ICU     History of Presenting Illness  67 y.o. male past medical history of tobacco dependence, COPD on home oxygen, CAD, CHF, obstructive sleep apnea who presented 12/23/2022 with progressive shortness of breath over the last 1 week.  Also reports increasing lower extremity swelling in his legs.  He reports he can only walk 5 feet without getting short of breath.  Upon arrival to the ER he was given Solu-Medrol, bronchodilators and placed on BiPAP for respiratory failure secondary to CHF/COPD and admitted to ICU.  Patient was weaned off BiPAP and downgraded to telemetry floor.  Hospital medicine was consulted.    12/25/2022-   Patient examined bedside is alert, awake, oriented x4 sitting upright in bed on nasal cannula 6 L oxygen.  Able to conversation in full senses, not using accessory muscles.  On exam clear breath sounds with scattered bibasilar crackles.  RT notified that no available CPAP at this time.    Review of Systems  Review of Systems   Constitutional:  Negative for chills and fever.   HENT:  Negative for hearing loss and tinnitus.    Eyes:  Negative for blurred vision and double vision.   Respiratory:  Positive for shortness of breath (on exertion). Negative for cough and hemoptysis.    Cardiovascular:  Positive for orthopnea and leg swelling. Negative for chest pain and palpitations.   Gastrointestinal:  Negative for heartburn and nausea.   Genitourinary:  Negative for dysuria and urgency.   Musculoskeletal:  Negative for myalgias and neck pain.   Skin:  Negative for rash.   Neurological:  Negative for dizziness and headaches.   Endo/Heme/Allergies:  Does not bruise/bleed easily.   Psychiatric/Behavioral:  Negative for depression and suicidal ideas.      Past Medical History   has a past medical history of  Chronic obstructive pulmonary disease (HCC).    Surgical History   has no past surgical history on file.    Family History  family history is not on file.    Social History   reports that he has been smoking cigarettes. He has never used smokeless tobacco. He reports that he does not currently use alcohol. He reports current drug use. Drug: Inhaled.    Medications  Current Facility-Administered Medications   Medication Dose Route Frequency Provider Last Rate Last Admin    methylPREDNISolone (SOLU-MEDROL) 40 MG injection 40 mg  40 mg Intravenous Q8HRS Philip Kumar M.D.   40 mg at 12/24/22 2106    furosemide (LASIX) injection 60 mg  60 mg Intravenous Q8HRS Jeremy M Gonda, M.D.   60 mg at 12/24/22 2106    metoprolol tartrate (LOPRESSOR) tablet 25 mg  25 mg Oral TWICE DAILY Jeremy M Gonda, M.D.   25 mg at 12/24/22 1720    ipratropium-albuterol (DUONEB) nebulizer solution  3 mL Nebulization 4X/DAY (RT) Jeremy M Gonda, M.D.        senna-docusate (PERICOLACE or SENOKOT S) 8.6-50 MG per tablet 2 Tablet  2 Tablet Oral BID Hao Urena M.D.        And    polyethylene glycol/lytes (MIRALAX) PACKET 1 Packet  1 Packet Oral QDAY PRN Hao Urena M.D.        And    magnesium hydroxide (MILK OF MAGNESIA) suspension 30 mL  30 mL Oral QDAY PRN Hao Urena M.D.        And    bisacodyl (DULCOLAX) suppository 10 mg  10 mg Rectal QDAY PRN Hao Urena M.D.        heparin injection 5,000 Units  5,000 Units Subcutaneous Q8HRS Hao Urena M.D.   5,000 Units at 12/24/22 2107    acetaminophen (Tylenol) tablet 650 mg  650 mg Oral Q6HRS PRN Hao Urena M.D.        ondansetron (ZOFRAN) syringe/vial injection 4 mg  4 mg Intravenous Q4HRS PRN Hao Urena M.D.        ondansetron (ZOFRAN ODT) dispertab 4 mg  4 mg Oral Q4HRS PRN Hao Urena M.D.        guaiFENesin dextromethorphan (ROBITUSSIN DM) 100-10 MG/5ML syrup 10 mL  10 mL Oral Q6HRS PRN Hao Urena M.D.        amitriptyline (ELAVIL) tablet 10 mg  10 mg Oral BID Hao  JULIO Urena   10 mg at 12/24/22 1720    aspirin EC (ECOTRIN) tablet 81 mg  81 mg Oral QAM Hao Urena M.D.   81 mg at 12/24/22 0833    atorvastatin (LIPITOR) tablet 40 mg  40 mg Oral Nightly Hao Urena M.D.   40 mg at 12/24/22 2107    ipratropium-albuterol (DUONEB) nebulizer solution  3 mL Nebulization Q2HRS PRN (RT) Hao Urena M.D.        umeclidinium-vilanterol (Anoro Ellipta) inhaler 1 Puff  1 Puff Inhalation QDAILY (RT) Hao Urena M.D.        And    fluticasone (FLOVENT HFA) 44 MCG/ACT inhaler 88 mcg  2 Puff Inhalation QDAILY (RT) Hao Urena M.D.        nicotine (NICODERM) 21 MG/24HR 21 mg  21 mg Transdermal Daily-0600 Hao Urena M.D.        And    nicotine polacrilex (NICORETTE) 2 MG piece 2 mg  2 mg Oral Q HOUR PRN Hao Urena M.D.        Respiratory Therapy Consult   Nebulization Continuous RT Gary Stover M.D.        insulin regular (HumuLIN R,NovoLIN R) injection  2-9 Units Subcutaneous Q6HRS Gary Stover M.D.   3 Units at 12/24/22 2339    And    dextrose 10 % BOLUS 25 g  25 g Intravenous Q15 MIN PRN Gary Stover M.D.        amLODIPine (NORVASC) tablet 10 mg  10 mg Oral ARIANNE Stover M.D.   10 mg at 12/24/22 0834       Allergies  No Known Allergies    Physical Exam  Temp:  [36.6 °C (97.9 °F)-36.8 °C (98.2 °F)] 36.7 °C (98.1 °F)  Pulse:  [] 94  Resp:  [14-50] 30  BP: ()/(50-87) 122/78  SpO2:  [60 %-100 %] 94 %    Physical Exam  Vitals and nursing note reviewed.   Constitutional:       Appearance: He is obese.   HENT:      Head: Normocephalic and atraumatic.      Right Ear: Tympanic membrane normal.      Left Ear: Tympanic membrane normal.      Nose: Nose normal.      Comments: On nasal cannula oxygen     Mouth/Throat:      Mouth: Mucous membranes are moist.      Pharynx: Oropharynx is clear.   Eyes:      Extraocular Movements: Extraocular movements intact.      Pupils: Pupils are equal, round, and reactive to light.   Cardiovascular:      Rate and  Rhythm: Normal rate and regular rhythm.      Pulses: Normal pulses.      Heart sounds: Normal heart sounds.   Pulmonary:      Effort: Pulmonary effort is normal. No respiratory distress.      Breath sounds: No stridor. Rales (Scattered bibasilar) present.   Abdominal:      General: Bowel sounds are normal. There is no distension.      Palpations: Abdomen is soft. There is no mass.      Hernia: A hernia (Umbilical) is present.   Musculoskeletal:         General: No swelling or tenderness. Normal range of motion.      Cervical back: Neck supple.      Right lower leg: Edema (1+) present.      Left lower leg: Edema (1+) present.   Skin:     General: Skin is warm.      Capillary Refill: Capillary refill takes less than 2 seconds.      Coloration: Skin is not jaundiced or pale.      Findings: Rash present.      Comments: Chronic venous stasis changes b/l lower extremities    Neurological:      General: No focal deficit present.      Mental Status: He is alert and oriented to person, place, and time. Mental status is at baseline.      Cranial Nerves: No cranial nerve deficit.      Sensory: No sensory deficit.   Psychiatric:         Mood and Affect: Mood normal.         Behavior: Behavior normal.       Fluids      Laboratory  Recent Labs     12/23/22  1400 12/24/22  0015   WBC 17.5* 12.1*   RBC 3.78* 3.14*   HEMOGLOBIN 9.9* 8.3*   HEMATOCRIT 33.4* 27.3*   MCV 88.4 86.9   MCH 26.2* 26.4*   MCHC 29.6* 30.4*   RDW 49.7 48.9   PLATELETCT 474* 344   MPV 9.3 9.3     Recent Labs     12/23/22  1400 12/24/22  0015   SODIUM 137 135   POTASSIUM 4.7 5.1   CHLORIDE 105 106   CO2 21 17*   GLUCOSE 125* 125*   BUN 34* 37*   CREATININE 3.31* 3.66*   CALCIUM 7.9* 7.6*              Recent Labs     12/24/22  0015   TRIGLYCERIDE 41   HDL 40   LDL 42        Imaging  EC-ECHOCARDIOGRAM COMPLETE W/ CONT         US-EXTREMITY VENOUS LOWER BILAT   Final Result      DX-CHEST-PORTABLE (1 VIEW)   Final Result      1.  Cardiomegaly with interstitial  infiltrates likely representing pulmonary edema.      2.  Bibasilar atelectasis and small bilateral pleural effusions.          Assessment/Plan  * Acute exacerbation of chronic obstructive pulmonary disease (COPD) ([facility])  Assessment & Plan  Resolving on oxygen at 6 L.  In no distress, no accessory muscle use, able to complete full sentences.  Patient downgraded from ICU.  No longer requiring rescue BiPAP.  Continue with Flovent and Anoro  Taper Solu-Medrol to 40 mg every 12 hours  Duo nebs as needed  Continue with oxygen supplementation to obtain SPO2 >88-90%    Elevated troponin  Assessment & Plan  Trop 473>>459>>404  BNP 6487  Likely secondary to CHF  Repeat troponin   ASA/statin  Echo pending       Bilateral leg edema  Assessment & Plan  Likely due to volume overload.  Echo pending  Diuresis  Bilateral lower extremity DVT studies negative    Acute pulmonary edema (HCC)  Assessment & Plan  Resolving  Repeat chest x-ray in a.m.  Continue with diuresis  Patient is normotensive.  Hold ARB at this time.  Echo pending     Hypertension  Assessment & Plan  Amlodipine 10 mg daily   Lasix 60 mg BID     Tobacco abuse  Assessment & Plan  Nicotine replacement as needed    ACP (advance care planning)  Assessment & Plan  Full code.  Discussed with patient in front of RN.    Class 3 severe obesity in adult ([facility])  Assessment & Plan  BMI 52.89     Cardiorenal syndrome, stage 1-4 or unspecified chronic kidney disease, with heart failure ([facility])  Assessment & Plan  Suspected  Cr 3.31 now slightly worse 3.66   Reduce Diuresis to IV lasix 60 mg BID  Monitor renal function while on Diuretic    ALONDRA on CPAP  Assessment & Plan  CPAP at bedtime      Acute on chronic respiratory failure with hypoxia ([facility])  Assessment & Plan  Dependent on 4 L.  Currently requiring 6 L in no distress  Diuresis with IV Lasix  Nebs/pulm toilet  Wean off Solu-Medrol as tolerated    Acute on chronic congestive heart failure (HCC)  Assessment & Plan  Diuresis  Echo  "pending   Continue with metoprolol 25 mg twice daily  UOP -1,475 since admit     CAD (coronary artery disease)  Assessment & Plan  ASA/statin    DM (diabetes mellitus) ([facility])  Assessment & Plan  Continue with SSI   On Farxiga, insulin NPH at home, and linagliptin          DVT Ppx: heparin s/c     This note was generated using voice recognition software which is a small chance of losing errors of grammar and possibly guarded.  Occasional wrong word or \"sound alike\" substitutions may have occurred due to inherent limitation of voice recognition software.  Read the chart carefully recognize and context, where the substitutions have occurred.  I have made every reasonable attempt to find and correct any obvious errors, but expect that some may not be found prior to finalization of these notes.    "

## 2022-12-26 ENCOUNTER — APPOINTMENT (OUTPATIENT)
Dept: RADIOLOGY | Facility: MEDICAL CENTER | Age: 67
DRG: 291 | End: 2022-12-26
Attending: HOSPITALIST
Payer: COMMERCIAL

## 2022-12-26 PROBLEM — N17.9 ACUTE RENAL FAILURE (ARF) (HCC): Status: ACTIVE | Noted: 2022-12-26

## 2022-12-26 LAB
ANION GAP SERPL CALC-SCNC: 17 MMOL/L (ref 7–16)
BASOPHILS # BLD AUTO: 0.3 % (ref 0–1.8)
BASOPHILS # BLD: 0.04 K/UL (ref 0–0.12)
BUN SERPL-MCNC: 93 MG/DL (ref 8–22)
CALCIUM SERPL-MCNC: 7.9 MG/DL (ref 8.5–10.5)
CHLORIDE SERPL-SCNC: 99 MMOL/L (ref 96–112)
CO2 SERPL-SCNC: 17 MMOL/L (ref 20–33)
CREAT SERPL-MCNC: 4.76 MG/DL (ref 0.5–1.4)
CREAT UR-MCNC: 111.41 MG/DL
EKG IMPRESSION: NORMAL
EKG IMPRESSION: NORMAL
EOSINOPHIL # BLD AUTO: 0 K/UL (ref 0–0.51)
EOSINOPHIL NFR BLD: 0 % (ref 0–6.9)
ERYTHROCYTE [DISTWIDTH] IN BLOOD BY AUTOMATED COUNT: 49.7 FL (ref 35.9–50)
GFR SERPLBLD CREATININE-BSD FMLA CKD-EPI: 13 ML/MIN/1.73 M 2
GLUCOSE BLD STRIP.AUTO-MCNC: 188 MG/DL (ref 65–99)
GLUCOSE BLD STRIP.AUTO-MCNC: 214 MG/DL (ref 65–99)
GLUCOSE BLD STRIP.AUTO-MCNC: 232 MG/DL (ref 65–99)
GLUCOSE BLD STRIP.AUTO-MCNC: 255 MG/DL (ref 65–99)
GLUCOSE BLD STRIP.AUTO-MCNC: 266 MG/DL (ref 65–99)
GLUCOSE SERPL-MCNC: 252 MG/DL (ref 65–99)
HAV IGM SERPL QL IA: NORMAL
HBV CORE IGM SER QL: NORMAL
HBV SURFACE AB SERPL IA-ACNC: <3.5 MIU/ML (ref 0–10)
HBV SURFACE AG SER QL: NORMAL
HCT VFR BLD AUTO: 29.2 % (ref 42–52)
HCV AB SER QL: NORMAL
HGB BLD-MCNC: 8.8 G/DL (ref 14–18)
IMM GRANULOCYTES # BLD AUTO: 0.13 K/UL (ref 0–0.11)
IMM GRANULOCYTES NFR BLD AUTO: 0.8 % (ref 0–0.9)
L PNEUMO AG UR QL IA: NEGATIVE
LYMPHOCYTES # BLD AUTO: 1.51 K/UL (ref 1–4.8)
LYMPHOCYTES NFR BLD: 9.5 % (ref 22–41)
MAGNESIUM SERPL-MCNC: 2.4 MG/DL (ref 1.5–2.5)
MCH RBC QN AUTO: 26.1 PG (ref 27–33)
MCHC RBC AUTO-ENTMCNC: 30.1 G/DL (ref 33.7–35.3)
MCV RBC AUTO: 86.6 FL (ref 81.4–97.8)
MONOCYTES # BLD AUTO: 0.51 K/UL (ref 0–0.85)
MONOCYTES NFR BLD AUTO: 3.2 % (ref 0–13.4)
NEUTROPHILS # BLD AUTO: 13.65 K/UL (ref 1.82–7.42)
NEUTROPHILS NFR BLD: 86.2 % (ref 44–72)
NRBC # BLD AUTO: 0.08 K/UL
NRBC BLD-RTO: 0.5 /100 WBC
PHOSPHATE SERPL-MCNC: 7.4 MG/DL (ref 2.5–4.5)
PLATELET # BLD AUTO: 445 K/UL (ref 164–446)
PMV BLD AUTO: 9.9 FL (ref 9–12.9)
POTASSIUM SERPL-SCNC: 5.8 MMOL/L (ref 3.6–5.5)
PROT UR-MCNC: 170 MG/DL (ref 0–15)
RBC # BLD AUTO: 3.37 M/UL (ref 4.7–6.1)
S PNEUM AG UR QL: NEGATIVE
SODIUM SERPL-SCNC: 133 MMOL/L (ref 135–145)
SODIUM UR-SCNC: 29 MMOL/L
TROPONIN T SERPL-MCNC: 609 NG/L (ref 6–19)
UUN UR-MCNC: 701 MG/DL
WBC # BLD AUTO: 15.8 K/UL (ref 4.8–10.8)

## 2022-12-26 PROCEDURE — 99221 1ST HOSP IP/OBS SF/LOW 40: CPT | Mod: 25 | Performed by: SURGERY

## 2022-12-26 PROCEDURE — 90935 HEMODIALYSIS ONE EVALUATION: CPT

## 2022-12-26 PROCEDURE — 85025 COMPLETE CBC W/AUTO DIFF WBC: CPT

## 2022-12-26 PROCEDURE — 71045 X-RAY EXAM CHEST 1 VIEW: CPT

## 2022-12-26 PROCEDURE — 83735 ASSAY OF MAGNESIUM: CPT

## 2022-12-26 PROCEDURE — 700111 HCHG RX REV CODE 636 W/ 250 OVERRIDE (IP): Performed by: HOSPITALIST

## 2022-12-26 PROCEDURE — 86706 HEP B SURFACE ANTIBODY: CPT

## 2022-12-26 PROCEDURE — 84100 ASSAY OF PHOSPHORUS: CPT

## 2022-12-26 PROCEDURE — 93005 ELECTROCARDIOGRAM TRACING: CPT | Performed by: HOSPITALIST

## 2022-12-26 PROCEDURE — 700111 HCHG RX REV CODE 636 W/ 250 OVERRIDE (IP): Performed by: STUDENT IN AN ORGANIZED HEALTH CARE EDUCATION/TRAINING PROGRAM

## 2022-12-26 PROCEDURE — A9270 NON-COVERED ITEM OR SERVICE: HCPCS | Performed by: INTERNAL MEDICINE

## 2022-12-26 PROCEDURE — 82962 GLUCOSE BLOOD TEST: CPT | Mod: 91

## 2022-12-26 PROCEDURE — 84484 ASSAY OF TROPONIN QUANT: CPT

## 2022-12-26 PROCEDURE — A9270 NON-COVERED ITEM OR SERVICE: HCPCS | Performed by: STUDENT IN AN ORGANIZED HEALTH CARE EDUCATION/TRAINING PROGRAM

## 2022-12-26 PROCEDURE — 5A1D70Z PERFORMANCE OF URINARY FILTRATION, INTERMITTENT, LESS THAN 6 HOURS PER DAY: ICD-10-PCS | Performed by: STUDENT IN AN ORGANIZED HEALTH CARE EDUCATION/TRAINING PROGRAM

## 2022-12-26 PROCEDURE — 36415 COLL VENOUS BLD VENIPUNCTURE: CPT

## 2022-12-26 PROCEDURE — C1752 CATH,HEMODIALYSIS,SHORT-TERM: HCPCS

## 2022-12-26 PROCEDURE — 700102 HCHG RX REV CODE 250 W/ 637 OVERRIDE(OP): Performed by: STUDENT IN AN ORGANIZED HEALTH CARE EDUCATION/TRAINING PROGRAM

## 2022-12-26 PROCEDURE — 770020 HCHG ROOM/CARE - TELE (206)

## 2022-12-26 PROCEDURE — 84300 ASSAY OF URINE SODIUM: CPT

## 2022-12-26 PROCEDURE — 80074 ACUTE HEPATITIS PANEL: CPT

## 2022-12-26 PROCEDURE — 82570 ASSAY OF URINE CREATININE: CPT

## 2022-12-26 PROCEDURE — 94760 N-INVAS EAR/PLS OXIMETRY 1: CPT

## 2022-12-26 PROCEDURE — 80048 BASIC METABOLIC PNL TOTAL CA: CPT

## 2022-12-26 PROCEDURE — 700101 HCHG RX REV CODE 250: Performed by: STUDENT IN AN ORGANIZED HEALTH CARE EDUCATION/TRAINING PROGRAM

## 2022-12-26 PROCEDURE — A9270 NON-COVERED ITEM OR SERVICE: HCPCS | Performed by: EMERGENCY MEDICINE

## 2022-12-26 PROCEDURE — 36556 INSERT NON-TUNNEL CV CATH: CPT | Mod: RT | Performed by: SURGERY

## 2022-12-26 PROCEDURE — 84156 ASSAY OF PROTEIN URINE: CPT

## 2022-12-26 PROCEDURE — 02HV33Z INSERTION OF INFUSION DEVICE INTO SUPERIOR VENA CAVA, PERCUTANEOUS APPROACH: ICD-10-PCS | Performed by: SURGERY

## 2022-12-26 PROCEDURE — 94669 MECHANICAL CHEST WALL OSCILL: CPT

## 2022-12-26 PROCEDURE — 36556 INSERT NON-TUNNEL CV CATH: CPT

## 2022-12-26 PROCEDURE — 93010 ELECTROCARDIOGRAM REPORT: CPT | Mod: 76 | Performed by: INTERNAL MEDICINE

## 2022-12-26 PROCEDURE — 94640 AIRWAY INHALATION TREATMENT: CPT

## 2022-12-26 PROCEDURE — 700101 HCHG RX REV CODE 250: Performed by: INTERNAL MEDICINE

## 2022-12-26 PROCEDURE — 99233 SBSQ HOSP IP/OBS HIGH 50: CPT | Performed by: HOSPITALIST

## 2022-12-26 PROCEDURE — 700102 HCHG RX REV CODE 250 W/ 637 OVERRIDE(OP): Performed by: INTERNAL MEDICINE

## 2022-12-26 PROCEDURE — 700102 HCHG RX REV CODE 250 W/ 637 OVERRIDE(OP): Performed by: EMERGENCY MEDICINE

## 2022-12-26 PROCEDURE — 94660 CPAP INITIATION&MGMT: CPT

## 2022-12-26 PROCEDURE — 84540 ASSAY OF URINE/UREA-N: CPT

## 2022-12-26 RX ORDER — HEPARIN SODIUM 1000 [USP'U]/ML
2000 INJECTION, SOLUTION INTRAVENOUS; SUBCUTANEOUS PRN
Status: DISCONTINUED | OUTPATIENT
Start: 2022-12-26 | End: 2022-12-28

## 2022-12-26 RX ORDER — HEPARIN SODIUM 5000 [USP'U]/100ML
0-30 INJECTION, SOLUTION INTRAVENOUS CONTINUOUS
Status: DISCONTINUED | OUTPATIENT
Start: 2022-12-27 | End: 2022-12-28

## 2022-12-26 RX ORDER — HEPARIN SODIUM 1000 [USP'U]/ML
4000 INJECTION, SOLUTION INTRAVENOUS; SUBCUTANEOUS ONCE
Status: COMPLETED | OUTPATIENT
Start: 2022-12-27 | End: 2022-12-27

## 2022-12-26 RX ORDER — FUROSEMIDE 10 MG/ML
80 INJECTION INTRAMUSCULAR; INTRAVENOUS
Status: DISCONTINUED | OUTPATIENT
Start: 2022-12-26 | End: 2023-01-01

## 2022-12-26 RX ORDER — FUROSEMIDE 10 MG/ML
60 INJECTION INTRAMUSCULAR; INTRAVENOUS ONCE
Status: COMPLETED | OUTPATIENT
Start: 2022-12-26 | End: 2022-12-26

## 2022-12-26 RX ORDER — HEPARIN SODIUM 1000 [USP'U]/ML
2000 INJECTION, SOLUTION INTRAVENOUS; SUBCUTANEOUS
Status: DISCONTINUED | OUTPATIENT
Start: 2022-12-26 | End: 2022-12-28

## 2022-12-26 RX ORDER — HEPARIN SODIUM 1000 [USP'U]/ML
2600 INJECTION, SOLUTION INTRAVENOUS; SUBCUTANEOUS
Status: DISCONTINUED | OUTPATIENT
Start: 2022-12-26 | End: 2022-12-28

## 2022-12-26 RX ADMIN — HEPARIN SODIUM 2000 UNITS: 1000 INJECTION, SOLUTION INTRAVENOUS; SUBCUTANEOUS at 18:17

## 2022-12-26 RX ADMIN — IPRATROPIUM BROMIDE AND ALBUTEROL SULFATE 3 ML: 2.5; .5 SOLUTION RESPIRATORY (INHALATION) at 08:01

## 2022-12-26 RX ADMIN — FUROSEMIDE 60 MG: 10 INJECTION, SOLUTION INTRAMUSCULAR; INTRAVENOUS at 11:30

## 2022-12-26 RX ADMIN — HEPARIN SODIUM 2600 UNITS: 1000 INJECTION, SOLUTION INTRAVENOUS; SUBCUTANEOUS at 21:30

## 2022-12-26 RX ADMIN — ATORVASTATIN CALCIUM 40 MG: 40 TABLET, FILM COATED ORAL at 22:25

## 2022-12-26 RX ADMIN — INSULIN HUMAN 3 UNITS: 100 INJECTION, SOLUTION PARENTERAL at 07:28

## 2022-12-26 RX ADMIN — INSULIN HUMAN 5 UNITS: 100 INJECTION, SOLUTION PARENTERAL at 16:40

## 2022-12-26 RX ADMIN — FLUTICASONE PROPIONATE 88 MCG: 44 AEROSOL, METERED RESPIRATORY (INHALATION) at 08:00

## 2022-12-26 RX ADMIN — INSULIN HUMAN 5 UNITS: 100 INJECTION, SOLUTION PARENTERAL at 11:46

## 2022-12-26 RX ADMIN — NICOTINE TRANSDERMAL SYSTEM 21 MG: 21 PATCH, EXTENDED RELEASE TRANSDERMAL at 04:49

## 2022-12-26 RX ADMIN — UMECLIDINIUM BROMIDE AND VILANTEROL TRIFENATATE 1 PUFF: 62.5; 25 POWDER RESPIRATORY (INHALATION) at 08:01

## 2022-12-26 RX ADMIN — HEPARIN SODIUM 5000 UNITS: 5000 INJECTION, SOLUTION INTRAVENOUS; SUBCUTANEOUS at 04:55

## 2022-12-26 RX ADMIN — INSULIN HUMAN 3 UNITS: 100 INJECTION, SOLUTION PARENTERAL at 00:10

## 2022-12-26 RX ADMIN — INSULIN HUMAN 2 UNITS: 100 INJECTION, SOLUTION PARENTERAL at 23:39

## 2022-12-26 RX ADMIN — DOCUSATE SODIUM 50 MG AND SENNOSIDES 8.6 MG 2 TABLET: 8.6; 5 TABLET, FILM COATED ORAL at 17:47

## 2022-12-26 RX ADMIN — METOPROLOL TARTRATE 25 MG: 25 TABLET, FILM COATED ORAL at 04:52

## 2022-12-26 RX ADMIN — AMITRIPTYLINE HYDROCHLORIDE 10 MG: 10 TABLET, FILM COATED ORAL at 04:52

## 2022-12-26 RX ADMIN — METOPROLOL TARTRATE 25 MG: 25 TABLET, FILM COATED ORAL at 17:47

## 2022-12-26 RX ADMIN — AMITRIPTYLINE HYDROCHLORIDE 10 MG: 10 TABLET, FILM COATED ORAL at 17:47

## 2022-12-26 RX ADMIN — HEPARIN SODIUM 5000 UNITS: 5000 INJECTION, SOLUTION INTRAVENOUS; SUBCUTANEOUS at 14:28

## 2022-12-26 RX ADMIN — METHYLPREDNISOLONE SODIUM SUCCINATE 40 MG: 40 INJECTION, POWDER, FOR SOLUTION INTRAMUSCULAR; INTRAVENOUS at 04:53

## 2022-12-26 RX ADMIN — ASPIRIN 81 MG: 81 TABLET, COATED ORAL at 04:52

## 2022-12-26 RX ADMIN — METHYLPREDNISOLONE SODIUM SUCCINATE 40 MG: 40 INJECTION, POWDER, FOR SOLUTION INTRAMUSCULAR; INTRAVENOUS at 17:47

## 2022-12-26 RX ADMIN — FUROSEMIDE 80 MG: 10 INJECTION, SOLUTION INTRAMUSCULAR; INTRAVENOUS at 16:44

## 2022-12-26 RX ADMIN — AMLODIPINE BESYLATE 10 MG: 10 TABLET ORAL at 04:52

## 2022-12-26 RX ADMIN — IPRATROPIUM BROMIDE AND ALBUTEROL SULFATE 3 ML: 2.5; .5 SOLUTION RESPIRATORY (INHALATION) at 19:00

## 2022-12-26 RX ADMIN — IPRATROPIUM BROMIDE AND ALBUTEROL SULFATE 3 ML: 2.5; .5 SOLUTION RESPIRATORY (INHALATION) at 19:11

## 2022-12-26 ASSESSMENT — ENCOUNTER SYMPTOMS
COUGH: 1
WEAKNESS: 0
CLAUDICATION: 0
PALPITATIONS: 0
CHILLS: 0
BRUISES/BLEEDS EASILY: 0
HEMOPTYSIS: 0
EYE DISCHARGE: 0
EYE PAIN: 0
SENSORY CHANGE: 0
ABDOMINAL PAIN: 0
WHEEZING: 1
VOMITING: 0
NECK PAIN: 0
DIARRHEA: 0
SPUTUM PRODUCTION: 0
SPEECH CHANGE: 0
CONSTIPATION: 0
LOSS OF CONSCIOUSNESS: 0
SHORTNESS OF BREATH: 1
HEADACHES: 0
SORE THROAT: 0
NAUSEA: 0
MYALGIAS: 0
DEPRESSION: 0
DIZZINESS: 0
FOCAL WEAKNESS: 0
DIAPHORESIS: 0
FEVER: 0
BACK PAIN: 0

## 2022-12-26 ASSESSMENT — FIBROSIS 4 INDEX: FIB4 SCORE: 0.72

## 2022-12-26 ASSESSMENT — LIFESTYLE VARIABLES: SUBSTANCE_ABUSE: 0

## 2022-12-26 NOTE — CONSULTS
Reason for PC Consult: Advance Care Planning    Consulted by: Dr. Woods; currently Dr. Islas    Assessment:  General: 68 y/o male from home with SOB and ongoing BLE edema. Pt with recent admission to [facility] x 5 days for possible PNA and/or COPD exacerbation, however PMA believes this admission is r/t his CHF vs COPD on this admission. ECHO with EF 55-60% and mild MR. RVSP unable to be calculated. Pt initially required ICU admission for BiPap, but weaning down to baseline O2 requirements of 4LPM. Pt's renal function worsening (Cr 3.31 --> 4.76). PMHx of COPD, ongoing smoking, CHF, ALONDRA with CPAP, CKD and DM.    Prior PC Consults: n/a    Social: Pt resides in [place] with GABRIELA Carbajal, his brother Valeriano and Raven's autistic son. He has 2 sons but is estranged from them. He has not been able to manage ADLs, even on a good day. His brother help with most of his ADLs and Raven works. He is on 4lpm at baseline of O2 at home.      Consults: PMA; nephrology likely    Dyspnea: Yes- improved from time of admission but c/o notable dyspnea. Pt in tripod stance at   Last BM: 12/25/22-    Pain: No-    Depression: No-    Dementia: No;       Spiritual:  Is Evangelical or spirituality important for coping with this illness? No-    Has a  or spiritual provider visit been requested? No    Palliative Performance Scale: 70%    Advance Directive: None-  he believes he completed one at [facility]; will check with Raven  DPOA: No-  NOK are is 2 sons but he has no contact with them. Valeriano woul dbe his NOK, but he wishes for Raven to be his POA. Will complete if not already done  POLST: No-  completed today for DNR, selective focused treatment and trial TF    Code Status: Full- Yoandy wishes for DNR and no intubation     Outcome:  PC RN met with Yoandy at bedside and explained the role of PC to help with discussions about the current medical situation and goals moving forward. Yoandy was sitting at , leaning on his table ginna tripod fashion d/t  "dyspnea. He tells pC he does still smoke, but \"that's going to stop.\" He shared with this RN his social/family history, including his brother and Raven being his closest contacts and help. He has a good understanding of his medical situation and states that he has not gotten any better after his last hospitalization at [facility]. He states that he could ambulate only as far as the bathroom and would take a while to recover before being able to walk back to the couch. He states his bedroom is too far to ambulate to. PC queried Yoandy's function prior to the December hospitalization, which he states was still difficult for him to manage any ADLs.    PC explored pt's values, beliefs, and preferences in order to identify GOC, including advance directives and code status. He tells PC that he believes Raven is his POA, but then later states that he gave permission to [facility] to use her for any decision making. He doesn't recall any official paperwork, but asked this RN to check with Raven. Code status discussed and he states that he would not want intubation under any circumstances. He understands that CPR would not be warranted/beneficial if he is not desiring intubation and confirms that DNR and DNI would be best to capture his wishes. POLST completed reflecting this and his desire for only short-term TF if warranted. POL reviewed and signed by Yoandy. Yoandy's renal function discussed and he expressed having spoken with his nephrologist about the possibility of HD in the future. He had not made a decision either way about whether this would be something he would wish to pursue or not. He is aware this is a concern though. PC discussed hospice in detail, including this as an option for the future, especially if his symptom management continues to be problematic or if his renal function declines and he is not interested in HD. He states this is not something he is interested in right now, but recognizes this may be needed in the " future.     Yoandy denied any questions/needs at this time. While talking to Yoandy, PC RN provided therapeutic communication, including open ended questions, reflective listening, and normalization of thought and feelings throughout encounter, as well as reinforced his goals of care. PC contact information provided to Yoandy and encouraged to call with any questions or concerns. POLST reviewed.signed by MD; original at BS and copy to be scanned into EMR.    Updated: MD/RN/SHAYNE    Plan: unknown at this time- pending clinical picture    Thank you for allowing Palliative Care to support this patient and family. Contact [phone] for additional assistance, change in patient status, or with any questions/concerns.

## 2022-12-26 NOTE — PROGRESS NOTES
Monitor Summary:   Rhythm: NSR   Rate: 92 - 97  Measurement: .18/.08/.28  Ectopy: Frequent PVCs    12 hour chart check

## 2022-12-26 NOTE — PROGRESS NOTES
Monitor Summary:     Rhythm: Sinus Rhythm    Rate: 96-87    Measurement: .20/.08/.30    Ectopy: none    12 hr cc

## 2022-12-26 NOTE — DISCHARGE PLANNING
Care Transition Team Assessment    LSW met with pt at bedside to complete assessment. Pt verified the information on the face sheet. Pt reported he lives with his S/O Raven and brother in a mobile home that has 4-5 steps to enter. Pt informed this LSW that he is independent with all ADLs and IADLs at baseline, however told the palliative RN his brother has been assisting him with ADLs/IADLs. Pt has a CPAP and uses 4L O2 at baseline. Pt unable to remember the name of the [facility] company that supplies his O2. Pt confirmed his S/O and brother are both good supports for him.    Pt is retired and receives SSI. No financial, SA, or MH concerns at this time. Pt reported he has an advance directive and Raven is his DPOA, however no ACP documents on file. Pt reported Raven will be his ride home upon DC and she is able to bring his portable O2.     Information Source  Information Given By: Patient  Informant's Name: Yoandy Peace  Who is responsible for making decisions for patient? : Patient    Readmission Evaluation  Is this a readmission?: No    Elopement Risk  Legal Hold: No  Ambulatory or Self Mobile in Wheelchair: No-Not an Elopement Risk  Elopement Risk: Not at Risk for Elopement    Interdisciplinary Discharge Planning  Lives with - Patient's Self Care Capacity: Significant Other  Patient or legal guardian wants to designate a caregiver: Yes  Caregiver name: Raven Puente  Caregiver contact info: [phone]  Support Systems: Spouse / Significant Other  Housing / Facility: [address] hospitals  Durable Medical Equipment: Home Oxygen, Other - Specify (CPAP)    Discharge Preparedness  What is your plan after discharge?: Home health care  What are your discharge supports?: Partner, Sibling  Prior Functional Level: Drives Self, Needs Assist with Activities of Daily Living, Independent with Medication Management  Difficulity with ADLs: None  Difficulity with IADLs: Laundry, Shopping, Cooking    Functional Assesment  Prior Functional  Level: Drives Self, Needs Assist with Activities of Daily Living, Independent with Medication Management    Finances  Financial Barriers to Discharge: No  Prescription Coverage: Yes    Vision / Hearing Impairment  Vision Impairment : Yes  Right Eye Vision: Impaired  Left Eye Vision: Impaired    Advance Directive  Advance Directive?: None    Domestic Abuse  Have you ever been the victim of abuse or violence?: No  Physical Abuse or Sexual Abuse: No  Verbal Abuse or Emotional Abuse: No  Possible Abuse/Neglect Reported to:: Not Applicable    Psychological Assessment  History of Substance Abuse: None  History of Psychiatric Problems: No  Non-compliant with Treatment: No  Newly Diagnosed Illness: No    Discharge Risks or Barriers  Discharge risks or barriers?: No    Anticipated Discharge Information  Discharge Disposition: D/T to home under A care in anticipation of covered skilled care (06)

## 2022-12-26 NOTE — CARE PLAN
The patient is Watcher - Medium risk of patient condition declining or worsening    Shift Goals  Clinical Goals: monitor respiratory status, wean o2 if possible  Patient Goals: Rest  Family Goals: no family present    Progress made toward(s) clinical / shift goals:  IV Lasix re-started, condom cath in place, SOB with activity, 6-10L NC-oxymask, line placement for poss dialysis today.    Patient is not progressing towards the following goals: wean O2, de-sats with slightest activity, long recovery time, 10Liter oxymask at times.      Problem: Impaired Gas Exchange  Goal: Patient will demonstrate improved ventilation and adequate oxygenation and participate in treatment regimen within the level of ability/situation.  Outcome: Not Met     Problem: Impaired Gas Exchange  Goal: Patient will demonstrate improved ventilation and adequate oxygenation and participate in treatment regimen within the level of ability/situation.  Outcome: Not Met     Problem: Care Map:  Admission Optimal Outcome for the Heart Failure Patient  Goal: Admission:  Optimal Care of the heart failure patient  Outcome: Progressing     Problem: Knowledge Deficit - Standard  Goal: Patient and family/care givers will demonstrate understanding of plan of care, disease process/condition, diagnostic tests and medications  Outcome: Progressing     Problem: Knowledge Deficit - COPD  Goal: Patient/significant other demonstrates understanding of disease process, utilization of the Action Plan, medications and discharge instruction  Outcome: Progressing     Problem: Fluid Volume  Goal: Fluid volume balance will be maintained  Outcome: Progressing

## 2022-12-26 NOTE — CARE PLAN
The patient is Watcher - Medium risk of patient condition declining or worsening    Shift Goals  Clinical Goals: Monitor O2  Patient Goals: Rest  Family Goals: no family present    Progress made toward(s) clinical / shift goals:    Problem: Knowledge Deficit - Standard  Goal: Patient and family/care givers will demonstrate understanding of plan of care, disease process/condition, diagnostic tests and medications  Outcome: Progressing     Problem: Knowledge Deficit - COPD  Goal: Patient/significant other demonstrates understanding of disease process, utilization of the Action Plan, medications and discharge instruction  Outcome: Progressing

## 2022-12-26 NOTE — PROGRESS NOTES
Bedside report received from day RN, pt care assumed, assessment completed. Pt is A&O 4, pain 0/10, sr on the monitor. Updated on POC, questions answered. Bed in lowest, locked position, treaded socks on, call light and belongings within reach. Fall precautions in place.

## 2022-12-26 NOTE — CONSULTS
"[facility] -  CONSULTATION NOTE               Author: Florina Story M.D. Date & Time: 12/26/2022  1:56 PM       REASON FOR CONSULTATION:   angelika    CHIEF COMPLAINT:   \"SOB, edema \"    HISTORY OF PRESENT ILLNESS:    67 y.o. male with history of COPD dependent on 4 L, tobacco abuse, CAD, CHF, ALONDRA/OHS on bedtime CPAP, who presented 12/23/2022 with evaluations for progressive shortness of breath and lower extremities swelling.  CXR notable for bilateral vascular congestion with pulmonary edema. Patient was admitted to the hospital on 12/23 for CHD exacerbation and copd. No F/C/N/V. No melena, hematochezia, hematemesis.  No HA, visual changes, or abdominal pain.    REVIEW OF SYSTEMS:    10 point ROS was performed and is as per HPI or otherwise negative    PAST MEDICAL HISTORY:   Past Medical History:   Diagnosis Date    CAD (coronary artery disease)     CHF (congestive heart failure) (HCC)     Chronic obstructive pulmonary disease (HCC)     Diabetes (HCC)     Hyperlipidemia     ALONDRA on CPAP     HS    Pneumonia     Tobacco abuse        PAST SURGICAL HISTORY:   History reviewed. No pertinent surgical history.    FAMILY HISTORY:   History reviewed. No pertinent family history.    SOCIAL HISTORY:   Social History     Tobacco Use   Smoking Status Every Day    Types: Cigarettes   Smokeless Tobacco Never     Social History     Substance and Sexual Activity   Alcohol Use Not Currently     Social History     Substance and Sexual Activity   Drug Use Yes    Types: Inhaled    Comment: thc       HOME MEDICATIONS:   Reviewed and documented in chart    LABORATORY STUDIES:   Recent Labs     12/25/22  0536 12/25/22  1313 12/26/22  0111   SODIUM 133* 133* 133*   POTASSIUM 5.9* 5.4 5.8*   CHLORIDE 100 97 99   CO2 17* 17* 17*   GLUCOSE 224* 290* 252*   BUN 68* 81* 93*   CREATININE 4.57* 4.44* 4.76*   CALCIUM 8.1* 7.9* 7.9*       ALLERGIES:  Patient has no known allergies.    VS:  /79   Pulse 79   Temp 36.6 °C " "(97.9 °F) (Temporal)   Resp (!) 22   Ht 1.778 m (5' 10\")   Wt (!) 167 kg (368 lb 9.8 oz)   SpO2 94%   BMI 52.89 kg/m²     Physical Exam  Constitutional:       Appearance: He is obese. He is ill-appearing.   HENT:      Head: Normocephalic and atraumatic.   Cardiovascular:      Rate and Rhythm: Normal rate. Rhythm irregular.      Comments: anasarca  Pulmonary:      Comments: Decreased BS BL  Abdominal:      General: There is distension.      Tenderness: There is no abdominal tenderness.      Hernia: A hernia is present.   Musculoskeletal:         General: Swelling present.   Skin:     Coloration: Skin is pale. Skin is not jaundiced.      Findings: No bruising.   Neurological:      General: No focal deficit present.      Mental Status: He is alert and oriented to person, place, and time.   Psychiatric:         Behavior: Behavior normal.       FLUID BALANCE:  In: 1010 [P.O.:1010]  Out: 850     IMAGING:  All imaging reviewed from admission to present day    IMPRESSION:  # FARIHA on CKD   - no hydro, UA benign  # CKD 2/2 DM, HTN  - unclear baseline cr   # Hyperkalemia  # hyponatremia   # Acidosis   # acute respiratory failure 2/2 COPD/CHF     PLAN:  - Temp line and HD today , then HD daily   - urine lytes , urea, upc  - strict IO  - Daily evaluation for RRT needs  - Dose all meds per eGFR < 15    Thank you for the consultation!    "

## 2022-12-26 NOTE — PROGRESS NOTES
1004: Patient's oxygen demands are increasing to maintain sats 88-90s%. Started on 6 Liter NC this morning but patient de-sats to 70s just with standing at bedside. SOB at rest and with activity. Placed on oxymask7-10 liter to help recover sats. MD notified.

## 2022-12-27 ENCOUNTER — APPOINTMENT (OUTPATIENT)
Dept: RADIOLOGY | Facility: MEDICAL CENTER | Age: 67
DRG: 291 | End: 2022-12-27
Attending: HOSPITALIST
Payer: COMMERCIAL

## 2022-12-27 LAB
ANION GAP SERPL CALC-SCNC: 17 MMOL/L (ref 7–16)
ANION GAP SERPL CALC-SCNC: 20 MMOL/L (ref 7–16)
APTT PPP: 27.6 SEC (ref 24.7–36)
BASOPHILS # BLD AUTO: 0.2 % (ref 0–1.8)
BASOPHILS # BLD AUTO: 0.3 % (ref 0–1.8)
BASOPHILS # BLD: 0.03 K/UL (ref 0–0.12)
BASOPHILS # BLD: 0.04 K/UL (ref 0–0.12)
BUN SERPL-MCNC: 80 MG/DL (ref 8–22)
BUN SERPL-MCNC: 87 MG/DL (ref 8–22)
CALCIUM SERPL-MCNC: 8.1 MG/DL (ref 8.5–10.5)
CALCIUM SERPL-MCNC: 8.1 MG/DL (ref 8.5–10.5)
CHLORIDE SERPL-SCNC: 97 MMOL/L (ref 96–112)
CHLORIDE SERPL-SCNC: 98 MMOL/L (ref 96–112)
CO2 SERPL-SCNC: 18 MMOL/L (ref 20–33)
CO2 SERPL-SCNC: 18 MMOL/L (ref 20–33)
CREAT SERPL-MCNC: 3.6 MG/DL (ref 0.5–1.4)
CREAT SERPL-MCNC: 3.72 MG/DL (ref 0.5–1.4)
EOSINOPHIL # BLD AUTO: 0 K/UL (ref 0–0.51)
EOSINOPHIL # BLD AUTO: 0 K/UL (ref 0–0.51)
EOSINOPHIL NFR BLD: 0 % (ref 0–6.9)
EOSINOPHIL NFR BLD: 0 % (ref 0–6.9)
ERYTHROCYTE [DISTWIDTH] IN BLOOD BY AUTOMATED COUNT: 46.5 FL (ref 35.9–50)
ERYTHROCYTE [DISTWIDTH] IN BLOOD BY AUTOMATED COUNT: 46.7 FL (ref 35.9–50)
GFR SERPLBLD CREATININE-BSD FMLA CKD-EPI: 17 ML/MIN/1.73 M 2
GFR SERPLBLD CREATININE-BSD FMLA CKD-EPI: 18 ML/MIN/1.73 M 2
GLUCOSE BLD STRIP.AUTO-MCNC: 175 MG/DL (ref 65–99)
GLUCOSE BLD STRIP.AUTO-MCNC: 205 MG/DL (ref 65–99)
GLUCOSE BLD STRIP.AUTO-MCNC: 214 MG/DL (ref 65–99)
GLUCOSE BLD STRIP.AUTO-MCNC: 224 MG/DL (ref 65–99)
GLUCOSE SERPL-MCNC: 228 MG/DL (ref 65–99)
GLUCOSE SERPL-MCNC: 247 MG/DL (ref 65–99)
HCT VFR BLD AUTO: 27.6 % (ref 42–52)
HCT VFR BLD AUTO: 28.6 % (ref 42–52)
HGB BLD-MCNC: 8.6 G/DL (ref 14–18)
HGB BLD-MCNC: 8.8 G/DL (ref 14–18)
IMM GRANULOCYTES # BLD AUTO: 0.18 K/UL (ref 0–0.11)
IMM GRANULOCYTES # BLD AUTO: 0.18 K/UL (ref 0–0.11)
IMM GRANULOCYTES NFR BLD AUTO: 1.1 % (ref 0–0.9)
IMM GRANULOCYTES NFR BLD AUTO: 1.2 % (ref 0–0.9)
INR PPP: 1.25 (ref 0.87–1.13)
LYMPHOCYTES # BLD AUTO: 1.4 K/UL (ref 1–4.8)
LYMPHOCYTES # BLD AUTO: 1.42 K/UL (ref 1–4.8)
LYMPHOCYTES NFR BLD: 8.3 % (ref 22–41)
LYMPHOCYTES NFR BLD: 9.1 % (ref 22–41)
M PNEUMO IGM SER IA-ACNC: 0.07 U/L
MAGNESIUM SERPL-MCNC: 2.4 MG/DL (ref 1.5–2.5)
MCH RBC QN AUTO: 25.9 PG (ref 27–33)
MCH RBC QN AUTO: 26.2 PG (ref 27–33)
MCHC RBC AUTO-ENTMCNC: 30.8 G/DL (ref 33.7–35.3)
MCHC RBC AUTO-ENTMCNC: 31.2 G/DL (ref 33.7–35.3)
MCV RBC AUTO: 84.1 FL (ref 81.4–97.8)
MCV RBC AUTO: 84.1 FL (ref 81.4–97.8)
MONOCYTES # BLD AUTO: 0.66 K/UL (ref 0–0.85)
MONOCYTES # BLD AUTO: 0.88 K/UL (ref 0–0.85)
MONOCYTES NFR BLD AUTO: 3.9 % (ref 0–13.4)
MONOCYTES NFR BLD AUTO: 5.7 % (ref 0–13.4)
NEUTROPHILS # BLD AUTO: 12.9 K/UL (ref 1.82–7.42)
NEUTROPHILS # BLD AUTO: 14.84 K/UL (ref 1.82–7.42)
NEUTROPHILS NFR BLD: 83.7 % (ref 44–72)
NEUTROPHILS NFR BLD: 86.5 % (ref 44–72)
NRBC # BLD AUTO: 0.05 K/UL
NRBC # BLD AUTO: 0.06 K/UL
NRBC BLD-RTO: 0.3 /100 WBC
NRBC BLD-RTO: 0.4 /100 WBC
PHOSPHATE SERPL-MCNC: 7 MG/DL (ref 2.5–4.5)
PHOSPHATE SERPL-MCNC: 7.3 MG/DL (ref 2.5–4.5)
PLATELET # BLD AUTO: 410 K/UL (ref 164–446)
PLATELET # BLD AUTO: 436 K/UL (ref 164–446)
PMV BLD AUTO: 10 FL (ref 9–12.9)
PMV BLD AUTO: 10 FL (ref 9–12.9)
POTASSIUM SERPL-SCNC: 4.7 MMOL/L (ref 3.6–5.5)
POTASSIUM SERPL-SCNC: 4.9 MMOL/L (ref 3.6–5.5)
PROCALCITONIN SERPL-MCNC: 0.17 NG/ML
PROTHROMBIN TIME: 15.5 SEC (ref 12–14.6)
RBC # BLD AUTO: 3.28 M/UL (ref 4.7–6.1)
RBC # BLD AUTO: 3.4 M/UL (ref 4.7–6.1)
SODIUM SERPL-SCNC: 133 MMOL/L (ref 135–145)
SODIUM SERPL-SCNC: 135 MMOL/L (ref 135–145)
TROPONIN T SERPL-MCNC: 655 NG/L (ref 6–19)
UFH PPP CHRO-ACNC: 0.2 IU/ML
UFH PPP CHRO-ACNC: 0.25 IU/ML
UFH PPP CHRO-ACNC: 0.39 IU/ML
UFH PPP CHRO-ACNC: <0.1 IU/ML
WBC # BLD AUTO: 15.4 K/UL (ref 4.8–10.8)
WBC # BLD AUTO: 17.1 K/UL (ref 4.8–10.8)

## 2022-12-27 PROCEDURE — A9270 NON-COVERED ITEM OR SERVICE: HCPCS | Performed by: INTERNAL MEDICINE

## 2022-12-27 PROCEDURE — 94669 MECHANICAL CHEST WALL OSCILL: CPT

## 2022-12-27 PROCEDURE — 94640 AIRWAY INHALATION TREATMENT: CPT

## 2022-12-27 PROCEDURE — 83735 ASSAY OF MAGNESIUM: CPT

## 2022-12-27 PROCEDURE — 97602 WOUND(S) CARE NON-SELECTIVE: CPT

## 2022-12-27 PROCEDURE — A9270 NON-COVERED ITEM OR SERVICE: HCPCS | Performed by: EMERGENCY MEDICINE

## 2022-12-27 PROCEDURE — 99233 SBSQ HOSP IP/OBS HIGH 50: CPT | Performed by: STUDENT IN AN ORGANIZED HEALTH CARE EDUCATION/TRAINING PROGRAM

## 2022-12-27 PROCEDURE — 82962 GLUCOSE BLOOD TEST: CPT | Mod: 91

## 2022-12-27 PROCEDURE — 700102 HCHG RX REV CODE 250 W/ 637 OVERRIDE(OP): Performed by: INTERNAL MEDICINE

## 2022-12-27 PROCEDURE — 700111 HCHG RX REV CODE 636 W/ 250 OVERRIDE (IP): Performed by: HOSPITALIST

## 2022-12-27 PROCEDURE — 700101 HCHG RX REV CODE 250: Performed by: INTERNAL MEDICINE

## 2022-12-27 PROCEDURE — 80048 BASIC METABOLIC PNL TOTAL CA: CPT | Mod: 91

## 2022-12-27 PROCEDURE — 700102 HCHG RX REV CODE 250 W/ 637 OVERRIDE(OP): Performed by: EMERGENCY MEDICINE

## 2022-12-27 PROCEDURE — 770020 HCHG ROOM/CARE - TELE (206)

## 2022-12-27 PROCEDURE — 90935 HEMODIALYSIS ONE EVALUATION: CPT

## 2022-12-27 PROCEDURE — 700111 HCHG RX REV CODE 636 W/ 250 OVERRIDE (IP)

## 2022-12-27 PROCEDURE — 85520 HEPARIN ASSAY: CPT | Mod: 91

## 2022-12-27 PROCEDURE — 85610 PROTHROMBIN TIME: CPT

## 2022-12-27 PROCEDURE — 36415 COLL VENOUS BLD VENIPUNCTURE: CPT

## 2022-12-27 PROCEDURE — 84145 PROCALCITONIN (PCT): CPT

## 2022-12-27 PROCEDURE — 94760 N-INVAS EAR/PLS OXIMETRY 1: CPT

## 2022-12-27 PROCEDURE — 85730 THROMBOPLASTIN TIME PARTIAL: CPT

## 2022-12-27 PROCEDURE — 84100 ASSAY OF PHOSPHORUS: CPT | Mod: 91

## 2022-12-27 PROCEDURE — 84484 ASSAY OF TROPONIN QUANT: CPT

## 2022-12-27 PROCEDURE — 99222 1ST HOSP IP/OBS MODERATE 55: CPT | Mod: GC | Performed by: INTERNAL MEDICINE

## 2022-12-27 PROCEDURE — 700111 HCHG RX REV CODE 636 W/ 250 OVERRIDE (IP): Performed by: STUDENT IN AN ORGANIZED HEALTH CARE EDUCATION/TRAINING PROGRAM

## 2022-12-27 PROCEDURE — 700102 HCHG RX REV CODE 250 W/ 637 OVERRIDE(OP): Performed by: STUDENT IN AN ORGANIZED HEALTH CARE EDUCATION/TRAINING PROGRAM

## 2022-12-27 PROCEDURE — 85025 COMPLETE CBC W/AUTO DIFF WBC: CPT | Mod: 91

## 2022-12-27 PROCEDURE — A9270 NON-COVERED ITEM OR SERVICE: HCPCS | Performed by: STUDENT IN AN ORGANIZED HEALTH CARE EDUCATION/TRAINING PROGRAM

## 2022-12-27 RX ORDER — HEPARIN SODIUM 1000 [USP'U]/ML
INJECTION, SOLUTION INTRAVENOUS; SUBCUTANEOUS
Status: COMPLETED
Start: 2022-12-27 | End: 2022-12-27

## 2022-12-27 RX ORDER — METHYLPREDNISOLONE SODIUM SUCCINATE 40 MG/ML
40 INJECTION, POWDER, LYOPHILIZED, FOR SOLUTION INTRAMUSCULAR; INTRAVENOUS DAILY
Status: COMPLETED | OUTPATIENT
Start: 2022-12-28 | End: 2022-12-29

## 2022-12-27 RX ORDER — METOPROLOL TARTRATE 50 MG/1
50 TABLET, FILM COATED ORAL TWICE DAILY
Status: DISCONTINUED | OUTPATIENT
Start: 2022-12-27 | End: 2023-01-03

## 2022-12-27 RX ADMIN — METOPROLOL TARTRATE 50 MG: 50 TABLET, FILM COATED ORAL at 17:32

## 2022-12-27 RX ADMIN — FLUTICASONE PROPIONATE 88 MCG: 44 AEROSOL, METERED RESPIRATORY (INHALATION) at 08:32

## 2022-12-27 RX ADMIN — ATORVASTATIN CALCIUM 40 MG: 40 TABLET, FILM COATED ORAL at 20:05

## 2022-12-27 RX ADMIN — IPRATROPIUM BROMIDE AND ALBUTEROL SULFATE 3 ML: 2.5; .5 SOLUTION RESPIRATORY (INHALATION) at 08:32

## 2022-12-27 RX ADMIN — AMITRIPTYLINE HYDROCHLORIDE 10 MG: 10 TABLET, FILM COATED ORAL at 17:33

## 2022-12-27 RX ADMIN — INSULIN HUMAN 3 UNITS: 100 INJECTION, SOLUTION PARENTERAL at 06:50

## 2022-12-27 RX ADMIN — AMITRIPTYLINE HYDROCHLORIDE 10 MG: 10 TABLET, FILM COATED ORAL at 04:28

## 2022-12-27 RX ADMIN — HEPARIN SODIUM 2600 UNITS: 1000 INJECTION, SOLUTION INTRAVENOUS; SUBCUTANEOUS at 16:33

## 2022-12-27 RX ADMIN — FUROSEMIDE 80 MG: 10 INJECTION, SOLUTION INTRAMUSCULAR; INTRAVENOUS at 17:31

## 2022-12-27 RX ADMIN — HEPARIN SODIUM 4000 UNITS: 1000 INJECTION, SOLUTION INTRAVENOUS; SUBCUTANEOUS at 02:39

## 2022-12-27 RX ADMIN — UMECLIDINIUM BROMIDE AND VILANTEROL TRIFENATATE 1 PUFF: 62.5; 25 POWDER RESPIRATORY (INHALATION) at 08:32

## 2022-12-27 RX ADMIN — HEPARIN SODIUM 2000 UNITS: 1000 INJECTION, SOLUTION INTRAVENOUS; SUBCUTANEOUS at 09:26

## 2022-12-27 RX ADMIN — METOPROLOL TARTRATE 25 MG: 25 TABLET, FILM COATED ORAL at 04:29

## 2022-12-27 RX ADMIN — INSULIN HUMAN 2 UNITS: 100 INJECTION, SOLUTION PARENTERAL at 17:27

## 2022-12-27 RX ADMIN — FUROSEMIDE 80 MG: 10 INJECTION, SOLUTION INTRAMUSCULAR; INTRAVENOUS at 04:33

## 2022-12-27 RX ADMIN — INSULIN HUMAN 3 UNITS: 100 INJECTION, SOLUTION PARENTERAL at 23:04

## 2022-12-27 RX ADMIN — ASPIRIN 81 MG: 81 TABLET, COATED ORAL at 04:28

## 2022-12-27 RX ADMIN — HEPARIN SODIUM 9 UNITS/KG/HR: 5000 INJECTION, SOLUTION INTRAVENOUS at 02:39

## 2022-12-27 RX ADMIN — INSULIN HUMAN 3 UNITS: 100 INJECTION, SOLUTION PARENTERAL at 11:35

## 2022-12-27 RX ADMIN — NICOTINE TRANSDERMAL SYSTEM 21 MG: 21 PATCH, EXTENDED RELEASE TRANSDERMAL at 04:30

## 2022-12-27 RX ADMIN — AMLODIPINE BESYLATE 10 MG: 10 TABLET ORAL at 04:29

## 2022-12-27 RX ADMIN — METHYLPREDNISOLONE SODIUM SUCCINATE 40 MG: 40 INJECTION, POWDER, FOR SOLUTION INTRAMUSCULAR; INTRAVENOUS at 04:31

## 2022-12-27 ASSESSMENT — PAIN DESCRIPTION - PAIN TYPE
TYPE: ACUTE PAIN;CHRONIC PAIN
TYPE: ACUTE PAIN

## 2022-12-27 ASSESSMENT — ENCOUNTER SYMPTOMS
MYALGIAS: 0
BLURRED VISION: 0
COUGH: 0
BRUISES/BLEEDS EASILY: 0
DIZZINESS: 0
NAUSEA: 0
SHORTNESS OF BREATH: 0
VOMITING: 0
FEVER: 0

## 2022-12-27 NOTE — CARE PLAN
"The patient is Watcher - Medium risk of patient condition declining or worsening    Shift Goals  Clinical Goals: Monitor respiratory status, labs, & vitals  Patient Goals: \"Rest\"  Family Goals: no family present    Progress made toward(s) clinical / shift goals:        Problem: Knowledge Deficit - Standard  Goal: Patient and family/care givers will demonstrate understanding of plan of care, disease process/condition, diagnostic tests and medications  Outcome: Progressing  Note: Patient verbally demonstrates understanding of POC and disease process. All patient questions answered.      Problem: Fall Risk  Goal: Patient will remain free from falls  Outcome: Progressing  Note: All fall precautions in place and patient educated to use the call light before ambulation          "

## 2022-12-27 NOTE — PROGRESS NOTES
Assumed care of patient, received bedside report from NOC RN. Patient is A&O X 4. Pain 3/10. Vital signs stable overnight, on 5L NC. On tele monitor, afib 108. POC discussed with patient and he verbalized understanding. Call light within reach and fall precautions in place. Bed locked and in lowest position.

## 2022-12-27 NOTE — CONSULTS
VASCULAR SURGERY SERVICE  CONSULT NOTE      Date: 12/26/2022    Referring Provider: Olga Islas M.d.    Consulting Physician: Reymundo Ellison MD    -------------------------------------------------------------------------------------------------    Reason for consultation: temporary dialysis catheter placement    HPI: This is a 67 y.o. male with ESRD in need of HD access. Temporary non-tunneled catheter placement has been requested at this time. When I came to evaluate the patient he was in no acute distress.     Past Medical History:   Diagnosis Date    CAD (coronary artery disease)     CHF (congestive heart failure) (HCC)     Chronic obstructive pulmonary disease (HCC)     Diabetes (HCC)     Hyperlipidemia     ALONDRA on CPAP     HS    Pneumonia     Tobacco abuse        History reviewed. No pertinent surgical history.    Current Facility-Administered Medications   Medication Dose Route Frequency Provider Last Rate Last Admin    furosemide (LASIX) injection 80 mg  80 mg Intravenous BID DIURETIC Olga Islas M.D.   80 mg at 12/26/22 1644    methylPREDNISolone (SOLU-MEDROL) 40 MG injection 40 mg  40 mg Intravenous Q12HRS Elieser Centeno M.D.   40 mg at 12/26/22 0453    metoprolol tartrate (LOPRESSOR) tablet 25 mg  25 mg Oral TWICE DAILY Jeremy M Gonda, M.D.   25 mg at 12/26/22 0452    ipratropium-albuterol (DUONEB) nebulizer solution  3 mL Nebulization 4X/DAY (RT) Jeremy M Gonda, M.D.   3 mL at 12/26/22 0801    senna-docusate (PERICOLACE or SENOKOT S) 8.6-50 MG per tablet 2 Tablet  2 Tablet Oral BID Hao Urena M.D.        And    polyethylene glycol/lytes (MIRALAX) PACKET 1 Packet  1 Packet Oral QDAY PRN Hao Urena M.D.        And    magnesium hydroxide (MILK OF MAGNESIA) suspension 30 mL  30 mL Oral QDAY PRN Hao Chaung, M.D.        And    bisacodyl (DULCOLAX) suppository 10 mg  10 mg Rectal QDAY PRN Hao Urena M.D.        [Held by provider] heparin injection 5,000 Units  5,000 Units Subcutaneous Q8HRS Hao  JULIO Urena   5,000 Units at 12/26/22 1428    acetaminophen (Tylenol) tablet 650 mg  650 mg Oral Q6HRS PRN Hao Urena M.D.        ondansetron (ZOFRAN) syringe/vial injection 4 mg  4 mg Intravenous Q4HRS PRN Hao Urena M.D.        ondansetron (ZOFRAN ODT) dispertab 4 mg  4 mg Oral Q4HRS PRN Hao Urena M.D.        guaiFENesin dextromethorphan (ROBITUSSIN DM) 100-10 MG/5ML syrup 10 mL  10 mL Oral Q6HRS PRN Hao Urena M.D.        amitriptyline (ELAVIL) tablet 10 mg  10 mg Oral BID Hao Urena M.D.   10 mg at 12/26/22 0452    aspirin EC (ECOTRIN) tablet 81 mg  81 mg Oral QAM Hoa Urena M.D.   81 mg at 12/26/22 0452    atorvastatin (LIPITOR) tablet 40 mg  40 mg Oral Nightly Hao Urena M.D.   40 mg at 12/25/22 2119    ipratropium-albuterol (DUONEB) nebulizer solution  3 mL Nebulization Q2HRS PRN (RT) Hao Urena M.D.   3 mL at 12/25/22 2155    umeclidinium-vilanterol (Anoro Ellipta) inhaler 1 Puff  1 Puff Inhalation QDCALEB (RT) Hao Urena M.D.   1 Puff at 12/26/22 0801    And    fluticasone (FLOVENT HFA) 44 MCG/ACT inhaler 88 mcg  2 Puff Inhalation QDAILY (RT) Hao Urena M.D.   88 mcg at 12/26/22 0800    nicotine (NICODERM) 21 MG/24HR 21 mg  21 mg Transdermal Daily-0600 Hao Urena M.D.   21 mg at 12/26/22 0449    And    nicotine polacrilex (NICORETTE) 2 MG piece 2 mg  2 mg Oral Q HOUR PRN Hao Urena M.D.        Respiratory Therapy Consult   Nebulization Continuous RT Gary Stover M.D.        insulin regular (HumuLIN R,NovoLIN R) injection  2-9 Units Subcutaneous Q6HRS Gary Stover M.D.   5 Units at 12/26/22 1640    And    dextrose 10 % BOLUS 25 g  25 g Intravenous Q15 MIN PRN Gary Stover M.D.        amLODIPine (NORVASC) tablet 10 mg  10 mg Oral QAM Gary Stover M.D.   10 mg at 12/26/22 0452       Social History     Socioeconomic History    Marital status:      Spouse name: Not on file    Number of children: Not on file    Years of education: Not on file     "Highest education level: Not on file   Occupational History    Not on file   Tobacco Use    Smoking status: Every Day     Types: Cigarettes    Smokeless tobacco: Never   Substance and Sexual Activity    Alcohol use: Not Currently    Drug use: Yes     Types: Inhaled     Comment: thc    Sexual activity: Not on file   Other Topics Concern    Not on file   Social History Narrative    Not on file     Social Determinants of Health     Financial Resource Strain: Not on file   Food Insecurity: Not on file   Transportation Needs: Not on file   Physical Activity: Not on file   Stress: Not on file   Social Connections: Not on file   Intimate Partner Violence: Not on file   Housing Stability: Not on file       History reviewed. No pertinent family history.    Allergies:  Patient has no known allergies.    Review of Systems:  Noncontributory except as per HPI    Physical Exam:  /81   Pulse 80   Temp 36.4 °C (97.5 °F) (Temporal)   Resp (!) 22   Ht 1.778 m (5' 10\")   Wt (!) 167 kg (368 lb 9.8 oz)   SpO2 91%     Constitutional: Alert, oriented, no acute distress  HEENT:  Normocephalic and atraumatic, EOMI  Neck:   Supple, no JVD,   Cardiovascular: Regular rate and rhythm,   Pulmonary:  Good air entry bilaterally,   Abdominal:  Soft, non-tender, non-distended  Musculoskeletal: No edema, no tenderness  Neurological:  CN II-XII grossly intact, no focal deficits  Skin:   Skin is warm and dry. No rash noted.    Labs:  Recent Labs     12/24/22  0015 12/25/22  0536 12/26/22  0111   WBC 12.1* 17.3* 15.8*   RBC 3.14* 3.39* 3.37*   HEMOGLOBIN 8.3* 8.8* 8.8*   HEMATOCRIT 27.3* 29.9* 29.2*   MCV 86.9 88.2 86.6   MCH 26.4* 26.0* 26.1*   MCHC 30.4* 29.4* 30.1*   RDW 48.9 50.5* 49.7   PLATELETCT 344 417 445   MPV 9.3 9.6 9.9     Recent Labs     12/25/22  0536 12/25/22  1313 12/26/22  0111   SODIUM 133* 133* 133*   POTASSIUM 5.9* 5.4 5.8*   CHLORIDE 100 97 99   CO2 17* 17* 17*   GLUCOSE 224* 290* 252*   BUN 68* 81* 93*   CREATININE " 4.57* 4.44* 4.76*   CALCIUM 8.1* 7.9* 7.9*         Recent Labs     12/24/22  0015   ASTSGOT 20   ALTSGPT 18   TBILIRUBIN 0.2   ALKPHOSPHAT 82   GLOBULIN 3.5         Assessment: This is a 67 y.o. male in need of a non-tunneled dialysis catheter. Will place today.    I explained the details of the operation, alternatives, and potential risks, including but not limited to bleeding, infection, injury to vessels or nerves, and risks of anesthesia.  All questions were answered. Patient understands and agrees to proceed.      Reymundo Ellison MD    [facility]  [place] preferred. Otherwise please call my office [phone]  __________________________________________________________________  Patient:Yoandy Peace   MRN:[id number]   CSN:[id number]

## 2022-12-27 NOTE — PALLIATIVE CARE
"Palliative Care follow-up  PC RN visited with Yoandy at . He was sitting up om the chair and appeared less dyspneic than yesterday. He reports feeling better, but just \"tired.\" PC discussed the AD completion as this RN was unable to locate anything noting Raven as his POA. He is open to completing a POA document so Raven can be involved if ever needed. Plan made for PC to complete today and have notarized for completion. He was appreciative.       Updated: MD    Plan: f/u for AD completion/notary today    Thank you for allowing Palliative Care to support this patient and family. Contact [phone] for additional assistance, change in patient status, or with any questions/concerns.     "

## 2022-12-27 NOTE — WOUND TEAM
[facility]  Inpatient Services  Initial Wound and Skin Care Evaluation    Admission Date: 12/23/2022     Last order of IP CONSULT TO WOUND CARE was found on 12/24/2022 from Hospital Encounter on 12/23/2022     HPI, PMH, SH: Reviewed    History reviewed. No pertinent surgical history.  Social History     Tobacco Use    Smoking status: Every Day     Types: Cigarettes    Smokeless tobacco: Never   Substance Use Topics    Alcohol use: Not Currently     Chief Complaint   Patient presents with    Shortness of Breath     BIB EMS from home for SOB x 1 hour. Hx of COPD.   O2 SAT 88% on baseline 4L PTA. Improved with CPAP.   Recevied 125 mg solumedrol, 2 duoneb and 2 albuterol.     Edema     Abd swelling, BLE pitting edema.      Diagnosis: Respiratory failure (HCC) [J96.90]    Unit where seen by Wound Team: T819/00     WOUND CONSULT/FOLLOW UP RELATED TO:  BLE     WOUND HISTORY:  patient states that his legs have been like this for a while, BLE are edemitis that are pitting +3,     WOUND ASSESSMENT/LDA  Wound 12/24/22 Pretibial Distal Bilateral Red, slow to meghan, flaky (Active)   Wound Image    12/27/22 1200   Site Assessment Red;Dry;Intact 12/27/22 1200   Periwound Assessment Dry;Pink;Edema 12/27/22 1200   Margins Attached edges;Defined edges 12/27/22 1200   Closure Open to air 12/27/22 1200   Drainage Amount None 12/27/22 1200   Treatments Cleansed;Site care;Moisturizing cream 12/27/22 1200   Wound Cleansing Soap and Water 12/27/22 1200   Periwound Protectant Not Applicable 12/27/22 1200   Dressing Cleansing/Solutions Not Applicable 12/27/22 1200   Dressing Options Open to Air 12/27/22 1200   Dressing Changed New 12/27/22 1200   Dressing Status Open to Air 12/27/22 1200   NEXT Dressing Change/Treatment Date 12/27/22 12/27/22 1200   NEXT Weekly Photo (Inpatient Only) 01/03/23 12/27/22 1200   Shape irregular x2 12/27/22 1200   Wound Odor None 12/27/22 1200   Exposed Structures None 12/27/22 1200   WOUND NURSE  ONLY - Time Spent with Patient (mins) 60 12/27/22 1200   Number of days: 3        Vascular:    DAVIDSON:   No results found.    Lab Values:    Lab Results   Component Value Date/Time    WBC 17.1 (H) 12/27/2022 08:37 AM    RBC 3.28 (L) 12/27/2022 08:37 AM    HEMOGLOBIN 8.6 (L) 12/27/2022 08:37 AM    HEMATOCRIT 27.6 (L) 12/27/2022 08:37 AM    CREACTPROT 1.13 (H) 12/23/2022 05:49 PM        Culture Results show:  No results found for this or any previous visit (from the past 720 hour(s)).    Pain Level/Medicated:  denied pain       INTERVENTIONS BY WOUND TEAM:  Chart and images reviewed. Discussed with bedside RN. All areas of concern (based on picture review, LDA review and discussion with bedside RN) have been thoroughly assessed. Documentation of areas based on significant findings. This RN in to assess patient. Performed standard wound care which includes appropriate positioning, dressing removal and non-selective debridement. Pictures and measurements obtained weekly if/when required.  Preparation for Dressing removal: Dressing soaked with n/a  Non-selectively Debrided with:  soap and water and gauze.  Sharp debridement: n/a  Alida wound: Cleansed with soap and water, Prepped with moisturizer  Primary Dressing: elevate leg  Secondary (Outer) Dressing: DIXIE    Interdisciplinary consultation: Patient, Bedside RN (Ashley),     EVALUATION / RATIONALE FOR TREATMENT:  Most Recent Date:  12/27/22: patient has pitting edema and discolorment to front shins, no open wounds or drainage.  Applied moisturizer to protect skin's elasticity.        Goals: Steady decrease in wound area and depth weekly.    WOUND TEAM PLAN OF CARE ([X] for frequency of wound follow up,):   Nursing to follow dressing orders written for wound care. Contact wound team if area fails to progress, deteriorates or with any questions/concerns if something comes up before next scheduled follow up (See below as to whether wound is following and frequency of wound  follow up)  Dressing changes by wound team:                   Follow up 3 times weekly:                NPWT change 3 times weekly:     Follow up 1-2 times weekly:      Follow up Bi-Monthly:           Follow up Monthly (High Risk):                        Follow up as needed:     Other (explain): Wound team is not following patient    NURSING PLAN OF CARE ORDERS (X):  Dressing changes: See Dressing Care orders: X  Skin care: See Skin Care orders: X  RN Prevention Protocol: X  Rectal tube care: See Rectal Tube Care orders:   Other orders:    RSKIN:   CURRENTLY IN PLACE (X), APPLIED THIS VISIT (A), ORDERED (O):   Q shift Thai:  X  Q shift pressure point assessments:  X    Surface/Positioning X  Pressure redistribution mattress      x      Low Airloss          ICU Low Airloss   Bariatric RIKI     Waffle cushion        Waffle Overlay          Reposition q 2 hours      TAPs Turning system     Z Shashank Pillow     Offloading/Redistribution X  Sacral Mepilex (Silicone dressing)     Heel Mepilex (Silicone dressing)    x     Heel float boots (Prevalon boot)             Float Heels off Bed with Pillows           Respiratory nasal canula   Silicone O2 tubing    x     Gray Foam Ear protectors   x  Cannula fixation Device (Tender )          High flow offloading Clip    Elastic head band offloading device      Anchorfast                                                         Trach with Optifoam split foam             Containment/Moisture Prevention continent     Rectal tube or BMS    Purwick/Condom Cath        Navarro Catheter    Barrier wipes           Barrier paste       Antifungal tx      Interdry        Mobilization up to chair      Up to chair        Ambulate      PT/OT      Nutrition PO      Dietician        Diabetes Education      PO     TF     TPN     NPO   # days     Other    diabetic/ cardiac    Anticipated discharge plans: JENI  LTACH:        SNF/Rehab:                  Home Health Care:           Outpatient Wound Center:             Self/Family Care:        Other:                  Vac Discharge Needs:   Not Applicable Pt not on a wound vac:    x   Regular Vac while inpatient, alternative dressing at DC:        Regular Vac in use and continued at DC:            Reg. Vac w/ Skin Sub/Biologic in use. Will need to be changed 2x wkly:      Veraflo Vac while inpatient, ok to transition to Regular Vac on Discharge:           Veraflo Vac while inpatient, will need to remain on Veraflo Vac upon discharge:

## 2022-12-27 NOTE — PROGRESS NOTES
Monitor Summary  Rhythm: SR  Rate: 80-86  Ectopy: freq PVC bigem trigem coup  .18 / .10 / .34

## 2022-12-27 NOTE — CARE PLAN
The patient is Watcher - Medium risk of patient condition declining or worsening    Shift Goals  Clinical Goals: Monitor O2 status, monitor labs post dialysis  Patient Goals: Feel better  Family Goals: no family present    Progress made toward(s) clinical / shift goals:    Problem: Knowledge Deficit - Standard  Goal: Patient and family/care givers will demonstrate understanding of plan of care, disease process/condition, diagnostic tests and medications  Outcome: Progressing     Problem: Knowledge Deficit - COPD  Goal: Patient/significant other demonstrates understanding of disease process, utilization of the Action Plan, medications and discharge instruction  Outcome: Progressing     Problem: Self Care  Goal: Patient will have the ability to perform ADLs independently or with assistance (bathe, groom, dress, toilet and feed)  Outcome: Progressing

## 2022-12-27 NOTE — CARE PLAN
Problem: Bronchoconstriction  Goal: Improve in air movement and diminished wheezing  Description: Target End Date:  2 to 3 days    1.  Implement inhaled treatments  2.  Evaluate and manage medication effects  Outcome: Not Met  Flowsheets (Taken 12/26/2022 1044)  Bronchodilator Goals/Outcome: Patient at Stable Baseline  Bronchodilator Indications: History / Diagnosis      Respiratory Update    Treatment modality: SVN DUO  Frequency: QID  Pt refused last two    Pt tolerating current treatments well with no adverse reactions.       Problem: Bronchopulmonary Hygiene  Goal: Increase mobilization of retained secretions  Description: Target End Date:  2 to 3 days    1.  Perform bronchopulmonary therapy as indicated by assessment  2.  Perform airway suctioning  3.  Perform actions to maintain patient airway  Outcome: Not Met  Flowsheets (Taken 12/25/2022 0700 by Roxie Marr, RRT)  Bronchopulmonary Hygiene Indications: (not opened or started with inital order- teaching to patient) --      Respiratory Update    Treatment modality: Flutter  Frequency: BID    Pt tolerating current treatments well with no adverse reactions.

## 2022-12-27 NOTE — PROGRESS NOTES
[facility] HOSPITALIST CROSS COVER    Notified by RN regarding patient complaining of chest pain while undergoing dialysis. The patient has an elevated troponin since arrival. He was also noted to go into atrial fibrillation once dialysis was started. 12 lead EKG was obtained which demonstrated atrial fibrillation with a right bundle branch block without ST elevations or depressions. A troponin was ordered and resulted at 609, whereas his prior troponins have been in the 400s. [facility]eline consulted cardiology who agreed to have the day team formally consult given his chest pain with new atrial fibrillation.       Vitals:    12/26/22 2308   BP: 121/82   Pulse: (!) 103   Resp: 20   Temp:    SpO2: 90%          Plan:  #Chest pain  -Pain resolved once dialysis was stopped  -Heparin drip with bolus and rebolus per ACS protocol for both chest pain and atrial fibrillation  -Continue daily aspirin  -Consider beta blocker for rate control  -Continue to tele monitor   -Repeat trop in 2 hours        -----------------------------------------------------------------------------------------------------------    Electronically signed by:  Fuentes Baltazar, SUSAN, GABY, THANH-BC  Hospitalist Services

## 2022-12-27 NOTE — PROGRESS NOTES
2200 Pt having chest pressure during dialysis, EKG obtained, EKG looks the same as last EKG, MD notified, troponin ordered.    2230 Pt no longer having chest pressure post dialysis.

## 2022-12-27 NOTE — PROGRESS NOTES
"[facility] -  PROGRESS NOTE               Author: Florina Story M.D. Date & Time: 12/27/2022  1:18 PM     HPI:  67 y.o. male with history of COPD dependent on 4 L, tobacco abuse, CAD, CHF, ALONDRA/OHS on bedtime CPAP, who presented 12/23/2022 with evaluations for progressive shortness of breath and lower extremities swelling.  CXR notable for bilateral vascular congestion with pulmonary edema. Patient was admitted to the hospital on 12/23 for CHD exacerbation and copd. No F/C/N/V. No melena, hematochezia, hematemesis.  No HA, visual changes, or abdominal pain.    DAILY NEPHROLOGY SUMMARY:  12/26: consult duque, s/p hd #1  12/27: due for HD #2, still anasarca and on O2     REVIEW OF SYSTEMS:    10 point ROS reviewed and is as per HPI/daily summary or otherwise negative    PMH/PSH/SH/FH:   Reviewed and unchanged since admission note    CURRENT MEDICATIONS:   Reviewed from admission to present day    VS:  /73   Pulse (!) 103   Temp 36.5 °C (97.7 °F) (Temporal)   Resp 20   Ht 1.778 m (5' 10\")   Wt (!) 168 kg (371 lb 4.1 oz)   SpO2 95%   BMI 53.27 kg/m²     Physical Exam  Constitutional:       Appearance: He is obese. He is ill-appearing.   HENT:      Head: Normocephalic and atraumatic.   Cardiovascular:      Rate and Rhythm: Normal rate. Rhythm irregular.      Comments: anasarca  Pulmonary:      Comments: Decreased BS BL  Abdominal:      General: There is distension.      Tenderness: There is no abdominal tenderness.      Hernia: A hernia is present.   Musculoskeletal:         General: Swelling present.   Skin:     Coloration: Skin is pale. Skin is not jaundiced.      Findings: No bruising.   Neurological:      General: No focal deficit present.      Mental Status: He is alert and oriented to person, place, and time.   Psychiatric:         Behavior: Behavior normal.   Fluids:  In: 850 [P.O.:200; Dialysis:650]  Out: 3350     LABS:  Recent Labs     12/26/22  0111 12/27/22  0152 " 12/27/22  0837   SODIUM 133* 135 133*   POTASSIUM 5.8* 4.7 4.9   CHLORIDE 99 97 98   CO2 17* 18* 18*   GLUCOSE 252* 228* 247*   BUN 93* 80* 87*   CREATININE 4.76* 3.60* 3.72*   CALCIUM 7.9* 8.1* 8.1*       IMAGING:   All imaging reviewed from admission to present day    IMPRESSION:  # FARIHA on CKD   - no hydro, UA benign  # CKD 2/2 DM, HTN  - unclear baseline cr   # Hyperkalemia  # hyponatremia   # Acidosis   # acute respiratory failure 2/2 COPD/CHF      PLAN:  - hd today #2 , hd #3 tomorrow   - suspect needing daily hd for volume control   - renal diet,   - fluid restriction 1L   - strict IO  - Dose all meds per eGFR < 15

## 2022-12-27 NOTE — PROGRESS NOTES
Bedside report received from day RN, pt care assumed, assessment completed. Pt is A&O 4, pain 0/10, on 6L NC. Updated on POC, questions answered. Bed in lowest, locked position, treaded socks on, call light and belongings within reach. Fall precautions in place.

## 2022-12-27 NOTE — OP REPORT
VASCULAR SURGERY SERVICE  OPERATIVE NOTE      Date: 2022    Patient: Yoandy Peace  : 1955  MRN: [id number]      Preoperative Diagnosis:  - Renal failure    Postoperative Diagnosis  - Renal failure    Procedure  06763 and 01231: Percutaneous access of the right internal jugular vein with ultrasound guidance and insertion of right internal jugular non-tunneled veno-venous dialysis catheter    Catheter used:  Valentine 12Fr triple lumen dialysis catheter 16cm length    Surgeon: Reymundo Ellison MD    Blood loss: minimal    Disposition: Patient tolerated procedure well    A time-out was completed verifying correct patient, procedure, site, positioning, and special equipment if applicable. The patient was prepped and draped in sterile fashion. Surgical timeout was called.  1% Lidocaine was used to anesthetize the surrounding skin area. The vein was accessed percutaneously and the J-wire passed easily.  A small puncture incision was made at the insertion site, the tract was dilated, then the catheter was passed over the wire into the vein. Appropriate blood return was obtained. Each lumen of the catheter was evacuated of air, flushed with sterile saline, and capped. The catheter was then sutured in place to the skin and a sterile dressing applied.   The patient tolerated the procedure well and there were no complications.    Reymundo Ellison MD  [facility] Surgery [facility]

## 2022-12-27 NOTE — PROGRESS NOTES
[facility] Progress Note    Hemodialysis treatment #1 ordered today per Dr. Story x 3 hours.   Treatment initiated at 1828.     2052: BP 90/58, patient c/o chest pressure and heartburn. Fluid removal stopped. NS bolus 200 mL IV x 1.   2058: Updated PCN on BP/patient symptoms.   2107: Bedside EKG.     See electronic flow sheet for details.     Net UF 2,200 mL, limited by BP/chest discomfort.     Post tx, CVC flushed with saline then locked with heparin 1000 units/mL per designated amount in each wing then clamped and capped. Aspirate heparin prior to next CVC use.    Report given to Primary RN.

## 2022-12-27 NOTE — CONSULTS
Reason of Consult: chest pain with new onset atrial fibrillation with rising troponin    Consulting Physician: Carlos Novoa    HPI:   67 y.o. male with history of COPD dependent on 4 L, tobacco abuse, CAD, CHF (EF 55-60% 12/24/22), ALONDRA/OHS on bedtime CPAP,HTN< DM, who presented 12/23/2022 with evaluations for progressive shortness of breath and lower extremities swelling for one week. Upon arrival to the ER, the patient was given Solu-Medrol, bronchodilators and placed on BiPAP for respiratory failure secondary to CHF/COPD and admitted to ICU.  Patient was weaned off BiPAP and downgraded to telemetry floor.12/25/22 was transferred out of the ICU. Nephrology consulted for chronic kidney disease. 12/26/22->Worsening renal function despite holding home Lasix and increased pulmonary edema with increased work of breathing. Nephrology started emergency hemodialysis. With access obtained by vascular surgery.Overnight, the patient developed chest pain while undergoing dialysis that resolved upon overnight APRN at bedside, as well as developed new onset atrial fibrillation once dialysis started.. EKG demonstrated afib with R BBB without ST elevation or depression. Troponin 609 from baseine 400's.   Started on Heparin drip this morning. Telemetry demonstrates sinus rhythm to afib rate 's nonsustained with no pvcs, rare trigeminy, and no coup.      Today, the patient denies pain (chest pain resolved reportedly during dialysis). Reports mild shortness of breath, but denies palpitations, lightheadedness, dizziness. Patient reports prior MI 28 years ago, but is unsure if had stents. No past stroke or venous thromboembolism.   He reports the leg swelling has been constant. Requires reportedly 4L NC at home all the time.         Past Medical History:   Diagnosis Date    CAD (coronary artery disease)     CHF (congestive heart failure) (HCC)     Chronic obstructive pulmonary disease (HCC)     Diabetes (HCC)     Hyperlipidemia      ALONDRA on CPAP     HS    Pneumonia     Tobacco abuse        Social History     Socioeconomic History    Marital status:      Spouse name: Not on file    Number of children: Not on file    Years of education: Not on file    Highest education level: Not on file   Occupational History    Not on file   Tobacco Use    Smoking status: Every Day     Types: Cigarettes    Smokeless tobacco: Never   Substance and Sexual Activity    Alcohol use: Not Currently    Drug use: Yes     Types: Inhaled     Comment: thc    Sexual activity: Not on file   Other Topics Concern    Not on file   Social History Narrative    Not on file     Social Determinants of Health     Financial Resource Strain: Not on file   Food Insecurity: Not on file   Transportation Needs: Not on file   Physical Activity: Not on file   Stress: Not on file   Social Connections: Not on file   Intimate Partner Violence: Not on file   Housing Stability: Not on file       No current facility-administered medications on file prior to encounter.     Current Outpatient Medications on File Prior to Encounter   Medication Sig Dispense Refill    aspirin EC (ECOTRIN) 81 MG Tablet Delayed Response Take 81 mg by mouth every morning.      fluticasone-umeclidin-vilant (TRELEGY ELLIPTA) 100-62.5-25 MCG/ACT AEROSOL POWDER, BREATH ACTIVATED inhalation Inhale 1 Inhalation every morning.      doxycycline (VIBRAMYCIN) 100 MG Tab Take 100 mg by mouth 2 times a day. 5 days      methylPREDNISolone (MEDROL DOSEPAK) 4 MG Tablet Therapy Pack Take 4-8 mg by mouth every day. Day 1  2 tablets before breakfast, 1 tablet after lunch and after supper, 2 tablets at bedtime  Day 2  1 tablet before breakfast, 1 tablet after lunch and after supper, 2 tablets at bedtime  Day 3  1 tablet before breakfast and 1 tablet after lunch, after supper and at bedtime  Day 4  1 tablet before breakfast, after lunch, and at bedtime  Day 5  1 tablet before breakfast and at bedtime  Day 6  1 tablet before breakfast       dapagliflozin propanediol (FARXIGA) 5 MG Tab Take 5 mg by mouth every morning.      atorvastatin (LIPITOR) 40 MG Tab Take 40 mg by mouth every evening.      umeclidinium-vilanterol (ANORO ELLIPTA) 62.5-25 MCG/ACT AEROSOL POWDER, BREATH ACTIVATED inhaler Inhale 1 Puff every day.      amitriptyline (ELAVIL) 10 MG Tab Take 10 mg by mouth 2 times a day.      amLODIPine (NORVASC) 10 MG Tab Take 10 mg by mouth every morning.      losartan (COZAAR) 25 MG Tab Take 25 mg by mouth 2 times a day.      ammonium lactate (LAC-HYDRIN) 12 % Lotion Apply 1 Application topically 2 times a day.      linagliptin (TRADJENTA) 5 MG Tab tablet Take 5 mg by mouth every day.      ketoconazole (NIZORAL) 2 % Cream Apply 1 Application topically 2 times a day.      albuterol 108 (90 Base) MCG/ACT Aero Soln inhalation aerosol Inhale 2 Puffs every 6 hours as needed for Shortness of Breath.      insulin NPH (HUMULIN/NOVOLIN) 100 UNIT/ML Suspension Inject  Units under the skin 3 times a day. 100 units in the morning, 75 units at lunch & 50 units at bedtime         Current Facility-Administered Medications   Medication Dose Frequency Provider Last Rate Last Admin    furosemide (LASIX) injection 80 mg  80 mg BID DIURETIC Olga Islas M.D.   80 mg at 12/27/22 0433    heparin injection 2,000 Units  2,000 Units ACUTE DIALYSIS PRN Florina Story M.D.   2,000 Units at 12/26/22 1817    heparin injection 2,600 Units  2,600 Units ACUTE DIALYSIS PRN Florina Story M.D.   2,600 Units at 12/26/22 2130    heparin infusion 25,000 units in 500 mL 0.45% NACL  0-30 Units/kg/hr (Adjusted) Continuous Shani Baltazar, A.P.R.N. 20 mL/hr at 12/27/22 0723 9 Units/kg/hr at 12/27/22 0723    heparin injection 2,000 Units  2,000 Units PRN Shani Baltazar, A.P.R.N.        methylPREDNISolone (SOLU-MEDROL) 40 MG injection 40 mg  40 mg Q12HRS Elieser Centeno M.D.   40 mg at 12/27/22 0431    metoprolol tartrate (LOPRESSOR) tablet 25 mg  25 mg TWICE DAILY Jeremy M Gonda, M.D.   25  mg at 12/27/22 0429    ipratropium-albuterol (DUONEB) nebulizer solution  3 mL 4X/DAY (RT) Jeremy M Gonda, M.D.   3 mL at 12/26/22 1900    senna-docusate (PERICOLACE or SENOKOT S) 8.6-50 MG per tablet 2 Tablet  2 Tablet BID Hao Urena M.D.   2 Tablet at 12/26/22 1747    And    polyethylene glycol/lytes (MIRALAX) PACKET 1 Packet  1 Packet QDAY PRN Hao Urena M.D.        And    magnesium hydroxide (MILK OF MAGNESIA) suspension 30 mL  30 mL QDAY PRN Hao Urena M.D.        And    bisacodyl (DULCOLAX) suppository 10 mg  10 mg QDAY PRN Hao Urena M.D.        acetaminophen (Tylenol) tablet 650 mg  650 mg Q6HRS PRN Hao Urena M.D.        ondansetron (ZOFRAN) syringe/vial injection 4 mg  4 mg Q4HRS PRN Hao Urena M.D.        ondansetron (ZOFRAN ODT) dispertab 4 mg  4 mg Q4HRS PRN Hao Urena M.D.        guaiFENesin dextromethorphan (ROBITUSSIN DM) 100-10 MG/5ML syrup 10 mL  10 mL Q6HRS PRN Hao Urena M.D.        amitriptyline (ELAVIL) tablet 10 mg  10 mg BID Hao Urena M.D.   10 mg at 12/27/22 0428    aspirin EC (ECOTRIN) tablet 81 mg  81 mg QAM Hao Urena M.D.   81 mg at 12/27/22 0428    atorvastatin (LIPITOR) tablet 40 mg  40 mg Nightly Hao Urena M.D.   40 mg at 12/26/22 2225    ipratropium-albuterol (DUONEB) nebulizer solution  3 mL Q2HRS PRN (RT) Hao Urena M.D.   3 mL at 12/26/22 1911    umeclidinium-vilanterol (Anoro Ellipta) inhaler 1 Puff  1 Puff QDAILY (RT) Hao Urena M.D.   1 Puff at 12/26/22 0801    And    fluticasone (FLOVENT HFA) 44 MCG/ACT inhaler 88 mcg  2 Puff QDAILY (RT) Hao Urena M.D.   88 mcg at 12/26/22 0800    nicotine (NICODERM) 21 MG/24HR 21 mg  21 mg Daily-0600 Hao Urena M.D.   21 mg at 12/27/22 0430    And    nicotine polacrilex (NICORETTE) 2 MG piece 2 mg  2 mg Q HOUR PRN Hao Urena M.D.        Respiratory Therapy Consult   Continuous RT Gary Stover M.D.        insulin regular (HumuLIN R,NovoLIN R) injection  2-9 Units Q6HRS Gary Stover,  "M.D.   3 Units at 12/27/22 0650    And    dextrose 10 % BOLUS 25 g  25 g Q15 MIN PRN Gary Stover M.D.        amLODIPine (NORVASC) tablet 10 mg  10 mg QAM Gary Stover M.D.   10 mg at 12/27/22 0429   Last reviewed on 12/23/2022  4:33 PM by Estrellita Rojas T     Patient has no known allergies.    History reviewed. No pertinent family history.    ROS: As per HPI all other systems reviewed and negative     Physical Exam   Blood pressure 109/70, pulse 99, temperature 36.6 °C (97.9 °F), temperature source Temporal, resp. rate 20, height 1.778 m (5' 10\"), weight (!) 168 kg (371 lb 4.1 oz), SpO2 92 %.    Constitutional: 1L NC, sitting up in chair by bedside Appears well-developed.   HENT: Normocephalic and atraumatic. No scleral icterus.   Neck: No JVD present.   Cardiovascular: Normal rate. Exam reveals no gallop and no friction rub. No murmur heard.   Pulmonary/Chest: bibasilar crackles with poor respiratory effort  Abdominal: S/NT/ND BS+   Musculoskeletal:  Pulses present. No atrophy. Strength normal.  Extremities: 3+ pitting edema BLE   Skin: with chronic venous stasis changes  Neuro: Non-focal, CN 2-12 intact grossly      Intake/Output Summary (Last 24 hours) at 12/27/2022 0819  Last data filed at 12/26/2022 2128  Gross per 24 hour   Intake 650 ml   Output 3100 ml   Net -2450 ml       Recent Labs     12/25/22  0536 12/26/22  0111 12/27/22  0152   WBC 17.3* 15.8* 15.4*   RBC 3.39* 3.37* 3.40*   HEMOGLOBIN 8.8* 8.8* 8.8*   HEMATOCRIT 29.9* 29.2* 28.6*   MCV 88.2 86.6 84.1   MCH 26.0* 26.1* 25.9*   MCHC 29.4* 30.1* 30.8*   RDW 50.5* 49.7 46.5   PLATELETCT 417 445 436   MPV 9.6 9.9 10.0     Recent Labs     12/25/22  0536 12/25/22  1313 12/26/22  0111   SODIUM 133* 133* 133*   POTASSIUM 5.9* 5.4 5.8*   CHLORIDE 100 97 99   CO2 17* 17* 17*   GLUCOSE 224* 290* 252*   BUN 68* 81* 93*   CREATININE 4.57* 4.44* 4.76*   CALCIUM 8.1* 7.9* 7.9*     Recent Labs     12/27/22  0152   APTT 27.6   INR 1.25*               "     EKG (12/26/22):  I have personally reviewed the EKG this visit and discussed with the patient. It shows atrial fibrillation rate 106 with right BBB, no ST elevation or depression appreciated    Imaging reviewed    Impressions:  #New onset atrial fibrillation-CHADSVASC 5, HAS-BLED 3  #elevated troponin: 473->479->404->446-<609->655  #Acute on chronic Congestive heart failure  #Coronary artery disease  #Acute on chronic hypoxic respiratory failure  #Hypertension  #Acute renal failure-cardiorenal likely  #Nicotine Dependence    Recommendations:  -hold Losartan given acute renal failure  -agree with Metoprolol, titrate up to keep HR resting <110  -consider switch to DOAC such as Eliquis from Heparin  -diagnostic LHC?   -oxygen therapy and COPD management per primary team  -titrate down Lasix   -continue Aspirin, Lipitor (though consider going up from 40->80mg daily)  -continue nicotine replacement per primary team  -hold Amlodipine given worsening peripheral edema    Discussed with the referring physician and bedside nursing.       Lexx Cheung MD  [facility] PGY3

## 2022-12-27 NOTE — PROGRESS NOTES
Hospital Medicine Daily Progress Note    Date of Service  12/26/2022    Chief Complaint  Yoandy Peace is a 67 y.o. male admitted 12/23/2022 with SOB, COPD on home O2 4 LPM NC, CAD, CHF, ALONDRA, CKD    Hospital Course  67 y.o. male past medical history of tobacco dependence, COPD on home oxygen, CAD, CHF, obstructive sleep apnea who presented 12/23/2022 with progressive shortness of breath over the last 1 week.  Also reports increasing lower extremity swelling in his legs.  He reports he can only walk 5 feet without getting short of breath.  Upon arrival to the ER he was given Solu-Medrol, bronchodilators and placed on BiPAP for respiratory failure secondary to CHF/COPD and admitted to ICU.  Patient was weaned off BiPAP and downgraded to telemetry floor.  Hospital medicine was consulted.       Interval Problem Update  12/25/2022- transferred out of ICU.  Patient examined bedside is alert, awake, oriented x4 sitting upright in bed on nasal cannula 6 L oxygen.   This morning laboratory significant for increase in creatinine from 3.68-4.57.  As well as elevated potassium at 5.9.  Kayexalate 15 g given with improvement of potassium to 5.4.  I have stopped IV Lasix.  renal ultrasound showing chronic medical disease renal lites ordered he does follow Dr. Renaldo Babb nephrology outpatient for chronic kidney disease.  Patient is currently at 6 L/min nasal cannula baseline is 4 L/min at home.  He does not appear to be in any respiratory distress.  Chest x-ray reviewed does appear to be minimal pulmonary edema noted.  He was previously on Lasix 60 mg IV twice daily.  Echocardiogram is pending.  Continue with Solu-Medrol IV.  If no improvement of renal function or worsening pulmonary edema requiring further diuresis  12/26: Worsening renal function despite holding Lasix.  Also increased pulmonary edema on chest x-ray as well as work of breathing on exam now requiring 10 L/min oxime mask to keep O2 sat greater than 89%.  Wet  cough noted.  Signs of pulmonary fluid overload.  CO2 17.  I have contacted nephrology for consult in emergency hemodialysis to start.  Patient needs emergency temporary hemodialysis line by vascular surgeon Dr. Fonseca.  Hold heparin and made NPO.    I have discussed this patient's plan of care and discharge plan at IDT rounds today with Case Management, Nursing, Nursing leadership, and other members of the IDT team.    Consultants/Specialty  critical care and nephrology    Code Status  DNAR/DNI    Disposition  Patient is not medically cleared for discharge.   Anticipate discharge to to skilled nursing facility.  I have placed the appropriate orders for post-discharge needs.    Review of Systems  Review of Systems   Constitutional:  Positive for malaise/fatigue. Negative for chills, diaphoresis and fever.   HENT:  Negative for congestion and sore throat.    Eyes:  Negative for pain and discharge.   Respiratory:  Positive for cough, shortness of breath and wheezing. Negative for hemoptysis and sputum production.    Cardiovascular:  Positive for leg swelling. Negative for chest pain, palpitations and claudication.   Gastrointestinal:  Negative for abdominal pain, constipation, diarrhea, melena, nausea and vomiting.   Genitourinary:  Negative for dysuria, frequency and urgency.   Musculoskeletal:  Negative for back pain, joint pain, myalgias and neck pain.   Skin:  Negative for itching and rash.   Neurological:  Negative for dizziness, sensory change, speech change, focal weakness, loss of consciousness, weakness and headaches.   Endo/Heme/Allergies:  Does not bruise/bleed easily.   Psychiatric/Behavioral:  Negative for depression, substance abuse and suicidal ideas.       Physical Exam  Temp:  [36.4 °C (97.5 °F)-36.8 °C (98.2 °F)] 36.4 °C (97.5 °F)  Pulse:  [71-94] 80  Resp:  [16-28] 22  BP: (130-147)/(79-83) 135/81  SpO2:  [91 %-95 %] 91 %    Physical Exam  Constitutional:       General: He is in acute distress.       Appearance: He is obese. He is toxic-appearing and diaphoretic.   HENT:      Head: Normocephalic and atraumatic.      Nose: Nose normal.      Mouth/Throat:      Mouth: Mucous membranes are moist.      Pharynx: No oropharyngeal exudate.   Eyes:      General: No scleral icterus.        Right eye: No discharge.         Left eye: No discharge.      Extraocular Movements: Extraocular movements intact.      Conjunctiva/sclera: Conjunctivae normal.      Pupils: Pupils are equal, round, and reactive to light.   Neck:      Thyroid: No thyromegaly.      Vascular: No JVD.      Trachea: No tracheal deviation.   Cardiovascular:      Rate and Rhythm: Normal rate and regular rhythm.      Heart sounds: Normal heart sounds. No murmur heard.    No friction rub. No gallop.   Pulmonary:      Effort: Respiratory distress present.      Breath sounds: Rales present. No wheezing or rhonchi.   Chest:      Chest wall: No tenderness.   Abdominal:      General: Bowel sounds are normal. There is no distension.      Palpations: Abdomen is soft. There is no mass.      Tenderness: There is no abdominal tenderness. There is no guarding or rebound.   Musculoskeletal:         General: Swelling present. No tenderness. Normal range of motion.      Cervical back: Normal range of motion and neck supple.      Right lower leg: Edema present.      Left lower leg: Edema present.   Lymphadenopathy:      Cervical: No cervical adenopathy.   Skin:     General: Skin is warm.      Findings: No erythema or rash.   Neurological:      General: No focal deficit present.      Mental Status: He is alert and oriented to person, place, and time.      Cranial Nerves: No cranial nerve deficit.      Motor: No abnormal muscle tone.   Psychiatric:         Mood and Affect: Mood normal.         Behavior: Behavior normal.         Thought Content: Thought content normal.         Judgment: Judgment normal.       Fluids    Intake/Output Summary (Last 24 hours) at 12/26/2022  1626  Last data filed at 12/26/2022 1415  Gross per 24 hour   Intake 700 ml   Output 1050 ml   Net -350 ml       Laboratory  Recent Labs     12/24/22  0015 12/25/22  0536 12/26/22  0111   WBC 12.1* 17.3* 15.8*   RBC 3.14* 3.39* 3.37*   HEMOGLOBIN 8.3* 8.8* 8.8*   HEMATOCRIT 27.3* 29.9* 29.2*   MCV 86.9 88.2 86.6   MCH 26.4* 26.0* 26.1*   MCHC 30.4* 29.4* 30.1*   RDW 48.9 50.5* 49.7   PLATELETCT 344 417 445   MPV 9.3 9.6 9.9     Recent Labs     12/25/22  0536 12/25/22  1313 12/26/22  0111   SODIUM 133* 133* 133*   POTASSIUM 5.9* 5.4 5.8*   CHLORIDE 100 97 99   CO2 17* 17* 17*   GLUCOSE 224* 290* 252*   BUN 68* 81* 93*   CREATININE 4.57* 4.44* 4.76*   CALCIUM 8.1* 7.9* 7.9*             Recent Labs     12/24/22  0015   TRIGLYCERIDE 41   HDL 40   LDL 42       Imaging  DX-CHEST-PORTABLE (1 VIEW)   Final Result         1. No significant interval change.      US-RENAL   Final Result      1.  Echogenic renal parenchyma bilaterally could relate to medical renal disease.      2.  No hydronephrosis. No renal calculus.      DX-CHEST-PORTABLE (1 VIEW)   Final Result      1.  Enlarged cardiac silhouette and small amounts of bilateral pleural fluid with diffuse interstitial opacities most consistent with pulmonary edema/CHF.   2.  Bibasilar opacities could be due to edema, atelectasis or pneumonitis.   3.  There is atherosclerosis.   4.  Questionable linear calcific plaque in the proximal descending thoracic aorta versus some other device intrinsic or extrinsic to the patient.      EC-ECHOCARDIOGRAM COMPLETE W/ CONT   Final Result      US-EXTREMITY VENOUS LOWER BILAT   Final Result      DX-CHEST-PORTABLE (1 VIEW)   Final Result      1.  Cardiomegaly with interstitial infiltrates likely representing pulmonary edema.      2.  Bibasilar atelectasis and small bilateral pleural effusions.      IR-CVC NON TUNNELED > AGE 5    (Results Pending)        Assessment/Plan  * Acute renal failure (ARF) ([facility])  Assessment & Plan  12/26: Worsening  renal function despite holding Lasix.  Also increased pulmonary edema on chest x-ray as well as work of breathing on exam now requiring 10 L/min oxime mask to keep O2 sat greater than 89%.  Wet cough noted.  Signs of pulmonary fluid overload.  CO2 17.  I have contacted nephrology for consult in emergency hemodialysis to start.  Patient needs emergency temporary hemodialysis line by vascular surgeon Dr. Fonseca.  Hold heparin and made NPO.    Elevated troponin  Assessment & Plan  Trop 473>>459>>404  BNP 6487  Likely secondary to CHF  Repeat troponin   ASA/statin  Echo pending       Bilateral leg edema- (present on admission)  Assessment & Plan  Likely due to volume overload.  Echo pending  Diuresis  Bilateral lower extremity DVT studies negative    Acute exacerbation of chronic obstructive pulmonary disease (COPD) ([facility])  Assessment & Plan  Resolving on oxygen at 6 L.  In no distress, no accessory muscle use, able to complete full sentences.  Patient downgraded from ICU.  No longer requiring rescue BiPAP.  Continue with Flovent and Anoro  Taper Solu-Medrol to 40 mg every 12 hours  Duo nebs as needed  Continue with oxygen supplementation to obtain SPO2 >88-90%    Acute pulmonary edema (HCC)- (present on admission)  Assessment & Plan  Resolving  Repeat chest x-ray in a.m.  Continue with diuresis  Patient is normotensive.  Hold ARB at this time.  Echo pending     Hypertension  Assessment & Plan  Amlodipine 10 mg daily   Lasix 60 mg BID     Tobacco abuse- (present on admission)  Assessment & Plan  Nicotine replacement as needed    ACP (advance care planning)  Assessment & Plan  Full code.  Discussed with patient in front of RN.    Class 3 severe obesity in adult (HCC)  Assessment & Plan  BMI 52.89     Cardiorenal syndrome, stage 1-4 or unspecified chronic kidney disease, with heart failure (HCC)- (present on admission)  Assessment & Plan  Suspected  Cr 3.31 now slightly worse 3.66   Reduce Diuresis to IV lasix 60 mg  BID  Monitor renal function while on Diuretic    ALONDRA on CPAP- (present on admission)  Assessment & Plan  CPAP at bedtime      Acute on chronic respiratory failure with hypoxia (HCC)  Assessment & Plan  Dependent on 4 L.  Currently requiring 6 L in no distress  Diuresis with IV Lasix  Nebs/pulm toilet  Wean off Solu-Medrol as tolerated    Acute on chronic congestive heart failure (HCC)  Assessment & Plan  Diuresis  Echo pending   Continue with metoprolol 25 mg twice daily  UOP -1,475 since admit     CAD (coronary artery disease)  Assessment & Plan  ASA/statin    DM (diabetes mellitus) ([facility])  Assessment & Plan  Continue with SSI   On Farxiga, insulin NPH at home, and linagliptin           VTE prophylaxis: SCDs/TEDs and heparin ppx    I have performed a physical exam and reviewed and updated ROS and Plan today (12/26/2022). In review of yesterday's note (12/25/2022), there are no changes except as documented above.

## 2022-12-27 NOTE — CONSULTS
Brief Cardiology Note:    I was called to discuss this patients care with Ms Olguin We discussed pt presetns with respiratory failuer started on HD tropos have been increasing and pt went into afib today at HD    Would hpearinize for new afib and trend troponin    Cardiology can see in the AM    At this time it was deemed no formal in person cardiology consultation was necessary, however if this changes due to changes in patient condition or abnormal test results, please re-consult cardiology.     Electronically signed: Nimesh Odell MD PhD [facility]  Cardiologist [facility]    Please note that this dictation was created using voice recognition software. There are errors of grammar and possibly content I did not discover before finalizing the note.     12/26/2022  11:53 PM

## 2022-12-27 NOTE — PROGRESS NOTES
Heparin started, initial rate of 12 units/kg/hr exceeded maximum initial infusion rate of 1000 at 1330 so this RN and charge RN called pharmacy to verify what rate to start at. Pharmacist verified to start the heparin at 9 units/kg/hr instead of the 12 per MAR.

## 2022-12-27 NOTE — PROGRESS NOTES
Hospital Medicine Daily Progress Note    Date of Service  12/27/2022    Chief Complaint  Yoandy Peace is a 67 y.o. male admitted 12/23/2022 with shortness of breath    Hospital Course    67 y.o. male past medical history of tobacco dependence, COPD on home oxygen, CAD, CHF, obstructive sleep apnea who presented 12/23/2022 with progressive shortness of breath over the last 1 week.  .  Upon arrival to the ER he was given Solu-Medrol, bronchodilators and placed on BiPAP for respiratory failure secondary to CHF/COPD and admitted to ICU.  Patient was weaned off BiPAP and downgraded to telemetry floor.  Nephrology was consulted for worsening function.  Patient was started on hemodialysis.  Started on IV Lasix for pulmonary edema.  Cardiology was consulted for elevated troponin and chest pain during dialysis.  Started on therapeutic Lovenox for A. fib with RVR    Interval Problem Update  12/27/2022    Currently on 5 L oxygen saturating over 90%  Labs noted with leukocytosis.  Low bicarbonate 18, high anion gap.   significantly elevated troponin.  Echo unremarkable    Currently on IV Lasix  On heparin  Nephrology following for hemodialysis  Cardiology on board        I have discussed this patient's plan of care and discharge plan at IDT rounds today with Case Management, Nursing, Nursing leadership, and other members of the IDT team.    Consultants/Specialty  cardiology    Code Status  DNAR/DNI    Disposition  Patient is not medically cleared for discharge.   Anticipate discharge to to home with close outpatient follow-up.  I have placed the appropriate orders for post-discharge needs.    Review of Systems  Review of Systems   Constitutional:  Negative for fever.   HENT:  Negative for hearing loss.    Eyes:  Negative for blurred vision.   Respiratory:  Negative for cough and shortness of breath.    Cardiovascular:  Negative for chest pain.   Gastrointestinal:  Negative for nausea and vomiting.   Genitourinary:  Negative  for dysuria.   Musculoskeletal:  Negative for myalgias.   Skin:  Negative for rash.   Neurological:  Negative for dizziness.   Endo/Heme/Allergies:  Does not bruise/bleed easily.      Physical Exam  Temp:  [36.3 °C (97.3 °F)-36.6 °C (97.9 °F)] 36.5 °C (97.7 °F)  Pulse:  [] 103  Resp:  [20-22] 20  BP: ()/(58-87) 123/73  SpO2:  [90 %-95 %] 95 %    Physical Exam  Constitutional:       General: He is not in acute distress.     Appearance: He is obese. He is ill-appearing.   HENT:      Head: Normocephalic and atraumatic.      Right Ear: Tympanic membrane normal.      Left Ear: Tympanic membrane normal.      Nose: Nose normal.      Mouth/Throat:      Mouth: Mucous membranes are moist.   Eyes:      Extraocular Movements: Extraocular movements intact.      Pupils: Pupils are equal, round, and reactive to light.   Cardiovascular:      Rate and Rhythm: Normal rate and regular rhythm.      Pulses: Normal pulses.   Pulmonary:      Effort: Pulmonary effort is normal. No respiratory distress.   Abdominal:      General: Abdomen is flat. There is no distension.   Musculoskeletal:      Cervical back: Normal range of motion.      Right lower leg: Edema present.      Left lower leg: Edema present.   Skin:     General: Skin is warm.   Neurological:      General: No focal deficit present.      Mental Status: He is alert and oriented to person, place, and time. Mental status is at baseline.   Psychiatric:         Mood and Affect: Mood normal.       Fluids    Intake/Output Summary (Last 24 hours) at 12/27/2022 1310  Last data filed at 12/27/2022 1300  Gross per 24 hour   Intake 1010 ml   Output 3100 ml   Net -2090 ml       Laboratory  Recent Labs     12/26/22  0111 12/27/22  0152 12/27/22  0837   WBC 15.8* 15.4* 17.1*   RBC 3.37* 3.40* 3.28*   HEMOGLOBIN 8.8* 8.8* 8.6*   HEMATOCRIT 29.2* 28.6* 27.6*   MCV 86.6 84.1 84.1   MCH 26.1* 25.9* 26.2*   MCHC 30.1* 30.8* 31.2*   RDW 49.7 46.5 46.7   PLATELETCT 445 436 410   MPV 9.9  10.0 10.0     Recent Labs     12/26/22  0111 12/27/22  0152 12/27/22  0837   SODIUM 133* 135 133*   POTASSIUM 5.8* 4.7 4.9   CHLORIDE 99 97 98   CO2 17* 18* 18*   GLUCOSE 252* 228* 247*   BUN 93* 80* 87*   CREATININE 4.76* 3.60* 3.72*   CALCIUM 7.9* 8.1* 8.1*     Recent Labs     12/27/22  0152   APTT 27.6   INR 1.25*               Imaging  DX-CHEST-PORTABLE (1 VIEW)   Final Result      1.  Ongoing pulmonary edema and bilateral pleural effusions.   2.  Cardiomegaly.   3.  Left lower lobe atelectasis or consolidation. There is also likely some component of compressive atelectasis of the right lung base considering the presence of significant pleural effusion.   4.  Interval placement of temporary non-tunneled hemodialysis catheter with no evidence of pneumothorax.      DX-CHEST-PORTABLE (1 VIEW)   Final Result         1. No significant interval change.      US-RENAL   Final Result      1.  Echogenic renal parenchyma bilaterally could relate to medical renal disease.      2.  No hydronephrosis. No renal calculus.      DX-CHEST-PORTABLE (1 VIEW)   Final Result      1.  Enlarged cardiac silhouette and small amounts of bilateral pleural fluid with diffuse interstitial opacities most consistent with pulmonary edema/CHF.   2.  Bibasilar opacities could be due to edema, atelectasis or pneumonitis.   3.  There is atherosclerosis.   4.  Questionable linear calcific plaque in the proximal descending thoracic aorta versus some other device intrinsic or extrinsic to the patient.      EC-ECHOCARDIOGRAM COMPLETE W/ CONT   Final Result      US-EXTREMITY VENOUS LOWER BILAT   Final Result      DX-CHEST-PORTABLE (1 VIEW)   Final Result      1.  Cardiomegaly with interstitial infiltrates likely representing pulmonary edema.      2.  Bibasilar atelectasis and small bilateral pleural effusions.           Assessment/Plan  * Acute renal failure (ARF) (HCC)  Assessment & Plan  Started on hemodialysis  Nephrology following    Elevated  troponin  Assessment & Plan  Trop 473>>459>>404  BNP 6487  Likely secondary to CHF  ASA/statin  Echo unremarkable  Cardiology following    Bilateral leg edema- (present on admission)  Assessment & Plan  Likely due to volume overload.  Echo pending  Diuresis  Bilateral lower extremity DVT studies negative    Acute exacerbation of chronic obstructive pulmonary disease (COPD) ([facility])  Assessment & Plan  Resolving on oxygen at 6 L.  In no distress, no accessory muscle use, able to complete full sentences.  Patient downgraded from ICU.  No longer requiring rescue BiPAP.  Continue with Flovent and Anoro  Taper Solu-Medrol to 40 mg every 12 hours  Duo nebs as needed  Continue with oxygen supplementation to obtain SPO2 >88-90%    Acute pulmonary edema (HCC)- (present on admission)  Assessment & Plan  Resolving  Repeat chest x-ray in a.m.  Continue with diuresis  Patient is normotensive.  Hold ARB at this time.  Echo pending     Hypertension  Assessment & Plan  Amlodipine 10 mg daily   Lasix 60 mg BID     Tobacco abuse- (present on admission)  Assessment & Plan  Nicotine replacement as needed    ACP (advance care planning)  Assessment & Plan  Full code.  Discussed with patient in front of RN.    Class 3 severe obesity in adult (HCC)  Assessment & Plan  BMI 52.89     Cardiorenal syndrome, stage 1-4 or unspecified chronic kidney disease, with heart failure ([facility])- (present on admission)  Assessment & Plan  Suspected  Cr 3.31 now slightly worse 3.66   Reduce Diuresis to IV lasix 60 mg BID  Monitor renal function while on Diuretic    ALONDRA on CPAP- (present on admission)  Assessment & Plan  CPAP at bedtime      Acute on chronic respiratory failure with hypoxia ([facility])  Assessment & Plan  Dependent on 4 L.  Currently requiring 6 L in no distress  Diuresis with IV Lasix  Nebs/pulm toilet  Wean off Solu-Medrol as tolerated    Acute on chronic congestive heart failure (HCC)  Assessment & Plan  Diuresis  Echo pending   Continue with  metoprolol 25 mg twice daily  UOP -1,475 since admit     CAD (coronary artery disease)  Assessment & Plan  ASA/statin    DM (diabetes mellitus) (HCC)  Assessment & Plan  Continue with SSI   On Farxiga, insulin NPH at home, and linagliptin           VTE prophylaxis: SCDs/TEDs and therapeutic anticoagulation with Heparin    I have performed a physical exam and reviewed and updated ROS and Plan today (12/27/2022). In review of yesterday's note (12/26/2022), there are no changes except as documented above.

## 2022-12-27 NOTE — PROGRESS NOTES
Monitor room called this RN that pt appears to have converted to afib, EKG obtained, MD notified. Pt now in Afib.

## 2022-12-27 NOTE — THERAPY
Occupational Therapy Contact Note    Patient Name: Yoandy Peace  Age:  67 y.o., Sex:  male  Medical Record #: [id number]  Today's Date: 12/27/2022    OT consult rec'd. Pt with new CP last night, elevated troponin, and new onset afib; pending cardiology consult this AM. Will hold OT eval and will re-attempt as appropriate/able.

## 2022-12-27 NOTE — PROGRESS NOTES
Monitor Summary:     Rhythm: Sinus Rhythm to Afib    Rate:     Measurement: .na/.12/.na    Ectopy: O PVCs, R Trigem, O Coup    12 hr cc

## 2022-12-28 LAB
ANION GAP SERPL CALC-SCNC: 13 MMOL/L (ref 7–16)
BACTERIA BLD CULT: NORMAL
BACTERIA BLD CULT: NORMAL
BASOPHILS # BLD AUTO: 0.2 % (ref 0–1.8)
BASOPHILS # BLD: 0.04 K/UL (ref 0–0.12)
BUN SERPL-MCNC: 76 MG/DL (ref 8–22)
CALCIUM SERPL-MCNC: 7.9 MG/DL (ref 8.5–10.5)
CHLORIDE SERPL-SCNC: 99 MMOL/L (ref 96–112)
CO2 SERPL-SCNC: 24 MMOL/L (ref 20–33)
CREAT SERPL-MCNC: 3 MG/DL (ref 0.5–1.4)
EOSINOPHIL # BLD AUTO: 0.01 K/UL (ref 0–0.51)
EOSINOPHIL NFR BLD: 0.1 % (ref 0–6.9)
ERYTHROCYTE [DISTWIDTH] IN BLOOD BY AUTOMATED COUNT: 46.5 FL (ref 35.9–50)
GFR SERPLBLD CREATININE-BSD FMLA CKD-EPI: 22 ML/MIN/1.73 M 2
GLUCOSE BLD STRIP.AUTO-MCNC: 168 MG/DL (ref 65–99)
GLUCOSE BLD STRIP.AUTO-MCNC: 183 MG/DL (ref 65–99)
GLUCOSE BLD STRIP.AUTO-MCNC: 187 MG/DL (ref 65–99)
GLUCOSE BLD STRIP.AUTO-MCNC: 228 MG/DL (ref 65–99)
GLUCOSE SERPL-MCNC: 187 MG/DL (ref 65–99)
HCT VFR BLD AUTO: 27 % (ref 42–52)
HGB BLD-MCNC: 8.4 G/DL (ref 14–18)
IMM GRANULOCYTES # BLD AUTO: 0.4 K/UL (ref 0–0.11)
IMM GRANULOCYTES NFR BLD AUTO: 2 % (ref 0–0.9)
LYMPHOCYTES # BLD AUTO: 3.85 K/UL (ref 1–4.8)
LYMPHOCYTES NFR BLD: 19.5 % (ref 22–41)
MAGNESIUM SERPL-MCNC: 2.3 MG/DL (ref 1.5–2.5)
MCH RBC QN AUTO: 25.8 PG (ref 27–33)
MCHC RBC AUTO-ENTMCNC: 31.1 G/DL (ref 33.7–35.3)
MCV RBC AUTO: 82.8 FL (ref 81.4–97.8)
MONOCYTES # BLD AUTO: 1.75 K/UL (ref 0–0.85)
MONOCYTES NFR BLD AUTO: 8.9 % (ref 0–13.4)
NEUTROPHILS # BLD AUTO: 13.69 K/UL (ref 1.82–7.42)
NEUTROPHILS NFR BLD: 69.3 % (ref 44–72)
NRBC # BLD AUTO: 0.12 K/UL
NRBC BLD-RTO: 0.6 /100 WBC
PHOSPHATE SERPL-MCNC: 5.7 MG/DL (ref 2.5–4.5)
PLATELET # BLD AUTO: 398 K/UL (ref 164–446)
PMV BLD AUTO: 9.9 FL (ref 9–12.9)
POTASSIUM SERPL-SCNC: 4.7 MMOL/L (ref 3.6–5.5)
RBC # BLD AUTO: 3.26 M/UL (ref 4.7–6.1)
SIGNIFICANT IND 70042: NORMAL
SIGNIFICANT IND 70042: NORMAL
SITE SITE: NORMAL
SITE SITE: NORMAL
SODIUM SERPL-SCNC: 136 MMOL/L (ref 135–145)
SOURCE SOURCE: NORMAL
SOURCE SOURCE: NORMAL
UFH PPP CHRO-ACNC: 0.38 IU/ML
WBC # BLD AUTO: 19.7 K/UL (ref 4.8–10.8)

## 2022-12-28 PROCEDURE — 99233 SBSQ HOSP IP/OBS HIGH 50: CPT | Performed by: STUDENT IN AN ORGANIZED HEALTH CARE EDUCATION/TRAINING PROGRAM

## 2022-12-28 PROCEDURE — 86480 TB TEST CELL IMMUN MEASURE: CPT

## 2022-12-28 PROCEDURE — 700102 HCHG RX REV CODE 250 W/ 637 OVERRIDE(OP): Performed by: STUDENT IN AN ORGANIZED HEALTH CARE EDUCATION/TRAINING PROGRAM

## 2022-12-28 PROCEDURE — 770020 HCHG ROOM/CARE - TELE (206)

## 2022-12-28 PROCEDURE — 80048 BASIC METABOLIC PNL TOTAL CA: CPT

## 2022-12-28 PROCEDURE — 94660 CPAP INITIATION&MGMT: CPT

## 2022-12-28 PROCEDURE — 90935 HEMODIALYSIS ONE EVALUATION: CPT

## 2022-12-28 PROCEDURE — 700111 HCHG RX REV CODE 636 W/ 250 OVERRIDE (IP)

## 2022-12-28 PROCEDURE — 700102 HCHG RX REV CODE 250 W/ 637 OVERRIDE(OP): Performed by: INTERNAL MEDICINE

## 2022-12-28 PROCEDURE — A9270 NON-COVERED ITEM OR SERVICE: HCPCS | Performed by: STUDENT IN AN ORGANIZED HEALTH CARE EDUCATION/TRAINING PROGRAM

## 2022-12-28 PROCEDURE — 97162 PT EVAL MOD COMPLEX 30 MIN: CPT

## 2022-12-28 PROCEDURE — 94669 MECHANICAL CHEST WALL OSCILL: CPT

## 2022-12-28 PROCEDURE — 83735 ASSAY OF MAGNESIUM: CPT

## 2022-12-28 PROCEDURE — 97166 OT EVAL MOD COMPLEX 45 MIN: CPT

## 2022-12-28 PROCEDURE — 700111 HCHG RX REV CODE 636 W/ 250 OVERRIDE (IP): Performed by: HOSPITALIST

## 2022-12-28 PROCEDURE — 99233 SBSQ HOSP IP/OBS HIGH 50: CPT | Mod: GC | Performed by: INTERNAL MEDICINE

## 2022-12-28 PROCEDURE — 85520 HEPARIN ASSAY: CPT

## 2022-12-28 PROCEDURE — 36415 COLL VENOUS BLD VENIPUNCTURE: CPT

## 2022-12-28 PROCEDURE — 82962 GLUCOSE BLOOD TEST: CPT

## 2022-12-28 PROCEDURE — 85025 COMPLETE CBC W/AUTO DIFF WBC: CPT

## 2022-12-28 PROCEDURE — A9270 NON-COVERED ITEM OR SERVICE: HCPCS | Performed by: INTERNAL MEDICINE

## 2022-12-28 PROCEDURE — 700111 HCHG RX REV CODE 636 W/ 250 OVERRIDE (IP): Performed by: STUDENT IN AN ORGANIZED HEALTH CARE EDUCATION/TRAINING PROGRAM

## 2022-12-28 PROCEDURE — 84100 ASSAY OF PHOSPHORUS: CPT

## 2022-12-28 RX ORDER — FLUTICASONE PROPIONATE 44 UG/1
2 AEROSOL, METERED RESPIRATORY (INHALATION) DAILY
Status: DISCONTINUED | OUTPATIENT
Start: 2022-12-29 | End: 2023-01-13 | Stop reason: HOSPADM

## 2022-12-28 RX ORDER — HEPARIN SODIUM 1000 [USP'U]/ML
2600 INJECTION, SOLUTION INTRAVENOUS; SUBCUTANEOUS
Status: DISCONTINUED | OUTPATIENT
Start: 2022-12-28 | End: 2023-01-02

## 2022-12-28 RX ORDER — HEPARIN SODIUM 1000 [USP'U]/ML
INJECTION, SOLUTION INTRAVENOUS; SUBCUTANEOUS
Status: COMPLETED
Start: 2022-12-28 | End: 2022-12-28

## 2022-12-28 RX ADMIN — HEPARIN SODIUM 2600 UNITS: 1000 INJECTION, SOLUTION INTRAVENOUS; SUBCUTANEOUS at 16:56

## 2022-12-28 RX ADMIN — APIXABAN 5 MG: 5 TABLET, FILM COATED ORAL at 17:54

## 2022-12-28 RX ADMIN — FUROSEMIDE 80 MG: 10 INJECTION, SOLUTION INTRAMUSCULAR; INTRAVENOUS at 17:51

## 2022-12-28 RX ADMIN — INSULIN HUMAN 3 UNITS: 100 INJECTION, SOLUTION PARENTERAL at 12:00

## 2022-12-28 RX ADMIN — ACETAMINOPHEN 650 MG: 325 TABLET, FILM COATED ORAL at 12:03

## 2022-12-28 RX ADMIN — INSULIN HUMAN 2 UNITS: 100 INJECTION, SOLUTION PARENTERAL at 23:14

## 2022-12-28 RX ADMIN — ACETAMINOPHEN 650 MG: 325 TABLET, FILM COATED ORAL at 19:29

## 2022-12-28 RX ADMIN — AMITRIPTYLINE HYDROCHLORIDE 10 MG: 10 TABLET, FILM COATED ORAL at 17:54

## 2022-12-28 RX ADMIN — NICOTINE TRANSDERMAL SYSTEM 21 MG: 21 PATCH, EXTENDED RELEASE TRANSDERMAL at 05:46

## 2022-12-28 RX ADMIN — APIXABAN 5 MG: 5 TABLET, FILM COATED ORAL at 08:25

## 2022-12-28 RX ADMIN — METOPROLOL TARTRATE 50 MG: 50 TABLET, FILM COATED ORAL at 17:54

## 2022-12-28 RX ADMIN — HEPARIN SODIUM 13 UNITS/KG/HR: 5000 INJECTION, SOLUTION INTRAVENOUS at 00:16

## 2022-12-28 RX ADMIN — INSULIN HUMAN 2 UNITS: 100 INJECTION, SOLUTION PARENTERAL at 17:58

## 2022-12-28 RX ADMIN — AMITRIPTYLINE HYDROCHLORIDE 10 MG: 10 TABLET, FILM COATED ORAL at 05:47

## 2022-12-28 RX ADMIN — DOCUSATE SODIUM 50 MG AND SENNOSIDES 8.6 MG 2 TABLET: 8.6; 5 TABLET, FILM COATED ORAL at 05:47

## 2022-12-28 RX ADMIN — ASPIRIN 81 MG: 81 TABLET, COATED ORAL at 05:47

## 2022-12-28 RX ADMIN — HEPARIN SODIUM 2000 UNITS: 1000 INJECTION, SOLUTION INTRAVENOUS; SUBCUTANEOUS at 00:14

## 2022-12-28 RX ADMIN — ATORVASTATIN CALCIUM 40 MG: 40 TABLET, FILM COATED ORAL at 19:29

## 2022-12-28 RX ADMIN — METHYLPREDNISOLONE SODIUM SUCCINATE 40 MG: 40 INJECTION, POWDER, FOR SOLUTION INTRAMUSCULAR; INTRAVENOUS at 05:46

## 2022-12-28 RX ADMIN — METOPROLOL TARTRATE 50 MG: 50 TABLET, FILM COATED ORAL at 05:47

## 2022-12-28 RX ADMIN — INSULIN HUMAN 2 UNITS: 100 INJECTION, SOLUTION PARENTERAL at 05:55

## 2022-12-28 RX ADMIN — FUROSEMIDE 80 MG: 10 INJECTION, SOLUTION INTRAMUSCULAR; INTRAVENOUS at 05:46

## 2022-12-28 ASSESSMENT — ENCOUNTER SYMPTOMS
VOMITING: 0
FEVER: 0
BRUISES/BLEEDS EASILY: 0
DIZZINESS: 0
SHORTNESS OF BREATH: 0
COUGH: 0
NAUSEA: 0
BLURRED VISION: 0
MYALGIAS: 0

## 2022-12-28 ASSESSMENT — COGNITIVE AND FUNCTIONAL STATUS - GENERAL
TURNING FROM BACK TO SIDE WHILE IN FLAT BAD: A LITTLE
DRESSING REGULAR UPPER BODY CLOTHING: A LITTLE
WALKING IN HOSPITAL ROOM: A LITTLE
DAILY ACTIVITIY SCORE: 16
STANDING UP FROM CHAIR USING ARMS: A LITTLE
SUGGESTED CMS G CODE MODIFIER DAILY ACTIVITY: CK
MOVING FROM LYING ON BACK TO SITTING ON SIDE OF FLAT BED: A LITTLE
DRESSING REGULAR LOWER BODY CLOTHING: A LOT
PERSONAL GROOMING: A LITTLE
MOBILITY SCORE: 16
SUGGESTED CMS G CODE MODIFIER MOBILITY: CK
MOVING TO AND FROM BED TO CHAIR: A LITTLE
CLIMB 3 TO 5 STEPS WITH RAILING: TOTAL
HELP NEEDED FOR BATHING: A LOT
TOILETING: A LOT

## 2022-12-28 ASSESSMENT — CHA2DS2 SCORE
CHF OR LEFT VENTRICULAR DYSFUNCTION: YES
HYPERTENSION: YES
PRIOR STROKE OR TIA OR THROMBOEMBOLISM: NO
SEX: MALE
DIABETES: YES
CHA2DS2 VASC SCORE: 4
AGE 65 TO 74: YES
AGE 75 OR GREATER: NO
VASCULAR DISEASE: NO

## 2022-12-28 ASSESSMENT — PAIN DESCRIPTION - PAIN TYPE
TYPE: ACUTE PAIN
TYPE: ACUTE PAIN

## 2022-12-28 ASSESSMENT — GAIT ASSESSMENTS
GAIT LEVEL OF ASSIST: CONTACT GUARD ASSIST
DEVIATION: BRADYKINETIC;DECREASED HEEL STRIKE;DECREASED TOE OFF
DISTANCE (FEET): 12
ASSISTIVE DEVICE: FRONT WHEEL WALKER

## 2022-12-28 ASSESSMENT — ACTIVITIES OF DAILY LIVING (ADL): TOILETING: INDEPENDENT

## 2022-12-28 ASSESSMENT — FIBROSIS 4 INDEX: FIB4 SCORE: 0.81

## 2022-12-28 NOTE — PROGRESS NOTES
"[facility] -  PROGRESS NOTE               Author: Florina Story M.D. Date & Time: 12/28/2022  2:30 PM     HPI:  67 y.o. male with history of COPD dependent on 4 L, tobacco abuse, CAD, CHF, ALONDRA/OHS on bedtime CPAP, who presented 12/23/2022 with evaluations for progressive shortness of breath and lower extremities swelling.  CXR notable for bilateral vascular congestion with pulmonary edema. Patient was admitted to the hospital on 12/23 for CHD exacerbation and copd. No F/C/N/V. No melena, hematochezia, hematemesis.  No HA, visual changes, or abdominal pain.     DAILY NEPHROLOGY SUMMARY:  12/26: consult duque, s/p hd #1  12/27: due for HD #2, still anasarca and on O2   12/28: on hd now, tolerating well.     REVIEW OF SYSTEMS:    10 point ROS reviewed and is as per HPI/daily summary or otherwise negative    PMH/PSH/SH/FH:   Reviewed and unchanged since admission note    CURRENT MEDICATIONS:   Reviewed from admission to present day    VS:  /88   Pulse 96   Temp 36.7 °C (98.1 °F) (Temporal)   Resp 20   Ht 1.778 m (5' 10\")   Wt (!) 168 kg (371 lb 4.1 oz)   SpO2 94%   BMI 53.27 kg/m²     Physical Exam  Constitutional:       Appearance: He is obese. He is ill-appearing.   HENT:      Head: Normocephalic and atraumatic.   Cardiovascular:      Rate and Rhythm: Normal rate. Rhythm irregular.      Comments: anasarca  Pulmonary:      Comments: Decreased BS BL  Abdominal:      General: There is distension.      Tenderness: There is no abdominal tenderness.      Hernia: A hernia is present.   Musculoskeletal:         General: Swelling present.   Skin:     Coloration: Skin is pale. Skin is not jaundiced.      Findings: No bruising.   Neurological:      General: No focal deficit present.      Mental Status: He is alert and oriented to person, place, and time.   Psychiatric:         Behavior: Behavior normal.   Fluids:  In: 1100 [P.O.:600; Dialysis:500]  Out: 4250     LABS:  Recent Labs     " 12/27/22  0152 12/27/22  0837 12/28/22  0627   SODIUM 135 133* 136   POTASSIUM 4.7 4.9 4.7   CHLORIDE 97 98 99   CO2 18* 18* 24   GLUCOSE 228* 247* 187*   BUN 80* 87* 76*   CREATININE 3.60* 3.72* 3.00*   CALCIUM 8.1* 8.1* 7.9*       IMAGING:   All imaging reviewed from admission to present day    IMPRESSION:  # FARIHA on CKD   - no hydro, UA benign  # CKD 2/2 DM, HTN  - unclear baseline cr   # Hyperkalemia  # hyponatremia   # Acidosis   # acute respiratory failure 2/2 COPD/CHF      PLAN:  - hd today #3. PUF tomorrow   - suspect needing daily hd for volume control   - renal diet,   - fluid restriction 1L   - strict IO  - Dose all meds per eGFR < 15

## 2022-12-28 NOTE — CARE PLAN
"The patient is Watcher - Medium risk of patient condition declining or worsening    Shift Goals  Clinical Goals: Monitor respiratory status, vitals, & labs  Patient Goals: \"Better food\"  Family Goals: no family present    Progress made toward(s) clinical / shift goals:        Problem: Knowledge Deficit - Standard  Goal: Patient and family/care givers will demonstrate understanding of plan of care, disease process/condition, diagnostic tests and medications  Outcome: Progressing  Note: Patient verbally demonstrates understanding of POC and disease process. All patient questions answered.     Problem: Impaired Gas Exchange  Goal: Patient will demonstrate improved ventilation and adequate oxygenation and participate in treatment regimen within the level of ability/situation.  Outcome: Progressing  Note: Patient is currently down to 5L NC. Patient's home baseline O2 is 4L. Patient reporting less SOB and feeling like he can breathe better today. Patient on a pulse ox. Patient educated on how to use IS and encouraged to use it throughout the day.         "

## 2022-12-28 NOTE — PROGRESS NOTES
[facility] Progress Note      HD today x 3 hours per Dr. Story.   Initiated at 1354 and ended at 1654.     Assessment:    Patient stable, alert and oriented. + SOB, back pain noted. Medicated by PCN.      Access used: R IJ non tunneled catheter      Net Fluid Removed: 2,500 mL      Procedure Events/ Complications:   Had hypotensive episodes during treatment. Asymptomatic. Denies lightheadedness.      Post Access Care:   Blood returned. Flushed with NS.  CVC locked with Heparin 1000 units/ mL   Arterial port:  1.3 mL   Venous port: 1.3  mL     Dressing change: Yes / No        Report given to Primary JUAN ANTONIO Braga RN.

## 2022-12-28 NOTE — PROGRESS NOTES
Hemodialysis Nursing Progress Notes:     2nd HD treatment x 3 hours today. S/E by Dr. Florina Story.    Pt is alert, oriented x 4, not in any signs of distress at this time. Right IJ catheter with clean, dry and intact dressing. No signs of infection noted. Pt reported of severe back pain due to laying down, pain scale 10/10. Primary RN notified, repositioned pt to help ease the pain. Pt completed 3hr HD treatment without any issues.    UF Net: 3L    Right IJ catheter flushed, heparin locked (1.3mL venous, 1.3mL arterial), capped, clamped, labeled.     Report given to primary RN JUAN ANTONIO Braga. See electronic flowsheets for additional information.

## 2022-12-28 NOTE — DISCHARGE PLANNING
Case Management Discharge Planning    Admission Date: 12/23/2022  GMLOS: 3.9  ALOS: 5    6-Clicks ADL Score: 16  6-Clicks Mobility Score: 16  PT and/or OT Eval ordered: Yes  Post-acute Referrals Ordered: No  Post-acute Choice Obtained: Yes  Has referral(s) been sent to post-acute provider:  Yes      Anticipated Discharge Dispo: Discharge Disposition: D/T to SNF with Medicare cert in anticipation of skilled care (03)    DME Needed: No    Action(s) Taken: LSW met with pt at bedside for SNF choice. Pt requested referrals be sent to [facility] and [facility]. Choice form faxed to Sean TRINIDAD.    Escalations Completed: None    Medically Clear: No    Next Steps: Care coordination will f/u with SNF referrals    Barriers to Discharge: Medical clearance and Pending Placement    Is the patient up for discharge tomorrow: No

## 2022-12-28 NOTE — PROGRESS NOTES
Assumed care of patient, received bedside report from NOC RN. Patient is A&O X 4. Pain 0/10. Vital signs stable overnight, on 5L NC. On tele monitor, afib 101. POC discussed with patient and he verbalized understanding. Call light within reach and fall precautions in place. Bed locked and in lowest position.

## 2022-12-28 NOTE — THERAPY
Physical Therapy   Initial Evaluation     Patient Name: Yoandy Peace  Age:  67 y.o., Sex:  male  Medical Record #: [id number]  Today's Date: 12/28/2022     Precautions  Precautions: Fall Risk    Assessment  Patient is a 67 y.o. male with hx of COPD on 4L, CAD, CHF, and DM admitted with NSTEMI, FARIHA, acute hypoxemic respiratory failure, fluid overload, and sepsis. Cardiology consulted and recommending medical management currently. PT eval complete, pt presenting with impaired activity tolerance, strength, mobility, and balance. Pt is most limited by SOB during in room ambulation and has to take seated breaks after walking 10-12 ft. Pt currently presents below his functional baseline with details down below, pt generally at Min A-CGA with FWW. Recommend post acute placement at this time to maximize functional independence and decrease caregiver burden. Pt reports living with his girlfriend and brother who can assist intermittently. Will follow for skilled PT intervention.     Plan    Physical Therapy Initial Treatment Plan   Treatment Plan : Bed Mobility, Gait Training, Neuro Re-Education / Balance, Self Care / Home Evaluation, Stair Training, Therapeutic Activities, Therapeutic Exercise  Treatment Frequency: 4 Times per Week  Duration: Until Therapy Goals Met    DC Equipment Recommendations: Unable to determine at this time  Discharge Recommendations: Recommend post-acute placement for additional physical therapy services prior to discharge home          12/28/22 0959   Vitals   Pulse 90  (up to 120 bpm with activity)   Pulse Oximetry 96 %   O2 (LPM) 5   O2 Delivery Device Silicone Nasal Cannula   Pain 0 - 10 Group   Location Back   Location Orientation Lower   Pain Rating Scale (NPRS)   (not quantified)   Description Aching   Therapist Pain Assessment Prior to Activity   Prior Living Situation   Prior Services Home-Independent   Housing / Facility Mobile Home   Steps Into Home 3   Equipment Owned None   Lives with -  Patient's Self Care Capacity Significant Other;Sibling   Comments reports living with his girlfriend and brother. brother assists with IADL's   Prior Level of Functional Mobility   Bed Mobility Independent   Transfer Status Independent   Ambulation Independent   Distance Ambulation (Feet)   (community distances)   Assistive Devices Used None   Stairs Independent   Comments above PLOF for prior to pt's admit to [facility] last month. since then, pt has been limited to short household distances 2/2 SOB   Cognition    Cognition / Consciousness WDL   Level of Consciousness Alert   Comments pleasant and participatory, receptive to education   Active ROM Lower Body    Active ROM Lower Body  WDL   Strength Lower Body   Lower Body Strength  X   Comments mild generalized weakness B   Balance Assessment   Sitting Balance (Static) Fair +   Sitting Balance (Dynamic) Fair   Standing Balance (Static) Fair -   Standing Balance (Dynamic) Poor +   Weight Shift Sitting Good   Weight Shift Standing Fair   Comments FWW in standing   Bed Mobility    Supine to Sit   (NT, in chair prior)   Sit to Supine Standby Assist   Scooting Standby Assist   Comments HOB slightly elevated   Gait Analysis   Gait Level Of Assist Contact Guard Assist   Assistive Device Front Wheel Walker   Distance (Feet) 12   # of Times Distance was Traveled 2   Deviation Bradykinetic;Decreased Heel Strike;Decreased Toe Off  (fwd flexion)   # of Stairs Climbed 0   Level of Assist with Stairs Unable to Participate   Weight Bearing Status no restrictions   Comments pt has tendency to move quickly when nearing destination when he is SOB. needs to be cued to slow down to prevent LOB   Functional Mobility   Sit to Stand Contact Guard Assist   Bed, Chair, Wheelchair Transfer Contact Guard Assist   Toilet Transfers Minimal Assist   Mobility FWW   Comments cues for hand positioning and sequencing   How much difficulty does the patient currently have...   Turning over in bed  (including adjusting bedclothes, sheets and blankets)? 3   Sitting down on and standing up from a chair with arms (e.g., wheelchair, bedside commode, etc.) 3   Moving from lying on back to sitting on the side of the bed? 3   How much help from another person does the patient currently need...   Moving to and from a bed to a chair (including a wheelchair)? 3   Need to walk in a hospital room? 3   Climbing 3-5 steps with a railing? 1   6 clicks Mobility Score 16   Activity Tolerance   Sitting in Chair 5 min/pre session   Sitting Edge of Bed 1 min   Standing 2 min   Comments limited by fatigue and SOB   Short Term Goals    Short Term Goal # 1 pt will move supine<>eob with hob flat and spv in 6 tx to improve bed mobility.   Short Term Goal # 2 pt will complete all transfers with fww and spv in 6 tx to improve functional mobility.   Short Term Goal # 3 pt will ambulate 150 ft with fww and spv in 6 tx for household distances.   Short Term Goal # 4 pt will negotiate 3 steps with sba in 6 tx to facilitate home entry.   Education Group   Education Provided Role of Physical Therapist   Role of Physical Therapist Patient Response Patient;Acceptance;Explanation;Verbal Demonstration   Physical Therapy Initial Treatment Plan    Treatment Plan  Bed Mobility;Gait Training;Neuro Re-Education / Balance;Self Care / Home Evaluation;Stair Training;Therapeutic Activities;Therapeutic Exercise   Treatment Frequency 4 Times per Week   Duration Until Therapy Goals Met   Problem List    Problems Impaired Bed Mobility;Impaired Transfers;Impaired Ambulation;Functional Strength Deficit;Impaired Balance;Decreased Activity Tolerance   Anticipated Discharge Equipment and Recommendations   DC Equipment Recommendations Unable to determine at this time   Discharge Recommendations Recommend post-acute placement for additional physical therapy services prior to discharge home   Interdisciplinary Plan of Care Collaboration   IDT Collaboration with   Nursing;Occupational Therapist   Patient Position at End of Therapy In Bed;Bed Alarm On;Call Light within Reach;Tray Table within Reach;Phone within Reach   Collaboration Comments RN updated   Session Information   Date / Session Number  12/28- 1 (1/4, 1/3)

## 2022-12-28 NOTE — THERAPY
Physical Therapy Contact Note    Patient Name: Yoandy Peace  Age:  67 y.o., Sex:  male  Medical Record #: [id number]  Today's Date: 12/27/2022    Discussed missed therapy with RN       12/27/22 0901   Interdisciplinary Plan of Care Collaboration   IDT Collaboration with  Nursing   Collaboration Comments hold, new onset afib, elevated trops, awaiting cardiology consult

## 2022-12-28 NOTE — DISCHARGE PLANNING
Received Choice form at [phone]  Agency/Facility Name: [facility]  Referral sent per Choice form @ [phone]

## 2022-12-28 NOTE — CARE PLAN
"  Problem: Knowledge Deficit - Standard  Goal: Patient and family/care givers will demonstrate understanding of plan of care, disease process/condition, diagnostic tests and medications  Outcome: Progressing     Problem: Hemodynamics  Goal: Patient's hemodynamics, fluid balance and neurologic status will be stable or improve  Outcome: Progressing     Problem: Fluid Volume  Goal: Fluid volume balance will be maintained  Outcome: Progressing     Problem: Respiratory  Goal: Patient will achieve/maintain optimum respiratory ventilation and gas exchange  Outcome: Progressing     Problem: Skin Integrity  Goal: Skin integrity is maintained or improved  Outcome: Progressing     Problem: Fall Risk  Goal: Patient will remain free from falls  Outcome: Progressing   The patient is Stable - Low risk of patient condition declining or worsening    Shift Goals  Clinical Goals: heparin gtt, resp status  Patient Goals: \"Rest\"  Family Goals: no family present    Progress made toward(s) clinical / shift goals:  Progressing    Patient is not progressing towards the following goals:      "

## 2022-12-28 NOTE — PROGRESS NOTES
Monitor Summary:   Rhythm: afib  Rate:   Measurement: .NA/.11/.NA  Ectopy: pvc's, trigem, couplets, triplets, 1 event of 4 beats Vtach

## 2022-12-28 NOTE — PALLIATIVE CARE
Palliative Care follow-up  Met with pt and reviewed the Advance directive and assisted him with filling out the document. Pt appointed his S.O Raven Puente as his DPOA-HC and selected #2, #3, #5 and #6 on his document. AD will be scanned into pt's chart once notarized and completed. Orignial will be given back to the pt. To locate AD please hover over ACP docs     Updated: Team    Plan: Continue to follow for any needs    Thank you for allowing Palliative Care to support this patient and family. Contact [phone] for additional assistance, change in patient status, or with any questions/concerns.

## 2022-12-28 NOTE — PROGRESS NOTES
Cardiology Follow Up Progress Note    Date of Service  12/27/22    Attending Physician  Dr. Carlos Novoa    Chief Complaint   Chest pain with new onset atrial fibrillation with rising troponin    HPI:   67 y.o. male with history of COPD dependent on 4 L, tobacco abuse, CAD, CHF (EF 55-60% 12/24/22), ALONDRA/OHS on bedtime CPAP,HTN< DM, MI 1994, who presented 12/23/2022 with evaluations for progressive shortness of breath and lower extremities swelling for one week. Upon arrival to the ER, the patient was given Solu-Medrol, bronchodilators and placed on BiPAP for respiratory failure secondary to CHF/COPD and admitted to ICU.  Patient was weaned off BiPAP and downgraded to telemetry floor.12/25/22 was transferred out of the ICU. Nephrology consulted for chronic kidney disease. 12/26/22->Worsening renal function despite holding home Lasix and increased pulmonary edema with increased work of breathing. Nephrology started emergency hemodialysis. With access obtained by vascular surgery.Overnight, the patient developed chest pain while undergoing dialysis that resolved upon overnight APRN at bedside, as well as developed new onset atrial fibrillation once dialysis started.. EKG demonstrated afib with R BBB without ST elevation or depression. Troponin 600s from baseine 400s.    Interim Events  -Received HD 12/27/22 and will today  -fluid restriction 1L per Nephrology  -telemetry: afib  with PVCs, trem, couplets, triplets, 1 even of 4 beats of vtach.  -I/0: Net -3.15L  -cr improved 4.76-<3.  -The patient reports no chest pain, with some shortness of breath, denies palpitations, lightheadedness, dizziness, nausea, vomiting, abdominal pain.    Review of Systems  ROS    Vital signs in last 24 hours  Temp:  [36.4 °C (97.6 °F)-37.1 °C (98.7 °F)] 36.6 °C (97.8 °F)  Pulse:  [54-65] 54  Resp:  [16-19] 16  BP: (113-151)/(70-90) 127/76  SpO2:  [96 %-98 %] 96 %    Physical Exam  Reviewed Vital signs and nursing notes  Constitutional:  5L NC, sitting up in chair by bedside. In no acute distress.  HENT: Normocephalic and atraumatic. No scleral icterus.   Neck: No JVD present.   Cardiovascular: Normal rate. Exam reveals no gallop and no friction rub. No murmur heard.   Pulmonary/Chest: bibasilar crackles with poor respiratory effort  Abdominal: S/NT/ND BS+   Musculoskeletal:  Pulses present. No atrophy. Strength normal.  Extremities: 3+ pitting edema BLE   Skin: with chronic venous stasis changes  Neuro: Non-focal, CN 2-12 intact grossly         Lab Review  Lab Results   Component Value Date/Time    WBC 6.9 07/12/2021 12:28 AM    RBC 5.72 07/12/2021 12:28 AM    HEMOGLOBIN 13.3 (L) 07/12/2021 12:28 AM    HEMATOCRIT 41.2 (L) 07/12/2021 12:28 AM    MCV 72.0 (L) 07/12/2021 12:28 AM    MCH 23.3 (L) 07/12/2021 12:28 AM    MCHC 32.3 (L) 07/12/2021 12:28 AM    MPV 10.5 07/12/2021 12:28 AM      Lab Results   Component Value Date/Time    SODIUM 136 07/12/2021 12:28 AM    POTASSIUM 3.7 07/12/2021 12:28 AM    CHLORIDE 104 07/12/2021 12:28 AM    CO2 24 07/12/2021 12:28 AM    GLUCOSE 105 (H) 07/12/2021 12:28 AM    BUN 15 07/12/2021 12:28 AM    CREATININE 0.78 07/12/2021 12:28 AM      Lab Results   Component Value Date/Time    ASTSGOT 21 07/12/2021 12:28 AM    ALTSGPT 23 07/12/2021 12:28 AM     Lab Results   Component Value Date/Time    TROPONINT 32 (H) 07/11/2021 08:30 PM       No results for input(s): NTPROBNP in the last 72 hours.    Cardiac Imaging and Procedures Review      Imaging      Assessment/Plan  67M with COPD on 4L NCO2 chronically, ongoing tobacco abuse, HTN, DM presents with 1 week edema and dyspnea associated with new FARIHA and new initiation of HDD. Consulted for new AF and abnormal troponin during HDD. Echo unremarkable.    #Acute kidney failure with HD initiated  #Volume overload related to acute renal failure w  #New atrial fibrillation-CHADSVASC 5, HAS-BLED 3  #Oxygen-dependent COPD  #Nicotine Dependence  -Uptitrate Lopressor as tolerated to  keep HR <110-120  -continue diuresis and HD, but defer to Nephrology   -continue Amiodarone loading 400 mg p.o. twice daily x1 week followed by 400 mg daily for 1 week followed by 200 mg daily.  -If remains in atrial fibrillation, can consider cardioversion and transition to another antiarrhythmic drug given COPD.  -continue Eliquis  -continue Lasix 80mg IV BID  -continue nicotine replacement    Thank you for allowing me to participate in the care of this patient.  I will continue to follow this patient    Please contact me with any questions.    Lexx Cheung Jr., M.D.   [employer] PGY-3 IM

## 2022-12-28 NOTE — THERAPY
Occupational Therapy   Initial Evaluation     Patient Name: Yoandy Peace  Age:  67 y.o., Sex:  male  Medical Record #: [id number]  Today's Date: 12/28/2022     Precautions  Precautions: Fall Risk    Assessment  Patient is 67 y.o. male admitted for FARIHA, hyperkalemia, hyponatremia, elevated troponin, acute pulmonary edema, chronic CHF, and acute on chronic respiratory failure 2/2 acute COPD exacerbation. PMHx COPD on 4L, DMII, obesity, tobacco use, CKD, CHF, and CAD. Pt with desaturation and large increase in WOB after short functional mobility and BSC txf. Unable to get read on SpO2 sensor, but req extended seated RB and v/cs for pursed lip breathing. Reports lives with his brother, who assists with IADLs, and his girlfriend who works nights and sleeps during the day, but is able to assist when home. Currently limited by decreased functional mobility, activity tolerance, strength, balance, O2 needs/SOB, and pain which are currently affecting pt's ability to complete ADLs/IADLs at baseline. Will continue to follow.     Plan    Occupational Therapy Initial Treatment Plan   Treatment Interventions: Self Care / Activities of Daily Living, Adaptive Equipment, Neuro Re-Education / Balance, Therapeutic Exercises, Therapeutic Activity  Treatment Frequency: 4 Times per Week  Duration: Until Therapy Goals Met    DC Equipment Recommendations: Unable to determine at this time  Discharge Recommendations: Recommend post-acute placement for additional occupational therapy services prior to discharge home      Objective     12/28/22 0941   Prior Living Situation   Prior Services Home-Independent   Housing / Facility Mobile Home   Steps Into Home 3   Bathroom Set up Bathtub / Shower Combination  (sits on the edge)   Equipment Owned None   Lives with - Patient's Self Care Capacity Significant Other;Other (Comments)   Comments Reports lives with his brother, who assists with IADLs, and his girlfriend who works nights and sleeps during  the day, but is able to assist when home.   Prior Level of ADL Function   Self Feeding Independent   Grooming / Hygiene Independent   Bathing Independent   Dressing Independent   Toileting Independent   Prior Level of IADL Function   Medication Management Independent   Laundry Requires Assist   Kitchen Mobility Requires Assist   Finances Independent   Home Management Dependent   Shopping Dependent   Prior Level Of Mobility Independent Without Device in Home;Independent Without Device in Community   Driving / Transportation Driving Independent   Comments Reports was fully independent prior to admission to [facility] about 1 mo ago for pneumonia/COPD exacerbation. Reports has req assist since then   Precautions   Precautions Fall Risk   Vitals   O2 (LPM) 5   O2 Delivery Device Silicone Nasal Cannula   Vitals Comments Pt with desaturation and large increase in WOB after short functional mobility. Unable to get read on SpO2 sensor, but req extended seated RB and v/cs for pursed lip breathing   Pain 0 - 10 Group   Location Back   Therapist Pain Assessment Post Activity;During Activity;Nurse Notified  (not quantified)   Cognition    Cognition / Consciousness WDL   Level of Consciousness Alert   Comments pleasant and cooperative; receptive to education   Passive ROM Upper Body   Passive ROM Upper Body WDL   Active ROM Upper Body   Active ROM Upper Body  WDL   Strength Upper Body   Upper Body Strength  X   Gross Strength Generalized Weakness, Equal Bilaterally.    Comments appears functional for light ADLs   Coordination Upper Body   Coordination WDL   Balance Assessment   Sitting Balance (Static) Fair +   Sitting Balance (Dynamic) Fair   Standing Balance (Static) Fair -   Standing Balance (Dynamic) Poor +   Weight Shift Sitting Fair   Weight Shift Standing Fair   Comments w/FWW   Bed Mobility    Supine to Sit   (found up in chair)   Sit to Supine Standby Assist   Scooting Standby Assist   Comments HOB slightly elevated    ADL Assessment   Eating Supervision  (drinking water)   Grooming   (able to reach face and head)   Lower Body Dressing Maximal Assist  (socks)   Toileting   (declined need)   Functional Mobility   Sit to Stand Contact Guard Assist   Bed, Chair, Wheelchair Transfer Contact Guard Assist   Toilet Transfers Minimal Assist  (off elevated BSC; recommended monroe BSC to RN d/t pt weight)   Transfer Method Stand Step   Mobility w/FWW; Chair>BSC in BR>BTB   Edema / Skin Assessment   Edema / Skin  Not Assessed   Activity Tolerance   Comments limtied by fatigue and SOB   Patient / Family Goals   Patient / Family Goal #1 to go home   Short Term Goals   Short Term Goal # 1 LB dressing with SPV   Short Term Goal # 2 toilet txf with min A   Short Term Goal # 3 toileting with SPV   Short Term Goal # 4 standing g/h with SPV taking RBs PRN   Education Group   Education Provided Role of Occupational Therapist;Transfers;Activities of Daily Living;Pathology of bedrest   Role of Occupational Therapist Patient Response Patient;Acceptance;Explanation;Verbal Demonstration;Reinforcement Needed   Transfers Patient Response Patient;Acceptance;Explanation;Reinforcement Needed;Action Demonstration   ADL Patient Response Patient;Acceptance;Explanation;Verbal Demonstration;Reinforcement Needed   Pathology of Bedrest Patient Response Patient;Acceptance;Explanation;Verbal Demonstration;Reinforcement Needed   Problem List   Problem List Decreased Active Daily Living Skills;Decreased Homemaking Skills;Decreased Upper Extremity Strength Right;Decreased Upper Extremity Strength Left;Decreased Functional Mobility;Decreased Activity Tolerance;Safety Awareness Deficits / Cognition;Impaired Postural Control / Balance

## 2022-12-28 NOTE — PROGRESS NOTES
Hospital Medicine Daily Progress Note    Date of Service  12/28/2022    Chief Complaint  Yoandy Peace is a 67 y.o. male admitted 12/23/2022 with shortness of breath    Hospital Course    67 y.o. male past medical history of tobacco dependence, COPD on home oxygen, CAD, CHF, obstructive sleep apnea who presented 12/23/2022 with progressive shortness of breath over the last 1 week.  .  Upon arrival to the ER he was given Solu-Medrol, bronchodilators and placed on BiPAP for respiratory failure secondary to CHF/COPD and admitted to ICU.  Patient was weaned off BiPAP and downgraded to telemetry floor.  Nephrology was consulted for worsening function.  Patient was started on hemodialysis.  Started on IV Lasix for pulmonary edema.  Cardiology was consulted for elevated troponin and chest pain during dialysis.  Started on therapeutic Lovenox for A. fib with RVR    Interval Problem Update  12/27/2022    Currently on 5 L oxygen saturating over 90%  Labs noted with leukocytosis.  Low bicarbonate 18, high anion gap.   significantly elevated troponin.  Echo unremarkable    Currently on IV Lasix  On heparin  Nephrology following for hemodialysis  Cardiology on board    12/28/2022  Vitals remained stable  Continue to require 5 L oxygen  Noted significant lower extremity swelling  Leukocytosis in setting of steroid use  Evaluated by cardiology.  No plan for angiogram is unlikely ACS  Nephrology following for hemodialysis  Eventually need placement.   aware    I have discussed this patient's plan of care and discharge plan at IDT rounds today with Case Management, Nursing, Nursing leadership, and other members of the IDT team.    Consultants/Specialty  cardiology    Code Status  DNAR/DNI    Disposition  Patient is not medically cleared for discharge.   Anticipate discharge to to home with close outpatient follow-up.  I have placed the appropriate orders for post-discharge needs.    Review of Systems  Review of Systems    Constitutional:  Negative for fever.   HENT:  Negative for hearing loss.    Eyes:  Negative for blurred vision.   Respiratory:  Negative for cough and shortness of breath.    Cardiovascular:  Negative for chest pain.   Gastrointestinal:  Negative for nausea and vomiting.   Genitourinary:  Negative for dysuria.   Musculoskeletal:  Negative for myalgias.   Skin:  Negative for rash.   Neurological:  Negative for dizziness.   Endo/Heme/Allergies:  Does not bruise/bleed easily.      Physical Exam  Temp:  [35.6 °C (96 °F)-36.7 °C (98.1 °F)] 36.7 °C (98.1 °F)  Pulse:  [] 96  Resp:  [20-25] 20  BP: ()/(54-88) 125/88  SpO2:  [91 %-99 %] 94 %    Physical Exam  Constitutional:       General: He is not in acute distress.     Appearance: He is obese. He is ill-appearing.   HENT:      Head: Normocephalic and atraumatic.      Right Ear: Tympanic membrane normal.      Left Ear: Tympanic membrane normal.      Nose: Nose normal.      Mouth/Throat:      Mouth: Mucous membranes are moist.   Eyes:      Extraocular Movements: Extraocular movements intact.      Pupils: Pupils are equal, round, and reactive to light.   Cardiovascular:      Rate and Rhythm: Normal rate and regular rhythm.      Pulses: Normal pulses.   Pulmonary:      Effort: Pulmonary effort is normal. No respiratory distress.   Abdominal:      General: Abdomen is flat. There is no distension.   Musculoskeletal:      Cervical back: Normal range of motion.      Right lower leg: Edema present.      Left lower leg: Edema present.   Skin:     General: Skin is warm.   Neurological:      General: No focal deficit present.      Mental Status: He is alert and oriented to person, place, and time. Mental status is at baseline.   Psychiatric:         Mood and Affect: Mood normal.       Fluids    Intake/Output Summary (Last 24 hours) at 12/28/2022 1340  Last data filed at 12/28/2022 1300  Gross per 24 hour   Intake 1220 ml   Output 3500 ml   Net -2280 ml          Laboratory  Recent Labs     12/27/22  0152 12/27/22  0837 12/28/22  0627   WBC 15.4* 17.1* 19.7*   RBC 3.40* 3.28* 3.26*   HEMOGLOBIN 8.8* 8.6* 8.4*   HEMATOCRIT 28.6* 27.6* 27.0*   MCV 84.1 84.1 82.8   MCH 25.9* 26.2* 25.8*   MCHC 30.8* 31.2* 31.1*   RDW 46.5 46.7 46.5   PLATELETCT 436 410 398   MPV 10.0 10.0 9.9       Recent Labs     12/27/22  0152 12/27/22  0837 12/28/22 0627   SODIUM 135 133* 136   POTASSIUM 4.7 4.9 4.7   CHLORIDE 97 98 99   CO2 18* 18* 24   GLUCOSE 228* 247* 187*   BUN 80* 87* 76*   CREATININE 3.60* 3.72* 3.00*   CALCIUM 8.1* 8.1* 7.9*       Recent Labs     12/27/22 0152   APTT 27.6   INR 1.25*                 Imaging  DX-CHEST-PORTABLE (1 VIEW)   Final Result      1.  Ongoing pulmonary edema and bilateral pleural effusions.   2.  Cardiomegaly.   3.  Left lower lobe atelectasis or consolidation. There is also likely some component of compressive atelectasis of the right lung base considering the presence of significant pleural effusion.   4.  Interval placement of temporary non-tunneled hemodialysis catheter with no evidence of pneumothorax.      DX-CHEST-PORTABLE (1 VIEW)   Final Result         1. No significant interval change.      US-RENAL   Final Result      1.  Echogenic renal parenchyma bilaterally could relate to medical renal disease.      2.  No hydronephrosis. No renal calculus.      DX-CHEST-PORTABLE (1 VIEW)   Final Result      1.  Enlarged cardiac silhouette and small amounts of bilateral pleural fluid with diffuse interstitial opacities most consistent with pulmonary edema/CHF.   2.  Bibasilar opacities could be due to edema, atelectasis or pneumonitis.   3.  There is atherosclerosis.   4.  Questionable linear calcific plaque in the proximal descending thoracic aorta versus some other device intrinsic or extrinsic to the patient.      EC-ECHOCARDIOGRAM COMPLETE W/ CONT   Final Result      US-EXTREMITY VENOUS LOWER BILAT   Final Result      DX-CHEST-PORTABLE (1 VIEW)    Final Result      1.  Cardiomegaly with interstitial infiltrates likely representing pulmonary edema.      2.  Bibasilar atelectasis and small bilateral pleural effusions.             Assessment/Plan  * Acute renal failure (ARF) ([facility])  Assessment & Plan  Started on hemodialysis  Nephrology following    Elevated troponin  Assessment & Plan  Trop 473>>459>>404  BNP 6487  Likely secondary to CHF  ASA/statin  Echo unremarkable  Cardiology following    Bilateral leg edema- (present on admission)  Assessment & Plan  Likely due to volume overload.  Echo pending  Diuresis  Bilateral lower extremity DVT studies negative    Acute exacerbation of chronic obstructive pulmonary disease (COPD) ([facility])  Assessment & Plan  Resolving on oxygen at 6 L.  In no distress, no accessory muscle use, able to complete full sentences.  Patient downgraded from ICU.  No longer requiring rescue BiPAP.  Continue with Flovent and Anoro  Taper Solu-Medrol to 40 mg every 12 hours  Duo nebs as needed  Continue with oxygen supplementation to obtain SPO2 >88-90%    Acute pulmonary edema (HCC)- (present on admission)  Assessment & Plan  Resolving  Repeat chest x-ray in a.m.  Continue with diuresis  Patient is normotensive.  Hold ARB at this time.  Echo pending     Hypertension  Assessment & Plan  Amlodipine 10 mg daily   Lasix 60 mg BID     Tobacco abuse- (present on admission)  Assessment & Plan  Nicotine replacement as needed    ACP (advance care planning)  Assessment & Plan  Full code.  Discussed with patient in front of RN.    Class 3 severe obesity in adult ([facility])  Assessment & Plan  BMI 52.89     Cardiorenal syndrome, stage 1-4 or unspecified chronic kidney disease, with heart failure (HCC)- (present on admission)  Assessment & Plan  Suspected  Cr 3.31 now slightly worse 3.66   Reduce Diuresis to IV lasix 60 mg BID  Monitor renal function while on Diuretic    ALONDRA on CPAP- (present on admission)  Assessment & Plan  CPAP at bedtime      Acute on  chronic respiratory failure with hypoxia (HCC)  Assessment & Plan  Dependent on 4 L.  Currently requiring 6 L in no distress  Diuresis with IV Lasix  Nebs/pulm toilet  Wean off Solu-Medrol as tolerated    Acute on chronic congestive heart failure (HCC)  Assessment & Plan  Diuresis  Echo pending   Continue with metoprolol 25 mg twice daily  UOP -1,475 since admit     CAD (coronary artery disease)  Assessment & Plan  ASA/statin    DM (diabetes mellitus) ([facility])  Assessment & Plan  Continue with SSI   On Farxiga, insulin NPH at home, and linagliptin           VTE prophylaxis: SCDs/TEDs and therapeutic anticoagulation with Heparin    I have performed a physical exam and reviewed and updated ROS and Plan today (12/28/2022). In review of yesterday's note (12/27/2022), there are no changes except as documented above.

## 2022-12-29 PROBLEM — I50.9 ACUTE ON CHRONIC CONGESTIVE HEART FAILURE (HCC): Status: RESOLVED | Noted: 2022-12-23 | Resolved: 2022-12-29

## 2022-12-29 PROBLEM — I13.0 CARDIORENAL SYNDROME, STAGE 1-4 OR UNSPECIFIED CHRONIC KIDNEY DISEASE, WITH HEART FAILURE (HCC): Status: RESOLVED | Noted: 2022-12-23 | Resolved: 2022-12-29

## 2022-12-29 LAB
ANION GAP SERPL CALC-SCNC: 12 MMOL/L (ref 7–16)
BASOPHILS # BLD AUTO: 0.2 % (ref 0–1.8)
BASOPHILS # BLD: 0.03 K/UL (ref 0–0.12)
BUN SERPL-MCNC: 71 MG/DL (ref 8–22)
CALCIUM SERPL-MCNC: 7.8 MG/DL (ref 8.5–10.5)
CHLORIDE SERPL-SCNC: 99 MMOL/L (ref 96–112)
CO2 SERPL-SCNC: 25 MMOL/L (ref 20–33)
CREAT SERPL-MCNC: 2.91 MG/DL (ref 0.5–1.4)
EOSINOPHIL # BLD AUTO: 0.01 K/UL (ref 0–0.51)
EOSINOPHIL NFR BLD: 0.1 % (ref 0–6.9)
ERYTHROCYTE [DISTWIDTH] IN BLOOD BY AUTOMATED COUNT: 45.4 FL (ref 35.9–50)
GAMMA INTERFERON BACKGROUND BLD IA-ACNC: 0.04 IU/ML
GFR SERPLBLD CREATININE-BSD FMLA CKD-EPI: 23 ML/MIN/1.73 M 2
GLUCOSE BLD STRIP.AUTO-MCNC: 179 MG/DL (ref 65–99)
GLUCOSE BLD STRIP.AUTO-MCNC: 240 MG/DL (ref 65–99)
GLUCOSE BLD STRIP.AUTO-MCNC: 248 MG/DL (ref 65–99)
GLUCOSE SERPL-MCNC: 183 MG/DL (ref 65–99)
HCT VFR BLD AUTO: 25 % (ref 42–52)
HGB BLD-MCNC: 7.9 G/DL (ref 14–18)
IMM GRANULOCYTES # BLD AUTO: 0.45 K/UL (ref 0–0.11)
IMM GRANULOCYTES NFR BLD AUTO: 2.5 % (ref 0–0.9)
LYMPHOCYTES # BLD AUTO: 3.99 K/UL (ref 1–4.8)
LYMPHOCYTES NFR BLD: 21.9 % (ref 22–41)
M TB IFN-G BLD-IMP: ABNORMAL
M TB IFN-G CD4+ BCKGRND COR BLD-ACNC: 0 IU/ML
MAGNESIUM SERPL-MCNC: 2.2 MG/DL (ref 1.5–2.5)
MCH RBC QN AUTO: 26.1 PG (ref 27–33)
MCHC RBC AUTO-ENTMCNC: 31.6 G/DL (ref 33.7–35.3)
MCV RBC AUTO: 82.5 FL (ref 81.4–97.8)
MITOGEN IGNF BCKGRD COR BLD-ACNC: 0.07 IU/ML
MONOCYTES # BLD AUTO: 1.56 K/UL (ref 0–0.85)
MONOCYTES NFR BLD AUTO: 8.6 % (ref 0–13.4)
NEUTROPHILS # BLD AUTO: 12.17 K/UL (ref 1.82–7.42)
NEUTROPHILS NFR BLD: 66.7 % (ref 44–72)
NRBC # BLD AUTO: 0.11 K/UL
NRBC BLD-RTO: 0.6 /100 WBC
PHOSPHATE SERPL-MCNC: 5.1 MG/DL (ref 2.5–4.5)
PLATELET # BLD AUTO: 364 K/UL (ref 164–446)
PMV BLD AUTO: 10 FL (ref 9–12.9)
POTASSIUM SERPL-SCNC: 4.4 MMOL/L (ref 3.6–5.5)
QFT TB2 - NIL TBQ2: 0 IU/ML
RBC # BLD AUTO: 3.03 M/UL (ref 4.7–6.1)
SODIUM SERPL-SCNC: 136 MMOL/L (ref 135–145)
WBC # BLD AUTO: 18.2 K/UL (ref 4.8–10.8)

## 2022-12-29 PROCEDURE — 700111 HCHG RX REV CODE 636 W/ 250 OVERRIDE (IP): Performed by: STUDENT IN AN ORGANIZED HEALTH CARE EDUCATION/TRAINING PROGRAM

## 2022-12-29 PROCEDURE — 82962 GLUCOSE BLOOD TEST: CPT | Mod: 91

## 2022-12-29 PROCEDURE — A9270 NON-COVERED ITEM OR SERVICE: HCPCS | Performed by: INTERNAL MEDICINE

## 2022-12-29 PROCEDURE — 80048 BASIC METABOLIC PNL TOTAL CA: CPT

## 2022-12-29 PROCEDURE — 700102 HCHG RX REV CODE 250 W/ 637 OVERRIDE(OP): Performed by: STUDENT IN AN ORGANIZED HEALTH CARE EDUCATION/TRAINING PROGRAM

## 2022-12-29 PROCEDURE — 36415 COLL VENOUS BLD VENIPUNCTURE: CPT

## 2022-12-29 PROCEDURE — 770020 HCHG ROOM/CARE - TELE (206)

## 2022-12-29 PROCEDURE — 99233 SBSQ HOSP IP/OBS HIGH 50: CPT | Performed by: STUDENT IN AN ORGANIZED HEALTH CARE EDUCATION/TRAINING PROGRAM

## 2022-12-29 PROCEDURE — 94669 MECHANICAL CHEST WALL OSCILL: CPT

## 2022-12-29 PROCEDURE — 700111 HCHG RX REV CODE 636 W/ 250 OVERRIDE (IP): Performed by: HOSPITALIST

## 2022-12-29 PROCEDURE — 84100 ASSAY OF PHOSPHORUS: CPT

## 2022-12-29 PROCEDURE — 83735 ASSAY OF MAGNESIUM: CPT

## 2022-12-29 PROCEDURE — 99232 SBSQ HOSP IP/OBS MODERATE 35: CPT | Mod: GC | Performed by: INTERNAL MEDICINE

## 2022-12-29 PROCEDURE — 90935 HEMODIALYSIS ONE EVALUATION: CPT

## 2022-12-29 PROCEDURE — 700102 HCHG RX REV CODE 250 W/ 637 OVERRIDE(OP): Performed by: INTERNAL MEDICINE

## 2022-12-29 PROCEDURE — 85025 COMPLETE CBC W/AUTO DIFF WBC: CPT

## 2022-12-29 PROCEDURE — A9270 NON-COVERED ITEM OR SERVICE: HCPCS | Performed by: STUDENT IN AN ORGANIZED HEALTH CARE EDUCATION/TRAINING PROGRAM

## 2022-12-29 PROCEDURE — 700111 HCHG RX REV CODE 636 W/ 250 OVERRIDE (IP)

## 2022-12-29 RX ORDER — NYSTATIN 100000 [USP'U]/G
POWDER TOPICAL 2 TIMES DAILY
Status: COMPLETED | OUTPATIENT
Start: 2022-12-29 | End: 2023-01-02

## 2022-12-29 RX ORDER — HEPARIN SODIUM 1000 [USP'U]/ML
INJECTION, SOLUTION INTRAVENOUS; SUBCUTANEOUS
Status: COMPLETED
Start: 2022-12-29 | End: 2022-12-29

## 2022-12-29 RX ORDER — NYSTATIN 100000 U/G
CREAM TOPICAL 2 TIMES DAILY
Status: DISCONTINUED | OUTPATIENT
Start: 2022-12-29 | End: 2022-12-29

## 2022-12-29 RX ORDER — HEPARIN SODIUM 1000 [USP'U]/ML
1500 INJECTION, SOLUTION INTRAVENOUS; SUBCUTANEOUS ONCE
Status: COMPLETED | OUTPATIENT
Start: 2022-12-29 | End: 2022-12-29

## 2022-12-29 RX ADMIN — HEPARIN SODIUM 1500 UNITS: 1000 INJECTION, SOLUTION INTRAVENOUS; SUBCUTANEOUS at 10:46

## 2022-12-29 RX ADMIN — INSULIN HUMAN 3 UNITS: 100 INJECTION, SOLUTION PARENTERAL at 11:54

## 2022-12-29 RX ADMIN — AMITRIPTYLINE HYDROCHLORIDE 10 MG: 10 TABLET, FILM COATED ORAL at 16:23

## 2022-12-29 RX ADMIN — ATORVASTATIN CALCIUM 40 MG: 40 TABLET, FILM COATED ORAL at 20:30

## 2022-12-29 RX ADMIN — APIXABAN 5 MG: 5 TABLET, FILM COATED ORAL at 16:23

## 2022-12-29 RX ADMIN — ASPIRIN 81 MG: 81 TABLET, COATED ORAL at 04:43

## 2022-12-29 RX ADMIN — NYSTATIN: 100000 POWDER TOPICAL at 17:20

## 2022-12-29 RX ADMIN — INSULIN HUMAN 3 UNITS: 100 INJECTION, SOLUTION PARENTERAL at 23:43

## 2022-12-29 RX ADMIN — ACETAMINOPHEN 650 MG: 325 TABLET, FILM COATED ORAL at 16:23

## 2022-12-29 RX ADMIN — FLUTICASONE PROPIONATE 88 MCG: 44 AEROSOL, METERED RESPIRATORY (INHALATION) at 04:57

## 2022-12-29 RX ADMIN — AMITRIPTYLINE HYDROCHLORIDE 10 MG: 10 TABLET, FILM COATED ORAL at 04:43

## 2022-12-29 RX ADMIN — APIXABAN 5 MG: 5 TABLET, FILM COATED ORAL at 04:43

## 2022-12-29 RX ADMIN — HEPARIN SODIUM 2600 UNITS: 1000 INJECTION, SOLUTION INTRAVENOUS; SUBCUTANEOUS at 10:47

## 2022-12-29 RX ADMIN — FUROSEMIDE 80 MG: 10 INJECTION, SOLUTION INTRAMUSCULAR; INTRAVENOUS at 16:24

## 2022-12-29 RX ADMIN — METOPROLOL TARTRATE 50 MG: 50 TABLET, FILM COATED ORAL at 04:43

## 2022-12-29 RX ADMIN — ACETAMINOPHEN 650 MG: 325 TABLET, FILM COATED ORAL at 08:37

## 2022-12-29 RX ADMIN — METHYLPREDNISOLONE SODIUM SUCCINATE 40 MG: 40 INJECTION, POWDER, FOR SOLUTION INTRAMUSCULAR; INTRAVENOUS at 04:44

## 2022-12-29 RX ADMIN — METOPROLOL TARTRATE 50 MG: 50 TABLET, FILM COATED ORAL at 16:24

## 2022-12-29 RX ADMIN — FUROSEMIDE 80 MG: 10 INJECTION, SOLUTION INTRAMUSCULAR; INTRAVENOUS at 04:44

## 2022-12-29 RX ADMIN — INSULIN HUMAN 3 UNITS: 100 INJECTION, SOLUTION PARENTERAL at 16:31

## 2022-12-29 RX ADMIN — INSULIN HUMAN 2 UNITS: 100 INJECTION, SOLUTION PARENTERAL at 06:42

## 2022-12-29 RX ADMIN — NICOTINE TRANSDERMAL SYSTEM 21 MG: 21 PATCH, EXTENDED RELEASE TRANSDERMAL at 04:45

## 2022-12-29 RX ADMIN — DOCUSATE SODIUM 50 MG AND SENNOSIDES 8.6 MG 2 TABLET: 8.6; 5 TABLET, FILM COATED ORAL at 16:23

## 2022-12-29 ASSESSMENT — ENCOUNTER SYMPTOMS
VOMITING: 0
FEVER: 0
SHORTNESS OF BREATH: 0
MYALGIAS: 0
NAUSEA: 0
BRUISES/BLEEDS EASILY: 0
DIZZINESS: 0
COUGH: 0
BLURRED VISION: 0

## 2022-12-29 ASSESSMENT — PAIN DESCRIPTION - PAIN TYPE
TYPE: ACUTE PAIN

## 2022-12-29 ASSESSMENT — FIBROSIS 4 INDEX: FIB4 SCORE: 0.88

## 2022-12-29 NOTE — PROGRESS NOTES
Hospital Medicine Daily Progress Note    Date of Service  12/29/2022    Chief Complaint  Yoandy Peace is a 67 y.o. male admitted 12/23/2022 with shortness of breath    Hospital Course    67 y.o. male past medical history of tobacco dependence, COPD on home oxygen, CAD, CHF, obstructive sleep apnea who presented 12/23/2022 with progressive shortness of breath over the last 1 week.  .  Upon arrival to the ER he was given Solu-Medrol, bronchodilators and placed on BiPAP for respiratory failure secondary to CHF/COPD and admitted to ICU.  Patient was weaned off BiPAP and downgraded to telemetry floor.  Nephrology was consulted for worsening function.  Patient was started on hemodialysis.  Started on IV Lasix for pulmonary edema.  Cardiology was consulted for elevated troponin and chest pain during dialysis.  Started on therapeutic Lovenox for A. fib with RVR    Interval Problem Update  12/27/2022    Currently on 5 L oxygen saturating over 90%  Labs noted with leukocytosis.  Low bicarbonate 18, high anion gap.   significantly elevated troponin.  Echo unremarkable    Currently on IV Lasix  On heparin  Nephrology following for hemodialysis  Cardiology on board    12/28/2022  Vitals remained stable  Continue to require 5 L oxygen  Noted significant lower extremity swelling  Leukocytosis in setting of steroid use  Evaluated by cardiology.  No plan for angiogram is unlikely ACS  Nephrology following for hemodialysis  Eventually need placement.   aware    12/29/2022  Stable on 5 L oxygen  Remained asymptomatic  Had session of dialysis today  Volume overload in setting of acute renal failure with no underlying heart disease.  Likely need long-term dialysis  Cardiology on board  Nephrology following for dialysis    I have discussed this patient's plan of care and discharge plan at IDT rounds today with Case Management, Nursing, Nursing leadership, and other members of the IDT  team.    Consultants/Specialty  cardiology    Code Status  DNAR/DNI    Disposition  Patient is not medically cleared for discharge.   Anticipate discharge to to home with close outpatient follow-up.  I have placed the appropriate orders for post-discharge needs.    Review of Systems  Review of Systems   Constitutional:  Negative for fever.   HENT:  Negative for hearing loss.    Eyes:  Negative for blurred vision.   Respiratory:  Negative for cough and shortness of breath.    Cardiovascular:  Negative for chest pain.   Gastrointestinal:  Negative for nausea and vomiting.   Genitourinary:  Negative for dysuria.   Musculoskeletal:  Negative for myalgias.   Skin:  Negative for rash.   Neurological:  Negative for dizziness.   Endo/Heme/Allergies:  Does not bruise/bleed easily.      Physical Exam  Temp:  [35.6 °C (96.1 °F)-37.1 °C (98.8 °F)] 36.6 °C (97.9 °F)  Pulse:  [76-98] 98  Resp:  [17-20] 17  BP: (107-135)/(40-79) 124/79  SpO2:  [95 %-99 %] 97 %    Physical Exam  Constitutional:       General: He is not in acute distress.     Appearance: He is obese. He is ill-appearing.   HENT:      Head: Normocephalic and atraumatic.      Right Ear: Tympanic membrane normal.      Left Ear: Tympanic membrane normal.      Nose: Nose normal.      Mouth/Throat:      Mouth: Mucous membranes are moist.   Eyes:      Extraocular Movements: Extraocular movements intact.      Pupils: Pupils are equal, round, and reactive to light.   Cardiovascular:      Rate and Rhythm: Normal rate and regular rhythm.      Pulses: Normal pulses.   Pulmonary:      Effort: Pulmonary effort is normal. No respiratory distress.   Abdominal:      General: Abdomen is flat. There is no distension.   Musculoskeletal:      Cervical back: Normal range of motion.      Right lower leg: Edema present.      Left lower leg: Edema present.   Skin:     General: Skin is warm.   Neurological:      General: No focal deficit present.      Mental Status: He is alert and oriented  to person, place, and time. Mental status is at baseline.   Psychiatric:         Mood and Affect: Mood normal.       Fluids    Intake/Output Summary (Last 24 hours) at 12/29/2022 1511  Last data filed at 12/29/2022 1300  Gross per 24 hour   Intake 1360 ml   Output 6400 ml   Net -5040 ml         Laboratory  Recent Labs     12/27/22  0837 12/28/22  0627 12/29/22  0424   WBC 17.1* 19.7* 18.2*   RBC 3.28* 3.26* 3.03*   HEMOGLOBIN 8.6* 8.4* 7.9*   HEMATOCRIT 27.6* 27.0* 25.0*   MCV 84.1 82.8 82.5   MCH 26.2* 25.8* 26.1*   MCHC 31.2* 31.1* 31.6*   RDW 46.7 46.5 45.4   PLATELETCT 410 398 364   MPV 10.0 9.9 10.0       Recent Labs     12/27/22  0837 12/28/22  0627 12/29/22  0424   SODIUM 133* 136 136   POTASSIUM 4.9 4.7 4.4   CHLORIDE 98 99 99   CO2 18* 24 25   GLUCOSE 247* 187* 183*   BUN 87* 76* 71*   CREATININE 3.72* 3.00* 2.91*   CALCIUM 8.1* 7.9* 7.8*       Recent Labs     12/27/22  0152   APTT 27.6   INR 1.25*                 Imaging  DX-CHEST-PORTABLE (1 VIEW)   Final Result      1.  Ongoing pulmonary edema and bilateral pleural effusions.   2.  Cardiomegaly.   3.  Left lower lobe atelectasis or consolidation. There is also likely some component of compressive atelectasis of the right lung base considering the presence of significant pleural effusion.   4.  Interval placement of temporary non-tunneled hemodialysis catheter with no evidence of pneumothorax.      DX-CHEST-PORTABLE (1 VIEW)   Final Result         1. No significant interval change.      US-RENAL   Final Result      1.  Echogenic renal parenchyma bilaterally could relate to medical renal disease.      2.  No hydronephrosis. No renal calculus.      DX-CHEST-PORTABLE (1 VIEW)   Final Result      1.  Enlarged cardiac silhouette and small amounts of bilateral pleural fluid with diffuse interstitial opacities most consistent with pulmonary edema/CHF.   2.  Bibasilar opacities could be due to edema, atelectasis or pneumonitis.   3.  There is atherosclerosis.    4.  Questionable linear calcific plaque in the proximal descending thoracic aorta versus some other device intrinsic or extrinsic to the patient.      EC-ECHOCARDIOGRAM COMPLETE W/ CONT   Final Result      US-EXTREMITY VENOUS LOWER BILAT   Final Result      DX-CHEST-PORTABLE (1 VIEW)   Final Result      1.  Cardiomegaly with interstitial infiltrates likely representing pulmonary edema.      2.  Bibasilar atelectasis and small bilateral pleural effusions.             Assessment/Plan  * Acute renal failure (ARF) ([facility])  Assessment & Plan  Started on hemodialysis  Nephrology following    Elevated troponin  Assessment & Plan  Trop 473>>459>>404  BNP 6487  Likely secondary to CHF  ASA/statin  Echo unremarkable  Cardiology following    Bilateral leg edema- (present on admission)  Assessment & Plan  Likely due to volume overload.  Echo pending  Diuresis  Bilateral lower extremity DVT studies negative    Acute exacerbation of chronic obstructive pulmonary disease (COPD) ([facility])  Assessment & Plan  Resolving on oxygen at 6 L.  In no distress, no accessory muscle use, able to complete full sentences.  Patient downgraded from ICU.  No longer requiring rescue BiPAP.  Continue with Flovent and Anoro  Taper Solu-Medrol to 40 mg every 12 hours  Duo nebs as needed  Continue with oxygen supplementation to obtain SPO2 >88-90%    Acute pulmonary edema (HCC)- (present on admission)  Assessment & Plan  Resolving  Repeat chest x-ray in a.m.  Continue with diuresis  Patient is normotensive.  Hold ARB at this time.  Echo pending     Hypertension  Assessment & Plan  Amlodipine 10 mg daily   Lasix 60 mg BID     Tobacco abuse- (present on admission)  Assessment & Plan  Nicotine replacement as needed    ACP (advance care planning)  Assessment & Plan  Full code.  Discussed with patient in front of RN.    Class 3 severe obesity in adult ([facility])  Assessment & Plan  BMI 52.89     ALONDRA on CPAP- (present on admission)  Assessment & Plan  CPAP at  bedtime      Acute on chronic respiratory failure with hypoxia (HCC)  Assessment & Plan  Dependent on 4 L.  Currently requiring 6 L in no distress  Diuresis with IV Lasix  Nebs/pulm toilet  Wean off Solu-Medrol as tolerated    CAD (coronary artery disease)  Assessment & Plan  ASA/statin    DM (diabetes mellitus) (HCC)  Assessment & Plan  Continue with SSI   On Farxiga, insulin NPH at home, and linagliptin           VTE prophylaxis: SCDs/TEDs and therapeutic anticoagulation with Heparin    I have performed a physical exam and reviewed and updated ROS and Plan today (12/29/2022). In review of yesterday's note (12/28/2022), there are no changes except as documented above.

## 2022-12-29 NOTE — PROGRESS NOTES
Hemodialysis (PUF) ordered by Dr. Story. Treatment started at 0852 and ended at 1052. Pt stable, vss, no c/ o post tx. Net UF 2.5 L d/t BP in 90's at the started of tx. Dr. Story notified and aware.  Report to JUAN ANTONIO Braga RN.

## 2022-12-29 NOTE — CARE PLAN
The patient is Stable - Low risk of patient condition declining or worsening    Shift Goals  Clinical Goals: wean O2, monitor labs, dialysis  Patient Goals: Pain relief  Family Goals: no family present    Progress made toward(s) clinical / shift goals:      Problem: Knowledge Deficit - Standard  Goal: Patient and family/care givers will demonstrate understanding of plan of care, disease process/condition, diagnostic tests and medications  Outcome: Progressing     Problem: Fluid Volume  Goal: Fluid volume balance will be maintained  Outcome: Progressing  Note: Fluid restriction initiated. Pt receiving HD, plan for another round in AM.      Problem: Respiratory  Goal: Patient will achieve/maintain optimum respiratory ventilation and gas exchange  Outcome: Progressing       Patient is not progressing towards the following goals:

## 2022-12-29 NOTE — PROGRESS NOTES
"[facility] -  PROGRESS NOTE               Author: Florina Story M.D. Date & Time: 12/29/2022  1:36 PM     HPI:  67 y.o. male with history of COPD dependent on 4 L, tobacco abuse, CAD, CHF, ALONDRA/OHS on bedtime CPAP, who presented 12/23/2022 with evaluations for progressive shortness of breath and lower extremities swelling.  CXR notable for bilateral vascular congestion with pulmonary edema. Patient was admitted to the hospital on 12/23 for CHD exacerbation and copd. No F/C/N/V. No melena, hematochezia, hematemesis.  No HA, visual changes, or abdominal pain.     DAILY NEPHROLOGY SUMMARY:  12/26: consult duque, s/p hd #1  12/27: due for HD #2, still anasarca and on O2   12/28: on hd now, tolerating well.   12/29: tolerated puf today , edema is better , he feels better     REVIEW OF SYSTEMS:    10 point ROS reviewed and is as per HPI/daily summary or otherwise negative    PMH/PSH/SH/FH:   Reviewed and unchanged since admission note    CURRENT MEDICATIONS:   Reviewed from admission to present day    VS:  /79   Pulse 98   Temp 36.6 °C (97.9 °F) (Temporal)   Resp 17   Ht 1.778 m (5' 10\")   Wt (!) 148 kg (326 lb 1 oz)   SpO2 97%   BMI 46.78 kg/m²     Physical Exam  Constitutional:       Appearance: He is obese. He is ill-appearing.   HENT:      Head: Normocephalic and atraumatic.   Cardiovascular:      Rate and Rhythm: Normal rate. Rhythm irregular.      Comments: anasarca  Pulmonary:      Comments: Decreased BS BL  Abdominal:      General: There is distension.      Tenderness: There is no abdominal tenderness.      Hernia: A hernia is present.   Musculoskeletal:         General: Swelling present.   Skin:     Coloration: Skin is pale. Skin is not jaundiced.      Findings: No bruising.   Neurological:      General: No focal deficit present.      Mental Status: He is alert and oriented to person, place, and time.   Psychiatric:         Behavior: Behavior normal.   Fluids:  In: 1100 " [P.O.:600; Dialysis:500]  Out: 3300     LABS:  Recent Labs     12/27/22  0837 12/28/22  0627 12/29/22  0424   SODIUM 133* 136 136   POTASSIUM 4.9 4.7 4.4   CHLORIDE 98 99 99   CO2 18* 24 25   GLUCOSE 247* 187* 183*   BUN 87* 76* 71*   CREATININE 3.72* 3.00* 2.91*   CALCIUM 8.1* 7.9* 7.8*       IMAGING:   All imaging reviewed from admission to present day    IMPRESSION:  # FARIHA on CKD   - no hydro, UA benign  - no renal recovery   - on HD   # CKD 2/2 DM, HTN  - unclear baseline cr   # Hyperkalemia  # hyponatremia   # Acidosis   # acute respiratory failure 2/2 COPD/CHF      PLAN:  - anticipate need for long term HD  - quant gold   - TDC with IR . Please keep NPO past midnight   - SW for OP chair  - HD tomorrow   - renal diet,   - fluid restriction 1L   - strict IO  - Dose all meds per eGFR < 15

## 2022-12-29 NOTE — PROGRESS NOTES
Assumed care of patient, bedside report received from Ashley RODRIGUEZ. Updated on POC, call light within reach and fall precautions in place. Bed locked and in lowest position. Patient instructed to call for assistance before getting out of bed. All questions answered, no other needs at this time.

## 2022-12-29 NOTE — PROGRESS NOTES
Assumed care of patient, received bedside report from NOC RN. Patient is A&O X 4. Pain 0/10. Vital signs stable overnight, on 5L NC. On tele monitor, afib 98. POC discussed with patient and he verbalized understanding. Call light within reach and fall precautions in place. Bed locked and in lowest position.

## 2022-12-29 NOTE — PROGRESS NOTES
Monitor Summary:  Rhythm: Afib  Rate:   Ectopy: (F) PVC, (R) Coup  Measurement:  -/.10/-

## 2022-12-29 NOTE — PROGRESS NOTES
Cardiology Follow Up Progress Note    Date of Service  12/27/22     Attending Physician  Dr. Carlos Novoa     Chief Complaint   Chest pain with new onset atrial fibrillation with rising troponin     HPI:   67 y.o. male with history of COPD dependent on 4 L, tobacco abuse, CAD, CHF (EF 55-60% 12/24/22), ALONDRA/OHS on bedtime CPAP,HTN< DM, MI 1994, who presented 12/23/2022 with evaluations for progressive shortness of breath and lower extremities swelling for one week. Upon arrival to the ER, the patient was given Solu-Medrol, bronchodilators and placed on BiPAP for respiratory failure secondary to CHF/COPD and admitted to ICU.  Patient was weaned off BiPAP and downgraded to telemetry floor.12/25/22 was transferred out of the ICU. Nephrology consulted for chronic kidney disease. 12/26/22->Worsening renal function despite holding home Lasix and increased pulmonary edema with increased work of breathing. Nephrology started emergency hemodialysis. With access obtained by vascular surgery.Overnight, the patient developed chest pain while undergoing dialysis that resolved upon overnight APRN at bedside, as well as developed new onset atrial fibrillation once dialysis started.. EKG demonstrated afib with R BBB without ST elevation or depression. Troponin 600s from baseine 400s.    Interim Events  -telemetry:Afib  with rare PVC  -HgB trending down to 7.9 from 8.4  -Scr improved from 4.76->2.9  -received HD #3 yesterday and removed Net 2.5L  -still fluid restricted to 1L    Review of Systems  ROS    Vital signs in last 24 hours  Temp:  [36.4 °C (97.6 °F)-37.1 °C (98.7 °F)] 36.6 °C (97.8 °F)  Pulse:  [54-65] 54  Resp:  [16-19] 16  BP: (113-151)/(70-90) 127/76  SpO2:  [96 %-98 %] 96 %    Physical Exam  Did not exam the patient since taken to dialysis already.    Lab Review  Lab Results   Component Value Date/Time    WBC 6.9 07/12/2021 12:28 AM    RBC 5.72 07/12/2021 12:28 AM    HEMOGLOBIN 13.3 (L) 07/12/2021 12:28 AM     HEMATOCRIT 41.2 (L) 07/12/2021 12:28 AM    MCV 72.0 (L) 07/12/2021 12:28 AM    MCH 23.3 (L) 07/12/2021 12:28 AM    MCHC 32.3 (L) 07/12/2021 12:28 AM    MPV 10.5 07/12/2021 12:28 AM      Lab Results   Component Value Date/Time    SODIUM 136 07/12/2021 12:28 AM    POTASSIUM 3.7 07/12/2021 12:28 AM    CHLORIDE 104 07/12/2021 12:28 AM    CO2 24 07/12/2021 12:28 AM    GLUCOSE 105 (H) 07/12/2021 12:28 AM    BUN 15 07/12/2021 12:28 AM    CREATININE 0.78 07/12/2021 12:28 AM      Lab Results   Component Value Date/Time    ASTSGOT 21 07/12/2021 12:28 AM    ALTSGPT 23 07/12/2021 12:28 AM     Lab Results   Component Value Date/Time    TROPONINT 32 (H) 07/11/2021 08:30 PM       No results for input(s): NTPROBNP in the last 72 hours.    Cardiac Imaging and Procedures Review      Echocardiogram: 12/24/22  Normal left ventricular systolic function.  The left ventricular ejection fraction is visually estimated to be 55-  60%.  Normal diastolic function.  The right ventricle is not well visualized.  Unable to estimate right ventricular systolic pressure due to an   inadequate tricuspid regurgitant jet.  Normal left atrial size.  Mild mitral regurgitation.  The inferior vena cava is severely dilated without respiratory   collapse.           Assessment/Plan  67M with COPD on 4L NCO2 chronically, ongoing tobacco abuse, HTN, DM presents with 1 week edema and dyspnea associated with new FARIHA and new initiation of HDD. Consulted for new AF and abnormal troponin during HDD. Echo unremarkable.     #FARIHA on CKD with HD initiated  #Volume overload related to acute renal failure w  #New atrial fibrillation-CHADSVASC 5, HAS-BLED 3  #Oxygen-dependent COPD  #Nicotine Dependence  #HTN  #Cardiorenal syndrome  #CAD  #Insulin Dependent Diabetes Mellitus   -Uptitrate Lopressor as tolerated to keep HR <110-120  -continue diuresis and HD, but defer to Nephrology   -continue Amiodarone loading 400 mg p.o. twice daily x1 week followed by 400 mg daily for 1  week followed by 200 mg daily.  -If remains in atrial fibrillation, can consider cardioversion and transition to another antiarrhythmic drug given COPD.  -continue Eliquis  -continue Lasix 80mg IV BID  -continue nicotine replacement  -continue ASA,high intensity statin       Thank you for allowing me to participate in the care of this patient.I will continue to follow this patient    Please contact me with any questions.    Lexx Cheung Jr., M.D.   [employer] PGY-3 IM

## 2022-12-29 NOTE — CARE PLAN
The patient is Stable - Low risk of patient condition declining or worsening    Shift Goals  Clinical Goals: Monitor I/Os, labs, & dialysis  Patient Goals: Rest  Family Goals: no family present    Progress made toward(s) clinical / shift goals:        Problem: Knowledge Deficit - Standard  Goal: Patient and family/care givers will demonstrate understanding of plan of care, disease process/condition, diagnostic tests and medications  Outcome: Progressing  Note: Patient verbally demonstrates understanding of POC and disease process. All patient questions answered.     Problem: Fall Risk  Goal: Patient will remain free from falls  Outcome: Progressing  Note: All fall precautions in place and patient educated to use the call light before ambulation

## 2022-12-30 LAB
ANION GAP SERPL CALC-SCNC: 15 MMOL/L (ref 7–16)
BASOPHILS # BLD AUTO: 0.1 % (ref 0–1.8)
BASOPHILS # BLD: 0.02 K/UL (ref 0–0.12)
BUN SERPL-MCNC: 90 MG/DL (ref 8–22)
CALCIUM SERPL-MCNC: 7.8 MG/DL (ref 8.5–10.5)
CHLORIDE SERPL-SCNC: 98 MMOL/L (ref 96–112)
CO2 SERPL-SCNC: 23 MMOL/L (ref 20–33)
CREAT SERPL-MCNC: 3.55 MG/DL (ref 0.5–1.4)
EOSINOPHIL # BLD AUTO: 0.01 K/UL (ref 0–0.51)
EOSINOPHIL NFR BLD: 0.1 % (ref 0–6.9)
ERYTHROCYTE [DISTWIDTH] IN BLOOD BY AUTOMATED COUNT: 46.8 FL (ref 35.9–50)
GFR SERPLBLD CREATININE-BSD FMLA CKD-EPI: 18 ML/MIN/1.73 M 2
GLUCOSE BLD STRIP.AUTO-MCNC: 141 MG/DL (ref 65–99)
GLUCOSE BLD STRIP.AUTO-MCNC: 155 MG/DL (ref 65–99)
GLUCOSE BLD STRIP.AUTO-MCNC: 162 MG/DL (ref 65–99)
GLUCOSE BLD STRIP.AUTO-MCNC: 190 MG/DL (ref 65–99)
GLUCOSE BLD STRIP.AUTO-MCNC: 207 MG/DL (ref 65–99)
GLUCOSE SERPL-MCNC: 201 MG/DL (ref 65–99)
HCT VFR BLD AUTO: 26.7 % (ref 42–52)
HGB BLD-MCNC: 8.3 G/DL (ref 14–18)
IMM GRANULOCYTES # BLD AUTO: 0.43 K/UL (ref 0–0.11)
IMM GRANULOCYTES NFR BLD AUTO: 2.2 % (ref 0–0.9)
LYMPHOCYTES # BLD AUTO: 3.36 K/UL (ref 1–4.8)
LYMPHOCYTES NFR BLD: 17.2 % (ref 22–41)
MAGNESIUM SERPL-MCNC: 2.3 MG/DL (ref 1.5–2.5)
MCH RBC QN AUTO: 26.1 PG (ref 27–33)
MCHC RBC AUTO-ENTMCNC: 31.1 G/DL (ref 33.7–35.3)
MCV RBC AUTO: 84 FL (ref 81.4–97.8)
MONOCYTES # BLD AUTO: 1.53 K/UL (ref 0–0.85)
MONOCYTES NFR BLD AUTO: 7.8 % (ref 0–13.4)
NEUTROPHILS # BLD AUTO: 14.2 K/UL (ref 1.82–7.42)
NEUTROPHILS NFR BLD: 72.6 % (ref 44–72)
NRBC # BLD AUTO: 0.07 K/UL
NRBC BLD-RTO: 0.4 /100 WBC
PHOSPHATE SERPL-MCNC: 6.3 MG/DL (ref 2.5–4.5)
PLATELET # BLD AUTO: 375 K/UL (ref 164–446)
PMV BLD AUTO: 10.3 FL (ref 9–12.9)
POTASSIUM SERPL-SCNC: 4.5 MMOL/L (ref 3.6–5.5)
RBC # BLD AUTO: 3.18 M/UL (ref 4.7–6.1)
SODIUM SERPL-SCNC: 136 MMOL/L (ref 135–145)
WBC # BLD AUTO: 19.6 K/UL (ref 4.8–10.8)

## 2022-12-30 PROCEDURE — 83735 ASSAY OF MAGNESIUM: CPT

## 2022-12-30 PROCEDURE — 770020 HCHG ROOM/CARE - TELE (206)

## 2022-12-30 PROCEDURE — 82962 GLUCOSE BLOOD TEST: CPT | Mod: 91

## 2022-12-30 PROCEDURE — 97116 GAIT TRAINING THERAPY: CPT

## 2022-12-30 PROCEDURE — A9270 NON-COVERED ITEM OR SERVICE: HCPCS | Performed by: STUDENT IN AN ORGANIZED HEALTH CARE EDUCATION/TRAINING PROGRAM

## 2022-12-30 PROCEDURE — 700102 HCHG RX REV CODE 250 W/ 637 OVERRIDE(OP): Performed by: STUDENT IN AN ORGANIZED HEALTH CARE EDUCATION/TRAINING PROGRAM

## 2022-12-30 PROCEDURE — 90935 HEMODIALYSIS ONE EVALUATION: CPT

## 2022-12-30 PROCEDURE — 700102 HCHG RX REV CODE 250 W/ 637 OVERRIDE(OP): Performed by: INTERNAL MEDICINE

## 2022-12-30 PROCEDURE — 84100 ASSAY OF PHOSPHORUS: CPT

## 2022-12-30 PROCEDURE — 700111 HCHG RX REV CODE 636 W/ 250 OVERRIDE (IP)

## 2022-12-30 PROCEDURE — 85025 COMPLETE CBC W/AUTO DIFF WBC: CPT

## 2022-12-30 PROCEDURE — 80048 BASIC METABOLIC PNL TOTAL CA: CPT

## 2022-12-30 PROCEDURE — A9270 NON-COVERED ITEM OR SERVICE: HCPCS | Performed by: INTERNAL MEDICINE

## 2022-12-30 PROCEDURE — 700111 HCHG RX REV CODE 636 W/ 250 OVERRIDE (IP): Performed by: HOSPITALIST

## 2022-12-30 PROCEDURE — 99232 SBSQ HOSP IP/OBS MODERATE 35: CPT | Performed by: STUDENT IN AN ORGANIZED HEALTH CARE EDUCATION/TRAINING PROGRAM

## 2022-12-30 PROCEDURE — 36415 COLL VENOUS BLD VENIPUNCTURE: CPT

## 2022-12-30 RX ORDER — CYCLOBENZAPRINE HCL 10 MG
10 TABLET ORAL 3 TIMES DAILY PRN
Status: DISPENSED | OUTPATIENT
Start: 2022-12-30 | End: 2023-01-01

## 2022-12-30 RX ORDER — HEPARIN SODIUM 1000 [USP'U]/ML
INJECTION, SOLUTION INTRAVENOUS; SUBCUTANEOUS
Status: COMPLETED
Start: 2022-12-30 | End: 2022-12-30

## 2022-12-30 RX ADMIN — APIXABAN 5 MG: 5 TABLET, FILM COATED ORAL at 18:07

## 2022-12-30 RX ADMIN — NYSTATIN: 100000 POWDER TOPICAL at 18:07

## 2022-12-30 RX ADMIN — INSULIN HUMAN 2 UNITS: 100 INJECTION, SOLUTION PARENTERAL at 05:18

## 2022-12-30 RX ADMIN — CYCLOBENZAPRINE 10 MG: 10 TABLET, FILM COATED ORAL at 14:25

## 2022-12-30 RX ADMIN — FUROSEMIDE 80 MG: 10 INJECTION, SOLUTION INTRAMUSCULAR; INTRAVENOUS at 16:42

## 2022-12-30 RX ADMIN — ATORVASTATIN CALCIUM 40 MG: 40 TABLET, FILM COATED ORAL at 19:50

## 2022-12-30 RX ADMIN — ACETAMINOPHEN 650 MG: 325 TABLET, FILM COATED ORAL at 19:50

## 2022-12-30 RX ADMIN — HEPARIN SODIUM 2600 UNITS: 1000 INJECTION, SOLUTION INTRAVENOUS; SUBCUTANEOUS at 11:04

## 2022-12-30 RX ADMIN — INSULIN HUMAN 2 UNITS: 100 INJECTION, SOLUTION PARENTERAL at 23:38

## 2022-12-30 RX ADMIN — NICOTINE TRANSDERMAL SYSTEM 21 MG: 21 PATCH, EXTENDED RELEASE TRANSDERMAL at 05:07

## 2022-12-30 RX ADMIN — FLUTICASONE PROPIONATE 88 MCG: 44 AEROSOL, METERED RESPIRATORY (INHALATION) at 05:04

## 2022-12-30 RX ADMIN — AMITRIPTYLINE HYDROCHLORIDE 10 MG: 10 TABLET, FILM COATED ORAL at 05:06

## 2022-12-30 RX ADMIN — FUROSEMIDE 80 MG: 10 INJECTION, SOLUTION INTRAMUSCULAR; INTRAVENOUS at 05:06

## 2022-12-30 RX ADMIN — AMITRIPTYLINE HYDROCHLORIDE 10 MG: 10 TABLET, FILM COATED ORAL at 18:07

## 2022-12-30 RX ADMIN — APIXABAN 5 MG: 5 TABLET, FILM COATED ORAL at 05:06

## 2022-12-30 RX ADMIN — ASPIRIN 81 MG: 81 TABLET, COATED ORAL at 05:05

## 2022-12-30 RX ADMIN — METOPROLOL TARTRATE 50 MG: 50 TABLET, FILM COATED ORAL at 05:06

## 2022-12-30 RX ADMIN — NYSTATIN: 100000 POWDER TOPICAL at 05:07

## 2022-12-30 RX ADMIN — INSULIN HUMAN 2 UNITS: 100 INJECTION, SOLUTION PARENTERAL at 12:35

## 2022-12-30 RX ADMIN — ACETAMINOPHEN 650 MG: 325 TABLET, FILM COATED ORAL at 09:15

## 2022-12-30 RX ADMIN — METOPROLOL TARTRATE 50 MG: 50 TABLET, FILM COATED ORAL at 18:07

## 2022-12-30 ASSESSMENT — ENCOUNTER SYMPTOMS
FEVER: 0
NAUSEA: 0
BLURRED VISION: 0
VOMITING: 0
BRUISES/BLEEDS EASILY: 0
DIZZINESS: 0
MYALGIAS: 0
SHORTNESS OF BREATH: 0
COUGH: 0

## 2022-12-30 ASSESSMENT — GAIT ASSESSMENTS
DEVIATION: BRADYKINETIC
GAIT LEVEL OF ASSIST: STANDBY ASSIST
ASSISTIVE DEVICE: FRONT WHEEL WALKER
DISTANCE (FEET): 30

## 2022-12-30 ASSESSMENT — COGNITIVE AND FUNCTIONAL STATUS - GENERAL
CLIMB 3 TO 5 STEPS WITH RAILING: A LOT
MOBILITY SCORE: 17
MOVING FROM LYING ON BACK TO SITTING ON SIDE OF FLAT BED: A LITTLE
WALKING IN HOSPITAL ROOM: A LITTLE
TURNING FROM BACK TO SIDE WHILE IN FLAT BAD: A LITTLE
STANDING UP FROM CHAIR USING ARMS: A LITTLE
MOVING TO AND FROM BED TO CHAIR: A LITTLE
SUGGESTED CMS G CODE MODIFIER MOBILITY: CK

## 2022-12-30 ASSESSMENT — PAIN DESCRIPTION - PAIN TYPE: TYPE: ACUTE PAIN

## 2022-12-30 ASSESSMENT — FIBROSIS 4 INDEX: FIB4 SCORE: 0.85

## 2022-12-30 NOTE — PROGRESS NOTES
Hospital Medicine Daily Progress Note    Date of Service  12/30/2022    Chief Complaint  Yoandy Peace is a 67 y.o. male admitted 12/23/2022 with shortness of breath    Hospital Course    67 y.o. male past medical history of tobacco dependence, COPD on home oxygen, CAD, CHF, obstructive sleep apnea who presented 12/23/2022 with progressive shortness of breath over the last 1 week.  .  Upon arrival to the ER he was given Solu-Medrol, bronchodilators and placed on BiPAP for respiratory failure secondary to CHF/COPD and admitted to ICU.  Patient was weaned off BiPAP and downgraded to telemetry floor.  Nephrology was consulted for worsening function.  Patient was started on hemodialysis.  Started on IV Lasix for pulmonary edema.  Cardiology was consulted for elevated troponin and chest pain during dialysis.  Started on therapeutic Lovenox for A. fib with RVR    Interval Problem Update  12/27/2022    Currently on 5 L oxygen saturating over 90%  Labs noted with leukocytosis.  Low bicarbonate 18, high anion gap.   significantly elevated troponin.  Echo unremarkable    Currently on IV Lasix  On heparin  Nephrology following for hemodialysis  Cardiology on board    12/28/2022  Vitals remained stable  Continue to require 5 L oxygen  Noted significant lower extremity swelling  Leukocytosis in setting of steroid use  Evaluated by cardiology.  No plan for angiogram is unlikely ACS  Nephrology following for hemodialysis  Eventually need placement.   aware    12/29/2022  Stable on 5 L oxygen  Remained asymptomatic  Had session of dialysis today  Volume overload in setting of acute renal failure with no underlying heart disease.  Likely need long-term dialysis  Cardiology on board  Nephrology following for dialysis    12/30/2022  Now on baseline for oxygen saturating over 90  Patient will need long-term dialysis  Nephrology following for dialysis  Continue on IV Lasix  Added Flexeril for back pain    I have discussed  this patient's plan of care and discharge plan at IDT rounds today with Case Management, Nursing, Nursing leadership, and other members of the IDT team.    Consultants/Specialty  cardiology    Code Status  DNAR/DNI    Disposition  Patient is not medically cleared for discharge.   Anticipate discharge to to home with close outpatient follow-up.  I have placed the appropriate orders for post-discharge needs.    Review of Systems  Review of Systems   Constitutional:  Negative for fever.   HENT:  Negative for hearing loss.    Eyes:  Negative for blurred vision.   Respiratory:  Negative for cough and shortness of breath.    Cardiovascular:  Negative for chest pain.   Gastrointestinal:  Negative for nausea and vomiting.   Genitourinary:  Negative for dysuria.   Musculoskeletal:  Negative for myalgias.   Skin:  Negative for rash.   Neurological:  Negative for dizziness.   Endo/Heme/Allergies:  Does not bruise/bleed easily.      Physical Exam  Temp:  [36.4 °C (97.5 °F)-37 °C (98.6 °F)] 36.6 °C (97.9 °F)  Pulse:  [62-88] 84  Resp:  [18] 18  BP: (110-125)/(67-79) 110/67  SpO2:  [93 %-100 %] 97 %    Physical Exam  Constitutional:       General: He is not in acute distress.     Appearance: He is obese. He is ill-appearing.   HENT:      Head: Normocephalic and atraumatic.      Right Ear: Tympanic membrane normal.      Left Ear: Tympanic membrane normal.      Nose: Nose normal.      Mouth/Throat:      Mouth: Mucous membranes are moist.   Eyes:      Extraocular Movements: Extraocular movements intact.      Pupils: Pupils are equal, round, and reactive to light.   Cardiovascular:      Rate and Rhythm: Normal rate and regular rhythm.      Pulses: Normal pulses.   Pulmonary:      Effort: Pulmonary effort is normal. No respiratory distress.   Abdominal:      General: Abdomen is flat. There is no distension.   Musculoskeletal:      Cervical back: Normal range of motion.      Right lower leg: Edema present.      Left lower leg: Edema  present.   Skin:     General: Skin is warm.   Neurological:      General: No focal deficit present.      Mental Status: He is alert and oriented to person, place, and time. Mental status is at baseline.   Psychiatric:         Mood and Affect: Mood normal.       Fluids    Intake/Output Summary (Last 24 hours) at 12/30/2022 1423  Last data filed at 12/30/2022 1147  Gross per 24 hour   Intake 240 ml   Output 1850 ml   Net -1610 ml         Laboratory  Recent Labs     12/28/22 0627 12/29/22  0424 12/30/22  0123   WBC 19.7* 18.2* 19.6*   RBC 3.26* 3.03* 3.18*   HEMOGLOBIN 8.4* 7.9* 8.3*   HEMATOCRIT 27.0* 25.0* 26.7*   MCV 82.8 82.5 84.0   MCH 25.8* 26.1* 26.1*   MCHC 31.1* 31.6* 31.1*   RDW 46.5 45.4 46.8   PLATELETCT 398 364 375   MPV 9.9 10.0 10.3       Recent Labs     12/28/22 0627 12/29/22  0424 12/30/22  0123   SODIUM 136 136 136   POTASSIUM 4.7 4.4 4.5   CHLORIDE 99 99 98   CO2 24 25 23   GLUCOSE 187* 183* 201*   BUN 76* 71* 90*   CREATININE 3.00* 2.91* 3.55*   CALCIUM 7.9* 7.8* 7.8*                       Imaging  DX-CHEST-PORTABLE (1 VIEW)   Final Result      1.  Ongoing pulmonary edema and bilateral pleural effusions.   2.  Cardiomegaly.   3.  Left lower lobe atelectasis or consolidation. There is also likely some component of compressive atelectasis of the right lung base considering the presence of significant pleural effusion.   4.  Interval placement of temporary non-tunneled hemodialysis catheter with no evidence of pneumothorax.      DX-CHEST-PORTABLE (1 VIEW)   Final Result         1. No significant interval change.      US-RENAL   Final Result      1.  Echogenic renal parenchyma bilaterally could relate to medical renal disease.      2.  No hydronephrosis. No renal calculus.      DX-CHEST-PORTABLE (1 VIEW)   Final Result      1.  Enlarged cardiac silhouette and small amounts of bilateral pleural fluid with diffuse interstitial opacities most consistent with pulmonary edema/CHF.   2.  Bibasilar opacities  could be due to edema, atelectasis or pneumonitis.   3.  There is atherosclerosis.   4.  Questionable linear calcific plaque in the proximal descending thoracic aorta versus some other device intrinsic or extrinsic to the patient.      EC-ECHOCARDIOGRAM COMPLETE W/ CONT   Final Result      US-EXTREMITY VENOUS LOWER BILAT   Final Result      DX-CHEST-PORTABLE (1 VIEW)   Final Result      1.  Cardiomegaly with interstitial infiltrates likely representing pulmonary edema.      2.  Bibasilar atelectasis and small bilateral pleural effusions.      IR-WIN,GROSHONG PLACEMENT >5    (Results Pending)          Assessment/Plan  * Acute renal failure (ARF) ([facility])  Assessment & Plan  Started on hemodialysis  Nephrology following    Elevated troponin  Assessment & Plan  Trop 473>>459>>404  BNP 6487  Likely secondary to CHF  ASA/statin  Echo unremarkable  Cardiology following    Bilateral leg edema- (present on admission)  Assessment & Plan  Likely due to volume overload.  Echo pending  Diuresis  Bilateral lower extremity DVT studies negative    Acute exacerbation of chronic obstructive pulmonary disease (COPD) ([facility])  Assessment & Plan  Resolving on oxygen at 6 L.  In no distress, no accessory muscle use, able to complete full sentences.  Patient downgraded from ICU.  No longer requiring rescue BiPAP.  Continue with Flovent and Anoro  Taper Solu-Medrol to 40 mg every 12 hours  Duo nebs as needed  Continue with oxygen supplementation to obtain SPO2 >88-90%    Acute pulmonary edema (HCC)- (present on admission)  Assessment & Plan  Resolving  Repeat chest x-ray in a.m.  Continue with diuresis  Patient is normotensive.  Hold ARB at this time.  Echo pending     Hypertension  Assessment & Plan  Amlodipine 10 mg daily   Lasix 60 mg BID     Tobacco abuse- (present on admission)  Assessment & Plan  Nicotine replacement as needed    ACP (advance care planning)  Assessment & Plan  Full code.  Discussed with patient in front of  RN.    Class 3 severe obesity in adult ([facility])  Assessment & Plan  BMI 52.89     ALONDRA on CPAP- (present on admission)  Assessment & Plan  CPAP at bedtime      Acute on chronic respiratory failure with hypoxia ([facility])  Assessment & Plan  Dependent on 4 L.  Currently requiring 6 L in no distress  Diuresis with IV Lasix  Nebs/pulm toilet  Wean off Solu-Medrol as tolerated    CAD (coronary artery disease)  Assessment & Plan  ASA/statin    DM (diabetes mellitus) ([facility])  Assessment & Plan  Continue with SSI   On Farxiga, insulin NPH at home, and linagliptin           VTE prophylaxis: SCDs/TEDs and therapeutic anticoagulation with Heparin    I have performed a physical exam and reviewed and updated ROS and Plan today (12/30/2022). In review of yesterday's note (12/29/2022), there are no changes except as documented above.

## 2022-12-30 NOTE — PROGRESS NOTES
Monitor Summary:  Rhythm: Afib  Rate:   Ectopy: (R) PVC, (R) Coup  Measurement:  -/.10/-

## 2022-12-30 NOTE — CARE PLAN
The patient is Stable - Low risk of patient condition declining or worsening    Shift Goals  Clinical Goals: Monitor I/Os, dialysis  Patient Goals: Sleep  Family Goals: no family present    Progress made toward(s) clinical / shift goals:      Problem: Knowledge Deficit - Standard  Goal: Patient and family/care givers will demonstrate understanding of plan of care, disease process/condition, diagnostic tests and medications  Outcome: Progressing  Note: Discussed upcoming HD cath insertion procedure with pt and need to be Npo at midnight. Reinforced importance and reasoning for fluid restriction.     Problem: Urinary - Renal Perfusion  Goal: Ability to achieve and maintain adequate renal perfusion and functioning will improve  Outcome: Progressing  Note: Pt producing urine, I/Os monitored.      Problem: Respiratory  Goal: Patient will achieve/maintain optimum respiratory ventilation and gas exchange  Outcome: Progressing  Note: Pt back to baseline 4L O2.        Patient is not progressing towards the following goals:

## 2022-12-31 ENCOUNTER — APPOINTMENT (OUTPATIENT)
Dept: RADIOLOGY | Facility: MEDICAL CENTER | Age: 67
DRG: 291 | End: 2022-12-31
Attending: STUDENT IN AN ORGANIZED HEALTH CARE EDUCATION/TRAINING PROGRAM
Payer: COMMERCIAL

## 2022-12-31 LAB
ANION GAP SERPL CALC-SCNC: 12 MMOL/L (ref 7–16)
BASOPHILS # BLD AUTO: 0.2 % (ref 0–1.8)
BASOPHILS # BLD: 0.03 K/UL (ref 0–0.12)
BUN SERPL-MCNC: 70 MG/DL (ref 8–22)
CALCIUM SERPL-MCNC: 8 MG/DL (ref 8.5–10.5)
CHLORIDE SERPL-SCNC: 97 MMOL/L (ref 96–112)
CO2 SERPL-SCNC: 28 MMOL/L (ref 20–33)
CREAT SERPL-MCNC: 3.32 MG/DL (ref 0.5–1.4)
EOSINOPHIL # BLD AUTO: 0.08 K/UL (ref 0–0.51)
EOSINOPHIL NFR BLD: 0.4 % (ref 0–6.9)
ERYTHROCYTE [DISTWIDTH] IN BLOOD BY AUTOMATED COUNT: 47.4 FL (ref 35.9–50)
GFR SERPLBLD CREATININE-BSD FMLA CKD-EPI: 19 ML/MIN/1.73 M 2
GLUCOSE BLD STRIP.AUTO-MCNC: 128 MG/DL (ref 65–99)
GLUCOSE BLD STRIP.AUTO-MCNC: 150 MG/DL (ref 65–99)
GLUCOSE BLD STRIP.AUTO-MCNC: 159 MG/DL (ref 65–99)
GLUCOSE SERPL-MCNC: 162 MG/DL (ref 65–99)
HCT VFR BLD AUTO: 28.3 % (ref 42–52)
HGB BLD-MCNC: 8.8 G/DL (ref 14–18)
IMM GRANULOCYTES # BLD AUTO: 0.36 K/UL (ref 0–0.11)
IMM GRANULOCYTES NFR BLD AUTO: 2 % (ref 0–0.9)
LYMPHOCYTES # BLD AUTO: 4.7 K/UL (ref 1–4.8)
LYMPHOCYTES NFR BLD: 25.9 % (ref 22–41)
MAGNESIUM SERPL-MCNC: 2.3 MG/DL (ref 1.5–2.5)
MCH RBC QN AUTO: 26.3 PG (ref 27–33)
MCHC RBC AUTO-ENTMCNC: 31.1 G/DL (ref 33.7–35.3)
MCV RBC AUTO: 84.7 FL (ref 81.4–97.8)
MONOCYTES # BLD AUTO: 1.3 K/UL (ref 0–0.85)
MONOCYTES NFR BLD AUTO: 7.2 % (ref 0–13.4)
NEUTROPHILS # BLD AUTO: 11.65 K/UL (ref 1.82–7.42)
NEUTROPHILS NFR BLD: 64.3 % (ref 44–72)
NRBC # BLD AUTO: 0.05 K/UL
NRBC BLD-RTO: 0.3 /100 WBC
PHOSPHATE SERPL-MCNC: 6 MG/DL (ref 2.5–4.5)
PLATELET # BLD AUTO: 358 K/UL (ref 164–446)
PMV BLD AUTO: 10.3 FL (ref 9–12.9)
POTASSIUM SERPL-SCNC: 4 MMOL/L (ref 3.6–5.5)
PTH-INTACT SERPL-MCNC: 186 PG/ML (ref 14–72)
RBC # BLD AUTO: 3.34 M/UL (ref 4.7–6.1)
SODIUM SERPL-SCNC: 137 MMOL/L (ref 135–145)
WBC # BLD AUTO: 18.1 K/UL (ref 4.8–10.8)

## 2022-12-31 PROCEDURE — 99232 SBSQ HOSP IP/OBS MODERATE 35: CPT | Performed by: STUDENT IN AN ORGANIZED HEALTH CARE EDUCATION/TRAINING PROGRAM

## 2022-12-31 PROCEDURE — 700102 HCHG RX REV CODE 250 W/ 637 OVERRIDE(OP): Performed by: INTERNAL MEDICINE

## 2022-12-31 PROCEDURE — 05HY33Z INSERTION OF INFUSION DEVICE INTO UPPER VEIN, PERCUTANEOUS APPROACH: ICD-10-PCS | Performed by: RADIOLOGY

## 2022-12-31 PROCEDURE — 80048 BASIC METABOLIC PNL TOTAL CA: CPT

## 2022-12-31 PROCEDURE — 82962 GLUCOSE BLOOD TEST: CPT | Mod: 91

## 2022-12-31 PROCEDURE — 700102 HCHG RX REV CODE 250 W/ 637 OVERRIDE(OP): Performed by: STUDENT IN AN ORGANIZED HEALTH CARE EDUCATION/TRAINING PROGRAM

## 2022-12-31 PROCEDURE — 700111 HCHG RX REV CODE 636 W/ 250 OVERRIDE (IP): Performed by: HOSPITALIST

## 2022-12-31 PROCEDURE — A9270 NON-COVERED ITEM OR SERVICE: HCPCS | Performed by: STUDENT IN AN ORGANIZED HEALTH CARE EDUCATION/TRAINING PROGRAM

## 2022-12-31 PROCEDURE — 02PYX3Z REMOVAL OF INFUSION DEVICE FROM GREAT VESSEL, EXTERNAL APPROACH: ICD-10-PCS | Performed by: RADIOLOGY

## 2022-12-31 PROCEDURE — A9270 NON-COVERED ITEM OR SERVICE: HCPCS | Performed by: INTERNAL MEDICINE

## 2022-12-31 PROCEDURE — 99153 MOD SED SAME PHYS/QHP EA: CPT

## 2022-12-31 PROCEDURE — 700111 HCHG RX REV CODE 636 W/ 250 OVERRIDE (IP)

## 2022-12-31 PROCEDURE — 770020 HCHG ROOM/CARE - TELE (206)

## 2022-12-31 PROCEDURE — 36415 COLL VENOUS BLD VENIPUNCTURE: CPT

## 2022-12-31 PROCEDURE — 700111 HCHG RX REV CODE 636 W/ 250 OVERRIDE (IP): Performed by: RADIOLOGY

## 2022-12-31 PROCEDURE — 84100 ASSAY OF PHOSPHORUS: CPT

## 2022-12-31 PROCEDURE — 85025 COMPLETE CBC W/AUTO DIFF WBC: CPT

## 2022-12-31 PROCEDURE — 0JH63XZ INSERTION OF TUNNELED VASCULAR ACCESS DEVICE INTO CHEST SUBCUTANEOUS TISSUE AND FASCIA, PERCUTANEOUS APPROACH: ICD-10-PCS | Performed by: RADIOLOGY

## 2022-12-31 PROCEDURE — 83735 ASSAY OF MAGNESIUM: CPT

## 2022-12-31 PROCEDURE — 83970 ASSAY OF PARATHORMONE: CPT

## 2022-12-31 RX ORDER — LIDOCAINE HYDROCHLORIDE AND EPINEPHRINE 10; 10 MG/ML; UG/ML
INJECTION, SOLUTION INFILTRATION; PERINEURAL
Status: DISPENSED
Start: 2022-12-31 | End: 2023-01-01

## 2022-12-31 RX ORDER — HEPARIN SODIUM 1000 [USP'U]/ML
INJECTION, SOLUTION INTRAVENOUS; SUBCUTANEOUS
Status: COMPLETED
Start: 2022-12-31 | End: 2022-12-31

## 2022-12-31 RX ORDER — MIDAZOLAM HYDROCHLORIDE 1 MG/ML
INJECTION INTRAMUSCULAR; INTRAVENOUS
Status: COMPLETED
Start: 2022-12-31 | End: 2022-12-31

## 2022-12-31 RX ORDER — MIDAZOLAM HYDROCHLORIDE 1 MG/ML
.5-2 INJECTION INTRAMUSCULAR; INTRAVENOUS PRN
Status: ACTIVE | OUTPATIENT
Start: 2022-12-31 | End: 2022-12-31

## 2022-12-31 RX ORDER — ONDANSETRON 2 MG/ML
4 INJECTION INTRAMUSCULAR; INTRAVENOUS PRN
Status: ACTIVE | OUTPATIENT
Start: 2022-12-31 | End: 2022-12-31

## 2022-12-31 RX ORDER — SODIUM CHLORIDE 9 MG/ML
500 INJECTION, SOLUTION INTRAVENOUS
Status: ACTIVE | OUTPATIENT
Start: 2022-12-31 | End: 2022-12-31

## 2022-12-31 RX ADMIN — MIDAZOLAM HYDROCHLORIDE 1 MG: 1 INJECTION, SOLUTION INTRAMUSCULAR; INTRAVENOUS at 15:00

## 2022-12-31 RX ADMIN — ACETAMINOPHEN 650 MG: 325 TABLET, FILM COATED ORAL at 19:23

## 2022-12-31 RX ADMIN — ASPIRIN 81 MG: 81 TABLET, COATED ORAL at 05:12

## 2022-12-31 RX ADMIN — FENTANYL CITRATE 50 MCG: 50 INJECTION INTRAMUSCULAR; INTRAVENOUS at 15:00

## 2022-12-31 RX ADMIN — NYSTATIN: 100000 POWDER TOPICAL at 17:16

## 2022-12-31 RX ADMIN — FENTANYL CITRATE 25 MCG: 50 INJECTION, SOLUTION INTRAMUSCULAR; INTRAVENOUS at 15:03

## 2022-12-31 RX ADMIN — AMITRIPTYLINE HYDROCHLORIDE 10 MG: 10 TABLET, FILM COATED ORAL at 17:16

## 2022-12-31 RX ADMIN — ATORVASTATIN CALCIUM 40 MG: 40 TABLET, FILM COATED ORAL at 19:23

## 2022-12-31 RX ADMIN — FUROSEMIDE 80 MG: 10 INJECTION, SOLUTION INTRAMUSCULAR; INTRAVENOUS at 05:12

## 2022-12-31 RX ADMIN — NYSTATIN: 100000 POWDER TOPICAL at 05:22

## 2022-12-31 RX ADMIN — NICOTINE TRANSDERMAL SYSTEM 21 MG: 21 PATCH, EXTENDED RELEASE TRANSDERMAL at 05:12

## 2022-12-31 RX ADMIN — FENTANYL CITRATE 50 MCG: 50 INJECTION, SOLUTION INTRAMUSCULAR; INTRAVENOUS at 15:00

## 2022-12-31 RX ADMIN — HEPARIN SODIUM: 1000 INJECTION, SOLUTION INTRAVENOUS; SUBCUTANEOUS at 15:18

## 2022-12-31 RX ADMIN — FUROSEMIDE 80 MG: 10 INJECTION, SOLUTION INTRAMUSCULAR; INTRAVENOUS at 16:26

## 2022-12-31 RX ADMIN — APIXABAN 5 MG: 5 TABLET, FILM COATED ORAL at 05:12

## 2022-12-31 RX ADMIN — FLUTICASONE PROPIONATE 88 MCG: 44 AEROSOL, METERED RESPIRATORY (INHALATION) at 05:09

## 2022-12-31 RX ADMIN — METOPROLOL TARTRATE 50 MG: 50 TABLET, FILM COATED ORAL at 05:12

## 2022-12-31 RX ADMIN — AMITRIPTYLINE HYDROCHLORIDE 10 MG: 10 TABLET, FILM COATED ORAL at 05:12

## 2022-12-31 RX ADMIN — METOPROLOL TARTRATE 50 MG: 50 TABLET, FILM COATED ORAL at 17:16

## 2022-12-31 RX ADMIN — APIXABAN 5 MG: 5 TABLET, FILM COATED ORAL at 17:16

## 2022-12-31 RX ADMIN — MIDAZOLAM HYDROCHLORIDE 1 MG: 1 INJECTION, SOLUTION INTRAMUSCULAR; INTRAVENOUS at 15:03

## 2022-12-31 ASSESSMENT — ENCOUNTER SYMPTOMS
MYALGIAS: 0
SHORTNESS OF BREATH: 0
BLURRED VISION: 0
FEVER: 0
COUGH: 0
VOMITING: 0
DIZZINESS: 0
NAUSEA: 0
BRUISES/BLEEDS EASILY: 0

## 2022-12-31 ASSESSMENT — FIBROSIS 4 INDEX: FIB4 SCORE: 0.9

## 2022-12-31 NOTE — PROGRESS NOTES
"[facility] -  PROGRESS NOTE               Author: Florina Story M.D. Date & Time: 12/31/2022  2:36 PM     HPI:  67 y.o. male with history of COPD dependent on 4 L, tobacco abuse, CAD, CHF, ALONDRA/OHS on bedtime CPAP, who presented 12/23/2022 with evaluations for progressive shortness of breath and lower extremities swelling.  CXR notable for bilateral vascular congestion with pulmonary edema. Patient was admitted to the hospital on 12/23 for CHD exacerbation and copd. No F/C/N/V. No melena, hematochezia, hematemesis.  No HA, visual changes, or abdominal pain.     DAILY NEPHROLOGY SUMMARY:  12/26: consult duque, s/p hd #1  12/27: due for HD #2, still anasarca and on O2   12/28: on hd now, tolerating well.   12/29: tolerated puf today , edema is better , he feels better   12/30: tolerated hd well. In negative balance   12/31: feeling better, no new issues.     REVIEW OF SYSTEMS:    10 point ROS reviewed and is as per HPI/daily summary or otherwise negative    PMH/PSH/SH/FH:   Reviewed and unchanged since admission note    CURRENT MEDICATIONS:   Reviewed from admission to present day    VS:  /59   Pulse 81   Temp 36.4 °C (97.5 °F) (Temporal)   Resp 18   Ht 1.778 m (5' 10\")   Wt (!) 142 kg (312 lb 13.3 oz)   SpO2 97%   BMI 44.89 kg/m²     Physical Exam  Appearance: He is obese. He is ill-appearing.   HENT:      Head: Normocephalic and atraumatic.   Cardiovascular:      Rate and Rhythm: Normal rate. Rhythm irregular.      Comments: anasarca  Pulmonary:      Comments: Decreased BS BL  Abdominal:      General: There is distension.      Tenderness: There is no abdominal tenderness.      Hernia: A hernia is present.   Musculoskeletal:         General: Swelling present.   Skin:     Coloration: Skin is pale. Skin is not jaundiced.      Findings: No bruising.   Neurological:      General: No focal deficit present.      Mental Status: He is alert and oriented to person, place, and time. "   Psychiatric:         Behavior: Behavior normal.  Fluids:  In: 1050 [P.O.:550; Dialysis:500]  Out: 4300     LABS:  Recent Labs     12/29/22  0424 12/30/22  0123 12/31/22  0124   SODIUM 136 136 137   POTASSIUM 4.4 4.5 4.0   CHLORIDE 99 98 97   CO2 25 23 28   GLUCOSE 183* 201* 162*   BUN 71* 90* 70*   CREATININE 2.91* 3.55* 3.32*   CALCIUM 7.8* 7.8* 8.0*       IMAGING:   All imaging reviewed from admission to present day    IMPRESSION:  # FARIHA on CKD   - no hydro, UA benign  - no renal recovery   - on HD   # CKD 2/2 DM, HTN  - unclear baseline cr   # Hyperkalemia  # hyponatremia   # Acidosis   # acute respiratory failure 2/2 COPD/CHF      PLAN:  - no indication for hd today   -  c/w q MWF   - awaiting TDC with IR   - SW for OP chair  - renal diet,   - fluid restriction 1L   - strict IO  - Dose all meds per eGFR < 15

## 2022-12-31 NOTE — PROGRESS NOTES
Assumed care of patient, bedside report received from Hua RODRIGUEZ. Updated on POC, call light within reach and fall precautions in place. Bed locked and in lowest position. Patient instructed to call for assistance before getting out of bed. All questions answered, no other needs at this time.

## 2022-12-31 NOTE — THERAPY
Physical Therapy   Daily Treatment     Patient Name: Yoandy Peace  Age:  67 y.o., Sex:  male  Medical Record #: [id number]  Today's Date: 12/30/2022     Precautions  Precautions: Fall Risk    Assessment  Pt seen for PT tx session. Pt demonstrated mild improvements with ambulation distance, however he is still limited by poor activity tolerance and needs 6L during ambulation to keep SpO2 >90%. Pt reports that he is feeling better day by day, however he notes that his back pain is also limiting his tolerance for mobility as he cannot recover adequately when resting. Continue to recommend placement. Will follow.     Plan    Physical Therapy Treatment Plan  Physical Therapy Treatment Plan: Continue Current Treatment Plan    DC Equipment Recommendations: Unable to determine at this time  Discharge Recommendations: Recommend post-acute placement for additional physical therapy services prior to discharge home         12/30/22 1406   Vitals   Pulse Oximetry 95 %   O2 (LPM) 4   O2 Delivery Device Silicone Nasal Cannula   Vitals Comments pt desatted to 85% after ambulating with 4L. bumped to 6L for ambulation and SpO2 remained >90%   Pain 0 - 10 Group   Location Back   Location Orientation Lower   Pain Rating Scale (NPRS)   (not quantified)   Description Aching   Therapist Pain Assessment Post Activity Pain Same as Prior to Activity   Balance   Sitting Balance (Static) Fair +   Sitting Balance (Dynamic) Fair   Standing Balance (Static) Fair -   Standing Balance (Dynamic) Fair -   Weight Shift Sitting Good   Weight Shift Standing Good   Skilled Intervention Verbal Cuing   Comments FWW in standing   Bed Mobility    Comments bed mobility NT, at EOB pre and chair post   Gait Analysis   Gait Level Of Assist Standby Assist   Assistive Device Front Wheel Walker   Distance (Feet) 30   # of Times Distance was Traveled 2   Deviation Bradykinetic   # of Stairs Climbed 0   Weight Bearing Status no restrictions   Skilled Intervention  Verbal Cuing   Comments pt needing to be cued to sit down slowly at end as he still rushes. pt slightly hypoxic ambulating on 4L, improved with 6L   Functional Mobility   Sit to Stand Standby Assist   Bed, Chair, Wheelchair Transfer Standby Assist   Transfer Method Stand Step   Mobility FWW   How much difficulty does the patient currently have...   Turning over in bed (including adjusting bedclothes, sheets and blankets)? 3   Sitting down on and standing up from a chair with arms (e.g., wheelchair, bedside commode, etc.) 3   Moving from lying on back to sitting on the side of the bed? 3   How much help from another person does the patient currently need...   Moving to and from a bed to a chair (including a wheelchair)? 3   Need to walk in a hospital room? 3   Climbing 3-5 steps with a railing? 2   6 clicks Mobility Score 17   Activity Tolerance   Sitting in Chair 5 min/post sessoin   Sitting Edge of Bed 5 min/pre session   Standing 3 min   Comments limited by fatigue   Short Term Goals    Short Term Goal # 1 pt will move supine<>eob with hob flat and spv in 6 tx to improve bed mobility.   Goal Outcome # 1 goal not met   Short Term Goal # 2 pt will complete all transfers with fww and spv in 6 tx to improve functional mobility.   Goal Outcome # 2 Progressing as expected   Short Term Goal # 3 pt will ambulate 150 ft with fww and spv in 6 tx for household distances.   Goal Outcome # 3 Progressing slower than expected   Short Term Goal # 4 pt will negotiate 3 steps with sba in 6 tx to facilitate home entry.   Goal Outcome # 4 Goal not met   Physical Therapy Treatment Plan   Physical Therapy Treatment Plan Continue Current Treatment Plan   Anticipated Discharge Equipment and Recommendations   DC Equipment Recommendations Unable to determine at this time   Discharge Recommendations Recommend post-acute placement for additional physical therapy services prior to discharge home   Interdisciplinary Plan of Care Collaboration    IDT Collaboration with  Nursing   Patient Position at End of Therapy Seated;Call Light within Reach;Tray Table within Reach;Phone within Reach   Collaboration Comments RN updated   Session Information   Date / Session Number  12/30- 2 (2/4, 1/3)

## 2022-12-31 NOTE — PROGRESS NOTES
Hospital Medicine Daily Progress Note    Date of Service  12/31/2022    Chief Complaint  Yoandy Peace is a 67 y.o. male admitted 12/23/2022 with shortness of breath    Hospital Course    67 y.o. male past medical history of tobacco dependence, COPD on home oxygen, CAD, CHF, obstructive sleep apnea who presented 12/23/2022 with progressive shortness of breath over the last 1 week.  .  Upon arrival to the ER he was given Solu-Medrol, bronchodilators and placed on BiPAP for respiratory failure secondary to CHF/COPD and admitted to ICU.  Patient was weaned off BiPAP and downgraded to telemetry floor.  Nephrology was consulted for worsening function.  Patient was started on hemodialysis.  Started on IV Lasix for pulmonary edema.  Cardiology was consulted for elevated troponin and chest pain during dialysis.  Started on therapeutic Lovenox for A. fib with RVR    Interval Problem Update  12/27/2022    Currently on 5 L oxygen saturating over 90%  Labs noted with leukocytosis.  Low bicarbonate 18, high anion gap.   significantly elevated troponin.  Echo unremarkable    Currently on IV Lasix  On heparin  Nephrology following for hemodialysis  Cardiology on board    12/28/2022  Vitals remained stable  Continue to require 5 L oxygen  Noted significant lower extremity swelling  Leukocytosis in setting of steroid use  Evaluated by cardiology.  No plan for angiogram is unlikely ACS  Nephrology following for hemodialysis  Eventually need placement.   aware    12/29/2022  Stable on 5 L oxygen  Remained asymptomatic  Had session of dialysis today  Volume overload in setting of acute renal failure with no underlying heart disease.  Likely need long-term dialysis  Cardiology on board  Nephrology following for dialysis    12/30/2022  Now on baseline for oxygen saturating over 90  Patient will need long-term dialysis  Nephrology following for dialysis  Continue on IV Lasix  Added Flexeril for back pain    12/31/2022  Vitals  remained stable  Persistent leukocytosis likely in setting of steroid use  No concern of ongoing infection  Nephrology following for dialysis  Continue on IV Lasix      I have discussed this patient's plan of care and discharge plan at IDT rounds today with Case Management, Nursing, Nursing leadership, and other members of the IDT team.    Consultants/Specialty  cardiology    Code Status  DNAR/DNI    Disposition  Patient is not medically cleared for discharge.   Anticipate discharge to to home with close outpatient follow-up.  I have placed the appropriate orders for post-discharge needs.    Review of Systems  Review of Systems   Constitutional:  Negative for fever.   HENT:  Negative for hearing loss.    Eyes:  Negative for blurred vision.   Respiratory:  Negative for cough and shortness of breath.    Cardiovascular:  Negative for chest pain.   Gastrointestinal:  Negative for nausea and vomiting.   Genitourinary:  Negative for dysuria.   Musculoskeletal:  Negative for myalgias.   Skin:  Negative for rash.   Neurological:  Negative for dizziness.   Endo/Heme/Allergies:  Does not bruise/bleed easily.      Physical Exam  Temp:  [36.4 °C (97.5 °F)-37.1 °C (98.7 °F)] 36.4 °C (97.5 °F)  Pulse:  [63-89] 81  Resp:  [18] 18  BP: ()/(54-68) 101/59  SpO2:  [94 %-100 %] 97 %    Physical Exam  Constitutional:       General: He is not in acute distress.     Appearance: He is obese. He is ill-appearing.   HENT:      Head: Normocephalic and atraumatic.      Right Ear: Tympanic membrane normal.      Left Ear: Tympanic membrane normal.      Nose: Nose normal.      Mouth/Throat:      Mouth: Mucous membranes are moist.   Eyes:      Extraocular Movements: Extraocular movements intact.      Pupils: Pupils are equal, round, and reactive to light.   Cardiovascular:      Rate and Rhythm: Normal rate and regular rhythm.      Pulses: Normal pulses.   Pulmonary:      Effort: Pulmonary effort is normal. No respiratory distress.    Abdominal:      General: Abdomen is flat. There is no distension.   Musculoskeletal:      Cervical back: Normal range of motion.      Right lower leg: Edema present.      Left lower leg: Edema present.   Skin:     General: Skin is warm.   Neurological:      General: No focal deficit present.      Mental Status: He is alert and oriented to person, place, and time. Mental status is at baseline.   Psychiatric:         Mood and Affect: Mood normal.       Fluids    Intake/Output Summary (Last 24 hours) at 12/31/2022 1341  Last data filed at 12/30/2022 2200  Gross per 24 hour   Intake 550 ml   Output 400 ml   Net 150 ml         Laboratory  Recent Labs     12/29/22  0424 12/30/22  0123 12/31/22  0124   WBC 18.2* 19.6* 18.1*   RBC 3.03* 3.18* 3.34*   HEMOGLOBIN 7.9* 8.3* 8.8*   HEMATOCRIT 25.0* 26.7* 28.3*   MCV 82.5 84.0 84.7   MCH 26.1* 26.1* 26.3*   MCHC 31.6* 31.1* 31.1*   RDW 45.4 46.8 47.4   PLATELETCT 364 375 358   MPV 10.0 10.3 10.3       Recent Labs     12/29/22  0424 12/30/22  0123 12/31/22  0124   SODIUM 136 136 137   POTASSIUM 4.4 4.5 4.0   CHLORIDE 99 98 97   CO2 25 23 28   GLUCOSE 183* 201* 162*   BUN 71* 90* 70*   CREATININE 2.91* 3.55* 3.32*   CALCIUM 7.8* 7.8* 8.0*                       Imaging  DX-CHEST-PORTABLE (1 VIEW)   Final Result      1.  Ongoing pulmonary edema and bilateral pleural effusions.   2.  Cardiomegaly.   3.  Left lower lobe atelectasis or consolidation. There is also likely some component of compressive atelectasis of the right lung base considering the presence of significant pleural effusion.   4.  Interval placement of temporary non-tunneled hemodialysis catheter with no evidence of pneumothorax.      DX-CHEST-PORTABLE (1 VIEW)   Final Result         1. No significant interval change.      US-RENAL   Final Result      1.  Echogenic renal parenchyma bilaterally could relate to medical renal disease.      2.  No hydronephrosis. No renal calculus.      DX-CHEST-PORTABLE (1 VIEW)   Final  Result      1.  Enlarged cardiac silhouette and small amounts of bilateral pleural fluid with diffuse interstitial opacities most consistent with pulmonary edema/CHF.   2.  Bibasilar opacities could be due to edema, atelectasis or pneumonitis.   3.  There is atherosclerosis.   4.  Questionable linear calcific plaque in the proximal descending thoracic aorta versus some other device intrinsic or extrinsic to the patient.      EC-ECHOCARDIOGRAM COMPLETE W/ CONT   Final Result      US-EXTREMITY VENOUS LOWER BILAT   Final Result      DX-CHEST-PORTABLE (1 VIEW)   Final Result      1.  Cardiomegaly with interstitial infiltrates likely representing pulmonary edema.      2.  Bibasilar atelectasis and small bilateral pleural effusions.      IR-WIN,GROSHONG PLACEMENT >5    (Results Pending)          Assessment/Plan  * Acute renal failure (ARF) ([facility])  Assessment & Plan  Started on hemodialysis  Nephrology following    Elevated troponin  Assessment & Plan  Trop 473>>459>>404  BNP 6487  Likely secondary to CHF  ASA/statin  Echo unremarkable  Cardiology following    Bilateral leg edema- (present on admission)  Assessment & Plan  Likely due to volume overload.  Echo pending  Diuresis  Bilateral lower extremity DVT studies negative    Acute exacerbation of chronic obstructive pulmonary disease (COPD) ([facility])  Assessment & Plan  Resolving on oxygen at 6 L.  In no distress, no accessory muscle use, able to complete full sentences.  Patient downgraded from ICU.  No longer requiring rescue BiPAP.  Continue with Flovent and Anoro  Taper Solu-Medrol to 40 mg every 12 hours  Duo nebs as needed  Continue with oxygen supplementation to obtain SPO2 >88-90%    Acute pulmonary edema (HCC)- (present on admission)  Assessment & Plan  Resolving  Repeat chest x-ray in a.m.  Continue with diuresis  Patient is normotensive.  Hold ARB at this time.  Echo pending     Hypertension  Assessment & Plan  Amlodipine 10 mg daily   Lasix 60 mg BID      Tobacco abuse- (present on admission)  Assessment & Plan  Nicotine replacement as needed    ACP (advance care planning)  Assessment & Plan  Full code.  Discussed with patient in front of RN.    Class 3 severe obesity in adult ([facility])  Assessment & Plan  BMI 52.89     ALONDRA on CPAP- (present on admission)  Assessment & Plan  CPAP at bedtime      Acute on chronic respiratory failure with hypoxia ([facility])  Assessment & Plan  Dependent on 4 L.  Currently requiring 6 L in no distress  Diuresis with IV Lasix  Nebs/pulm toilet  Wean off Solu-Medrol as tolerated    CAD (coronary artery disease)  Assessment & Plan  ASA/statin    DM (diabetes mellitus) ([facility])  Assessment & Plan  Continue with SSI   On Farxiga, insulin NPH at home, and linagliptin           VTE prophylaxis: SCDs/TEDs and therapeutic anticoagulation with Heparin    I have performed a physical exam and reviewed and updated ROS and Plan today (12/31/2022). In review of yesterday's note (12/30/2022), there are no changes except as documented above.

## 2022-12-31 NOTE — PROGRESS NOTES
Text IR Charge RN for update re HD cath    1600 Text MD, pt back from cath, ok to give diet>?  MD endorses cards diet, 1L fluid restrict    1650 Text MD, change to ACHS from Q6? MD endorses yes

## 2022-12-31 NOTE — PROGRESS NOTES
Hemodialysis ordered by Dr. Story. Treatment started at 0820 and ended at 1120. Pt stable, vss, no c/o post tx. Net UF 3.0 L. Report to LISETTE Ramos RN.

## 2022-12-31 NOTE — DOCUMENTATION QUERY
[facility]                                                                       Query Response Note      PATIENT:               FERNANDO VAZQUEZ  ACCT #:                  [id number]  MRN:                     [id number]  :                      1955  ADMIT DATE:       2022 2:01 PM  DISCH DATE:          RESPONDING  PROVIDER #:        [id number]           QUERY TEXT:    Per H&P the patient has a diagnosis of acute on chronic heart failure. Additional diagnosis of acute exacerbation of COPD, acute on chronic respiratory failure with hypoxia, FARIHA and cardiorenal syndrome is also noted.  CXR with likely pulmonary edema and bilateral pleural effusions.  ECHO reports EF of 55 - 60% with normal diastolic function. Patient was treated with IV Lasix, IV Diuril, Lopressor, oxygen and started on hemodialysis.    Can the type of CHF be further clarified based on the above clinical indicators?      The patient's Clinical Indicators include:    67 y.o. w/ hx COPD on home O2 4 L, smoking, CAD, HTN, CHF, ALONDRA on CPAP     H&P: Acute on chronic CHF; AECOPD; acute on chronic RF w/ hypoxia; cardiorenal syndrome; morbid obesity     ECHO: EF 55 - 60%. Normal diastolic function. Inferior vena cava severely dilated w/ out resp collapse.  CXR: Cardiomegaly w/ interstitial infiltrates likely pulm. edema. Bibasilar atelectasis & sm. b/l pl. effusions.  NT -proBNP: 6487        PN: Worsening renal function despite holding Lasix.  Increased pulmonary edema/WOB, requiring 10 L/min.    Nephrology Consult: FARIHA on CKD 2; DM 2  HM Crossover: Noted to go into atrial fibrillation once dialysis was started.    Treatment: IV lasix; IV diuril; HD;  lopressor; duoneb; O2; BiPAP; RT; ECHO; CXR; NT -proBNP  Risk Factors: CAD; HTN; cardiorenal syndrome; a-fib; ALONDRA; COPD; CKD; acute on chronic resp failure; obesity    Thank You,  Maria Del Carmen Mcdaniel RN  Clinical     Connect via [employer] Messenger  Options provided:   -- Diastolic / HFpEF   -- Other type of heart failure, (please specify type)   -- Other explanation, (please specify other explanation)   -- Unable to determine      Query created by: Maria Del Carmen Mcdaniel on 12/27/2022 9:13 AM    RESPONSE TEXT:    Unable to determine          Electronically signed by:  SIDDHARTH CARREON MD 12/30/2022 4:01 PM

## 2022-12-31 NOTE — DISCHARGE PLANNING
Outpatient Dialysis Note     Received notification for outpatient dialysis placement from Dr. Story     Met/Spoke with patient and confirmed demographics and insurance information.  Provided patient with dialysis education materials and list of HD centers, referral initiated to:    [facility]   [address] [place] [address] [place] [address]     Pending medical and financial clearance.     Xitlaly Reyes   Dialysis Coordinator / [facility] [employer]   Ph: [phone]

## 2022-12-31 NOTE — CARE PLAN
The patient is Stable - Low risk of patient condition declining or worsening    Shift Goals  Clinical Goals: dialysis, monitor I/O  Patient Goals: Comfort  Family Goals: no family present    Progress made toward(s) clinical / shift goals:      Problem: Knowledge Deficit - Standard  Goal: Patient and family/care givers will demonstrate understanding of plan of care, disease process/condition, diagnostic tests and medications  Outcome: Progressing     Problem: Self Care  Goal: Patient will have the ability to perform ADLs independently or with assistance (bathe, groom, dress, toilet and feed)  Outcome: Progressing  Note: Pt able to use urinal self and feed self. Still requiring assistance with mobility to bathroom.      Problem: Respiratory  Goal: Patient will achieve/maintain optimum respiratory ventilation and gas exchange  Outcome: Progressing       Patient is not progressing towards the following goals:

## 2022-12-31 NOTE — OR SURGEON
Immediate Post- Operative Note        Findings: CRF.      Procedure(s): Permcath placement, tempcath removal.       Estimated Blood Loss: Less than 5 ml        Complications: None            12/31/2022     1516 PM     Naveen Vu M.D.

## 2022-12-31 NOTE — PROGRESS NOTES
IR Nursing Note      Tunneled Hemodialysis Catheter Placement by MD Vu assisted by RT Ivan, Right IJ access site.    Access site with catheter in place, secured with sutures, covered with biopatch/tegaderm, C/D/I.  Heplocked per protocol.  Gauze/tegaderm to previous temp cath site in right neck.    Report given to MICHAEL Sequeira.  Patient transported to [facility] via IR MICHAEL Jimenez monitored then transferred care to report RN.      Heydi Singleton, Precision H Chronic Catheter, 14.5F x 23cm, REF# [id number], LOT# [id number], Exp. Date 8/15/2025

## 2022-12-31 NOTE — PROGRESS NOTES
Uupdate from IR, HD cath delayed till tomorrow. MD endorses order cardiac 1L diet and NPO midnight

## 2022-12-31 NOTE — PROGRESS NOTES
"[facility] -  PROGRESS NOTE               Author: Florina Story M.D. Date & Time: 12/30/2022  4:04 PM     HPI:  67 y.o. male with history of COPD dependent on 4 L, tobacco abuse, CAD, CHF, ALONDRA/OHS on bedtime CPAP, who presented 12/23/2022 with evaluations for progressive shortness of breath and lower extremities swelling.  CXR notable for bilateral vascular congestion with pulmonary edema. Patient was admitted to the hospital on 12/23 for CHD exacerbation and copd. No F/C/N/V. No melena, hematochezia, hematemesis.  No HA, visual changes, or abdominal pain.     DAILY NEPHROLOGY SUMMARY:  12/26: consult duque, s/p hd #1  12/27: due for HD #2, still anasarca and on O2   12/28: on hd now, tolerating well.   12/29: tolerated puf today , edema is better , he feels better   12/30: tolerated hd well. In negative balance     REVIEW OF SYSTEMS:    10 point ROS reviewed and is as per HPI/daily summary or otherwise negative    PMH/PSH/SH/FH:   Reviewed and unchanged since admission note    CURRENT MEDICATIONS:   Reviewed from admission to present day    VS:  /67   Pulse 84   Temp 36.6 °C (97.9 °F) (Temporal)   Resp 18   Ht 1.778 m (5' 10\")   Wt (!) 146 kg (322 lb 8.5 oz)   SpO2 97%   BMI 46.28 kg/m²     Physical Exam   Appearance: He is obese. He is ill-appearing.   HENT:      Head: Normocephalic and atraumatic.   Cardiovascular:      Rate and Rhythm: Normal rate. Rhythm irregular.      Comments: anasarca  Pulmonary:      Comments: Decreased BS BL  Abdominal:      General: There is distension.      Tenderness: There is no abdominal tenderness.      Hernia: A hernia is present.   Musculoskeletal:         General: Swelling present.   Skin:     Coloration: Skin is pale. Skin is not jaundiced.      Findings: No bruising.   Neurological:      General: No focal deficit present.      Mental Status: He is alert and oriented to person, place, and time.   Psychiatric:         Behavior: Behavior normal. "   Fluids:  In: 980 [P.O.:480; Dialysis:500]  Out: 4550     LABS:  Recent Labs     12/28/22  0627 12/29/22  0424 12/30/22  0123   SODIUM 136 136 136   POTASSIUM 4.7 4.4 4.5   CHLORIDE 99 99 98   CO2 24 25 23   GLUCOSE 187* 183* 201*   BUN 76* 71* 90*   CREATININE 3.00* 2.91* 3.55*   CALCIUM 7.9* 7.8* 7.8*       IMAGING:   All imaging reviewed from admission to present day    IMPRESSION:  # FARIHA on CKD   - no hydro, UA benign  - no renal recovery   - on HD   # CKD 2/2 DM, HTN  - unclear baseline cr   # Hyperkalemia  # hyponatremia   # Acidosis   # acute respiratory failure 2/2 COPD/CHF      PLAN:  - got hd today. Will c/w q MWF   - awaiting TDC with IR   - SW for OP chair  - renal diet,   - fluid restriction 1L   - strict IO  - pth  - Dose all meds per eGFR < 15

## 2023-01-01 LAB
ANION GAP SERPL CALC-SCNC: 14 MMOL/L (ref 7–16)
BASOPHILS # BLD AUTO: 0.2 % (ref 0–1.8)
BASOPHILS # BLD: 0.03 K/UL (ref 0–0.12)
BUN SERPL-MCNC: 77 MG/DL (ref 8–22)
CALCIUM SERPL-MCNC: 8 MG/DL (ref 8.5–10.5)
CHLORIDE SERPL-SCNC: 96 MMOL/L (ref 96–112)
CO2 SERPL-SCNC: 25 MMOL/L (ref 20–33)
CREAT SERPL-MCNC: 3.77 MG/DL (ref 0.5–1.4)
EOSINOPHIL # BLD AUTO: 0.09 K/UL (ref 0–0.51)
EOSINOPHIL NFR BLD: 0.5 % (ref 0–6.9)
ERYTHROCYTE [DISTWIDTH] IN BLOOD BY AUTOMATED COUNT: 47.7 FL (ref 35.9–50)
GFR SERPLBLD CREATININE-BSD FMLA CKD-EPI: 17 ML/MIN/1.73 M 2
GLUCOSE BLD STRIP.AUTO-MCNC: 140 MG/DL (ref 65–99)
GLUCOSE BLD STRIP.AUTO-MCNC: 173 MG/DL (ref 65–99)
GLUCOSE BLD STRIP.AUTO-MCNC: 208 MG/DL (ref 65–99)
GLUCOSE SERPL-MCNC: 131 MG/DL (ref 65–99)
HCT VFR BLD AUTO: 28.7 % (ref 42–52)
HGB BLD-MCNC: 8.8 G/DL (ref 14–18)
IMM GRANULOCYTES # BLD AUTO: 0.31 K/UL (ref 0–0.11)
IMM GRANULOCYTES NFR BLD AUTO: 1.8 % (ref 0–0.9)
LYMPHOCYTES # BLD AUTO: 4.12 K/UL (ref 1–4.8)
LYMPHOCYTES NFR BLD: 24.5 % (ref 22–41)
MAGNESIUM SERPL-MCNC: 2.1 MG/DL (ref 1.5–2.5)
MCH RBC QN AUTO: 26.1 PG (ref 27–33)
MCHC RBC AUTO-ENTMCNC: 30.7 G/DL (ref 33.7–35.3)
MCV RBC AUTO: 85.2 FL (ref 81.4–97.8)
MONOCYTES # BLD AUTO: 1.29 K/UL (ref 0–0.85)
MONOCYTES NFR BLD AUTO: 7.7 % (ref 0–13.4)
NEUTROPHILS # BLD AUTO: 10.95 K/UL (ref 1.82–7.42)
NEUTROPHILS NFR BLD: 65.3 % (ref 44–72)
NRBC # BLD AUTO: 0 K/UL
NRBC BLD-RTO: 0 /100 WBC
PHOSPHATE SERPL-MCNC: 5.8 MG/DL (ref 2.5–4.5)
PLATELET # BLD AUTO: 362 K/UL (ref 164–446)
PMV BLD AUTO: 10.4 FL (ref 9–12.9)
POTASSIUM SERPL-SCNC: 3.9 MMOL/L (ref 3.6–5.5)
RBC # BLD AUTO: 3.37 M/UL (ref 4.7–6.1)
SODIUM SERPL-SCNC: 135 MMOL/L (ref 135–145)
WBC # BLD AUTO: 16.8 K/UL (ref 4.8–10.8)

## 2023-01-01 PROCEDURE — 700111 HCHG RX REV CODE 636 W/ 250 OVERRIDE (IP): Performed by: STUDENT IN AN ORGANIZED HEALTH CARE EDUCATION/TRAINING PROGRAM

## 2023-01-01 PROCEDURE — 80048 BASIC METABOLIC PNL TOTAL CA: CPT

## 2023-01-01 PROCEDURE — 83735 ASSAY OF MAGNESIUM: CPT

## 2023-01-01 PROCEDURE — A9270 NON-COVERED ITEM OR SERVICE: HCPCS | Performed by: STUDENT IN AN ORGANIZED HEALTH CARE EDUCATION/TRAINING PROGRAM

## 2023-01-01 PROCEDURE — 700102 HCHG RX REV CODE 250 W/ 637 OVERRIDE(OP): Performed by: STUDENT IN AN ORGANIZED HEALTH CARE EDUCATION/TRAINING PROGRAM

## 2023-01-01 PROCEDURE — 700102 HCHG RX REV CODE 250 W/ 637 OVERRIDE(OP): Performed by: INTERNAL MEDICINE

## 2023-01-01 PROCEDURE — 85025 COMPLETE CBC W/AUTO DIFF WBC: CPT

## 2023-01-01 PROCEDURE — 700111 HCHG RX REV CODE 636 W/ 250 OVERRIDE (IP): Performed by: HOSPITALIST

## 2023-01-01 PROCEDURE — 84100 ASSAY OF PHOSPHORUS: CPT

## 2023-01-01 PROCEDURE — 36415 COLL VENOUS BLD VENIPUNCTURE: CPT

## 2023-01-01 PROCEDURE — 770020 HCHG ROOM/CARE - TELE (206)

## 2023-01-01 PROCEDURE — 99232 SBSQ HOSP IP/OBS MODERATE 35: CPT | Performed by: STUDENT IN AN ORGANIZED HEALTH CARE EDUCATION/TRAINING PROGRAM

## 2023-01-01 PROCEDURE — 82962 GLUCOSE BLOOD TEST: CPT

## 2023-01-01 PROCEDURE — A9270 NON-COVERED ITEM OR SERVICE: HCPCS | Performed by: INTERNAL MEDICINE

## 2023-01-01 RX ORDER — POTASSIUM CHLORIDE 20 MEQ/1
40 TABLET, EXTENDED RELEASE ORAL ONCE
Status: COMPLETED | OUTPATIENT
Start: 2023-01-01 | End: 2023-01-01

## 2023-01-01 RX ORDER — MAGNESIUM SULFATE 1 G/100ML
1 INJECTION INTRAVENOUS ONCE
Status: COMPLETED | OUTPATIENT
Start: 2023-01-01 | End: 2023-01-01

## 2023-01-01 RX ORDER — FUROSEMIDE 10 MG/ML
40 INJECTION INTRAMUSCULAR; INTRAVENOUS
Status: DISCONTINUED | OUTPATIENT
Start: 2023-01-01 | End: 2023-01-05

## 2023-01-01 RX ADMIN — AMITRIPTYLINE HYDROCHLORIDE 10 MG: 10 TABLET, FILM COATED ORAL at 17:56

## 2023-01-01 RX ADMIN — MAGNESIUM SULFATE HEPTAHYDRATE 1 G: 1 INJECTION, SOLUTION INTRAVENOUS at 16:35

## 2023-01-01 RX ADMIN — NYSTATIN: 100000 POWDER TOPICAL at 17:59

## 2023-01-01 RX ADMIN — APIXABAN 5 MG: 5 TABLET, FILM COATED ORAL at 17:56

## 2023-01-01 RX ADMIN — AMITRIPTYLINE HYDROCHLORIDE 10 MG: 10 TABLET, FILM COATED ORAL at 06:06

## 2023-01-01 RX ADMIN — NYSTATIN: 100000 POWDER TOPICAL at 06:07

## 2023-01-01 RX ADMIN — ATORVASTATIN CALCIUM 40 MG: 40 TABLET, FILM COATED ORAL at 21:40

## 2023-01-01 RX ADMIN — METOPROLOL TARTRATE 50 MG: 50 TABLET, FILM COATED ORAL at 17:56

## 2023-01-01 RX ADMIN — NICOTINE TRANSDERMAL SYSTEM 21 MG: 21 PATCH, EXTENDED RELEASE TRANSDERMAL at 06:07

## 2023-01-01 RX ADMIN — ACETAMINOPHEN 650 MG: 325 TABLET, FILM COATED ORAL at 16:27

## 2023-01-01 RX ADMIN — APIXABAN 5 MG: 5 TABLET, FILM COATED ORAL at 06:06

## 2023-01-01 RX ADMIN — ASPIRIN 81 MG: 81 TABLET, COATED ORAL at 06:07

## 2023-01-01 RX ADMIN — METOPROLOL TARTRATE 50 MG: 50 TABLET, FILM COATED ORAL at 06:06

## 2023-01-01 RX ADMIN — ACETAMINOPHEN 650 MG: 325 TABLET, FILM COATED ORAL at 09:35

## 2023-01-01 RX ADMIN — FLUTICASONE PROPIONATE 88 MCG: 44 AEROSOL, METERED RESPIRATORY (INHALATION) at 06:07

## 2023-01-01 RX ADMIN — FUROSEMIDE 80 MG: 10 INJECTION, SOLUTION INTRAMUSCULAR; INTRAVENOUS at 06:06

## 2023-01-01 RX ADMIN — FUROSEMIDE 40 MG: 10 INJECTION, SOLUTION INTRAMUSCULAR; INTRAVENOUS at 16:27

## 2023-01-01 RX ADMIN — POTASSIUM CHLORIDE 40 MEQ: 1500 TABLET, EXTENDED RELEASE ORAL at 16:27

## 2023-01-01 ASSESSMENT — PAIN DESCRIPTION - PAIN TYPE: TYPE: ACUTE PAIN

## 2023-01-01 ASSESSMENT — ENCOUNTER SYMPTOMS
BLURRED VISION: 0
SHORTNESS OF BREATH: 0
FEVER: 0
VOMITING: 0
COUGH: 0
BRUISES/BLEEDS EASILY: 0
DIZZINESS: 0
NAUSEA: 0
MYALGIAS: 0

## 2023-01-01 NOTE — PROGRESS NOTES
"[facility] -  PROGRESS NOTE               Author: Florina Story M.D. Date & Time: 1/1/2023  2:35 PM     HPI:  67 y.o. male with history of COPD dependent on 4 L, tobacco abuse, CAD, CHF, ALONDRA/OHS on bedtime CPAP, who presented 12/23/2022 with evaluations for progressive shortness of breath and lower extremities swelling.  CXR notable for bilateral vascular congestion with pulmonary edema. Patient was admitted to the hospital on 12/23 for CHD exacerbation and copd. No F/C/N/V. No melena, hematochezia, hematemesis.  No HA, visual changes, or abdominal pain.     DAILY NEPHROLOGY SUMMARY:  12/26: consult duque, s/p hd #1  12/27: due for HD #2, still anasarca and on O2   12/28: on hd now, tolerating well.   12/29: tolerated puf today , edema is better , he feels better   12/30: tolerated hd well. In negative balance   12/31: feeling better, no new issues.   1/1: sitting in the chair, LE edema is better, no new complaints. S/p TDC placement     REVIEW OF SYSTEMS:    10 point ROS reviewed and is as per HPI/daily summary or otherwise negative    PMH/PSH/SH/FH:   Reviewed and unchanged since admission note    CURRENT MEDICATIONS:   Reviewed from admission to present day    VS:  BP 91/56   Pulse 72   Temp 36.8 °C (98.3 °F) (Temporal)   Resp 16   Ht 1.778 m (5' 10\")   Wt (!) 142 kg (313 lb 7.9 oz)   SpO2 94%   BMI 44.98 kg/m²     Physical Exam  Appearance: He is obese. He is ill-appearing.   HENT:      Head: Normocephalic and atraumatic.   Cardiovascular:      Rate and Rhythm: Normal rate. Rhythm irregular.      Comments: anasarca  Pulmonary:      Comments: Decreased BS BL  Abdominal:      General: There is distension.      Tenderness: There is no abdominal tenderness.      Hernia: A hernia is present.   Musculoskeletal:         General: Swelling present.   Skin:     Coloration: Skin is pale. Skin is not jaundiced.      Findings: No bruising.   Neurological:      General: No focal deficit present. "      Mental Status: He is alert and oriented to person, place, and time.   Psychiatric:         Behavior: Behavior normal.  Fluids:  In: 800 [P.O.:800]  Out: 900     LABS:  Recent Labs     12/30/22  0123 12/31/22  0124 01/01/23  0147   SODIUM 136 137 135   POTASSIUM 4.5 4.0 3.9   CHLORIDE 98 97 96   CO2 23 28 25   GLUCOSE 201* 162* 131*   BUN 90* 70* 77*   CREATININE 3.55* 3.32* 3.77*   CALCIUM 7.8* 8.0* 8.0*       IMAGING:   All imaging reviewed from admission to present day    IMPRESSION:  # FARIHA on CKD   - no hydro, UA benign  - no renal recovery   - on HD   # CKD 2/2 DM, HTN  - unclear baseline cr   # Hyperkalemia  # hyponatremia   # Acidosis   # acute respiratory failure 2/2 COPD/CHF      PLAN:  - no indication for hd today 1/1  -  c/w q MWF   - renal diet,   - fluid restriction 1L   - strict IO  - Dose all meds per eGFR < 15  - OP chair

## 2023-01-01 NOTE — PROGRESS NOTES
Report received by day shift RN. Pt awake alert and oriented x 4 with no c/o pain. Discussed POC. All needs met at this time.

## 2023-01-01 NOTE — PROGRESS NOTES
Hospital Medicine Daily Progress Note    Date of Service  1/1/2023    Chief Complaint  Yoandy Peace is a 67 y.o. male admitted 12/23/2022 with shortness of breath    Hospital Course    67 y.o. male past medical history of tobacco dependence, COPD on home oxygen, CAD, CHF, obstructive sleep apnea who presented 12/23/2022 with progressive shortness of breath over the last 1 week.  .  Upon arrival to the ER he was given Solu-Medrol, bronchodilators and placed on BiPAP for respiratory failure secondary to CHF/COPD and admitted to ICU.  Patient was weaned off BiPAP and downgraded to telemetry floor.  Nephrology was consulted for worsening function.  Patient was started on hemodialysis.  Started on IV Lasix for pulmonary edema.  Cardiology was consulted for elevated troponin and chest pain during dialysis.  Started on therapeutic Lovenox for A. fib with RVR    Interval Problem Update  12/27/2022    Currently on 5 L oxygen saturating over 90%  Labs noted with leukocytosis.  Low bicarbonate 18, high anion gap.   significantly elevated troponin.  Echo unremarkable    Currently on IV Lasix  On heparin  Nephrology following for hemodialysis  Cardiology on board    12/28/2022  Vitals remained stable  Continue to require 5 L oxygen  Noted significant lower extremity swelling  Leukocytosis in setting of steroid use  Evaluated by cardiology.  No plan for angiogram is unlikely ACS  Nephrology following for hemodialysis  Eventually need placement.   aware    12/29/2022  Stable on 5 L oxygen  Remained asymptomatic  Had session of dialysis today  Volume overload in setting of acute renal failure with no underlying heart disease.  Likely need long-term dialysis  Cardiology on board  Nephrology following for dialysis    12/30/2022  Now on baseline for oxygen saturating over 90  Patient will need long-term dialysis  Nephrology following for dialysis  Continue on IV Lasix  Added Flexeril for back pain    12/31/2022  Vitals  remained stable  Persistent leukocytosis likely in setting of steroid use  No concern of ongoing infection  Nephrology following for dialysis  Continue on IV Lasix    1/1/2023  Vitals remained stable.  Borderline hypotensive  Labs reviewed.  Leukocytosis improving  Nephrology following for dialysis  Decrease Lasix to 40 mg IV twice daily    I have discussed this patient's plan of care and discharge plan at IDT rounds today with Case Management, Nursing, Nursing leadership, and other members of the IDT team.    Consultants/Specialty  cardiology    Code Status  DNAR/DNI    Disposition  Patient is not medically cleared for discharge.   Anticipate discharge to to home with close outpatient follow-up.  I have placed the appropriate orders for post-discharge needs.    Review of Systems  Review of Systems   Constitutional:  Negative for fever.   HENT:  Negative for hearing loss.    Eyes:  Negative for blurred vision.   Respiratory:  Negative for cough and shortness of breath.    Cardiovascular:  Negative for chest pain.   Gastrointestinal:  Negative for nausea and vomiting.   Genitourinary:  Negative for dysuria.   Musculoskeletal:  Negative for myalgias.   Skin:  Negative for rash.   Neurological:  Negative for dizziness.   Endo/Heme/Allergies:  Does not bruise/bleed easily.      Physical Exam  Temp:  [36.5 °C (97.7 °F)-36.8 °C (98.3 °F)] 36.8 °C (98.3 °F)  Pulse:  [] 72  Resp:  [16-22] 16  BP: ()/(56-89) 91/56  SpO2:  [94 %-100 %] 94 %    Physical Exam  Constitutional:       General: He is not in acute distress.     Appearance: He is obese. He is ill-appearing.   HENT:      Head: Normocephalic and atraumatic.      Right Ear: Tympanic membrane normal.      Left Ear: Tympanic membrane normal.      Nose: Nose normal.      Mouth/Throat:      Mouth: Mucous membranes are moist.   Eyes:      Extraocular Movements: Extraocular movements intact.      Pupils: Pupils are equal, round, and reactive to light.    Cardiovascular:      Rate and Rhythm: Normal rate and regular rhythm.      Pulses: Normal pulses.   Pulmonary:      Effort: Pulmonary effort is normal. No respiratory distress.   Abdominal:      General: Abdomen is flat. There is no distension.   Musculoskeletal:      Cervical back: Normal range of motion.      Right lower leg: Edema present.      Left lower leg: Edema present.   Skin:     General: Skin is warm.   Neurological:      General: No focal deficit present.      Mental Status: He is alert and oriented to person, place, and time. Mental status is at baseline.   Psychiatric:         Mood and Affect: Mood normal.       Fluids    Intake/Output Summary (Last 24 hours) at 1/1/2023 1301  Last data filed at 12/31/2022 2200  Gross per 24 hour   Intake 800 ml   Output 900 ml   Net -100 ml         Laboratory  Recent Labs     12/30/22  0123 12/31/22  0124 01/01/23  0147   WBC 19.6* 18.1* 16.8*   RBC 3.18* 3.34* 3.37*   HEMOGLOBIN 8.3* 8.8* 8.8*   HEMATOCRIT 26.7* 28.3* 28.7*   MCV 84.0 84.7 85.2   MCH 26.1* 26.3* 26.1*   MCHC 31.1* 31.1* 30.7*   RDW 46.8 47.4 47.7   PLATELETCT 375 358 362   MPV 10.3 10.3 10.4       Recent Labs     12/30/22  0123 12/31/22  0124 01/01/23  0147   SODIUM 136 137 135   POTASSIUM 4.5 4.0 3.9   CHLORIDE 98 97 96   CO2 23 28 25   GLUCOSE 201* 162* 131*   BUN 90* 70* 77*   CREATININE 3.55* 3.32* 3.77*   CALCIUM 7.8* 8.0* 8.0*                       Imaging  IR-WIN,GROSHONG PLACEMENT >5   Final Result      1. Ultrasound and fluoroscopic guided placement of a right internal jugular 14.5 [place] HemoSplit tunneled dialysis catheter.      2. The hemodialysis catheter may be used immediately as clinically indicated. Flushes per protocol.      3. Removal of the right temporary dialysis catheter.      DX-CHEST-PORTABLE (1 VIEW)   Final Result      1.  Ongoing pulmonary edema and bilateral pleural effusions.   2.  Cardiomegaly.   3.  Left lower lobe atelectasis or consolidation. There is also  likely some component of compressive atelectasis of the right lung base considering the presence of significant pleural effusion.   4.  Interval placement of temporary non-tunneled hemodialysis catheter with no evidence of pneumothorax.      DX-CHEST-PORTABLE (1 VIEW)   Final Result         1. No significant interval change.      US-RENAL   Final Result      1.  Echogenic renal parenchyma bilaterally could relate to medical renal disease.      2.  No hydronephrosis. No renal calculus.      DX-CHEST-PORTABLE (1 VIEW)   Final Result      1.  Enlarged cardiac silhouette and small amounts of bilateral pleural fluid with diffuse interstitial opacities most consistent with pulmonary edema/CHF.   2.  Bibasilar opacities could be due to edema, atelectasis or pneumonitis.   3.  There is atherosclerosis.   4.  Questionable linear calcific plaque in the proximal descending thoracic aorta versus some other device intrinsic or extrinsic to the patient.      EC-ECHOCARDIOGRAM COMPLETE W/ CONT   Final Result      US-EXTREMITY VENOUS LOWER BILAT   Final Result      DX-CHEST-PORTABLE (1 VIEW)   Final Result      1.  Cardiomegaly with interstitial infiltrates likely representing pulmonary edema.      2.  Bibasilar atelectasis and small bilateral pleural effusions.             Assessment/Plan  * Acute renal failure (ARF) ([facility])  Assessment & Plan  Started on hemodialysis  Nephrology following    Elevated troponin  Assessment & Plan  Trop 473>>459>>404  BNP 6487  Likely secondary to CHF  ASA/statin  Echo unremarkable  Cardiology following    Bilateral leg edema- (present on admission)  Assessment & Plan  Likely due to volume overload.  Echo pending  Diuresis  Bilateral lower extremity DVT studies negative    Acute exacerbation of chronic obstructive pulmonary disease (COPD) ([facility])  Assessment & Plan  Resolving on oxygen at 6 L.  In no distress, no accessory muscle use, able to complete full sentences.  Patient downgraded from ICU.  No  longer requiring rescue BiPAP.  Continue with Flovent and Anoro  Taper Solu-Medrol to 40 mg every 12 hours  Duo nebs as needed  Continue with oxygen supplementation to obtain SPO2 >88-90%    Acute pulmonary edema (HCC)- (present on admission)  Assessment & Plan  Resolving  Repeat chest x-ray in a.m.  Continue with diuresis  Patient is normotensive.  Hold ARB at this time.  Echo pending     Hypertension  Assessment & Plan  Amlodipine 10 mg daily   Lasix 60 mg BID     Tobacco abuse- (present on admission)  Assessment & Plan  Nicotine replacement as needed    ACP (advance care planning)  Assessment & Plan  Full code.  Discussed with patient in front of RN.    Class 3 severe obesity in adult ([facility])  Assessment & Plan  BMI 52.89     ALONDRA on CPAP- (present on admission)  Assessment & Plan  CPAP at bedtime      Acute on chronic respiratory failure with hypoxia ([facility])  Assessment & Plan  Dependent on 4 L.  Currently requiring 6 L in no distress  Diuresis with IV Lasix  Nebs/pulm toilet  Wean off Solu-Medrol as tolerated    CAD (coronary artery disease)  Assessment & Plan  ASA/statin    DM (diabetes mellitus) ([facility])  Assessment & Plan  Continue with SSI   On Farxiga, insulin NPH at home, and linagliptin           VTE prophylaxis: SCDs/TEDs and therapeutic anticoagulation with Heparin    I have performed a physical exam and reviewed and updated ROS and Plan today (1/1/2023). In review of yesterday's note (12/31/2022), there are no changes except as documented above.

## 2023-01-02 LAB
ANION GAP SERPL CALC-SCNC: 12 MMOL/L (ref 7–16)
BASOPHILS # BLD AUTO: 0.1 % (ref 0–1.8)
BASOPHILS # BLD: 0.02 K/UL (ref 0–0.12)
BUN SERPL-MCNC: 84 MG/DL (ref 8–22)
CALCIUM SERPL-MCNC: 7.7 MG/DL (ref 8.5–10.5)
CHLORIDE SERPL-SCNC: 97 MMOL/L (ref 96–112)
CO2 SERPL-SCNC: 27 MMOL/L (ref 20–33)
CREAT SERPL-MCNC: 4.14 MG/DL (ref 0.5–1.4)
EOSINOPHIL # BLD AUTO: 0.08 K/UL (ref 0–0.51)
EOSINOPHIL NFR BLD: 0.5 % (ref 0–6.9)
ERYTHROCYTE [DISTWIDTH] IN BLOOD BY AUTOMATED COUNT: 48.5 FL (ref 35.9–50)
GFR SERPLBLD CREATININE-BSD FMLA CKD-EPI: 15 ML/MIN/1.73 M 2
GLUCOSE BLD STRIP.AUTO-MCNC: 129 MG/DL (ref 65–99)
GLUCOSE BLD STRIP.AUTO-MCNC: 165 MG/DL (ref 65–99)
GLUCOSE BLD STRIP.AUTO-MCNC: 173 MG/DL (ref 65–99)
GLUCOSE BLD STRIP.AUTO-MCNC: 191 MG/DL (ref 65–99)
GLUCOSE SERPL-MCNC: 134 MG/DL (ref 65–99)
HCT VFR BLD AUTO: 26.8 % (ref 42–52)
HGB BLD-MCNC: 8.1 G/DL (ref 14–18)
IMM GRANULOCYTES # BLD AUTO: 0.27 K/UL (ref 0–0.11)
IMM GRANULOCYTES NFR BLD AUTO: 1.7 % (ref 0–0.9)
LYMPHOCYTES # BLD AUTO: 3.42 K/UL (ref 1–4.8)
LYMPHOCYTES NFR BLD: 22.1 % (ref 22–41)
MAGNESIUM SERPL-MCNC: 2.4 MG/DL (ref 1.5–2.5)
MCH RBC QN AUTO: 25.9 PG (ref 27–33)
MCHC RBC AUTO-ENTMCNC: 30.2 G/DL (ref 33.7–35.3)
MCV RBC AUTO: 85.6 FL (ref 81.4–97.8)
MONOCYTES # BLD AUTO: 1.36 K/UL (ref 0–0.85)
MONOCYTES NFR BLD AUTO: 8.8 % (ref 0–13.4)
NEUTROPHILS # BLD AUTO: 10.36 K/UL (ref 1.82–7.42)
NEUTROPHILS NFR BLD: 66.8 % (ref 44–72)
NRBC # BLD AUTO: 0 K/UL
NRBC BLD-RTO: 0 /100 WBC
PHOSPHATE SERPL-MCNC: 6.3 MG/DL (ref 2.5–4.5)
PLATELET # BLD AUTO: 316 K/UL (ref 164–446)
PMV BLD AUTO: 10.4 FL (ref 9–12.9)
POTASSIUM SERPL-SCNC: 4.1 MMOL/L (ref 3.6–5.5)
RBC # BLD AUTO: 3.13 M/UL (ref 4.7–6.1)
SODIUM SERPL-SCNC: 136 MMOL/L (ref 135–145)
WBC # BLD AUTO: 15.5 K/UL (ref 4.8–10.8)

## 2023-01-02 PROCEDURE — A9270 NON-COVERED ITEM OR SERVICE: HCPCS | Performed by: INTERNAL MEDICINE

## 2023-01-02 PROCEDURE — 700102 HCHG RX REV CODE 250 W/ 637 OVERRIDE(OP): Performed by: STUDENT IN AN ORGANIZED HEALTH CARE EDUCATION/TRAINING PROGRAM

## 2023-01-02 PROCEDURE — 770020 HCHG ROOM/CARE - TELE (206)

## 2023-01-02 PROCEDURE — 700111 HCHG RX REV CODE 636 W/ 250 OVERRIDE (IP)

## 2023-01-02 PROCEDURE — 90935 HEMODIALYSIS ONE EVALUATION: CPT

## 2023-01-02 PROCEDURE — A9270 NON-COVERED ITEM OR SERVICE: HCPCS | Performed by: STUDENT IN AN ORGANIZED HEALTH CARE EDUCATION/TRAINING PROGRAM

## 2023-01-02 PROCEDURE — 85025 COMPLETE CBC W/AUTO DIFF WBC: CPT

## 2023-01-02 PROCEDURE — 83735 ASSAY OF MAGNESIUM: CPT

## 2023-01-02 PROCEDURE — 36415 COLL VENOUS BLD VENIPUNCTURE: CPT

## 2023-01-02 PROCEDURE — 82962 GLUCOSE BLOOD TEST: CPT | Mod: 91

## 2023-01-02 PROCEDURE — 80048 BASIC METABOLIC PNL TOTAL CA: CPT

## 2023-01-02 PROCEDURE — 700111 HCHG RX REV CODE 636 W/ 250 OVERRIDE (IP): Performed by: STUDENT IN AN ORGANIZED HEALTH CARE EDUCATION/TRAINING PROGRAM

## 2023-01-02 PROCEDURE — 99232 SBSQ HOSP IP/OBS MODERATE 35: CPT | Performed by: STUDENT IN AN ORGANIZED HEALTH CARE EDUCATION/TRAINING PROGRAM

## 2023-01-02 PROCEDURE — 700102 HCHG RX REV CODE 250 W/ 637 OVERRIDE(OP): Performed by: INTERNAL MEDICINE

## 2023-01-02 PROCEDURE — 84100 ASSAY OF PHOSPHORUS: CPT

## 2023-01-02 RX ORDER — HEPARIN SODIUM 1000 [USP'U]/ML
3800 INJECTION, SOLUTION INTRAVENOUS; SUBCUTANEOUS
Status: DISCONTINUED | OUTPATIENT
Start: 2023-01-02 | End: 2023-01-13 | Stop reason: HOSPADM

## 2023-01-02 RX ORDER — HEPARIN SODIUM 1000 [USP'U]/ML
INJECTION, SOLUTION INTRAVENOUS; SUBCUTANEOUS
Status: COMPLETED
Start: 2023-01-02 | End: 2023-01-02

## 2023-01-02 RX ADMIN — FUROSEMIDE 40 MG: 10 INJECTION, SOLUTION INTRAMUSCULAR; INTRAVENOUS at 17:08

## 2023-01-02 RX ADMIN — METOPROLOL TARTRATE 50 MG: 50 TABLET, FILM COATED ORAL at 05:42

## 2023-01-02 RX ADMIN — ASPIRIN 81 MG: 81 TABLET, COATED ORAL at 05:42

## 2023-01-02 RX ADMIN — ACETAMINOPHEN 650 MG: 325 TABLET, FILM COATED ORAL at 18:54

## 2023-01-02 RX ADMIN — APIXABAN 5 MG: 5 TABLET, FILM COATED ORAL at 16:55

## 2023-01-02 RX ADMIN — AMITRIPTYLINE HYDROCHLORIDE 10 MG: 10 TABLET, FILM COATED ORAL at 16:55

## 2023-01-02 RX ADMIN — HEPARIN SODIUM 3800 UNITS: 1000 INJECTION, SOLUTION INTRAVENOUS; SUBCUTANEOUS at 14:31

## 2023-01-02 RX ADMIN — FLUTICASONE PROPIONATE 88 MCG: 44 AEROSOL, METERED RESPIRATORY (INHALATION) at 05:47

## 2023-01-02 RX ADMIN — ATORVASTATIN CALCIUM 40 MG: 40 TABLET, FILM COATED ORAL at 20:17

## 2023-01-02 RX ADMIN — ACETAMINOPHEN 650 MG: 325 TABLET, FILM COATED ORAL at 11:21

## 2023-01-02 RX ADMIN — METOPROLOL TARTRATE 50 MG: 50 TABLET, FILM COATED ORAL at 16:55

## 2023-01-02 RX ADMIN — NICOTINE TRANSDERMAL SYSTEM 21 MG: 21 PATCH, EXTENDED RELEASE TRANSDERMAL at 05:43

## 2023-01-02 RX ADMIN — AMITRIPTYLINE HYDROCHLORIDE 10 MG: 10 TABLET, FILM COATED ORAL at 05:42

## 2023-01-02 RX ADMIN — FUROSEMIDE 40 MG: 10 INJECTION, SOLUTION INTRAMUSCULAR; INTRAVENOUS at 05:49

## 2023-01-02 RX ADMIN — NYSTATIN: 100000 POWDER TOPICAL at 05:55

## 2023-01-02 RX ADMIN — APIXABAN 5 MG: 5 TABLET, FILM COATED ORAL at 05:41

## 2023-01-02 ASSESSMENT — ENCOUNTER SYMPTOMS
BLURRED VISION: 0
COUGH: 0
BRUISES/BLEEDS EASILY: 0
NAUSEA: 0
SHORTNESS OF BREATH: 0
MYALGIAS: 0
FEVER: 0
DIZZINESS: 0
VOMITING: 0

## 2023-01-02 ASSESSMENT — PAIN DESCRIPTION - PAIN TYPE
TYPE: ACUTE PAIN

## 2023-01-02 ASSESSMENT — FIBROSIS 4 INDEX: FIB4 SCORE: 1.01

## 2023-01-02 NOTE — PROGRESS NOTES
"[facility] -  PROGRESS NOTE               Author: Lucas Cerda M.D. Date & Time: 1/2/2023  12:28 PM     HPI:  67 y.o. male with history of COPD dependent on 4 L, tobacco abuse, CAD, CHF, ALONDRA/OHS on bedtime CPAP, who presented 12/23/2022 with evaluations for progressive shortness of breath and lower extremities swelling.  CXR notable for bilateral vascular congestion with pulmonary edema. Patient was admitted to the hospital on 12/23 for CHD exacerbation and copd. No F/C/N/V. No melena, hematochezia, hematemesis.  No HA, visual changes, or abdominal pain.     DAILY NEPHROLOGY SUMMARY:  12/26: consult duque, s/p hd #1  12/27: due for HD #2, still anasarca and on O2   12/28: on hd now, tolerating well.   12/29: tolerated puf today , edema is better , he feels better   12/30: tolerated hd well. In negative balance   12/31: feeling better, no new issues.   1/1: sitting in the chair, LE edema is better, no new complaints. S/p TDC placement   1/2 Seen on HD No c/o, producing more urine 1665 cc/ 24 hrs     REVIEW OF SYSTEMS:    10 point ROS reviewed and is as per HPI/daily summary or otherwise negative    PMH/PSH/SH/FH:   Reviewed and unchanged since admission note    CURRENT MEDICATIONS:   Reviewed from admission to present day    VS:  /77   Pulse (!) 103   Temp 36.4 °C (97.5 °F) (Temporal)   Resp 17   Ht 1.778 m (5' 10\")   Wt (!) 139 kg (307 lb 1.6 oz)   SpO2 99% Comment: 4L nasal cannula   BMI 44.06 kg/m²     Physical Exam  Appearance: He is obese. He is ill-appearing.   HENT:      Head: Normocephalic and atraumatic.   Cardiovascular:      Rate and Rhythm: Normal rate. Rhythm irregular.      Comments: anasarca improved   Pulmonary:      Comments: CTA   Abdominal:      General: There is distension.      Tenderness: There is no abdominal tenderness.      Hernia: A hernia is present.   Musculoskeletal:         General: Swelling improved   Skin:     Coloration: Skin is pale. Skin " is not jaundiced.      Findings: No bruising.   Neurological:      General: No focal deficit present.      Mental Status: He is alert and oriented to person, place, and time.   Psychiatric:         Behavior: Behavior normal.  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

## 2023-01-02 NOTE — CARE PLAN
The patient is Stable - Low risk of patient condition declining or worsening    Shift Goals  Clinical Goals: Dialysis  Patient Goals: Comfort, rest  Family Goals: no family present    Progress made toward(s) clinical / shift goals:  Hemodialysis today, increase mobility    Patient is not progressing towards the following goals:      Problem: Knowledge Deficit - COPD  Goal: Patient/significant other demonstrates understanding of disease process, utilization of the Action Plan, medications and discharge instruction  Outcome: Progressing     Problem: Risk for Infection - COPD  Goal: Patient will remain free from signs and symptoms of infection  Outcome: Progressing     Problem: Nutrition - Advanced  Goal: Patient will display progressive weight gain toward goal have adequate food and fluid intake  Outcome: Progressing     Problem: Ineffective Airway Clearance  Goal: Patient will maintain patent airway with clear/clearing breath sounds  Outcome: Progressing     Problem: Self Care  Goal: Patient will have the ability to perform ADLs independently or with assistance (bathe, groom, dress, toilet and feed)  Outcome: Progressing     Problem: Hemodynamics  Goal: Patient's hemodynamics, fluid balance and neurologic status will be stable or improve  Outcome: Progressing

## 2023-01-02 NOTE — PROGRESS NOTES
Hospital Medicine Daily Progress Note    Date of Service  1/2/2023    Chief Complaint  Yoandy Peace is a 67 y.o. male admitted 12/23/2022 with shortness of breath    Hospital Course    67 y.o. male past medical history of tobacco dependence, COPD on home oxygen, CAD, CHF, obstructive sleep apnea who presented 12/23/2022 with progressive shortness of breath over the last 1 week.  .  Upon arrival to the ER he was given Solu-Medrol, bronchodilators and placed on BiPAP for respiratory failure secondary to CHF/COPD and admitted to ICU.  Patient was weaned off BiPAP and downgraded to telemetry floor.  Nephrology was consulted for worsening function.  Patient was started on hemodialysis.  Started on IV Lasix for pulmonary edema.  Cardiology was consulted for elevated troponin and chest pain during dialysis.  Started on therapeutic Lovenox for A. fib with RVR.  Nephrology following for hemodialysis.  Patient to  require long-term hemodialysis and SNF on discharge.    Interval Problem Update  12/27/2022    Currently on 5 L oxygen saturating over 90%  Labs noted with leukocytosis.  Low bicarbonate 18, high anion gap.   significantly elevated troponin.  Echo unremarkable    Currently on IV Lasix  On heparin  Nephrology following for hemodialysis  Cardiology on board    12/28/2022  Vitals remained stable  Continue to require 5 L oxygen  Noted significant lower extremity swelling  Leukocytosis in setting of steroid use  Evaluated by cardiology.  No plan for angiogram is unlikely ACS  Nephrology following for hemodialysis  Eventually need placement.   aware    12/29/2022  Stable on 5 L oxygen  Remained asymptomatic  Had session of dialysis today  Volume overload in setting of acute renal failure with no underlying heart disease.  Likely need long-term dialysis  Cardiology on board  Nephrology following for dialysis    12/30/2022  Now on baseline for oxygen saturating over 90  Patient will need long-term  dialysis  Nephrology following for dialysis  Continue on IV Lasix  Added Flexeril for back pain    12/31/2022  Vitals remained stable  Persistent leukocytosis likely in setting of steroid use  No concern of ongoing infection  Nephrology following for dialysis  Continue on IV Lasix    1/1/2023  Vitals remained stable.  Borderline hypotensive  Labs reviewed.  Leukocytosis improving  Nephrology following for dialysis  Decrease Lasix to 40 mg IV twice daily    1/2/2023  Vital stable  On IV Lasix for volume overload from renal failure  Nephrology following for hemodialysis  Need long-term dialysis and SNF on discharge   assisting    I have discussed this patient's plan of care and discharge plan at IDT rounds today with Case Management, Nursing, Nursing leadership, and other members of the IDT team.    Consultants/Specialty  cardiology    Code Status  DNAR/DNI    Disposition  Patient is not medically cleared for discharge.   Anticipate discharge to to home with close outpatient follow-up.  I have placed the appropriate orders for post-discharge needs.    Review of Systems  Review of Systems   Constitutional:  Negative for fever.   HENT:  Negative for hearing loss.    Eyes:  Negative for blurred vision.   Respiratory:  Negative for cough and shortness of breath.    Cardiovascular:  Negative for chest pain.   Gastrointestinal:  Negative for nausea and vomiting.   Genitourinary:  Negative for dysuria.   Musculoskeletal:  Negative for myalgias.   Skin:  Negative for rash.   Neurological:  Negative for dizziness.   Endo/Heme/Allergies:  Does not bruise/bleed easily.      Physical Exam  Temp:  [36.4 °C (97.5 °F)-37 °C (98.6 °F)] 36.4 °C (97.5 °F)  Pulse:  [] 103  Resp:  [16-17] 17  BP: ()/(61-77) 126/77  SpO2:  [95 %-100 %] 99 %    Physical Exam  Constitutional:       General: He is not in acute distress.     Appearance: He is obese. He is ill-appearing.   HENT:      Head: Normocephalic and atraumatic.       Right Ear: Tympanic membrane normal.      Left Ear: Tympanic membrane normal.      Nose: Nose normal.      Mouth/Throat:      Mouth: Mucous membranes are moist.   Eyes:      Extraocular Movements: Extraocular movements intact.      Pupils: Pupils are equal, round, and reactive to light.   Cardiovascular:      Rate and Rhythm: Normal rate and regular rhythm.      Pulses: Normal pulses.   Pulmonary:      Effort: Pulmonary effort is normal. No respiratory distress.   Abdominal:      General: Abdomen is flat. There is no distension.   Musculoskeletal:      Cervical back: Normal range of motion.      Right lower leg: Edema present.      Left lower leg: Edema present.   Skin:     General: Skin is warm.   Neurological:      General: No focal deficit present.      Mental Status: He is alert and oriented to person, place, and time. Mental status is at baseline.   Psychiatric:         Mood and Affect: Mood normal.       Fluids    Intake/Output Summary (Last 24 hours) at 1/2/2023 1459  Last data filed at 1/2/2023 0901  Gross per 24 hour   Intake 220 ml   Output 1375 ml   Net -1155 ml         Laboratory  Recent Labs     12/31/22  0124 01/01/23 0147 01/02/23 0214   WBC 18.1* 16.8* 15.5*   RBC 3.34* 3.37* 3.13*   HEMOGLOBIN 8.8* 8.8* 8.1*   HEMATOCRIT 28.3* 28.7* 26.8*   MCV 84.7 85.2 85.6   MCH 26.3* 26.1* 25.9*   MCHC 31.1* 30.7* 30.2*   RDW 47.4 47.7 48.5   PLATELETCT 358 362 316   MPV 10.3 10.4 10.4       Recent Labs     12/31/22  0124 01/01/23 0147 01/02/23 0214   SODIUM 137 135 136   POTASSIUM 4.0 3.9 4.1   CHLORIDE 97 96 97   CO2 28 25 27   GLUCOSE 162* 131* 134*   BUN 70* 77* 84*   CREATININE 3.32* 3.77* 4.14*   CALCIUM 8.0* 8.0* 7.7*                       Imaging  IR-RAMÓN WIN PLACEMENT >5   Final Result      1. Ultrasound and fluoroscopic guided placement of a right internal jugular 14.5 [place] HemoSplit tunneled dialysis catheter.      2. The hemodialysis catheter may be used immediately as clinically  indicated. Flushes per protocol.      3. Removal of the right temporary dialysis catheter.      DX-CHEST-PORTABLE (1 VIEW)   Final Result      1.  Ongoing pulmonary edema and bilateral pleural effusions.   2.  Cardiomegaly.   3.  Left lower lobe atelectasis or consolidation. There is also likely some component of compressive atelectasis of the right lung base considering the presence of significant pleural effusion.   4.  Interval placement of temporary non-tunneled hemodialysis catheter with no evidence of pneumothorax.      DX-CHEST-PORTABLE (1 VIEW)   Final Result         1. No significant interval change.      US-RENAL   Final Result      1.  Echogenic renal parenchyma bilaterally could relate to medical renal disease.      2.  No hydronephrosis. No renal calculus.      DX-CHEST-PORTABLE (1 VIEW)   Final Result      1.  Enlarged cardiac silhouette and small amounts of bilateral pleural fluid with diffuse interstitial opacities most consistent with pulmonary edema/CHF.   2.  Bibasilar opacities could be due to edema, atelectasis or pneumonitis.   3.  There is atherosclerosis.   4.  Questionable linear calcific plaque in the proximal descending thoracic aorta versus some other device intrinsic or extrinsic to the patient.      EC-ECHOCARDIOGRAM COMPLETE W/ CONT   Final Result      US-EXTREMITY VENOUS LOWER BILAT   Final Result      DX-CHEST-PORTABLE (1 VIEW)   Final Result      1.  Cardiomegaly with interstitial infiltrates likely representing pulmonary edema.      2.  Bibasilar atelectasis and small bilateral pleural effusions.             Assessment/Plan  * Acute renal failure (ARF) ([facility])  Assessment & Plan  Started on hemodialysis  Nephrology following    Elevated troponin  Assessment & Plan  Trop 473>>459>>404  BNP 6487  Likely secondary to CHF  ASA/statin  Echo unremarkable  Cardiology following    Bilateral leg edema- (present on admission)  Assessment & Plan  Likely due to volume overload.  Echo  pending  Diuresis  Bilateral lower extremity DVT studies negative    Acute exacerbation of chronic obstructive pulmonary disease (COPD) ([facility])  Assessment & Plan  Resolving on oxygen at 6 L.  In no distress, no accessory muscle use, able to complete full sentences.  Patient downgraded from ICU.  No longer requiring rescue BiPAP.  Continue with Flovent and Anoro  Taper Solu-Medrol to 40 mg every 12 hours  Duo nebs as needed  Continue with oxygen supplementation to obtain SPO2 >88-90%    Acute pulmonary edema (HCC)- (present on admission)  Assessment & Plan  Resolving  Repeat chest x-ray in a.m.  Continue with diuresis  Patient is normotensive.  Hold ARB at this time.  Echo pending     Hypertension  Assessment & Plan  Amlodipine 10 mg daily   Lasix 60 mg BID     Tobacco abuse- (present on admission)  Assessment & Plan  Nicotine replacement as needed    ACP (advance care planning)  Assessment & Plan  Full code.  Discussed with patient in front of RN.    Class 3 severe obesity in adult ([facility])  Assessment & Plan  BMI 52.89     ALONDRA on CPAP- (present on admission)  Assessment & Plan  CPAP at bedtime      Acute on chronic respiratory failure with hypoxia ([facility])  Assessment & Plan  Dependent on 4 L.  Currently requiring 6 L in no distress  Diuresis with IV Lasix  Nebs/pulm toilet  Wean off Solu-Medrol as tolerated    CAD (coronary artery disease)  Assessment & Plan  ASA/statin    DM (diabetes mellitus) ([facility])  Assessment & Plan  Continue with SSI   On Farxiga, insulin NPH at home, and linagliptin           VTE prophylaxis: SCDs/TEDs and therapeutic anticoagulation with Heparin    I have performed a physical exam and reviewed and updated ROS and Plan today (1/2/2023). In review of yesterday's note (1/1/2023), there are no changes except as documented above.

## 2023-01-02 NOTE — CARE PLAN
The patient is Stable - Low risk of patient condition declining or worsening    Shift Goals  Clinical Goals: dialysis; strict I/O;  Patient Goals: comfort and sleep  Family Goals: no family present    Progress made toward(s) clinical / shift goals:    Problem: Care Map:  Day Before Discharge Optimal Outcome for the Heart Failure Patient  Goal: Day Before Discharge:  Optimal Care of the heart failure patient  Outcome: Progressing     Problem: Knowledge Deficit - Standard  Goal: Patient and family/care givers will demonstrate understanding of plan of care, disease process/condition, diagnostic tests and medications  Outcome: Progressing     Problem: Risk for Infection - COPD  Goal: Patient will remain free from signs and symptoms of infection  Outcome: Progressing  Note: WBC is trending down.     Problem: Pain - Standard  Goal: Alleviation of pain or a reduction in pain to the patient’s comfort goal  Outcome: Progressing  Note: pain assessment and reassessment performed.     Problem: Fall Risk  Goal: Patient will remain free from falls  Outcome: Progressing  Note: Fall precaution in place. Patient uses call light for assistance       Patient is not progressing towards the following goals:

## 2023-01-02 NOTE — CARE PLAN
The patient is Stable - Low risk of patient condition declining or worsening    Shift Goals  Clinical Goals: dialysis, monitor I/O  Patient Goals: Comfort  Family Goals: no family present    Progress made toward(s) clinical / shift goals:  Diuresis, up to the chair for meals, monitor intake and output.     Patient is not progressing towards the following goals:      Problem: Knowledge Deficit - Standard  Goal: Patient and family/care givers will demonstrate understanding of plan of care, disease process/condition, diagnostic tests and medications  Outcome: Progressing     Problem: Risk for Aspiration  Goal: Patient's risk for aspiration will be absent or decrease  Outcome: Progressing  Flowsheets  Taken 1/1/2023 1807  Aspiration Prevention:   Assisted patient up to chair for meals   Assessed for signs and symptoms of aspiration  Taken 1/1/2023 0900  Head of Bed Elevated: Self regulated     Problem: Self Care  Goal: Patient will have the ability to perform ADLs independently or with assistance (bathe, groom, dress, toilet and feed)  Outcome: Progressing

## 2023-01-03 LAB
ANION GAP SERPL CALC-SCNC: 10 MMOL/L (ref 7–16)
BUN SERPL-MCNC: 50 MG/DL (ref 8–22)
CALCIUM SERPL-MCNC: 7.8 MG/DL (ref 8.5–10.5)
CHLORIDE SERPL-SCNC: 100 MMOL/L (ref 96–112)
CO2 SERPL-SCNC: 28 MMOL/L (ref 20–33)
CREAT SERPL-MCNC: 3.13 MG/DL (ref 0.5–1.4)
ERYTHROCYTE [DISTWIDTH] IN BLOOD BY AUTOMATED COUNT: 50.4 FL (ref 35.9–50)
FERRITIN SERPL-MCNC: 68.6 NG/ML (ref 22–322)
GFR SERPLBLD CREATININE-BSD FMLA CKD-EPI: 21 ML/MIN/1.73 M 2
GLUCOSE BLD STRIP.AUTO-MCNC: 137 MG/DL (ref 65–99)
GLUCOSE BLD STRIP.AUTO-MCNC: 139 MG/DL (ref 65–99)
GLUCOSE BLD STRIP.AUTO-MCNC: 142 MG/DL (ref 65–99)
GLUCOSE BLD STRIP.AUTO-MCNC: 147 MG/DL (ref 65–99)
GLUCOSE BLD STRIP.AUTO-MCNC: 171 MG/DL (ref 65–99)
GLUCOSE SERPL-MCNC: 134 MG/DL (ref 65–99)
HCT VFR BLD AUTO: 26.5 % (ref 42–52)
HGB BLD-MCNC: 8.1 G/DL (ref 14–18)
IRON SATN MFR SERPL: 13 % (ref 15–55)
IRON SERPL-MCNC: 29 UG/DL (ref 50–180)
MAGNESIUM SERPL-MCNC: 2 MG/DL (ref 1.5–2.5)
MCH RBC QN AUTO: 26.7 PG (ref 27–33)
MCHC RBC AUTO-ENTMCNC: 30.6 G/DL (ref 33.7–35.3)
MCV RBC AUTO: 87.5 FL (ref 81.4–97.8)
PHOSPHATE SERPL-MCNC: 4.3 MG/DL (ref 2.5–4.5)
PLATELET # BLD AUTO: 315 K/UL (ref 164–446)
PMV BLD AUTO: 10.4 FL (ref 9–12.9)
POTASSIUM SERPL-SCNC: 4.2 MMOL/L (ref 3.6–5.5)
RBC # BLD AUTO: 3.03 M/UL (ref 4.7–6.1)
SODIUM SERPL-SCNC: 138 MMOL/L (ref 135–145)
TIBC SERPL-MCNC: 222 UG/DL (ref 250–450)
UIBC SERPL-MCNC: 193 UG/DL (ref 110–370)
WBC # BLD AUTO: 15.7 K/UL (ref 4.8–10.8)

## 2023-01-03 PROCEDURE — A9270 NON-COVERED ITEM OR SERVICE: HCPCS | Performed by: STUDENT IN AN ORGANIZED HEALTH CARE EDUCATION/TRAINING PROGRAM

## 2023-01-03 PROCEDURE — 85027 COMPLETE CBC AUTOMATED: CPT

## 2023-01-03 PROCEDURE — 83735 ASSAY OF MAGNESIUM: CPT

## 2023-01-03 PROCEDURE — 83540 ASSAY OF IRON: CPT

## 2023-01-03 PROCEDURE — 700102 HCHG RX REV CODE 250 W/ 637 OVERRIDE(OP): Performed by: INTERNAL MEDICINE

## 2023-01-03 PROCEDURE — 700111 HCHG RX REV CODE 636 W/ 250 OVERRIDE (IP): Performed by: STUDENT IN AN ORGANIZED HEALTH CARE EDUCATION/TRAINING PROGRAM

## 2023-01-03 PROCEDURE — 700102 HCHG RX REV CODE 250 W/ 637 OVERRIDE(OP): Performed by: STUDENT IN AN ORGANIZED HEALTH CARE EDUCATION/TRAINING PROGRAM

## 2023-01-03 PROCEDURE — A9270 NON-COVERED ITEM OR SERVICE: HCPCS | Performed by: INTERNAL MEDICINE

## 2023-01-03 PROCEDURE — 83550 IRON BINDING TEST: CPT

## 2023-01-03 PROCEDURE — 36415 COLL VENOUS BLD VENIPUNCTURE: CPT

## 2023-01-03 PROCEDURE — 82962 GLUCOSE BLOOD TEST: CPT | Mod: 91

## 2023-01-03 PROCEDURE — 84100 ASSAY OF PHOSPHORUS: CPT

## 2023-01-03 PROCEDURE — 770020 HCHG ROOM/CARE - TELE (206)

## 2023-01-03 PROCEDURE — 700105 HCHG RX REV CODE 258: Performed by: INTERNAL MEDICINE

## 2023-01-03 PROCEDURE — 80048 BASIC METABOLIC PNL TOTAL CA: CPT

## 2023-01-03 PROCEDURE — 99232 SBSQ HOSP IP/OBS MODERATE 35: CPT | Performed by: HOSPITALIST

## 2023-01-03 PROCEDURE — 82728 ASSAY OF FERRITIN: CPT

## 2023-01-03 PROCEDURE — 700111 HCHG RX REV CODE 636 W/ 250 OVERRIDE (IP): Performed by: INTERNAL MEDICINE

## 2023-01-03 RX ADMIN — DOCUSATE SODIUM 50 MG AND SENNOSIDES 8.6 MG 2 TABLET: 8.6; 5 TABLET, FILM COATED ORAL at 05:17

## 2023-01-03 RX ADMIN — SODIUM CHLORIDE 250 MG: 9 INJECTION, SOLUTION INTRAVENOUS at 13:08

## 2023-01-03 RX ADMIN — ASPIRIN 81 MG: 81 TABLET, COATED ORAL at 05:15

## 2023-01-03 RX ADMIN — NICOTINE TRANSDERMAL SYSTEM 21 MG: 21 PATCH, EXTENDED RELEASE TRANSDERMAL at 05:17

## 2023-01-03 RX ADMIN — ATORVASTATIN CALCIUM 40 MG: 40 TABLET, FILM COATED ORAL at 21:32

## 2023-01-03 RX ADMIN — METOPROLOL TARTRATE 50 MG: 50 TABLET, FILM COATED ORAL at 05:16

## 2023-01-03 RX ADMIN — AMITRIPTYLINE HYDROCHLORIDE 10 MG: 10 TABLET, FILM COATED ORAL at 05:15

## 2023-01-03 RX ADMIN — FLUTICASONE PROPIONATE 88 MCG: 44 AEROSOL, METERED RESPIRATORY (INHALATION) at 05:19

## 2023-01-03 RX ADMIN — APIXABAN 5 MG: 5 TABLET, FILM COATED ORAL at 17:03

## 2023-01-03 RX ADMIN — FUROSEMIDE 40 MG: 10 INJECTION, SOLUTION INTRAMUSCULAR; INTRAVENOUS at 05:16

## 2023-01-03 RX ADMIN — ACETAMINOPHEN 650 MG: 325 TABLET, FILM COATED ORAL at 19:27

## 2023-01-03 RX ADMIN — METOPROLOL TARTRATE 25 MG: 25 TABLET, FILM COATED ORAL at 17:03

## 2023-01-03 RX ADMIN — FUROSEMIDE 40 MG: 10 INJECTION, SOLUTION INTRAMUSCULAR; INTRAVENOUS at 17:04

## 2023-01-03 RX ADMIN — AMITRIPTYLINE HYDROCHLORIDE 10 MG: 10 TABLET, FILM COATED ORAL at 17:03

## 2023-01-03 RX ADMIN — APIXABAN 5 MG: 5 TABLET, FILM COATED ORAL at 05:15

## 2023-01-03 RX ADMIN — ACETAMINOPHEN 650 MG: 325 TABLET, FILM COATED ORAL at 11:24

## 2023-01-03 ASSESSMENT — ENCOUNTER SYMPTOMS
NAUSEA: 0
FEVER: 0
SHORTNESS OF BREATH: 0
MYALGIAS: 0
BLURRED VISION: 0
COUGH: 0
VOMITING: 0
DIZZINESS: 0
BRUISES/BLEEDS EASILY: 0

## 2023-01-03 ASSESSMENT — FIBROSIS 4 INDEX: FIB4 SCORE: 1.02

## 2023-01-03 ASSESSMENT — PAIN DESCRIPTION - PAIN TYPE
TYPE: ACUTE PAIN;CHRONIC PAIN
TYPE: ACUTE PAIN
TYPE: ACUTE PAIN;CHRONIC PAIN

## 2023-01-03 NOTE — CARE PLAN
"The patient is Stable - Low risk of patient condition declining or worsening    Shift Goals  Clinical Goals: Monitor I/Os, labs, & vitals  Patient Goals: \"Get out fo here\"  Family Goals: no family present    Progress made toward(s) clinical / shift goals:        Problem: Knowledge Deficit - Standard  Goal: Patient and family/care givers will demonstrate understanding of plan of care, disease process/condition, diagnostic tests and medications  Outcome: Progressing  Note: Patient verbally demonstrates understanding of POC and disease process. All patient questions answered.     Problem: Pain - Standard  Goal: Alleviation of pain or a reduction in pain to the patient’s comfort goal  Outcome: Progressing  Note: Patient reports current pain management is working well. Utilizing 0-10 pain scale.         "

## 2023-01-03 NOTE — PROGRESS NOTES
"[facility] -  PROGRESS NOTE               Author: Lucas Cerda M.D. Date & Time: 1/3/2023  10:30 AM     HPI:  67 y.o. male with history of COPD dependent on 4 L, tobacco abuse, CAD, CHF, ALONDRA/OHS on bedtime CPAP, who presented 12/23/2022 with evaluations for progressive shortness of breath and lower extremities swelling.  CXR notable for bilateral vascular congestion with pulmonary edema. Patient was admitted to the hospital on 12/23 for CHD exacerbation and copd. No F/C/N/V. No melena, hematochezia, hematemesis.  No HA, visual changes, or abdominal pain.     DAILY NEPHROLOGY SUMMARY:  12/26: consult duque, s/p hd #1  12/27: due for HD #2, still anasarca and on O2   12/28: on hd now, tolerating well.   12/29: tolerated puf today , edema is better , he feels better   12/30: tolerated hd well. In negative balance   12/31: feeling better, no new issues.   1/1: sitting in the chair, LE edema is better, no new complaints. S/p TDC placement   1/2 Seen on HD No c/o, producing more urine 1665 cc/ 24 hrs   1/3 Feeling better overall, breathing improved, HD yesterday net UF 1 L   UOP ~ 1L / 24 hrs , UF goal decreased due to soft BP     REVIEW OF SYSTEMS:    10 point ROS reviewed and is as per HPI/daily summary or otherwise negative    PMH/PSH/SH/FH:   Reviewed and unchanged since admission note    CURRENT MEDICATIONS:   Reviewed from admission to present day    VS:  /61   Pulse 83   Temp 36.6 °C (97.9 °F) (Temporal)   Resp 17   Ht 1.778 m (5' 10\")   Wt (!) 139 kg (307 lb 1.6 oz)   SpO2 100% Comment: 4L on nasal cannula   BMI 44.06 kg/m²     Physical Exam  Appearance: He is obese. NAD  HENT:      Head: Normocephalic and atraumatic.   Cardiovascular:      Rate and Rhythm: Normal rate. Rhythm irregular.      Comments: anasarca improved   Pulmonary:      Comments: CTA   Abdominal:      General: There is distension.      Tenderness: There is no abdominal tenderness.      Hernia: A hernia " is present.   Musculoskeletal:         General: BL lower ext edema +   Skin:     Coloration: Skin is pale. Skin is not jaundiced.      Findings: No bruising.   Neurological:      General: No focal deficit present.      Mental Status: He is alert and oriented to person, place, and time.   Psychiatric:         Behavior: Behavior normal.  Fluids:  In: 740 [P.O.:240; Dialysis:500]  Out: 2605     LABS:  Recent Labs     01/01/23  0147 01/02/23  0214 01/03/23  0221   SODIUM 135 136 138   POTASSIUM 3.9 4.1 4.2   CHLORIDE 96 97 100   CO2 25 27 28   GLUCOSE 131* 134* 134*   BUN 77* 84* 50*   CREATININE 3.77* 4.14* 3.13*   CALCIUM 8.0* 7.7* 7.8*         IMAGING:   All imaging reviewed from admission to present day    IMPRESSION:  # FARIHA on CKD   - No hydro, UA benign    - Dialysis  dependent   - Renal recovery uncertain   # CKD 2/2 DM, HTN  - Unclear baseline Cr , Cr 3.31 on admission   # Hyperkalemia resolved   # Hyponatremia resolved   # Acidosis   # Acute respiratory failure 2/2 COPD/CHF   # Leukocytosis   # Anemia resolved   - low iron stores % sat 13, ferritin 68   # Hyperphosphatemia resolved   # ALONDRA on CPAP  # CAD   # DM-2   # Obesity   # Volume overload        PLAN:  - No HD today   - Continue HD MWF to optimize volume status   - Monitor for renal recovery   - Decrease metoprolol 25 mg bid   - Renal diet   - Fluid restriction 1.5 L   - IV iron replacement   - Strict IO  - Dose all meds per eGFR < 15  - CM to assist in arranging in HD chair

## 2023-01-03 NOTE — DISCHARGE PLANNING
Case Management Discharge Planning    Admission Date: 12/23/2022  GMLOS: 3.9  ALOS: 11    6-Clicks ADL Score: 16  6-Clicks Mobility Score: 17  PT and/or OT Eval ordered: Yes  Post-acute Referrals Ordered: No  Post-acute Choice Obtained: Yes  Has referral(s) been sent to post-acute provider:  Yes      Anticipated Discharge Dispo: Discharge Disposition: D/T to home under HHA care in anticipation of covered skilled care (06)    DME Needed: No    Action(s) Taken: Pt is still pending outpatient HD chair. PT/OT recommending post acute placement, however dialysis will likely be a barrier to SNF placement. IRF may be able to accept pt with dialysis if pt qualifies. PAULW made phone call to dialysis coordinator Lucila and left  with callback request.    Addendum @[id number]  LSW received call back from Lucila who reported pt does not have a confirmed chair time yet, however she has been following up with the dialysis center.    Escalations Completed: None    Medically Clear: No    Next Steps: Care coordination will f/u with SNFs pending confirmed dialysis chair    Barriers to Discharge: Medical clearance and Pending Placement    Is the patient up for discharge tomorrow: No

## 2023-01-03 NOTE — PROGRESS NOTES
Hospital Medicine Daily Progress Note    Date of Service  1/3/2023    Chief Complaint  Yoandy Peace is a 67 y.o. male admitted 12/23/2022 with shortness of breath    Hospital Course    67 y.o. male past medical history of tobacco dependence, COPD on 4L home oxygen, CAD, CHF, obstructive sleep apnea who presented 12/23/2022 with progressive shortness of breath. He was admitted for acute hypoxic respiratory failure secondary to CHF and COPD.   He was given Solu-Medrol, bronchodilators and placed on BiPAP and admitted to ICU.  Patient was weaned off BiPAP.  Nephrology was consulted for worsening function.  Patient was started on hemodialysis and IV Lasix for pulmonary edema.  Cardiology was consulted for elevated troponin and chest pain during dialysis.  Started on therapeutic Lovenox for A. fib with RVR and transitioned to eliquis.  Nephrology following for hemodialysis. SNF on discharge.    Interval Problem Update  KARI overnight  Pt for dialysis tomorrow  Pt is feeling ok, denies any new complaitns  On 4.5L oxygen this AM      I have discussed this patient's plan of care and discharge plan at IDT rounds today with Case Management, Nursing, Nursing leadership, and other members of the IDT team.    Consultants/Specialty  cardiology, critical care, nephrology, palliative care, pulmonary, and vascular surgery    Code Status  DNAR/DNI    Disposition  Patient is not medically cleared for discharge.   Anticipate discharge to to home with close outpatient follow-up.  I have placed the appropriate orders for post-discharge needs.    Review of Systems  Review of Systems   Constitutional:  Negative for fever.   HENT:  Negative for hearing loss.    Eyes:  Negative for blurred vision.   Respiratory:  Negative for cough and shortness of breath.    Cardiovascular:  Negative for chest pain.   Gastrointestinal:  Negative for nausea and vomiting.   Genitourinary:  Negative for dysuria.   Musculoskeletal:  Negative for myalgias.    Skin:  Negative for rash.   Neurological:  Negative for dizziness.   Endo/Heme/Allergies:  Does not bruise/bleed easily.      Physical Exam  Temp:  [35.9 °C (96.7 °F)-36.8 °C (98.2 °F)] 36.6 °C (97.9 °F)  Pulse:  [] 83  Resp:  [17-19] 17  BP: ()/(50-77) 111/61  SpO2:  [99 %-100 %] 100 %    Physical Exam  Constitutional:       General: He is not in acute distress.     Appearance: He is obese. He is ill-appearing.   HENT:      Head: Normocephalic and atraumatic.      Right Ear: Tympanic membrane normal.      Left Ear: Tympanic membrane normal.      Nose: Nose normal.      Mouth/Throat:      Mouth: Mucous membranes are moist.   Eyes:      Extraocular Movements: Extraocular movements intact.      Pupils: Pupils are equal, round, and reactive to light.   Cardiovascular:      Rate and Rhythm: Normal rate and regular rhythm.      Pulses: Normal pulses.   Pulmonary:      Effort: Pulmonary effort is normal. No respiratory distress.   Abdominal:      General: Abdomen is flat. There is no distension.   Musculoskeletal:      Cervical back: Normal range of motion.      Right lower leg: Edema present.      Left lower leg: Edema present.   Skin:     General: Skin is warm.   Neurological:      General: No focal deficit present.      Mental Status: He is alert and oriented to person, place, and time. Mental status is at baseline.   Psychiatric:         Mood and Affect: Mood normal.       Fluids    Intake/Output Summary (Last 24 hours) at 1/3/2023 0857  Last data filed at 1/3/2023 0841  Gross per 24 hour   Intake 940 ml   Output 2455 ml   Net -1515 ml         Laboratory  Recent Labs     01/01/23  0147 01/02/23  0214 01/03/23  0221   WBC 16.8* 15.5* 15.7*   RBC 3.37* 3.13* 3.03*   HEMOGLOBIN 8.8* 8.1* 8.1*   HEMATOCRIT 28.7* 26.8* 26.5*   MCV 85.2 85.6 87.5   MCH 26.1* 25.9* 26.7*   MCHC 30.7* 30.2* 30.6*   RDW 47.7 48.5 50.4*   PLATELETCT 362 316 315   MPV 10.4 10.4 10.4       Recent Labs     01/01/23  0147  01/02/23  0214 01/03/23  0221   SODIUM 135 136 138   POTASSIUM 3.9 4.1 4.2   CHLORIDE 96 97 100   CO2 25 27 28   GLUCOSE 131* 134* 134*   BUN 77* 84* 50*   CREATININE 3.77* 4.14* 3.13*   CALCIUM 8.0* 7.7* 7.8*                       Imaging  IR-WIN,GROSHONG PLACEMENT >5   Final Result      1. Ultrasound and fluoroscopic guided placement of a right internal jugular 14.5 [place] HemoSplit tunneled dialysis catheter.      2. The hemodialysis catheter may be used immediately as clinically indicated. Flushes per protocol.      3. Removal of the right temporary dialysis catheter.      DX-CHEST-PORTABLE (1 VIEW)   Final Result      1.  Ongoing pulmonary edema and bilateral pleural effusions.   2.  Cardiomegaly.   3.  Left lower lobe atelectasis or consolidation. There is also likely some component of compressive atelectasis of the right lung base considering the presence of significant pleural effusion.   4.  Interval placement of temporary non-tunneled hemodialysis catheter with no evidence of pneumothorax.      DX-CHEST-PORTABLE (1 VIEW)   Final Result         1. No significant interval change.      US-RENAL   Final Result      1.  Echogenic renal parenchyma bilaterally could relate to medical renal disease.      2.  No hydronephrosis. No renal calculus.      DX-CHEST-PORTABLE (1 VIEW)   Final Result      1.  Enlarged cardiac silhouette and small amounts of bilateral pleural fluid with diffuse interstitial opacities most consistent with pulmonary edema/CHF.   2.  Bibasilar opacities could be due to edema, atelectasis or pneumonitis.   3.  There is atherosclerosis.   4.  Questionable linear calcific plaque in the proximal descending thoracic aorta versus some other device intrinsic or extrinsic to the patient.      EC-ECHOCARDIOGRAM COMPLETE W/ CONT   Final Result      US-EXTREMITY VENOUS LOWER BILAT   Final Result      DX-CHEST-PORTABLE (1 VIEW)   Final Result      1.  Cardiomegaly with interstitial infiltrates likely  representing pulmonary edema.      2.  Bibasilar atelectasis and small bilateral pleural effusions.             Assessment/Plan  * Acute renal failure (ARF) ([facility])  Assessment & Plan  Started on hemodialysis  Nephrology following    Elevated troponin  Assessment & Plan  Trop 473>>459>>404  BNP 6487  Likely secondary to CHF  ASA/statin  Echo unremarkable  Cardiology following    Bilateral leg edema- (present on admission)  Assessment & Plan  Likely due to volume overload.  Echo pending  Diuresis  Bilateral lower extremity DVT studies negative    Acute exacerbation of chronic obstructive pulmonary disease (COPD) ([facility])  Assessment & Plan  Resolving on oxygen at 6 L.  In no distress, no accessory muscle use, able to complete full sentences.  Patient downgraded from ICU.  No longer requiring rescue BiPAP.  Continue with Flovent and Anoro  Taper Solu-Medrol to 40 mg every 12 hours  Duo nebs as needed  Continue with oxygen supplementation to obtain SPO2 >88-90%    Acute pulmonary edema ([facility])- (present on admission)  Assessment & Plan  Resolving  Repeat chest x-ray in a.m.  Continue with diuresis  Patient is normotensive.  Hold ARB at this time.  Echo pending     Hypertension  Assessment & Plan  Amlodipine 10 mg daily   Lasix 60 mg BID     Tobacco abuse- (present on admission)  Assessment & Plan  Nicotine replacement as needed    ACP (advance care planning)  Assessment & Plan  Full code.  Discussed with patient in front of RN.    Class 3 severe obesity in adult ([facility])  Assessment & Plan  BMI 52.89     ALONDRA on CPAP- (present on admission)  Assessment & Plan  CPAP at bedtime      Acute on chronic respiratory failure with hypoxia ([facility])  Assessment & Plan  Dependent on 4 L.  Currently requiring 6 L in no distress  Diuresis with IV Lasix  Nebs/pulm toilet  Wean off Solu-Medrol as tolerated    CAD (coronary artery disease)  Assessment & Plan  ASA/statin    DM (diabetes mellitus) ([facility])  Assessment & Plan  Continue with SSI   On  Farxiga, insulin NPH at home, and linagliptin           VTE prophylaxis: SCDs/TEDs and therapeutic anticoagulation with eliquis    I have performed a physical exam and reviewed and updated ROS and Plan today (1/3/2023). In review of yesterday's note (1/2/2023), there are no changes except as documented above.

## 2023-01-03 NOTE — PROGRESS NOTES
Assumed care of patient, received bedside report from NOC RN. Patient is A&O X 4. Pain 0/10. Vital signs stable overnight, on 4.5L NC. On tele monitor, afib 87. POC discussed with patient and he verbalized understanding. Call light within reach and fall precautions in place. Bed locked and in lowest position.

## 2023-01-03 NOTE — CARE PLAN
The patient is Stable - Low risk of patient condition declining or worsening    Shift Goals  Clinical Goals: hemodynamic stability  Patient Goals: comfort, rest, and sleep  Family Goals: no family present    Progress made toward(s) clinical / shift goals:    Problem: Care Map:  Day Before Discharge Optimal Outcome for the Heart Failure Patient  Goal: Day Before Discharge:  Optimal Care of the heart failure patient  Outcome: Progressing     Problem: Knowledge Deficit - Standard  Goal: Patient and family/care givers will demonstrate understanding of plan of care, disease process/condition, diagnostic tests and medications  Outcome: Progressing     Problem: Hemodynamics  Goal: Patient's hemodynamics, fluid balance and neurologic status will be stable or improve  Outcome: Progressing       Patient is not progressing towards the following goals:

## 2023-01-03 NOTE — DISCHARGE PLANNING
[facility] Transitional [facility] Coordination    Referral from:  Dr. Chan  Insurance Provider on Facesheet:  [facility] Medicare HMO/Medicaid FFS  Potential Rehab diagnosis: Cardiac Debility    Chart review indicates patient has ongoing medical management and may have therapy needs to possibly meet inpatient rehab facility criteria with the goal of returning to community.      D/C Support: To be determined - Brother and/or Girlfriend    Physiatry consult pended while waiting for additional information.  Would welcome PT/OT updates.  Volate message to SHAYNE Wilks regarding therapy need.  TCC will follow.  Please reach out sooner if PMR consult requested for medical management  .  Last Covid test:  12/23/2022 not detected    Thank you for the referral.

## 2023-01-03 NOTE — PROGRESS NOTES
Hemodialysis ordered by Dr. Cerda. Treatment started at 1144 and ended at 1444. Pt stable, vss, no c/o post tx. Net UF 1.0 L. Report to STEPHON Odonnell RN.

## 2023-01-04 LAB
ANION GAP SERPL CALC-SCNC: 10 MMOL/L (ref 7–16)
BUN SERPL-MCNC: 63 MG/DL (ref 8–22)
CALCIUM SERPL-MCNC: 7.8 MG/DL (ref 8.5–10.5)
CHLORIDE SERPL-SCNC: 100 MMOL/L (ref 96–112)
CO2 SERPL-SCNC: 27 MMOL/L (ref 20–33)
CREAT SERPL-MCNC: 3.62 MG/DL (ref 0.5–1.4)
ERYTHROCYTE [DISTWIDTH] IN BLOOD BY AUTOMATED COUNT: 51.7 FL (ref 35.9–50)
GFR SERPLBLD CREATININE-BSD FMLA CKD-EPI: 17 ML/MIN/1.73 M 2
GLUCOSE BLD STRIP.AUTO-MCNC: 117 MG/DL (ref 65–99)
GLUCOSE BLD STRIP.AUTO-MCNC: 141 MG/DL (ref 65–99)
GLUCOSE BLD STRIP.AUTO-MCNC: 158 MG/DL (ref 65–99)
GLUCOSE SERPL-MCNC: 164 MG/DL (ref 65–99)
HCT VFR BLD AUTO: 24.9 % (ref 42–52)
HGB BLD-MCNC: 7.5 G/DL (ref 14–18)
MAGNESIUM SERPL-MCNC: 1.9 MG/DL (ref 1.5–2.5)
MCH RBC QN AUTO: 26.3 PG (ref 27–33)
MCHC RBC AUTO-ENTMCNC: 30.1 G/DL (ref 33.7–35.3)
MCV RBC AUTO: 87.4 FL (ref 81.4–97.8)
PHOSPHATE SERPL-MCNC: 4.3 MG/DL (ref 2.5–4.5)
PLATELET # BLD AUTO: 290 K/UL (ref 164–446)
PMV BLD AUTO: 10.4 FL (ref 9–12.9)
POTASSIUM SERPL-SCNC: 4.3 MMOL/L (ref 3.6–5.5)
RBC # BLD AUTO: 2.85 M/UL (ref 4.7–6.1)
SODIUM SERPL-SCNC: 137 MMOL/L (ref 135–145)
WBC # BLD AUTO: 15.9 K/UL (ref 4.8–10.8)

## 2023-01-04 PROCEDURE — 97602 WOUND(S) CARE NON-SELECTIVE: CPT

## 2023-01-04 PROCEDURE — 94760 N-INVAS EAR/PLS OXIMETRY 1: CPT

## 2023-01-04 PROCEDURE — 84100 ASSAY OF PHOSPHORUS: CPT

## 2023-01-04 PROCEDURE — 97116 GAIT TRAINING THERAPY: CPT

## 2023-01-04 PROCEDURE — 700102 HCHG RX REV CODE 250 W/ 637 OVERRIDE(OP): Performed by: STUDENT IN AN ORGANIZED HEALTH CARE EDUCATION/TRAINING PROGRAM

## 2023-01-04 PROCEDURE — 80048 BASIC METABOLIC PNL TOTAL CA: CPT

## 2023-01-04 PROCEDURE — 700105 HCHG RX REV CODE 258: Performed by: INTERNAL MEDICINE

## 2023-01-04 PROCEDURE — 85027 COMPLETE CBC AUTOMATED: CPT

## 2023-01-04 PROCEDURE — 83735 ASSAY OF MAGNESIUM: CPT

## 2023-01-04 PROCEDURE — 90935 HEMODIALYSIS ONE EVALUATION: CPT

## 2023-01-04 PROCEDURE — A9270 NON-COVERED ITEM OR SERVICE: HCPCS | Performed by: STUDENT IN AN ORGANIZED HEALTH CARE EDUCATION/TRAINING PROGRAM

## 2023-01-04 PROCEDURE — 97530 THERAPEUTIC ACTIVITIES: CPT

## 2023-01-04 PROCEDURE — 82962 GLUCOSE BLOOD TEST: CPT | Mod: 91

## 2023-01-04 PROCEDURE — 700102 HCHG RX REV CODE 250 W/ 637 OVERRIDE(OP): Performed by: INTERNAL MEDICINE

## 2023-01-04 PROCEDURE — 700111 HCHG RX REV CODE 636 W/ 250 OVERRIDE (IP): Performed by: INTERNAL MEDICINE

## 2023-01-04 PROCEDURE — 700111 HCHG RX REV CODE 636 W/ 250 OVERRIDE (IP): Performed by: STUDENT IN AN ORGANIZED HEALTH CARE EDUCATION/TRAINING PROGRAM

## 2023-01-04 PROCEDURE — 36415 COLL VENOUS BLD VENIPUNCTURE: CPT

## 2023-01-04 PROCEDURE — 770020 HCHG ROOM/CARE - TELE (206)

## 2023-01-04 PROCEDURE — 99255 IP/OBS CONSLTJ NEW/EST HI 80: CPT | Performed by: PHYSICAL MEDICINE & REHABILITATION

## 2023-01-04 PROCEDURE — A9270 NON-COVERED ITEM OR SERVICE: HCPCS | Performed by: INTERNAL MEDICINE

## 2023-01-04 PROCEDURE — 99232 SBSQ HOSP IP/OBS MODERATE 35: CPT | Performed by: HOSPITALIST

## 2023-01-04 PROCEDURE — 700111 HCHG RX REV CODE 636 W/ 250 OVERRIDE (IP)

## 2023-01-04 PROCEDURE — 97535 SELF CARE MNGMENT TRAINING: CPT

## 2023-01-04 RX ORDER — HEPARIN SODIUM 1000 [USP'U]/ML
INJECTION, SOLUTION INTRAVENOUS; SUBCUTANEOUS
Status: COMPLETED
Start: 2023-01-04 | End: 2023-01-04

## 2023-01-04 RX ADMIN — FUROSEMIDE 40 MG: 10 INJECTION, SOLUTION INTRAMUSCULAR; INTRAVENOUS at 04:26

## 2023-01-04 RX ADMIN — SODIUM CHLORIDE 250 MG: 9 INJECTION, SOLUTION INTRAVENOUS at 06:12

## 2023-01-04 RX ADMIN — METOPROLOL TARTRATE 25 MG: 25 TABLET, FILM COATED ORAL at 04:26

## 2023-01-04 RX ADMIN — HEPARIN SODIUM 3800 UNITS: 1000 INJECTION, SOLUTION INTRAVENOUS; SUBCUTANEOUS at 14:03

## 2023-01-04 RX ADMIN — AMITRIPTYLINE HYDROCHLORIDE 10 MG: 10 TABLET, FILM COATED ORAL at 17:52

## 2023-01-04 RX ADMIN — FUROSEMIDE 40 MG: 10 INJECTION, SOLUTION INTRAMUSCULAR; INTRAVENOUS at 15:57

## 2023-01-04 RX ADMIN — AMITRIPTYLINE HYDROCHLORIDE 10 MG: 10 TABLET, FILM COATED ORAL at 04:25

## 2023-01-04 RX ADMIN — NICOTINE TRANSDERMAL SYSTEM 21 MG: 21 PATCH, EXTENDED RELEASE TRANSDERMAL at 04:26

## 2023-01-04 RX ADMIN — DOCUSATE SODIUM 50 MG AND SENNOSIDES 8.6 MG 2 TABLET: 8.6; 5 TABLET, FILM COATED ORAL at 17:52

## 2023-01-04 RX ADMIN — APIXABAN 5 MG: 5 TABLET, FILM COATED ORAL at 04:25

## 2023-01-04 RX ADMIN — ATORVASTATIN CALCIUM 40 MG: 40 TABLET, FILM COATED ORAL at 20:03

## 2023-01-04 RX ADMIN — APIXABAN 5 MG: 5 TABLET, FILM COATED ORAL at 17:52

## 2023-01-04 RX ADMIN — ASPIRIN 81 MG: 81 TABLET, COATED ORAL at 04:25

## 2023-01-04 RX ADMIN — FLUTICASONE PROPIONATE 88 MCG: 44 AEROSOL, METERED RESPIRATORY (INHALATION) at 04:28

## 2023-01-04 RX ADMIN — ACETAMINOPHEN 650 MG: 325 TABLET, FILM COATED ORAL at 10:19

## 2023-01-04 RX ADMIN — METOPROLOL TARTRATE 25 MG: 25 TABLET, FILM COATED ORAL at 17:52

## 2023-01-04 ASSESSMENT — ENCOUNTER SYMPTOMS
COUGH: 0
NAUSEA: 0
MYALGIAS: 0
DIZZINESS: 0
VOMITING: 0
FEVER: 0
BLURRED VISION: 0
SHORTNESS OF BREATH: 0
BRUISES/BLEEDS EASILY: 0

## 2023-01-04 ASSESSMENT — COGNITIVE AND FUNCTIONAL STATUS - GENERAL
MOBILITY SCORE: 17
DRESSING REGULAR LOWER BODY CLOTHING: A LITTLE
DRESSING REGULAR UPPER BODY CLOTHING: A LITTLE
TURNING FROM BACK TO SIDE WHILE IN FLAT BAD: A LITTLE
MOVING TO AND FROM BED TO CHAIR: A LITTLE
PERSONAL GROOMING: A LITTLE
STANDING UP FROM CHAIR USING ARMS: A LITTLE
SUGGESTED CMS G CODE MODIFIER MOBILITY: CK
HELP NEEDED FOR BATHING: A LOT
WALKING IN HOSPITAL ROOM: A LITTLE
TOILETING: A LITTLE
SUGGESTED CMS G CODE MODIFIER DAILY ACTIVITY: CK
MOVING FROM LYING ON BACK TO SITTING ON SIDE OF FLAT BED: A LITTLE
CLIMB 3 TO 5 STEPS WITH RAILING: A LOT
DAILY ACTIVITIY SCORE: 18

## 2023-01-04 ASSESSMENT — GAIT ASSESSMENTS
DEVIATION: BRADYKINETIC
GAIT LEVEL OF ASSIST: STANDBY ASSIST
DISTANCE (FEET): 30
ASSISTIVE DEVICE: FRONT WHEEL WALKER

## 2023-01-04 NOTE — PROGRESS NOTES
Monitor Summary:  Rhythm: Afib  Rate:   Ectopy: (O) PVC, (R) Coup  Measurement:  -/.08/-

## 2023-01-04 NOTE — PROGRESS NOTES
Hospital Medicine Daily Progress Note    Date of Service  1/4/2023    Chief Complaint  Yoandy Peace is a 67 y.o. male admitted 12/23/2022 with shortness of breath    Hospital Course    67 y.o. male past medical history of tobacco dependence, COPD on 4L home oxygen, CAD, CHF, obstructive sleep apnea who presented 12/23/2022 with progressive shortness of breath. He was admitted for acute hypoxic respiratory failure secondary to CHF and COPD.   He was given Solu-Medrol, bronchodilators and placed on BiPAP and admitted to ICU.  Patient was weaned off BiPAP.  Nephrology was consulted for worsening function.  Patient was started on hemodialysis and IV Lasix for pulmonary edema.  Cardiology was consulted for elevated troponin and chest pain during dialysis.  Started on therapeutic Lovenox for A. fib with RVR and transitioned to eliquis.  Nephrology following for hemodialysis. SNF on discharge.    Interval Problem Update  KARI overnight  Pt for dialysis today  He denies any new complaitns  On 4L oxygen this AM  Pending rehab review      I have discussed this patient's plan of care and discharge plan at IDT rounds today with Case Management, Nursing, Nursing leadership, and other members of the IDT team.    Consultants/Specialty  cardiology, critical care, nephrology, palliative care, physiatry, pulmonary, and vascular surgery    Code Status  DNAR/DNI    Disposition  Patient is not medically cleared for discharge.   Anticipate discharge to to home with close outpatient follow-up.  I have placed the appropriate orders for post-discharge needs.    Review of Systems  Review of Systems   Constitutional:  Negative for fever.   HENT:  Negative for hearing loss.    Eyes:  Negative for blurred vision.   Respiratory:  Negative for cough and shortness of breath.    Cardiovascular:  Negative for chest pain.   Gastrointestinal:  Negative for nausea and vomiting.   Genitourinary:  Negative for dysuria.   Musculoskeletal:  Negative for  myalgias.   Skin:  Negative for rash.   Neurological:  Negative for dizziness.   Endo/Heme/Allergies:  Does not bruise/bleed easily.      Physical Exam  Temp:  [36.4 °C (97.5 °F)-36.7 °C (98.1 °F)] 36.7 °C (98.1 °F)  Pulse:  [80-96] 86  Resp:  [16-19] 16  BP: ()/(52-69) 101/52  SpO2:  [94 %-100 %] 95 %    Physical Exam  Constitutional:       General: He is not in acute distress.     Appearance: He is obese. He is ill-appearing.   HENT:      Head: Normocephalic and atraumatic.      Right Ear: Tympanic membrane normal.      Left Ear: Tympanic membrane normal.      Nose: Nose normal.      Mouth/Throat:      Mouth: Mucous membranes are moist.   Eyes:      Extraocular Movements: Extraocular movements intact.      Pupils: Pupils are equal, round, and reactive to light.   Cardiovascular:      Rate and Rhythm: Normal rate and regular rhythm.      Pulses: Normal pulses.   Pulmonary:      Effort: Pulmonary effort is normal. No respiratory distress.   Abdominal:      General: Abdomen is flat. There is no distension.   Musculoskeletal:      Cervical back: Normal range of motion.      Right lower leg: Edema present.      Left lower leg: Edema present.   Skin:     General: Skin is warm.   Neurological:      General: No focal deficit present.      Mental Status: He is alert and oriented to person, place, and time. Mental status is at baseline.   Psychiatric:         Mood and Affect: Mood normal.       Fluids    Intake/Output Summary (Last 24 hours) at 1/4/2023 0856  Last data filed at 1/4/2023 0820  Gross per 24 hour   Intake 1350 ml   Output 1075 ml   Net 275 ml         Laboratory  Recent Labs     01/02/23  0214 01/03/23  0221 01/04/23  0602   WBC 15.5* 15.7* 15.9*   RBC 3.13* 3.03* 2.85*   HEMOGLOBIN 8.1* 8.1* 7.5*   HEMATOCRIT 26.8* 26.5* 24.9*   MCV 85.6 87.5 87.4   MCH 25.9* 26.7* 26.3*   MCHC 30.2* 30.6* 30.1*   RDW 48.5 50.4* 51.7*   PLATELETCT 316 315 290   MPV 10.4 10.4 10.4       Recent Labs     01/02/23  0210  01/03/23  0221 01/04/23  0602   SODIUM 136 138 137   POTASSIUM 4.1 4.2 4.3   CHLORIDE 97 100 100   CO2 27 28 27   GLUCOSE 134* 134* 164*   BUN 84* 50* 63*   CREATININE 4.14* 3.13* 3.62*   CALCIUM 7.7* 7.8* 7.8*                       Imaging  IR-WIN,GROSHONG PLACEMENT >5   Final Result      1. Ultrasound and fluoroscopic guided placement of a right internal jugular 14.5 Cymro HemoSplit tunneled dialysis catheter.      2. The hemodialysis catheter may be used immediately as clinically indicated. Flushes per protocol.      3. Removal of the right temporary dialysis catheter.      DX-CHEST-PORTABLE (1 VIEW)   Final Result      1.  Ongoing pulmonary edema and bilateral pleural effusions.   2.  Cardiomegaly.   3.  Left lower lobe atelectasis or consolidation. There is also likely some component of compressive atelectasis of the right lung base considering the presence of significant pleural effusion.   4.  Interval placement of temporary non-tunneled hemodialysis catheter with no evidence of pneumothorax.      DX-CHEST-PORTABLE (1 VIEW)   Final Result         1. No significant interval change.      US-RENAL   Final Result      1.  Echogenic renal parenchyma bilaterally could relate to medical renal disease.      2.  No hydronephrosis. No renal calculus.      DX-CHEST-PORTABLE (1 VIEW)   Final Result      1.  Enlarged cardiac silhouette and small amounts of bilateral pleural fluid with diffuse interstitial opacities most consistent with pulmonary edema/CHF.   2.  Bibasilar opacities could be due to edema, atelectasis or pneumonitis.   3.  There is atherosclerosis.   4.  Questionable linear calcific plaque in the proximal descending thoracic aorta versus some other device intrinsic or extrinsic to the patient.      EC-ECHOCARDIOGRAM COMPLETE W/ CONT   Final Result      US-EXTREMITY VENOUS LOWER BILAT   Final Result      DX-CHEST-PORTABLE (1 VIEW)   Final Result      1.  Cardiomegaly with interstitial infiltrates likely  representing pulmonary edema.      2.  Bibasilar atelectasis and small bilateral pleural effusions.             Assessment/Plan  * Acute renal failure (ARF) ([facility])  Assessment & Plan  Started on hemodialysis  Nephrology following    Elevated troponin  Assessment & Plan  Trop 473>>459>>404  BNP 6487  Likely secondary to CHF  ASA/statin  Echo unremarkable  Cardiology following    Bilateral leg edema- (present on admission)  Assessment & Plan  Likely due to volume overload.  Echo pending  Diuresis  Bilateral lower extremity DVT studies negative    Acute exacerbation of chronic obstructive pulmonary disease (COPD) ([facility])  Assessment & Plan  Resolving on oxygen at 6 L.  In no distress, no accessory muscle use, able to complete full sentences.  Patient downgraded from ICU.  No longer requiring rescue BiPAP.  Continue with Flovent and Anoro  Taper Solu-Medrol to 40 mg every 12 hours  Duo nebs as needed  Continue with oxygen supplementation to obtain SPO2 >88-90%    Acute pulmonary edema ([facility])- (present on admission)  Assessment & Plan  Resolving  Repeat chest x-ray in a.m.  Continue with diuresis  Patient is normotensive.  Hold ARB at this time.  Echo pending     Hypertension  Assessment & Plan  Amlodipine 10 mg daily   Lasix 60 mg BID     Tobacco abuse- (present on admission)  Assessment & Plan  Nicotine replacement as needed    ACP (advance care planning)  Assessment & Plan  Full code.  Discussed with patient in front of RN.    Class 3 severe obesity in adult ([facility])  Assessment & Plan  BMI 52.89     ALONDRA on CPAP- (present on admission)  Assessment & Plan  CPAP at bedtime      Acute on chronic respiratory failure with hypoxia ([facility])  Assessment & Plan  Dependent on 4 L.  Currently requiring 6 L in no distress  Diuresis with IV Lasix  Nebs/pulm toilet  Wean off Solu-Medrol as tolerated    CAD (coronary artery disease)  Assessment & Plan  ASA/statin    DM (diabetes mellitus) ([facility])  Assessment & Plan  Continue with SSI   On  Farxiga, insulin NPH at home, and linagliptin           VTE prophylaxis: SCDs/TEDs and therapeutic anticoagulation with eliquis    I have performed a physical exam and reviewed and updated ROS and Plan today (1/4/2023). In review of yesterday's note (1/3/2023), there are no changes except as documented above.

## 2023-01-04 NOTE — THERAPY
Occupational Therapy  Daily Treatment     Patient Name: Yoandy Peace  Age:  67 y.o., Sex:  male  Medical Record #: [id number]  Today's Date: 1/4/2023     Precautions  Precautions: Fall Risk  Comments: HoTN    Assessment    Pt seen for OT session. Progressing with functional mobility, activity tolerance, and O2 needs. Pt with desaturation to 87% after short functional mobility, but recovered quickly back to >90%. HR increased up to 140s during mobility. At end of session pt c/o dizziness: BP in chair 102/52, then continued to recover to 110/67, completed STS and BP dropped to 93/64 and c/o dizziness and lightheadedness again that took ~2 min to resolve. Pt became SOB/fatigued quickly after short functional ambulation req extended seated RB on BSC to recover. Continues to be limited by decreased functional mobility, activity tolerance, cognition, strength, balance, and pain which are currently affecting pt's ability to complete ADLs/IADLs at baseline. Will continue to follow.     Plan    Occupational Therapy Treatment Plan  O.T. Treatment Plan: Continue Current Treatment Plan    DC Equipment Recommendations: Unable to determine at this time  Discharge Recommendations: Recommend post-acute placement for additional occupational therapy services prior to discharge home     Objective     01/04/23 0936   Precautions   Precautions Fall Risk   Comments Ambika   Vitals   O2 (LPM) 4   O2 Delivery Device Silicone Nasal Cannula   Vitals Comments Pt with desaturation to 87% after short functional mobility, but recovered quickly. HR increased up to 140s during mobility. At end of session pt c/o dizziness: BP in chair 102/52, then continued to recover to 110/67, completed STS and BP 93/64 and c/o dizziness/lightheadedness again that took ~2 min to resolve.   Pain 0 - 10 Group   Therapist Pain Assessment Post Activity Pain Same as Prior to Activity;During Activity;Nurse Notified;0   Cognition    Cognition / Consciousness WDL   Level of  Consciousness Alert   Comments pleasant and cooperative   Passive ROM Upper Body   Passive ROM Upper Body WDL   Active ROM Upper Body   Active ROM Upper Body  WDL   Strength Upper Body   Upper Body Strength  X   Gross Strength Generalized Weakness, Equal Bilaterally.    Comments improving; functional for light ADLs   Balance   Sitting Balance (Static) Fair +   Sitting Balance (Dynamic) Fair   Standing Balance (Static) Fair -   Standing Balance (Dynamic) Fair -   Weight Shift Sitting Good   Weight Shift Standing Fair   Skilled Intervention Verbal Cuing;Tactile Cuing;Compensatory Strategies   Comments w/FWW   Bed Mobility    Supine to Sit Standby Assist   Sit to Supine   (left up in recliner)   Scooting Standby Assist   Skilled Intervention Verbal Cuing;Tactile Cuing;Compensatory Strategies   Comments HOB elevated and use of rails   Activities of Daily Living   Eating Supervision  (drinking water)   Grooming Supervision;Seated  (wiping face)   Lower Body Dressing Minimal Assist  (diabetic shoes)   Toileting   (declined need)   Skilled Intervention Verbal Cuing;Tactile Cuing;Facilitation;Compensatory Strategies   Functional Mobility   Sit to Stand Standby Assist   Bed, Chair, Wheelchair Transfer Standby Assist   Toilet Transfers Minimal Assist  (CGA off of BSC over toilet as RTS; heavy use of arms)   Transfer Method Stand Step   Mobility w/FWW; bed>toilet>doorway>chair   Skilled Intervention Verbal Cuing;Tactile Cuing;Compensatory Strategies;Facilitation   Comments becomes SOB/fatigued quickly req extended seated RB on toilet to recover   Activity Tolerance   Comments limited by fatigue, dizziness, and HoTN   Patient / Family Goals   Patient / Family Goal #1 to go home   Goal #1 Outcome Goal not met   Short Term Goals   Short Term Goal # 1 LB dressing with SPV   Goal Outcome # 1 Progressing as expected   Short Term Goal # 2 toilet txf with min A   Goal Outcome # 2 Progressing as expected   Short Term Goal # 3 toileting  with SPV   Goal Outcome # 3 Goal not met   Short Term Goal # 4 standing g/h with SPV taking RBs PRN   Goal Outcome # 4 Goal not met   Education Group   Education Provided Pathology of bedrest;Transfers   Transfers Patient Response Patient;Acceptance;Explanation;Verbal Demonstration;Reinforcement Needed   Pathology of Bedrest Patient Response Patient;Acceptance;Explanation;Verbal Demonstration;Reinforcement Needed

## 2023-01-04 NOTE — WOUND TEAM
[facility]  Inpatient Services  Wound and Skin Care Evaluation    Admission Date: 12/23/2022     Last order of IP CONSULT TO WOUND CARE was found on 12/24/2022 from Hospital Encounter on 12/23/2022     HPI, PMH, SH: Reviewed    History reviewed. No pertinent surgical history.  Social History     Tobacco Use    Smoking status: Every Day     Types: Cigarettes    Smokeless tobacco: Never   Substance Use Topics    Alcohol use: Not Currently     Chief Complaint   Patient presents with    Shortness of Breath     BIB EMS from home for SOB x 1 hour. Hx of COPD.   O2 SAT 88% on baseline 4L PTA. Improved with CPAP.   Recevied 125 mg solumedrol, 2 duoneb and 2 albuterol.     Edema     Abd swelling, BLE pitting edema.      Diagnosis: Respiratory failure (HCC) [J96.90]    Unit where seen by Wound Team: T819/00     WOUND CONSULT/FOLLOW UP RELATED TO:  BLE & ears    WOUND HISTORY:  patient states that his legs have been like this for a while, BLE are edemitis that are pitting +3,     WOUND ASSESSMENT/LDA  Moisture Associated Skin Damage 12/29/22 Groin;Buttock (Active)   Wound Image    12/29/22 1500   Drainage Amount None 01/03/23 2100   Periwound Assessment Clean;Intact;Excoriated;Red 01/03/23 2100   Number of days: 6       Wound 12/24/22 Pretibial Distal Bilateral Red, slow to meghan, flaky (Active)   Wound Image    01/04/23 1236   Site Assessment Red;Dry 01/04/23 1236   Periwound Assessment Hemosiderin Staining;Pink;Edema 01/04/23 1236   Margins Attached edges;Defined edges 01/04/23 1236   Closure Open to air 01/04/23 1236   Drainage Amount None 01/04/23 1236   Treatments Cleansed;Site care;Moisturizing cream 01/04/23 1236   Wound Cleansing Soap and Water 01/04/23 1236   Periwound Protectant Skin Moisturizer 01/04/23 1236   Dressing Cleansing/Solutions Not Applicable 01/04/23 1236   Dressing Options Open to Air 01/04/23 1236   Dressing Changed Reinforced 01/04/23 1236   Dressing Status Open to Air 01/04/23 1236    Dressing Change/Treatment Frequency Daily, and As Needed 01/04/23 1236   NEXT Dressing Change/Treatment Date 01/05/23 01/04/23 1236   NEXT Weekly Photo (Inpatient Only) 01/11/23 01/04/23 1236   Non-staged Wound Description Not applicable 01/04/23 1236   Shape irregular x2 01/04/23 1236   Wound Odor None 01/04/23 1236   Pulses Right;Left;1+;DP;PT 01/04/23 1236   Exposed Structures None 01/04/23 1236   WOUND NURSE ONLY - Time Spent with Patient (mins) 60 01/04/23 1236   Number of days: 11       Wound 12/23/22 Pressure Injury Ear Bilateral POA unstageable (Active)   Wound Image   01/03/23 0800   Site Assessment Yellow;Red 01/04/23 1236   Periwound Assessment Pink;Red 01/04/23 1236   Margins Defined edges;Unattached edges 01/04/23 1236   Closure Open to air 01/04/23 1236   Drainage Amount None 01/04/23 1236   Treatments Cleansed;Site care;Offloading 01/04/23 1236   Wound Cleansing Foam Cleanser/Washcloth 01/04/23 1236   Periwound Protectant Not Applicable 01/04/23 1236   Dressing Cleansing/Solutions Not Applicable 01/04/23 1236   Dressing Changed New 01/04/23 1236   Dressing Change/Treatment Frequency As Needed 01/04/23 1236   WOUND NURSE ONLY - Pressure Injury Stage U 01/04/23 1236   Shape [place] x2 01/04/23 1236   Wound Odor None 01/04/23 1236   Pulses N/A 01/04/23 1236   Exposed Structures JENI 01/04/23 1236   Number of days: 12       Wound 01/03/23 Skin Tear Toe, 2nd (Active)   Wound Image   01/03/23 2200   Treatments Cleansed 01/03/23 2200   Wound Cleansing Normal Saline Irrigation 01/03/23 2200   Dressing Options Bandaid 01/03/23 2200   Number of days: 1        Vascular:    DAVIDSON:   No results found.    Lab Values:    Lab Results   Component Value Date/Time    WBC 15.9 (H) 01/04/2023 06:02 AM    RBC 2.85 (L) 01/04/2023 06:02 AM    HEMOGLOBIN 7.5 (L) 01/04/2023 06:02 AM    HEMATOCRIT 24.9 (L) 01/04/2023 06:02 AM    CREACTPROT 1.13 (H) 12/23/2022 05:49 PM        Culture Results show:  No results found for this or any  previous visit (from the past 720 hour(s)).    Pain Level/Medicated:  denied pain       INTERVENTIONS BY WOUND TEAM:  Chart and images reviewed. Discussed with bedside RN. All areas of concern (based on picture review, LDA review and discussion with bedside RN) have been thoroughly assessed. Documentation of areas based on significant findings. This RN in to assess patient. Performed standard wound care which includes appropriate positioning, dressing removal and non-selective debridement. Pictures and measurements obtained weekly if/when required.  Preparation for Dressing removal: Dressing soaked with n/a  Non-selectively Debrided with:  soap and water and gauze.  Sharp debridement: n/a  Alida wound: Cleansed with soap and water, Prepped with moisturizer  Primary Dressing: elevate leg  Secondary (Outer) Dressing: DIXIE        Bilateral ears: cleansed with foam cleanser and gauze pat dry and applied Opifoam and patient already had gray foam padding    Interdisciplinary consultation: Patient, Bedside RN    EVALUATION / RATIONALE FOR TREATMENT:  Most Recent Date:  1/4/23: re-consulted for BLE, skin appears very similar to prior assessment, per patient he believes that compression socks caused the wounds and are slow to heal.  Cleansed legs and re-applied lotion.  Ears reassessed and still unstageable despite gray foams, applied optifoam directly to ears to protect from O2 canula.    12/27/22: patient has pitting edema and discolorment to front shins, no open wounds or drainage.  Applied moisturizer to protect skin's elasticity.   Patient with POA unstageable wounds to bilateral ears, patient wearing gray foam protectors     Goals: Steady decrease in wound area and depth weekly.    WOUND TEAM PLAN OF CARE ([X] for frequency of wound follow up,):   Nursing to follow dressing orders written for wound care. Contact wound team if area fails to progress, deteriorates or with any questions/concerns if something comes up before  next scheduled follow up (See below as to whether wound is following and frequency of wound follow up)  Dressing changes by wound team:                   Follow up 3 times weekly:                NPWT change 3 times weekly:     Follow up 1-2 times weekly:      Follow up Bi-Monthly:           Follow up Monthly (High Risk):                        Follow up as needed:     Other (explain): Wound team is not following patient    NURSING PLAN OF CARE ORDERS (X):  Dressing changes: See Dressing Care orders: X  Skin care: See Skin Care orders: X  RN Prevention Protocol: X  Rectal tube care: See Rectal Tube Care orders:   Other orders:    RSKIN:   CURRENTLY IN PLACE (X), APPLIED THIS VISIT (A), ORDERED (O):   Q shift Thai:  X  Q shift pressure point assessments:  X    Surface/Positioning X  Pressure redistribution mattress      x      Low Airloss          ICU Low Airloss   Bariatric RIKI     Waffle cushion        Waffle Overlay          Reposition q 2 hours      TAPs Turning system     Z Shashank Pillow     Offloading/Redistribution X  Sacral Mepilex (Silicone dressing)     Heel Mepilex (Silicone dressing)    x     Heel float boots (Prevalon boot)             Float Heels off Bed with Pillows           Respiratory nasal canula   Silicone O2 tubing    x     Gray Foam Ear protectors   x  Cannula fixation Device (Tender )          High flow offloading Clip    Elastic head band offloading device      Anchorfast                                                         Trach with Optifoam split foam             Containment/Moisture Prevention continent     Rectal tube or BMS    Purwick/Condom Cath        [place] Catheter    Barrier wipes           Barrier paste       Antifungal tx      Interdry        Mobilization up to chair      Up to chair        Ambulate      PT/OT      Nutrition PO      Dietician        Diabetes Education      PO     TF     TPN     NPO   # days     Other    diabetic/ cardiac    Anticipated discharge plans:  JENI  LTACH:        SNF/Rehab:                  Home Health Care:           Outpatient Wound Center:            Self/Family Care:        Other:                  Vac Discharge Needs:   Not Applicable Pt not on a wound vac:    x   Regular Vac while inpatient, alternative dressing at DC:        Regular Vac in use and continued at DC:            Reg. Vac w/ Skin Sub/Biologic in use. Will need to be changed 2x wkly:      Veraflo Vac while inpatient, ok to transition to Regular Vac on Discharge:           Veraflo Vac while inpatient, will need to remain on Veraflo Vac upon discharge:

## 2023-01-04 NOTE — CARE PLAN
The patient is Stable - Low risk of patient condition declining or worsening    Shift Goals  Clinical Goals: Dialyis on Wednesday, skin integrity  Patient Goals: go margaret  Family Goals: no family present    Progress made toward(s) clinical / shift goals:    Problem: Care Map:  Day Before Discharge Optimal Outcome for the Heart Failure Patient  Goal: Day Before Discharge:  Optimal Care of the heart failure patient  Outcome: Progressing       Patient is not progressing towards the following goals:

## 2023-01-04 NOTE — CONSULTS
Physical Medicine and Rehabilitation Consultation              Date of initial consultation: 1/4/2023  Requesting provider: Debbie Chan M.D. at 01/04/23 1331   Consulting provider: Ashley John D.O.  Reason for consultation: assess for acute inpatient rehab appropriateness  LOS: 12 Day(s)    Chief complaint: SOB and LE swelling     HPI: The patient is a 67 y.o. male with a past medical history of COPD on 4 L at baseline, + tobacco use, CAD, CHF, ALONDRA on NOC CPAP, and HTN ;  who presented on 12/23/2022  2:01 PM with shortness of breath.  Upon evaluation to the emergency room patient was found to be hypoxic with concern for hypoxic respiratory failure secondary to CHF and COPD.  Patient's hospital course was complicated by requirement of admission to the ICU where he was administered Solu-Medrol and bronchodilators and required BiPAP for improvement of his respiratory status.  Patient has been able to be weaned off of BiPAP.  He is now on 4.5 L of O2.  Patient's hospital course was complicated by worsening kidney function, nephrology was consulted, patient has required hemodialysis in addition to IV Lasix for his pulmonary edema.  Cardiology was also consulted for an elevated troponin and chest pain during dialysis, patient was started on therapeutic Lovenox for atrial fibrillation with evidence of RVR and transition to Eliquis.  Patient will need dialysis at time of discharge.  Patient is able to function near his baseline level with PT.  An updated OT note is pending.  Patient has poor endurance with therapies.  Patient continues to have an elevated WBC up to 15, secondary to steroid use.    Patient seen and examined in dialysis.  Patient reports he was able to ambulate with therapies.  Discussed progress with therapist, patient needed frequent breaks.  Patient tells me he would be fine if he just had a motorized cart like he uses at the [facility].  Patient reports he would prefer to go home, does not  want to stay at rehab more than a couple of days.  Reviewed with patient option for inpatient rehab where the average length of stay is approximately 10 to 14 days.  Patient not willing to stay longer than 3.  Patient reports he is willing to go to an SNF level where he feels more comfortable doing 1 hours of therapy per day.  Reviewed with patient SNF is a reasonable option although it may be a barrier to access due to his hemodialysis, patient expressed understanding.  Patient updates me that he does not have a seat in the community but that it would not be available until next week.  Otherwise patient reports he feels okay, feels tired, reports that he gets short of breath when he is walking, which is not new for him.  Does not report HA, lightheadedness, SOB, CP, abdominal pain, or changes in numbness/tingling/weakness.  Has chronic numbness in his feet.        Social Hx:  Patient lives with  his brother and GF in a mobile home with 3 CAMILLA.   3 CAMILLA  At prior level of function required assistance with IADLs     Tobacco: + tobacco use  Alcohol: Denies   Drugs: + inhaled drug use     THERAPY:  Restrictions: Fall Risk   PT: Functional mobility   1/4 SBA with FWW x 30 ft, SBA sit to stand     OT: ADLs  12/28 Min A toilet transfers     SLP:   None     IMAGING:  DX-CHEST-PORTABLE (1 VIEW)   Final Result       1.  Ongoing pulmonary edema and bilateral pleural effusions.   2.  Cardiomegaly.   3.  Left lower lobe atelectasis or consolidation. There is also likely some component of compressive atelectasis of the right lung base considering the presence of significant pleural effusion.   4.  Interval placement of temporary non-tunneled hemodialysis catheter with no evidence of pneumothorax.       DX-CHEST-PORTABLE (1 VIEW)   Final Result           1. No significant interval change.       US-RENAL   Final Result       1.  Echogenic renal parenchyma bilaterally could relate to medical renal disease.       2.  No  hydronephrosis. No renal calculus.       DX-CHEST-PORTABLE (1 VIEW)   Final Result       1.  Enlarged cardiac silhouette and small amounts of bilateral pleural fluid with diffuse interstitial opacities most consistent with pulmonary edema/CHF.   2.  Bibasilar opacities could be due to edema, atelectasis or pneumonitis.   3.  There is atherosclerosis.   4.  Questionable linear calcific plaque in the proximal descending thoracic aorta versus some other device intrinsic or extrinsic to the patient.       EC-ECHOCARDIOGRAM COMPLETE W/ CONT   Final Result       US-EXTREMITY VENOUS LOWER BILAT   Final Result       DX-CHEST-PORTABLE (1 VIEW)   Final Result       1.  Cardiomegaly with interstitial infiltrates likely representing pulmonary edema.       2.  Bibasilar atelectasis and small bilateral pleural effusions.           PROCEDURES:  12/31 permacath placement for dialysis    PMH:  Past Medical History:   Diagnosis Date    CAD (coronary artery disease)     CHF (congestive heart failure) (HCC)     Chronic obstructive pulmonary disease (HCC)     Diabetes (HCC)     Hyperlipidemia     ALONDRA on CPAP     HS    Pneumonia     Tobacco abuse        PSH:  History reviewed. No pertinent surgical history.    FHX:  History reviewed. No pertinent family history.    Medications:  Current Facility-Administered Medications   Medication Dose    HEPARIN SODIUM (PORCINE) 1000 UNIT/ML INJ SOLN      metoprolol tartrate (LOPRESSOR) tablet 25 mg  25 mg    ferric gluconate complex (Ferrlecit) 250 mg in  mL IVPB  250 mg    heparin injection 3,800 Units  3,800 Units    furosemide (LASIX) injection 40 mg  40 mg    insulin regular (HumuLIN R,NovoLIN R) injection  2-9 Units    And    dextrose 10 % BOLUS 25 g  25 g    umeclidinium-vilanterol (Anoro Ellipta) inhaler 1 Puff  1 Puff    And    fluticasone (FLOVENT HFA) 44 MCG/ACT inhaler 88 mcg  2 Puff    apixaban (ELIQUIS) tablet 5 mg  5 mg    senna-docusate (PERICOLACE or SENOKOT S) 8.6-50 MG per  "tablet 2 Tablet  2 Tablet    And    polyethylene glycol/lytes (MIRALAX) PACKET 1 Packet  1 Packet    And    bisacodyl (DULCOLAX) suppository 10 mg  10 mg    acetaminophen (Tylenol) tablet 650 mg  650 mg    ondansetron (ZOFRAN) syringe/vial injection 4 mg  4 mg    ondansetron (ZOFRAN ODT) dispertab 4 mg  4 mg    guaiFENesin dextromethorphan (ROBITUSSIN DM) 100-10 MG/5ML syrup 10 mL  10 mL    amitriptyline (ELAVIL) tablet 10 mg  10 mg    aspirin EC (ECOTRIN) tablet 81 mg  81 mg    atorvastatin (LIPITOR) tablet 40 mg  40 mg    ipratropium-albuterol (DUONEB) nebulizer solution  3 mL    nicotine (NICODERM) 21 MG/24HR 21 mg  21 mg    And    nicotine polacrilex (NICORETTE) 2 MG piece 2 mg  2 mg    Respiratory Therapy Consult         Allergies:  No Known Allergies    Physical Exam:  Vitals: /52   Pulse 86   Temp 36.7 °C (98.1 °F) (Temporal)   Resp 16   Ht 1.778 m (5' 10\")   Wt (!) 139 kg (305 lb 8.9 oz)   SpO2 95%   Gen: NAD, laying comfortably in dialysis plan  Head:  NC/AT  Eyes/ Nose/ Mouth: PERRLA, moist mucous membranes  Cardio: RRR, good distal perfusion, warm extremities  Pulm: normal respiratory effort, no cyanosis   Abd: Soft NTND, obese  Ext: No peripheral edema. No calf tenderness. No clubbing.  Peripheral arterial disease evident in bilateral lower extremities    Mental status:  A&Ox4 (person, place, date, situation) answers questions appropriately follows commands  Speech: fluent, no aphasia or dysarthria    CRANIAL NERVES:  2,3: visual acuity grossly intact, PERRL  3,4,6: EOMI bilaterally, no nystagmus or diplopia  5: sensation intact to light touch bilaterally and symmetric  7: no facial asymmetry  8: hearing grossly intact     Motor:      Upper Extremity  Myotome R L   Shoulder flexion C5 5/5 5/5   Elbow flexion C5 5/5 5/5   Wrist extension C6 5/5 5/5   Elbow extension C7 5/5 5/5   Finger flexion C8 5/5 5/5   Finger abduction T1 5/5 5/5     Lower Extremity Myotome R L   Hip flexion L2 5/5 5/5 "   Knee extension L3 5/5 5/5   Ankle dorsiflexion L4 5/5 5/5   Toe extension L5 5/5 5/5   Ankle plantarflexion S1 5/5 5/5       Sensory:   intact to light touch through out    DTRs: 2+ in bilateral  biceps  No clonus at bilateral ankles  Negative Darden b/l     Tone: no spasticity noted, no cogwheeling noted    Coordination:   intact finger to nose bilaterally  intact fine motor with fingers bilaterally      Labs: Reviewed and significant for   Recent Labs     01/02/23 0214 01/03/23 0221 01/04/23  0602   RBC 3.13* 3.03* 2.85*   HEMOGLOBIN 8.1* 8.1* 7.5*   HEMATOCRIT 26.8* 26.5* 24.9*   PLATELETCT 316 315 290   IRON  --  29*  --    FERRITIN  --  68.6  --    TOTIRONBC  --  222*  --      Recent Labs     01/02/23 0214 01/03/23 0221 01/04/23 0602   SODIUM 136 138 137   POTASSIUM 4.1 4.2 4.3   CHLORIDE 97 100 100   CO2 27 28 27   GLUCOSE 134* 134* 164*   BUN 84* 50* 63*   CREATININE 4.14* 3.13* 3.62*   CALCIUM 7.7* 7.8* 7.8*     Recent Results (from the past 24 hour(s))   POCT glucose device results    Collection Time: 01/03/23  5:07 PM   Result Value Ref Range    POC Glucose, Blood 137 (H) 65 - 99 mg/dL   POCT glucose device results    Collection Time: 01/03/23  9:42 PM   Result Value Ref Range    POC Glucose, Blood 142 (H) 65 - 99 mg/dL   POCT glucose device results    Collection Time: 01/04/23  4:37 AM   Result Value Ref Range    POC Glucose, Blood 117 (H) 65 - 99 mg/dL   CBC WITHOUT DIFFERENTIAL    Collection Time: 01/04/23  6:02 AM   Result Value Ref Range    WBC 15.9 (H) 4.8 - 10.8 K/uL    RBC 2.85 (L) 4.70 - 6.10 M/uL    Hemoglobin 7.5 (L) 14.0 - 18.0 g/dL    Hematocrit 24.9 (L) 42.0 - 52.0 %    MCV 87.4 81.4 - 97.8 fL    MCH 26.3 (L) 27.0 - 33.0 pg    MCHC 30.1 (L) 33.7 - 35.3 g/dL    RDW 51.7 (H) 35.9 - 50.0 fL    Platelet Count 290 164 - 446 K/uL    MPV 10.4 9.0 - 12.9 fL   Basic Metabolic Panel    Collection Time: 01/04/23  6:02 AM   Result Value Ref Range    Sodium 137 135 - 145 mmol/L    Potassium 4.3  3.6 - 5.5 mmol/L    Chloride 100 96 - 112 mmol/L    Co2 27 20 - 33 mmol/L    Glucose 164 (H) 65 - 99 mg/dL    Bun 63 (H) 8 - 22 mg/dL    Creatinine 3.62 (H) 0.50 - 1.40 mg/dL    Calcium 7.8 (L) 8.5 - 10.5 mg/dL    Anion Gap 10.0 7.0 - 16.0   Magnesium    Collection Time: 01/04/23  6:02 AM   Result Value Ref Range    Magnesium 1.9 1.5 - 2.5 mg/dL   Phosphorus    Collection Time: 01/04/23  6:02 AM   Result Value Ref Range    Phosphorus 4.3 2.5 - 4.5 mg/dL   ESTIMATED GFR    Collection Time: 01/04/23  6:02 AM   Result Value Ref Range    GFR (CKD-EPI) 17 (A) >60 mL/min/1.73 m 2         ASSESSMENT:  Patient is a 67 y.o. male admitted with hypoxia due to CHF in COPD exacerbation    [facility] Code / Diagnosis to Support: 0010.1 - Pulmonary Disorders: Chronic Obstructive Pulmonary Disease    Rehabilitation: Impaired ADLs and mobility  Patient is a fair candidate for inpatient rehab based on needs for PT, OT, however patient is hesitant to go to [facility] level therapy is resistant to do 3 hours of therapy and stay approximately 10 to 14 days.  Choice is SNF with 1 hour of therapy per day    Barriers to transfer include: Insurance authorization, TCCs to verify disposition, medical clearance and bed availability     Additional Recommendations:  COPD exacerbation  -Is chronically on 4 L at home  - Recently on 4.5 to 6 L, fatigues easily  - Continues on Solu-Medrol taper  - Is limited by endurance, continue with PT/OT as able, anticipate prolonged rehabilitation program at the SNF level    CHF  - Acute pulmonary edema, has required IV Lasix,  - Echocardiogram with EF of 60%    Acute renal failure  - Nephrology consulted, creatinine continues to be elevated around 3  - Anticipate need for chronic hemodialysis  - Port placed for dialysis  - Will need to be set up with chair in the community    Dispo:  - patient is currently functioning below their level of baseline, recommend post acute rehab  - recommend SNF level therapy due to poor  endurance and most appropriate for 1 hour therapy per day  -PM&R to sign off at this time        Medical Complexity:  COPD exacerbation  CHF  Acute renal failure  Diabetes  Hypertension  Impaired mobility and ADLs      DVT PPX: Eliquis      Thank you for allowing us to participate in the care of this patient.     Patient was seen for 89 minutes on unit/floor of which > 50% of time was spent on counseling and coordination of care regarding the above, including prognosis, risk reduction, benefits of treatment, and options for next stage of care.    Ashley John D.O.   Physical Medicine and Rehabilitation     Please note that this dictation was created using voice recognition software. I have made every reasonable attempt to correct obvious errors, but there may be errors of grammar and possibly content that I did not discover before finalizing the note.

## 2023-01-04 NOTE — DISCHARGE PLANNING
Case Management Discharge Planning    Admission Date: 12/23/2022  GMLOS: 3.9  ALOS: 12    6-Clicks ADL Score: 18  6-Clicks Mobility Score: 17  PT and/or OT Eval ordered: Yes  Post-acute Referrals Ordered: Yes  Post-acute Choice Obtained: Yes  Has referral(s) been sent to post-acute provider:  Yes      Anticipated Discharge Dispo: Discharge Disposition: D/T to home under HHA care in anticipation of covered skilled care (06)    DME Needed: No    Action(s) Taken: Pt has HD chair confirmed MWF, however start date is not until 1/9/23. Pt was declined by IRF. Expanded SNF referrals to all local facilities as HD is often barrier to SNF acceptance.    Escalations Completed: None    Medically Clear: Yes    Next Steps: Care coordination will f/u with SNF referrals    Barriers to Discharge: Pending Placement, dialysis chair start date 1/9    Is the patient up for discharge tomorrow: No

## 2023-01-04 NOTE — PROGRESS NOTES
[facility] Services Progress Note      HD today x 3 hours per Dr. Cerda.   Initiated at 1100 and ended at 1400.     Assessment:    Patient alert and oriented. Not in distress. On O2 at 4lpm, o2 sat 95%     Access used: R chest tunneled catheter      Net Fluid Removed: 1,000 mL      Procedure Events/ Complications:   Pt stable, alert and oriented x 4. Not in distress. No SOB. No chest pain. Tolerated treatment.    Post Access Care:   Blood returned. Flushed with NS.  CVC locked with Heparin 1000 units/ mL   Arterial port:  1.9 mL   Venous port: 1.9 mL     Dressing change: due on 01/07      Report given to Primary LISETTE Crouch RN.

## 2023-01-04 NOTE — PROGRESS NOTES
"[facility] -  PROGRESS NOTE               Author: Lucas Cerda M.D. Date & Time: 1/4/2023  2:01 PM     HPI:  67 y.o. male with history of COPD dependent on 4 L, tobacco abuse, CAD, CHF, ALONDRA/OHS on bedtime CPAP, who presented 12/23/2022 with evaluations for progressive shortness of breath and lower extremities swelling.  CXR notable for bilateral vascular congestion with pulmonary edema. Patient was admitted to the hospital on 12/23 for CHD exacerbation and copd. No F/C/N/V. No melena, hematochezia, hematemesis.  No HA, visual changes, or abdominal pain.     DAILY NEPHROLOGY SUMMARY:  12/26: consult neto, s/p hd #1  12/27: due for HD #2, still anasarca and on O2   12/28: on hd now, tolerating well.   12/29: tolerated puf today , edema is better , he feels better   12/30: tolerated hd well. In negative balance   12/31: feeling better, no new issues.   1/1: sitting in the chair, LE edema is better, no new complaints. S/p TDC placement   1/2 Seen on HD No c/o, producing more urine 1665 cc/ 24 hrs   1/3 Feeling better overall, breathing improved, HD yesterday net UF 1 L   UOP ~ 1L / 24 hrs , UF goal decreased due to soft BP   1/4: Seen on HD UF 1 L , BP stable no c/o, UOP     REVIEW OF SYSTEMS:    10 point ROS reviewed and is as per HPI/daily summary or otherwise negative    PMH/PSH/SH/FH:   Reviewed and unchanged since admission note    CURRENT MEDICATIONS:   Reviewed from admission to present day    VS:  /52 Comment: 110/67 repeat in sitting, 93/64 standing  Pulse 86   Temp 36.7 °C (98.1 °F) (Temporal)   Resp 16   Ht 1.778 m (5' 10\")   Wt (!) 139 kg (305 lb 8.9 oz)   SpO2 95%   BMI 43.84 kg/m²     Physical Exam  Appearance: He is obese. NAD  HENT:      Head: Normocephalic and atraumatic.   Cardiovascular:      Rate and Rhythm: Normal rate. Rhythm irregular.      Comments: anasarca improved   Pulmonary:      Comments: CTA   Abdominal:      General: There is distension.      " Tenderness: There is no abdominal tenderness.      Hernia: A hernia is present.   Musculoskeletal:         General: BL lower ext edema +   Skin:     Coloration: Skin is pale. Skin is not jaundiced.      Findings: No bruising.   Neurological:      General: No focal deficit present.      Mental Status: He is alert and oriented to person, place, and time.   Psychiatric:         Behavior: Behavior normal.  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, HTN  - Unclear baseline Cr , Cr 3.31 on admission   # Hyperkalemia resolved   # Hyponatremia resolved   # Acidosis   # Acute respiratory failure 2/2 COPD/CHF   # Leukocytosis   # Anemia resolved   - low iron stores % sat 13, ferritin 68   # Hyperphosphatemia resolved   # ALONDRA on CPAP  # CAD   # DM-2   # Obesity   # Volume overload   # Relative Hypotension        PLAN:  - HD today( Wed)   - Continue HD MW to optimize volume status   - Monitor for renal recovery   - Renal diet   - Fluid restriction 1.2 L   - IV iron replacement   - CARA with HD   - Strict IO  - Dose all meds per eGFR < 15  - Has HD chair at [facility]

## 2023-01-04 NOTE — THERAPY
Physical Therapy   Daily Treatment     Patient Name: Yoandy Peace  Age:  67 y.o., Sex:  male  Medical Record #: [id number]  Today's Date: 1/4/2023     Precautions  Precautions: Fall Risk    Assessment  Pt seen for PT tx session. Pt continues to be limited by poor activity tolerance, however pt also reported having lightheadness and dizziness with standing today. Pt was negative for orthostatic hypotension with values noted down below, however pt consistently reported symptoms with standing up. Continue to recommend placement at this time as pt is still having difficulty with navigating household distances and is now experiencing new standing symptoms that are impairing his mobility. Will follow.     Plan    Physical Therapy Treatment Plan  Physical Therapy Treatment Plan: Continue Current Treatment Plan    DC Equipment Recommendations: Unable to determine at this time  Discharge Recommendations: Recommend post-acute placement for additional physical therapy services prior to discharge home         01/04/23 1003   Vitals   Patient BP Position Sitting   Blood Pressure  102/52  (110/67 repeat in sitting, 93/64 standing)   O2 (LPM) 4   O2 Delivery Device Silicone Nasal Cannula   Vitals Comments pt desatted to 87% ambulating on 4L, inreased to 6L for ambulating   Pain 0 - 10 Group   Location Back   Location Orientation Lower   Pain Rating Scale (NPRS)   (not quantified)   Description Aching   Therapist Pain Assessment Post Activity Pain Same as Prior to Activity   Balance   Sitting Balance (Static) Fair +   Sitting Balance (Dynamic) Fair   Standing Balance (Static) Fair -   Standing Balance (Dynamic) Fair -   Weight Shift Sitting Good   Weight Shift Standing Fair   Skilled Intervention Verbal Cuing   Comments FWW in standing   Bed Mobility    Supine to Sit Standby Assist   Sit to Supine   (NT, in chair after)   Scooting Standby Assist   Skilled Intervention Verbal Cuing   Comments HOB elevated   Gait Analysis   Gait Level  Of Assist Standby Assist   Assistive Device Front Wheel Walker   Distance (Feet) 30  (+15')   # of Times Distance was Traveled 1   Deviation Bradykinetic   # of Stairs Climbed 0   Weight Bearing Status no restrictions   Skilled Intervention Verbal Cuing   Comments distances limited by fatigue, lightheadedness, and dizziness   Functional Mobility   Sit to Stand Standby Assist   Bed, Chair, Wheelchair Transfer Standby Assist   Toilet Transfers Standby Assist   Mobility FWW   Skilled Intervention Verbal Cuing   How much difficulty does the patient currently have...   Turning over in bed (including adjusting bedclothes, sheets and blankets)? 3   Sitting down on and standing up from a chair with arms (e.g., wheelchair, bedside commode, etc.) 3   Moving from lying on back to sitting on the side of the bed? 3   How much help from another person does the patient currently need...   Moving to and from a bed to a chair (including a wheelchair)? 3   Need to walk in a hospital room? 3   Climbing 3-5 steps with a railing? 2   6 clicks Mobility Score 17   Activity Tolerance   Sitting in Chair 10 min/post session   Sitting Edge of Bed 3 min   Standing 5 min   Comments limited by fatigue, lightheadedness, and dizziness   Short Term Goals    Short Term Goal # 1 pt will move supine<>eob with hob flat and spv in 6 tx to improve bed mobility.   Goal Outcome # 1 Progressing as expected   Short Term Goal # 2 pt will complete all transfers with fww and spv in 6 tx to improve functional mobility.   Goal Outcome # 2 Progressing as expected   Short Term Goal # 3 pt will ambulate 150 ft with fww and spv in 6 tx for household distances.   Goal Outcome # 3 Progressing slower than expected   Short Term Goal # 4 pt will negotiate 3 steps with sba in 6 tx to facilitate home entry.   Goal Outcome # 4 Goal not met   Physical Therapy Treatment Plan   Physical Therapy Treatment Plan Continue Current Treatment Plan   Anticipated Discharge Equipment and  Recommendations   DC Equipment Recommendations Unable to determine at this time   Discharge Recommendations Recommend post-acute placement for additional physical therapy services prior to discharge home   Interdisciplinary Plan of Care Collaboration   IDT Collaboration with  Nursing   Patient Position at End of Therapy Seated;Call Light within Reach;Tray Table within Reach;Phone within Reach   Collaboration Comments RN updated   Session Information   Date / Session Number  1/4- 3 (1/4, 1/10)

## 2023-01-04 NOTE — DISCHARGE PLANNING
Received Choice form at [phone]  Agency/Facility Name: [facility] [place]  Referral sent per Choice form @ [phone]

## 2023-01-04 NOTE — DISCHARGE PLANNING
Outpatient Dialysis Note    Patient was referred to [facility] due to discharge location changing to SNF.     Patient has been placed and confirmed at:    [facility]  [address] [place], [place] [address] [id number]    Ph: [phone]    Schedule: Tues, Thurs, Sat  Time: 5:15 AM    Patient to start Tues, 01/10 at 5:00 AM     [employer] and nephrologist Dr. Anderson notified of placement confirmation.   Provided patient with dialysis schedule welcome letter.     Xitlaly Reyes   Dialysis Coordinator / [facility]   Ph: [phone]

## 2023-01-04 NOTE — DISCHARGE PLANNING
[facility]    Physiatry recommending skilled nursing for post acute care.  Volate message to SHAYNE Wilks.  TCC will no longer follow.      Thank you for the referral.

## 2023-01-04 NOTE — DISCHARGE PLANNING
[facility]    Physiatry consult pended while waiting for additional information.  Would welcome PT/OT updates.  TCC following.

## 2023-01-05 ENCOUNTER — HOSPITAL ENCOUNTER (INPATIENT)
Facility: REHABILITATION | Age: 68
End: 2023-01-05
Attending: PHYSICAL MEDICINE & REHABILITATION | Admitting: PHYSICAL MEDICINE & REHABILITATION
Payer: COMMERCIAL

## 2023-01-05 PROBLEM — R79.89 ELEVATED TROPONIN: Status: RESOLVED | Noted: 2022-12-23 | Resolved: 2023-01-05

## 2023-01-05 LAB
ANION GAP SERPL CALC-SCNC: 10 MMOL/L (ref 7–16)
BUN SERPL-MCNC: 41 MG/DL (ref 8–22)
CALCIUM SERPL-MCNC: 7.9 MG/DL (ref 8.5–10.5)
CHLORIDE SERPL-SCNC: 101 MMOL/L (ref 96–112)
CO2 SERPL-SCNC: 27 MMOL/L (ref 20–33)
CREAT SERPL-MCNC: 2.65 MG/DL (ref 0.5–1.4)
ERYTHROCYTE [DISTWIDTH] IN BLOOD BY AUTOMATED COUNT: 55.6 FL (ref 35.9–50)
GFR SERPLBLD CREATININE-BSD FMLA CKD-EPI: 25 ML/MIN/1.73 M 2
GLUCOSE BLD STRIP.AUTO-MCNC: 135 MG/DL (ref 65–99)
GLUCOSE BLD STRIP.AUTO-MCNC: 148 MG/DL (ref 65–99)
GLUCOSE BLD STRIP.AUTO-MCNC: 150 MG/DL (ref 65–99)
GLUCOSE BLD STRIP.AUTO-MCNC: 161 MG/DL (ref 65–99)
GLUCOSE SERPL-MCNC: 138 MG/DL (ref 65–99)
HCT VFR BLD AUTO: 26.6 % (ref 42–52)
HGB BLD-MCNC: 7.3 G/DL (ref 14–18)
MAGNESIUM SERPL-MCNC: 1.8 MG/DL (ref 1.5–2.5)
MCH RBC QN AUTO: 25.8 PG (ref 27–33)
MCHC RBC AUTO-ENTMCNC: 27.4 G/DL (ref 33.7–35.3)
MCV RBC AUTO: 94 FL (ref 81.4–97.8)
PHOSPHATE SERPL-MCNC: 3 MG/DL (ref 2.5–4.5)
PLATELET # BLD AUTO: 288 K/UL (ref 164–446)
PMV BLD AUTO: 10.1 FL (ref 9–12.9)
POTASSIUM SERPL-SCNC: 4.2 MMOL/L (ref 3.6–5.5)
RBC # BLD AUTO: 2.83 M/UL (ref 4.7–6.1)
SODIUM SERPL-SCNC: 138 MMOL/L (ref 135–145)
WBC # BLD AUTO: 14.9 K/UL (ref 4.8–10.8)

## 2023-01-05 PROCEDURE — 85027 COMPLETE CBC AUTOMATED: CPT

## 2023-01-05 PROCEDURE — 700111 HCHG RX REV CODE 636 W/ 250 OVERRIDE (IP): Performed by: STUDENT IN AN ORGANIZED HEALTH CARE EDUCATION/TRAINING PROGRAM

## 2023-01-05 PROCEDURE — 80048 BASIC METABOLIC PNL TOTAL CA: CPT

## 2023-01-05 PROCEDURE — A9270 NON-COVERED ITEM OR SERVICE: HCPCS | Performed by: STUDENT IN AN ORGANIZED HEALTH CARE EDUCATION/TRAINING PROGRAM

## 2023-01-05 PROCEDURE — 83735 ASSAY OF MAGNESIUM: CPT

## 2023-01-05 PROCEDURE — 36415 COLL VENOUS BLD VENIPUNCTURE: CPT

## 2023-01-05 PROCEDURE — 700105 HCHG RX REV CODE 258: Performed by: INTERNAL MEDICINE

## 2023-01-05 PROCEDURE — A9270 NON-COVERED ITEM OR SERVICE: HCPCS | Performed by: INTERNAL MEDICINE

## 2023-01-05 PROCEDURE — 84100 ASSAY OF PHOSPHORUS: CPT

## 2023-01-05 PROCEDURE — 99232 SBSQ HOSP IP/OBS MODERATE 35: CPT | Performed by: HOSPITALIST

## 2023-01-05 PROCEDURE — 770020 HCHG ROOM/CARE - TELE (206)

## 2023-01-05 PROCEDURE — 700102 HCHG RX REV CODE 250 W/ 637 OVERRIDE(OP): Performed by: STUDENT IN AN ORGANIZED HEALTH CARE EDUCATION/TRAINING PROGRAM

## 2023-01-05 PROCEDURE — 700111 HCHG RX REV CODE 636 W/ 250 OVERRIDE (IP): Performed by: INTERNAL MEDICINE

## 2023-01-05 PROCEDURE — 700102 HCHG RX REV CODE 250 W/ 637 OVERRIDE(OP): Performed by: INTERNAL MEDICINE

## 2023-01-05 PROCEDURE — 82962 GLUCOSE BLOOD TEST: CPT | Mod: 91

## 2023-01-05 RX ORDER — FUROSEMIDE 40 MG/1
40 TABLET ORAL SEE ADMIN INSTRUCTIONS
Status: DISCONTINUED | OUTPATIENT
Start: 2023-01-05 | End: 2023-01-12

## 2023-01-05 RX ADMIN — AMITRIPTYLINE HYDROCHLORIDE 10 MG: 10 TABLET, FILM COATED ORAL at 04:02

## 2023-01-05 RX ADMIN — METOPROLOL TARTRATE 25 MG: 25 TABLET, FILM COATED ORAL at 18:16

## 2023-01-05 RX ADMIN — NICOTINE TRANSDERMAL SYSTEM 21 MG: 21 PATCH, EXTENDED RELEASE TRANSDERMAL at 04:01

## 2023-01-05 RX ADMIN — ASPIRIN 81 MG: 81 TABLET, COATED ORAL at 04:01

## 2023-01-05 RX ADMIN — AMITRIPTYLINE HYDROCHLORIDE 10 MG: 10 TABLET, FILM COATED ORAL at 18:16

## 2023-01-05 RX ADMIN — APIXABAN 5 MG: 5 TABLET, FILM COATED ORAL at 04:01

## 2023-01-05 RX ADMIN — ATORVASTATIN CALCIUM 40 MG: 40 TABLET, FILM COATED ORAL at 21:50

## 2023-01-05 RX ADMIN — SODIUM CHLORIDE 250 MG: 9 INJECTION, SOLUTION INTRAVENOUS at 05:52

## 2023-01-05 RX ADMIN — ACETAMINOPHEN 650 MG: 325 TABLET, FILM COATED ORAL at 10:16

## 2023-01-05 RX ADMIN — METOPROLOL TARTRATE 25 MG: 25 TABLET, FILM COATED ORAL at 04:01

## 2023-01-05 RX ADMIN — FUROSEMIDE 40 MG: 10 INJECTION, SOLUTION INTRAMUSCULAR; INTRAVENOUS at 04:01

## 2023-01-05 RX ADMIN — DOCUSATE SODIUM 50 MG AND SENNOSIDES 8.6 MG 2 TABLET: 8.6; 5 TABLET, FILM COATED ORAL at 04:01

## 2023-01-05 RX ADMIN — APIXABAN 5 MG: 5 TABLET, FILM COATED ORAL at 18:16

## 2023-01-05 RX ADMIN — FLUTICASONE PROPIONATE 88 MCG: 44 AEROSOL, METERED RESPIRATORY (INHALATION) at 04:01

## 2023-01-05 ASSESSMENT — ENCOUNTER SYMPTOMS
VOMITING: 0
FEVER: 0
MYALGIAS: 0
DIZZINESS: 0
NAUSEA: 0
BRUISES/BLEEDS EASILY: 0
COUGH: 0
BLURRED VISION: 0
SHORTNESS OF BREATH: 0

## 2023-01-05 ASSESSMENT — FIBROSIS 4 INDEX: FIB4 SCORE: 1.11

## 2023-01-05 NOTE — DIETARY
Nutrition Services Update:Pt seen for PO intake follow up.     Day 13 of admit.  Yoandy Peace is a 67 y.o. male with admitting DX of Respiratory failure.     Current Diet: Cardiac, 1500 ml fluid restriction.   Per ADL, PO intake variable but eating mostly 50% or greater of three out of past six meals.   Pt explained if he likes food, he will eat 100% of meals but dislikes hospital foods. Reviewed importance of adequate intake, pt verbalized understanding and  provided preferences and agreeable to Magic Cup once daily and noted preferences for high protein standards to be sent with meals. RD reviewed current diet restrictions and explained some food items may need to be adjusted to meet diet restrictions.   Skin: MASD to groin/buttock, unstageable pressure ulcer to bilateral ears. Wound care team last seen pt on 1/4.   Wt: 138.6 kg per stand up scale; down from wt of 147.9 kg per stand up scale on 12/28/2022 likely related to fluid imbalance.  Nephrology following, pt with FARIHA on CKD and plan is to continue HD MWF to optimize volume status.     Problem: Nutritional:  Goal: Achieve adequate nutritional intake  Description: Patient will consume ~50% or more of meals  Outcome: Progressing      Plan/Rec:   RD added Magic Cup once daily and noted preferences for high protein standards to be sent with meals.   Consider Renal Multivitamin such as ALL BEE + C for wound healing.     RD to follow for consistently adequate intake.

## 2023-01-05 NOTE — CARE PLAN
The patient is Stable - Low risk of patient condition declining or worsening    Shift Goals  Clinical Goals: Complete HD  Patient Goals: Discharge soon  Family Goals: no family present    Progress made toward(s) clinical / shift goals:    Problem: Knowledge Deficit - Standard  Goal: Patient and family/care givers will demonstrate understanding of plan of care, disease process/condition, diagnostic tests and medications  Outcome: Progressing     Problem: Hemodynamics  Goal: Patient's hemodynamics, fluid balance and neurologic status will be stable or improve  Outcome: Progressing     Problem: Fluid Volume  Goal: Fluid volume balance will be maintained  Outcome: Progressing     Problem: Urinary - Renal Perfusion  Goal: Ability to achieve and maintain adequate renal perfusion and functioning will improve  Outcome: Progressing     Problem: Respiratory  Goal: Patient will achieve/maintain optimum respiratory ventilation and gas exchange  Outcome: Progressing     Problem: Skin Integrity  Goal: Skin integrity is maintained or improved  Outcome: Progressing

## 2023-01-05 NOTE — DISCHARGE PLANNING
Case Management Discharge Planning    Admission Date: 12/23/2022  GMLOS: 3.9  ALOS: 13    6-Clicks ADL Score: 18  6-Clicks Mobility Score: 17  PT and/or OT Eval ordered: Yes  Post-acute Referrals Ordered: Yes  Post-acute Choice Obtained: Yes  Has referral(s) been sent to post-acute provider:  Yes      Anticipated Discharge Dispo: Discharge Disposition: D/T to home under [facility] care in anticipation of covered skilled care (06)    DME Needed: No    Action(s) Taken: ELKE made phone call to the following SNFs:  [facility]-left VM with callback request  [facility]-unable to accommodate dialysis chair time  [facility]-left VM with callback request    Escalations Completed: None    Medically Clear: Yes    Next Steps: Care coordination will f/u with SNF referrals    Barriers to Discharge: Pending Placement, HD chair start date 1/9    Is the patient up for discharge tomorrow: No

## 2023-01-05 NOTE — PROGRESS NOTES
Monitor check called for HR of 160. Patient found at side of bed denies chest pain palpitations. Observed rhythm on monitor afib. See flowsheet for vs.

## 2023-01-05 NOTE — PROGRESS NOTES
"[facility] -  PROGRESS NOTE               Author: Lucas Cerda M.D. Date & Time: 1/5/2023  11:51 AM     HPI:  67 y.o. male with history of COPD dependent on 4 L, tobacco abuse, CAD, CHF, ALONDRA/OHS on bedtime CPAP, who presented 12/23/2022 with evaluations for progressive shortness of breath and lower extremities swelling.  CXR notable for bilateral vascular congestion with pulmonary edema. Patient was admitted to the hospital on 12/23 for CHD exacerbation and copd. No F/C/N/V. No melena, hematochezia, hematemesis.  No HA, visual changes, or abdominal pain.     DAILY NEPHROLOGY SUMMARY:  12/26: consult duque, s/p hd #1  12/27: due for HD #2, still anasarca and on O2   12/28: on hd now, tolerating well.   12/29: tolerated puf today , edema is better , he feels better   12/30: tolerated hd well. In negative balance   12/31: feeling better, no new issues.   1/1: sitting in the chair, LE edema is better, no new complaints. S/p TDC placement   1/2 Seen on HD No c/o, producing more urine 1665 cc/ 24 hrs   1/3 Feeling better overall, breathing improved, HD yesterday net UF 1 L   UOP ~ 1L / 24 hrs , UF goal decreased due to soft BP   1/4: Seen on HD UF 1 L , BP stable no c/o, UOP   1/5: No c/o, lying in bed, VSS    REVIEW OF SYSTEMS:    10 point ROS reviewed and is as per HPI/daily summary or otherwise negative    PMH/PSH/SH/FH:   Reviewed and unchanged since admission note    CURRENT MEDICATIONS:   Reviewed from admission to present day    VS:  /60   Pulse 97   Temp 36.8 °C (98.3 °F) (Temporal)   Resp 16   Ht 1.778 m (5' 10\")   Wt (!) 139 kg (305 lb 8.9 oz)   SpO2 96%   BMI 43.84 kg/m²     Physical Exam  Appearance: He is obese. NAD  HENT:      Head: Normocephalic and atraumatic.   Cardiovascular:      Rate and Rhythm: Normal rate. Rhythm irregular.      Comments: anasarca improved   Pulmonary:      Comments: Diminished BS BL  Abdominal:      General: There is distension.      " Tenderness: There is no abdominal tenderness.      Hernia: A hernia is present.   Musculoskeletal:         General: BL lower ext edema + with interval improvement   Skin:     Coloration: Skin is pale. Skin is not jaundiced.      Findings: No bruising.   Neurological:      General: No focal deficit present.      Mental Status: He is alert and oriented to person, place, and time.   Psychiatric:         Behavior: Behavior normal.  Fluids:  In: 980 [P.O.:480; Dialysis:500]  Out: 1725     LABS:  Recent Labs     01/03/23  0221 01/04/23  0602 01/05/23  0132   SODIUM 138 137 138   POTASSIUM 4.2 4.3 4.2   CHLORIDE 100 100 101   CO2 28 27 27   GLUCOSE 134* 164* 138*   BUN 50* 63* 41*   CREATININE 3.13* 3.62* 2.65*   CALCIUM 7.8* 7.8* 7.9*         IMAGING:   All imaging reviewed from admission to present day    IMPRESSION:  # FARIHA on CKD   - No hydro, UA benign    - Dialysis  dependent   - Renal recovery uncertain  - Continue to need HD   - Access R IJ perm cath  # CKD 2/2 DM, HTN  - Unclear baseline Cr , Cr 3.31 on admission   # Hyperkalemia resolved   # Hyponatremia resolved   # Acidosis   # Acute respiratory failure 2/2 COPD/CHF   # Leukocytosis   # Anemia resolved   - low iron stores % sat 13, ferritin 68   # Hyperphosphatemia resolved   # ALONDRA on CPAP  # CAD   # DM-2   # Obesity   # Volume overload   # Relative Hypotension        PLAN:  - Continue HD MW to optimize volume status   - Monitor for renal recovery   - Renal diet   - Fluid restriction 1.2 L   - IV iron replacement   - CARA with HD   - Strict IO  - Dose all meds per eGFR < 15  - Change lasix to PO 40 mg bid on non-HD days   - Has HD chair at [facility]  Ok to discharge from renal standpoint when medically cleared

## 2023-01-05 NOTE — PROGRESS NOTES
Hospital Medicine Daily Progress Note    Date of Service  1/5/2023    Chief Complaint  Yoandy Peace is a 67 y.o. male admitted 12/23/2022 with shortness of breath    Hospital Course    67 y.o. male past medical history of tobacco dependence, COPD on 4L home oxygen, CAD, CHF, obstructive sleep apnea who presented 12/23/2022 with progressive shortness of breath. He was admitted for acute hypoxic respiratory failure secondary to CHF and COPD.   He was given Solu-Medrol, bronchodilators and placed on BiPAP and admitted to ICU.  Patient was weaned off BiPAP.  Nephrology was consulted for worsening function.  Patient was started on hemodialysis and IV Lasix for pulmonary edema.  Cardiology was consulted for elevated troponin and chest pain during dialysis.  Started on therapeutic Lovenox for A. fib with RVR and transitioned to eliquis.  Nephrology following for hemodialysis. SNF on discharge.    Interval Problem Update  KARI overnight  Pt not agreeable to acute rehab as he does not think he can tolerate 3 hours  Tachy into 150s upon ambulation. Will check orthostats  I d/w nephro- will change lasix to PO  Awaiting SNF      I have discussed this patient's plan of care and discharge plan at IDT rounds today with Case Management, Nursing, Nursing leadership, and other members of the IDT team.    Consultants/Specialty  cardiology, critical care, nephrology, palliative care, physiatry, pulmonary, and vascular surgery    Code Status  DNAR/DNI    Disposition  Patient is medically cleared for discharge.   Anticipate discharge to to skilled nursing facility.  I have placed the appropriate orders for post-discharge needs.    Review of Systems  Review of Systems   Constitutional:  Negative for fever.   HENT:  Negative for hearing loss.    Eyes:  Negative for blurred vision.   Respiratory:  Negative for cough and shortness of breath.    Cardiovascular:  Negative for chest pain.   Gastrointestinal:  Negative for nausea and vomiting.    Genitourinary:  Negative for dysuria.   Musculoskeletal:  Negative for myalgias.   Skin:  Negative for rash.   Neurological:  Negative for dizziness.   Endo/Heme/Allergies:  Does not bruise/bleed easily.      Physical Exam  Temp:  [36.7 °C (98.1 °F)-36.8 °C (98.3 °F)] 36.8 °C (98.2 °F)  Pulse:  [] 68  Resp:  [16-20] 16  BP: ()/(49-64) 118/64  SpO2:  [95 %-100 %] 95 %    Physical Exam  Constitutional:       General: He is not in acute distress.     Appearance: He is obese. He is ill-appearing.   HENT:      Head: Normocephalic and atraumatic.      Right Ear: Tympanic membrane normal.      Left Ear: Tympanic membrane normal.      Nose: Nose normal.      Mouth/Throat:      Mouth: Mucous membranes are moist.   Eyes:      Extraocular Movements: Extraocular movements intact.      Pupils: Pupils are equal, round, and reactive to light.   Cardiovascular:      Rate and Rhythm: Normal rate and regular rhythm.      Pulses: Normal pulses.   Pulmonary:      Effort: Pulmonary effort is normal. No respiratory distress.   Abdominal:      General: Abdomen is flat. There is no distension.   Musculoskeletal:      Cervical back: Normal range of motion.      Right lower leg: Edema present.      Left lower leg: Edema present.   Skin:     General: Skin is warm.   Neurological:      General: No focal deficit present.      Mental Status: He is alert and oriented to person, place, and time. Mental status is at baseline.   Psychiatric:         Mood and Affect: Mood normal.       Fluids  No intake or output data in the 24 hours ending 01/05/23 1541    Laboratory  Recent Labs     01/03/23  0221 01/04/23  0602 01/05/23  0132   WBC 15.7* 15.9* 14.9*   RBC 3.03* 2.85* 2.83*   HEMOGLOBIN 8.1* 7.5* 7.3*   HEMATOCRIT 26.5* 24.9* 26.6*   MCV 87.5 87.4 94.0   MCH 26.7* 26.3* 25.8*   MCHC 30.6* 30.1* 27.4*   RDW 50.4* 51.7* 55.6*   PLATELETCT 315 290 288   MPV 10.4 10.4 10.1     Recent Labs     01/03/23  0221 01/04/23  0602 01/05/23  0132    SODIUM 138 137 138   POTASSIUM 4.2 4.3 4.2   CHLORIDE 100 100 101   CO2 28 27 27   GLUCOSE 134* 164* 138*   BUN 50* 63* 41*   CREATININE 3.13* 3.62* 2.65*   CALCIUM 7.8* 7.8* 7.9*                     Imaging  IR-WIN,TRIPPONG PLACEMENT >5   Final Result      1. Ultrasound and fluoroscopic guided placement of a right internal jugular 14.5 Polish HemoSplit tunneled dialysis catheter.      2. The hemodialysis catheter may be used immediately as clinically indicated. Flushes per protocol.      3. Removal of the right temporary dialysis catheter.      DX-CHEST-PORTABLE (1 VIEW)   Final Result      1.  Ongoing pulmonary edema and bilateral pleural effusions.   2.  Cardiomegaly.   3.  Left lower lobe atelectasis or consolidation. There is also likely some component of compressive atelectasis of the right lung base considering the presence of significant pleural effusion.   4.  Interval placement of temporary non-tunneled hemodialysis catheter with no evidence of pneumothorax.      DX-CHEST-PORTABLE (1 VIEW)   Final Result         1. No significant interval change.      US-RENAL   Final Result      1.  Echogenic renal parenchyma bilaterally could relate to medical renal disease.      2.  No hydronephrosis. No renal calculus.      DX-CHEST-PORTABLE (1 VIEW)   Final Result      1.  Enlarged cardiac silhouette and small amounts of bilateral pleural fluid with diffuse interstitial opacities most consistent with pulmonary edema/CHF.   2.  Bibasilar opacities could be due to edema, atelectasis or pneumonitis.   3.  There is atherosclerosis.   4.  Questionable linear calcific plaque in the proximal descending thoracic aorta versus some other device intrinsic or extrinsic to the patient.      EC-ECHOCARDIOGRAM COMPLETE W/ CONT   Final Result      US-EXTREMITY VENOUS LOWER BILAT   Final Result      DX-CHEST-PORTABLE (1 VIEW)   Final Result      1.  Cardiomegaly with interstitial infiltrates likely representing pulmonary edema.       2.  Bibasilar atelectasis and small bilateral pleural effusions.             Assessment/Plan  * Acute renal failure (ARF) ([facility])  Assessment & Plan  Started on hemodialysis  Nephrology following    Bilateral leg edema- (present on admission)  Assessment & Plan  Likely due to volume overload.  Echo pending  Diuresis  Bilateral lower extremity DVT studies negative    Acute exacerbation of chronic obstructive pulmonary disease (COPD) ([facility])  Assessment & Plan  Stable on oxygen at 5 L.  Continue with Flovent and Anoro  S/p steroids  Duo nebs as needed  Continue with oxygen supplementation to obtain SPO2 >88-90%    Acute pulmonary edema ([facility])- (present on admission)  Assessment & Plan  Resolving  Continue with diuresis and HD  Echo EF of 55-60%    Hypertension  Assessment & Plan  Amlodipine 10 mg daily   Lasix 40 mg BID   Well controlled    Tobacco abuse- (present on admission)  Assessment & Plan  Nicotine replacement as needed    ACP (advance care planning)  Assessment & Plan  Full code.  Discussed with patient in front of RN.    Class 3 severe obesity in adult ([facility])  Assessment & Plan  BMI 52.89     ALONDRA on CPAP- (present on admission)  Assessment & Plan  CPAP at bedtime      Acute on chronic respiratory failure with hypoxia ([facility])  Assessment & Plan  Dependent on 4 L.  Currently requiring 2-5 L in no distress  Diuresis with Lasix  Nebs/pulm toilet    CAD (coronary artery disease)  Assessment & Plan  ASA/statin    DM (diabetes mellitus) ([facility])  Assessment & Plan  Continue with SSI   On Farxiga, insulin NPH at home, and linagliptin           VTE prophylaxis: SCDs/TEDs and therapeutic anticoagulation with eliquis    I have performed a physical exam and reviewed and updated ROS and Plan today (1/5/2023). In review of yesterday's note (1/4/2023), there are no changes except as documented above.

## 2023-01-06 PROBLEM — I48.91 ATRIAL FIBRILLATION (HCC): Status: ACTIVE | Noted: 2023-01-06

## 2023-01-06 PROBLEM — D64.9 ANEMIA: Status: ACTIVE | Noted: 2023-01-06

## 2023-01-06 LAB
ABO + RH BLD: NORMAL
ABO GROUP BLD: NORMAL
ANION GAP SERPL CALC-SCNC: 10 MMOL/L (ref 7–16)
BARCODED ABORH UBTYP: 2800
BARCODED ABORH UBTYP: 9500
BARCODED PRD CODE UBPRD: NORMAL
BARCODED PRD CODE UBPRD: NORMAL
BARCODED UNIT NUM UBUNT: NORMAL
BARCODED UNIT NUM UBUNT: NORMAL
BLD GP AB SCN SERPL QL: NORMAL
BUN SERPL-MCNC: 60 MG/DL (ref 8–22)
CALCIUM SERPL-MCNC: 8.1 MG/DL (ref 8.5–10.5)
CHLORIDE SERPL-SCNC: 97 MMOL/L (ref 96–112)
CO2 SERPL-SCNC: 27 MMOL/L (ref 20–33)
COMPONENT R 8504R: NORMAL
COMPONENT R 8504R: NORMAL
CREAT SERPL-MCNC: 3.54 MG/DL (ref 0.5–1.4)
ERYTHROCYTE [DISTWIDTH] IN BLOOD BY AUTOMATED COUNT: 54.2 FL (ref 35.9–50)
GFR SERPLBLD CREATININE-BSD FMLA CKD-EPI: 18 ML/MIN/1.73 M 2
GLUCOSE BLD STRIP.AUTO-MCNC: 115 MG/DL (ref 65–99)
GLUCOSE BLD STRIP.AUTO-MCNC: 131 MG/DL (ref 65–99)
GLUCOSE BLD STRIP.AUTO-MCNC: 142 MG/DL (ref 65–99)
GLUCOSE BLD STRIP.AUTO-MCNC: 143 MG/DL (ref 65–99)
GLUCOSE SERPL-MCNC: 153 MG/DL (ref 65–99)
HCT VFR BLD AUTO: 19.8 % (ref 42–52)
HGB BLD-MCNC: 5.8 G/DL (ref 14–18)
HGB BLD-MCNC: 6.5 G/DL (ref 14–18)
MAGNESIUM SERPL-MCNC: 1.9 MG/DL (ref 1.5–2.5)
MCH RBC QN AUTO: 26.2 PG (ref 27–33)
MCHC RBC AUTO-ENTMCNC: 29.3 G/DL (ref 33.7–35.3)
MCV RBC AUTO: 89.6 FL (ref 81.4–97.8)
PHOSPHATE SERPL-MCNC: 2.9 MG/DL (ref 2.5–4.5)
PLATELET # BLD AUTO: 265 K/UL (ref 164–446)
PMV BLD AUTO: 10.7 FL (ref 9–12.9)
POTASSIUM SERPL-SCNC: 4 MMOL/L (ref 3.6–5.5)
PRODUCT TYPE UPROD: NORMAL
PRODUCT TYPE UPROD: NORMAL
RBC # BLD AUTO: 2.21 M/UL (ref 4.7–6.1)
RH BLD: NORMAL
SODIUM SERPL-SCNC: 134 MMOL/L (ref 135–145)
UNIT STATUS USTAT: NORMAL
UNIT STATUS USTAT: NORMAL
WBC # BLD AUTO: 16.5 K/UL (ref 4.8–10.8)

## 2023-01-06 PROCEDURE — A9270 NON-COVERED ITEM OR SERVICE: HCPCS | Performed by: STUDENT IN AN ORGANIZED HEALTH CARE EDUCATION/TRAINING PROGRAM

## 2023-01-06 PROCEDURE — 86850 RBC ANTIBODY SCREEN: CPT

## 2023-01-06 PROCEDURE — 700102 HCHG RX REV CODE 250 W/ 637 OVERRIDE(OP): Performed by: INTERNAL MEDICINE

## 2023-01-06 PROCEDURE — 700102 HCHG RX REV CODE 250 W/ 637 OVERRIDE(OP): Performed by: STUDENT IN AN ORGANIZED HEALTH CARE EDUCATION/TRAINING PROGRAM

## 2023-01-06 PROCEDURE — 83735 ASSAY OF MAGNESIUM: CPT

## 2023-01-06 PROCEDURE — 85027 COMPLETE CBC AUTOMATED: CPT

## 2023-01-06 PROCEDURE — 82962 GLUCOSE BLOOD TEST: CPT | Mod: 91

## 2023-01-06 PROCEDURE — P9016 RBC LEUKOCYTES REDUCED: HCPCS

## 2023-01-06 PROCEDURE — 86900 BLOOD TYPING SEROLOGIC ABO: CPT

## 2023-01-06 PROCEDURE — 30233N1 TRANSFUSION OF NONAUTOLOGOUS RED BLOOD CELLS INTO PERIPHERAL VEIN, PERCUTANEOUS APPROACH: ICD-10-PCS | Performed by: HOSPITALIST

## 2023-01-06 PROCEDURE — 80048 BASIC METABOLIC PNL TOTAL CA: CPT

## 2023-01-06 PROCEDURE — 36430 TRANSFUSION BLD/BLD COMPNT: CPT

## 2023-01-06 PROCEDURE — 700111 HCHG RX REV CODE 636 W/ 250 OVERRIDE (IP): Performed by: INTERNAL MEDICINE

## 2023-01-06 PROCEDURE — 86923 COMPATIBILITY TEST ELECTRIC: CPT

## 2023-01-06 PROCEDURE — 85018 HEMOGLOBIN: CPT

## 2023-01-06 PROCEDURE — A9270 NON-COVERED ITEM OR SERVICE: HCPCS | Performed by: INTERNAL MEDICINE

## 2023-01-06 PROCEDURE — 36415 COLL VENOUS BLD VENIPUNCTURE: CPT

## 2023-01-06 PROCEDURE — 90935 HEMODIALYSIS ONE EVALUATION: CPT

## 2023-01-06 PROCEDURE — 700111 HCHG RX REV CODE 636 W/ 250 OVERRIDE (IP): Performed by: HOSPITALIST

## 2023-01-06 PROCEDURE — 770020 HCHG ROOM/CARE - TELE (206)

## 2023-01-06 PROCEDURE — 99233 SBSQ HOSP IP/OBS HIGH 50: CPT | Performed by: HOSPITALIST

## 2023-01-06 PROCEDURE — 700105 HCHG RX REV CODE 258: Performed by: INTERNAL MEDICINE

## 2023-01-06 PROCEDURE — C9113 INJ PANTOPRAZOLE SODIUM, VIA: HCPCS | Performed by: HOSPITALIST

## 2023-01-06 PROCEDURE — 86901 BLOOD TYPING SEROLOGIC RH(D): CPT

## 2023-01-06 PROCEDURE — 84100 ASSAY OF PHOSPHORUS: CPT

## 2023-01-06 RX ORDER — PANTOPRAZOLE SODIUM 40 MG/10ML
40 INJECTION, POWDER, LYOPHILIZED, FOR SOLUTION INTRAVENOUS 2 TIMES DAILY
Status: DISCONTINUED | OUTPATIENT
Start: 2023-01-06 | End: 2023-01-08

## 2023-01-06 RX ADMIN — NICOTINE TRANSDERMAL SYSTEM 21 MG: 21 PATCH, EXTENDED RELEASE TRANSDERMAL at 04:23

## 2023-01-06 RX ADMIN — ASPIRIN 81 MG: 81 TABLET, COATED ORAL at 04:22

## 2023-01-06 RX ADMIN — ATORVASTATIN CALCIUM 40 MG: 40 TABLET, FILM COATED ORAL at 20:45

## 2023-01-06 RX ADMIN — HEPARIN SODIUM 3800 UNITS: 1000 INJECTION, SOLUTION INTRAVENOUS; SUBCUTANEOUS at 16:40

## 2023-01-06 RX ADMIN — EPOETIN ALFA-EPBX 7000 UNITS: 4000 INJECTION, SOLUTION INTRAVENOUS; SUBCUTANEOUS at 15:45

## 2023-01-06 RX ADMIN — METOPROLOL TARTRATE 25 MG: 25 TABLET, FILM COATED ORAL at 04:22

## 2023-01-06 RX ADMIN — SODIUM CHLORIDE 250 MG: 9 INJECTION, SOLUTION INTRAVENOUS at 05:28

## 2023-01-06 RX ADMIN — METOPROLOL TARTRATE 25 MG: 25 TABLET, FILM COATED ORAL at 17:24

## 2023-01-06 RX ADMIN — AMITRIPTYLINE HYDROCHLORIDE 10 MG: 10 TABLET, FILM COATED ORAL at 04:22

## 2023-01-06 RX ADMIN — ACETAMINOPHEN 650 MG: 325 TABLET, FILM COATED ORAL at 13:13

## 2023-01-06 RX ADMIN — PANTOPRAZOLE SODIUM 40 MG: 40 INJECTION, POWDER, LYOPHILIZED, FOR SOLUTION INTRAVENOUS at 17:25

## 2023-01-06 RX ADMIN — APIXABAN 5 MG: 5 TABLET, FILM COATED ORAL at 04:22

## 2023-01-06 RX ADMIN — FLUTICASONE PROPIONATE 88 MCG: 44 AEROSOL, METERED RESPIRATORY (INHALATION) at 04:23

## 2023-01-06 RX ADMIN — AMITRIPTYLINE HYDROCHLORIDE 10 MG: 10 TABLET, FILM COATED ORAL at 17:24

## 2023-01-06 ASSESSMENT — ENCOUNTER SYMPTOMS
NAUSEA: 0
DIZZINESS: 0
BRUISES/BLEEDS EASILY: 0
COUGH: 0
VOMITING: 0
FEVER: 0
MYALGIAS: 0
SHORTNESS OF BREATH: 0
BLURRED VISION: 0

## 2023-01-06 ASSESSMENT — FIBROSIS 4 INDEX: FIB4 SCORE: 1.21

## 2023-01-06 NOTE — CARE PLAN
Problem: Care Map:  Admission Optimal Outcome for the Heart Failure Patient  Goal: Admission:  Optimal Care of the heart failure patient  Outcome: Progressing     Problem: Care Map:  Day 1 Optimal Outcome for the Heart Failure Patient  Goal: Day 1:  Optimal Care of the heart failure patient  Outcome: Progressing   The patient is Stable - Low risk of patient condition declining or worsening    Shift Goals  Clinical Goals: Monitor HR; discharge planning  Patient Goals: Rest  Family Goals: no family present    Progress made toward(s) clinical / shift goals:  Mobility, diuresis    Patient is not progressing towards the following goals:

## 2023-01-06 NOTE — DOCUMENTATION QUERY
[facility]                                                                       Query Response Note      PATIENT:               FERNANDO VAZQUEZ  ACCT #:                  [id number]  MRN:                     [id number]  :                      1955  ADMIT DATE:       2022 2:01 PM  DISCH DATE:          RESPONDING  PROVIDER #:        [id number]           QUERY TEXT:    Documentation in the medical record indicates that this patient is being treated for ulcer of the following sites:    Pressure Injury Ear Bilateral POA unstageable    Can the diagnosis of ulcer be further clarified as to type of wound, location, and if present on admission (POA) based on the above clinical indicators?    The patient's Clinical Indicators include:  2022   67 y.o. w/ hx COPD on home O2 4 L, smoking, CAD, CHF, ALONDRA on CPAP     2023   Wound Team Eval: Wound 22 Pressure Injury Ear Bilateral POA unstageable    Treatment: Wound team & RD consults; site care; foam cleanser; offloading  Risk Factors: COPD on O2; resp failure w/ hypoxia; ALONDRA; CAD; CHF; FARIHA; CKD; DM     Thank You,  Maria Del Carmen Mcdaniel RN  Clinical    Connect via [employer]  Options provided:   -- Pressure Injury Ear Bilateral present on admission   -- Pressure Injury Ear Bilateral not present on admission   -- Pressure Injury Ear Bilateral unknown if present on admission   -- Wound to Ear Bilateral is not a pressure ulcer, (please specify other type of wound)   -- Other explanation, (please specify other explanation)   -- Unable to Determine      Query created by: Maria Del Carmen Mcdaniel on 2023 9:42 AM    RESPONSE TEXT:    Pressure Injury Ear Bilateral present on admission          Electronically signed by:  HEMA RUSS MD 2023 2:35 PM

## 2023-01-06 NOTE — DISCHARGE PLANNING
Jose BACK called and will accept patient if dialysis time can be scheduled be for 12:00 noon .  Phoned Xitlay Reyes dialysis coordinator contacted [phone] and she will see if she is able to arrange dialysis before 12:00 noon . Patient currently at dialysis . Patient is currently at  dialysis will follow up at a later time. Requested Mark to proceed with getting authorization from [facility] for placement.   Received a call back from Taz and Rafael Rob has a 5:00 am slot available on Tuesday, Thursday and Saturday  . Phoned Derik and left message for [place] to call back to confirm dialysis time . Case management will continue to follow for disposition planning.

## 2023-01-06 NOTE — PROGRESS NOTES
Hospital Medicine Daily Progress Note    Date of Service  1/6/2023    Chief Complaint  Yoandy Peace is a 67 y.o. male admitted 12/23/2022 with shortness of breath    Hospital Course    67 y.o. male past medical history of tobacco dependence, COPD on 4L home oxygen, CAD, CHF, obstructive sleep apnea who presented 12/23/2022 with progressive shortness of breath. He was admitted for acute hypoxic respiratory failure secondary to CHF and COPD.   He was given Solu-Medrol, bronchodilators and placed on BiPAP and admitted to ICU.  Patient was weaned off BiPAP.  Nephrology was consulted for worsening function.  Patient was started on hemodialysis and IV Lasix for pulmonary edema.  Cardiology was consulted for elevated troponin and chest pain during dialysis.  Started on therapeutic Lovenox for A. fib with RVR and transitioned to eliquis.  Nephrology following for hemodialysis. SNF on discharge.    Interval Problem Update  Pt with hgb of 5.8 this AM, transfused 1U PRBC  Hold eliquis  He states he has been having melena since he has been here  Denies any hematemsis, BRBPR      I have discussed this patient's plan of care and discharge plan at IDT rounds today with Case Management, Nursing, Nursing leadership, and other members of the IDT team.    Consultants/Specialty  cardiology, critical care, nephrology, palliative care, physiatry, pulmonary, and vascular surgery    Code Status  DNAR/DNI    Disposition  Patient is not medically cleared for discharge.   Anticipate discharge to to skilled nursing facility.  I have placed the appropriate orders for post-discharge needs.    Review of Systems  Review of Systems   Constitutional:  Negative for fever.   HENT:  Negative for hearing loss.    Eyes:  Negative for blurred vision.   Respiratory:  Negative for cough and shortness of breath.    Cardiovascular:  Negative for chest pain.   Gastrointestinal:  Positive for melena. Negative for nausea and vomiting.   Genitourinary:   Negative for dysuria.   Musculoskeletal:  Negative for myalgias.   Skin:  Negative for rash.   Neurological:  Negative for dizziness.   Endo/Heme/Allergies:  Does not bruise/bleed easily.      Physical Exam  Temp:  [36 °C (96.8 °F)-37 °C (98.6 °F)] 36.7 °C (98.1 °F)  Pulse:  [] 99  Resp:  [16-18] 18  BP: ()/(48-75) 121/75  SpO2:  [97 %-100 %] 100 %    Physical Exam  Constitutional:       General: He is not in acute distress.     Appearance: He is obese. He is ill-appearing.   HENT:      Head: Normocephalic and atraumatic.      Right Ear: Tympanic membrane normal.      Left Ear: Tympanic membrane normal.      Nose: Nose normal.      Mouth/Throat:      Mouth: Mucous membranes are moist.   Eyes:      Extraocular Movements: Extraocular movements intact.      Pupils: Pupils are equal, round, and reactive to light.   Cardiovascular:      Rate and Rhythm: Normal rate and regular rhythm.      Pulses: Normal pulses.   Pulmonary:      Effort: Pulmonary effort is normal. No respiratory distress.   Abdominal:      General: Abdomen is flat. There is no distension.   Musculoskeletal:      Cervical back: Normal range of motion.      Right lower leg: Edema present.      Left lower leg: Edema present.   Skin:     General: Skin is warm.   Neurological:      General: No focal deficit present.      Mental Status: He is alert and oriented to person, place, and time. Mental status is at baseline.   Psychiatric:         Mood and Affect: Mood normal.       Fluids    Intake/Output Summary (Last 24 hours) at 1/6/2023 1630  Last data filed at 1/6/2023 1200  Gross per 24 hour   Intake --   Output 350 ml   Net -350 ml       Laboratory  Recent Labs     01/04/23  0602 01/05/23  0132 01/06/23  0733 01/06/23  1516   WBC 15.9* 14.9* 16.5*  --    RBC 2.85* 2.83* 2.21*  --    HEMOGLOBIN 7.5* 7.3* 5.8* 6.5*   HEMATOCRIT 24.9* 26.6* 19.8*  --    MCV 87.4 94.0 89.6  --    MCH 26.3* 25.8* 26.2*  --    MCHC 30.1* 27.4* 29.3*  --    RDW 51.7*  55.6* 54.2*  --    PLATELETCT 290 288 265  --    MPV 10.4 10.1 10.7  --      Recent Labs     01/04/23  0602 01/05/23  0132 01/06/23  0527   SODIUM 137 138 134*   POTASSIUM 4.3 4.2 4.0   CHLORIDE 100 101 97   CO2 27 27 27   GLUCOSE 164* 138* 153*   BUN 63* 41* 60*   CREATININE 3.62* 2.65* 3.54*   CALCIUM 7.8* 7.9* 8.1*                     Imaging  IR-RAMÓN WIN PLACEMENT >5   Final Result      1. Ultrasound and fluoroscopic guided placement of a right internal jugular 14.5 Serbian HemoSplit tunneled dialysis catheter.      2. The hemodialysis catheter may be used immediately as clinically indicated. Flushes per protocol.      3. Removal of the right temporary dialysis catheter.      DX-CHEST-PORTABLE (1 VIEW)   Final Result      1.  Ongoing pulmonary edema and bilateral pleural effusions.   2.  Cardiomegaly.   3.  Left lower lobe atelectasis or consolidation. There is also likely some component of compressive atelectasis of the right lung base considering the presence of significant pleural effusion.   4.  Interval placement of temporary non-tunneled hemodialysis catheter with no evidence of pneumothorax.      DX-CHEST-PORTABLE (1 VIEW)   Final Result         1. No significant interval change.      US-RENAL   Final Result      1.  Echogenic renal parenchyma bilaterally could relate to medical renal disease.      2.  No hydronephrosis. No renal calculus.      DX-CHEST-PORTABLE (1 VIEW)   Final Result      1.  Enlarged cardiac silhouette and small amounts of bilateral pleural fluid with diffuse interstitial opacities most consistent with pulmonary edema/CHF.   2.  Bibasilar opacities could be due to edema, atelectasis or pneumonitis.   3.  There is atherosclerosis.   4.  Questionable linear calcific plaque in the proximal descending thoracic aorta versus some other device intrinsic or extrinsic to the patient.      EC-ECHOCARDIOGRAM COMPLETE W/ CONT   Final Result      US-EXTREMITY VENOUS LOWER BILAT   Final  Result      DX-CHEST-PORTABLE (1 VIEW)   Final Result      1.  Cardiomegaly with interstitial infiltrates likely representing pulmonary edema.      2.  Bibasilar atelectasis and small bilateral pleural effusions.             Assessment/Plan  * Acute renal failure (ARF) ([facility])  Assessment & Plan  Started on hemodialysis  Nephrology following    Anemia  Assessment & Plan  Requiring blood transfusion  2U on 1/6/23  No s/s of bleeding  IV iron ordered  FOBT pending  Trend H&H q 8hours, transfuse if less than 7  IV PPI  CLD with no reds, NPO at midnight  Will consult GI in AM if hgb cont to drop    Atrial fibrillation ([facility])  Assessment & Plan  New onset  [id number]  Had been started on eliquis    Bilateral leg edema- (present on admission)  Assessment & Plan  Likely due to volume overload.  Echo pending  Diuresis  Bilateral lower extremity DVT studies negative    Acute exacerbation of chronic obstructive pulmonary disease (COPD) ([facility])  Assessment & Plan  Stable on oxygen at 5 L.  Continue with Flovent and Anoro  S/p steroids  Duo nebs as needed  Continue with oxygen supplementation to obtain SPO2 >88-90%    Acute pulmonary edema ([facility])- (present on admission)  Assessment & Plan  Resolving  Continue with diuresis and HD  Echo EF of 55-60%    Hypertension  Assessment & Plan  Amlodipine 10 mg daily   Lasix 40 mg BID   Well controlled    Tobacco abuse- (present on admission)  Assessment & Plan  Nicotine replacement as needed    ACP (advance care planning)  Assessment & Plan  Full code    Class 3 severe obesity in adult ([facility])  Assessment & Plan  BMI 52.89     ALONDRA on CPAP- (present on admission)  Assessment & Plan  CPAP at bedtime      Acute on chronic respiratory failure with hypoxia ([facility])  Assessment & Plan  Dependent on 4 L.  Currently requiring 2-5 L in no distress  Diuresis with Lasix  Nebs/pulm toilet    CAD (coronary artery disease)  Assessment & Plan  ASA/statin    DM (diabetes mellitus) ([facility])  Assessment &  Plan  Continue with SSI   On Farxiga, insulin NPH at home, and linagliptin           VTE prophylaxis: SCDs/TEDs    I have performed a physical exam and reviewed and updated ROS and Plan today (1/6/2023). In review of yesterday's note (1/5/2023), there are no changes except as documented above.

## 2023-01-06 NOTE — PROGRESS NOTES
"[facility] -  PROGRESS NOTE               Author: Lucas Cerda M.D. Date & Time: 1/6/2023  2:37 PM     HPI:  67 y.o. male with history of COPD dependent on 4 L, tobacco abuse, CAD, CHF, ALONDRA/OHS on bedtime CPAP, who presented 12/23/2022 with evaluations for progressive shortness of breath and lower extremities swelling.  CXR notable for bilateral vascular congestion with pulmonary edema. Patient was admitted to the hospital on 12/23 for CHD exacerbation and copd. No F/C/N/V. No melena, hematochezia, hematemesis.  No HA, visual changes, or abdominal pain.     DAILY NEPHROLOGY SUMMARY:  12/26: consult neto, s/p hd #1  12/27: due for HD #2, still anasarca and on O2   12/28: on hd now, tolerating well.   12/29: tolerated puf today , edema is better , he feels better   12/30: tolerated hd well. In negative balance   12/31: feeling better, no new issues.   1/1: sitting in the chair, LE edema is better, no new complaints. S/p TDC placement   1/2 Seen on HD No c/o, producing more urine 1665 cc/ 24 hrs   1/3 Feeling better overall, breathing improved, HD yesterday net UF 1 L   UOP ~ 1L / 24 hrs , UF goal decreased due to soft BP   1/4: Seen on HD UF 1 L , BP stable no c/o, UOP   1/5: No c/o, lying in bed, VSS  1/6: No c/o VSS , Hb 5.8 today     REVIEW OF SYSTEMS:    10 point ROS reviewed and is as per HPI/daily summary or otherwise negative    PMH/PSH/SH/FH:   Reviewed and unchanged since admission note    CURRENT MEDICATIONS:   Reviewed from admission to present day    VS:  /75   Pulse 99   Temp 36.7 °C (98.1 °F) (Temporal)   Resp 18   Ht 1.778 m (5' 10\")   Wt (!) 138 kg (305 lb 5.4 oz)   SpO2 100%   BMI 43.81 kg/m²     Physical Exam  Appearance: He is obese. NAD, plae   HENT:      Head: Normocephalic and atraumatic.   Cardiovascular:      Rate and Rhythm: Normal rate. Rhythm irregular.      Comments: anasarca improved   Pulmonary:      Comments: Diminished BS BL  Abdominal:      " General: There is distension.      Tenderness: There is no abdominal tenderness.      Hernia: A hernia is present.   Musculoskeletal:         General: BL lower ext edema + with interval improvement   Skin:     Coloration: Skin is pale. Skin is not jaundiced.      Findings: No bruising.   Neurological:      General: No focal deficit present.      Mental Status: He is alert and oriented to person, place, and time.   Psychiatric:         Behavior: Behavior normal.  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, HTN  - Unclear baseline Cr , Cr 3.31 on admission   # Hyperkalemia resolved   # Hyponatremia resolved   # Acidosis   # Acute respiratory failure 2/2 COPD/CHF   # Leukocytosis   # Anemia resolved   - low iron stores % sat 13, ferritin 68   # Hyperphosphatemia resolved   # ALONDRA on CPAP  # CAD   # DM-2   # Obesity   # Volume overload   # Relative Hypotension        PLAN:  - Continue HD MWF to optimize volume status   - HD today ( Fri)   - Monitor for renal recovery   - Renal diet   - PRBC transfusion   - Check FOBT   - Fluid restriction 1.2 L   - IV iron replacement   - CARA with HD   - Strict IO  - Dose all meds per eGFR < 15  - Change lasix to PO 40 mg bid on non-HD days   - Has HD chair at [facility]  Ok to discharge from renal standpoint when medically cleared

## 2023-01-06 NOTE — PROGRESS NOTES
Tele review:   Afib HR , occasionally as high as 170 with activity (MD aware)  --/0.09/--

## 2023-01-06 NOTE — DISCHARGE PLANNING
Agency[facility]Facility Name: [facility]   Outcome: DPA left v-mail asking if SNF has a bed available for Pt tomorrow, as he is expected to DC tomorrow per RN CLEM.     1333-  Agency/Facility Name: [facility]  Spoke To: [place]   Outcome: SNF informed DPA that they will not be able to take Pt tomorrow, as they will need to run auth first. Also Pt will need dialysis schedule to be changed to before 1200, to meet SNF's transportation availability.     RN CLEM notified.     1336-  Agency/Facility Name: [facility]   Spoke To: [place]  Outcome: Per RN CM, DPA asked SNF to begin auth. SNF to update DPA once auth is complete.     RN CLEM notified.     1526-  Agency/Facility Name: [facility]  Spoke To: [place]  Outcome: DPA confirmed that Pt's dialysis schedule is Tuesday/Thursday/Saturday at 0500, per RN CLEM. SNF to verify that they can accommodate schedule.     RN CM notified.

## 2023-01-06 NOTE — PROGRESS NOTES
MD wanted orthostatic BPs checked, attempted to check patient's standing BP but patient became too dizzy and had to sit down. Patient's HR elevated up to 160s at that time. NOC APRN notified of elevated HR and labile HR between 100-160s depending on activity. APRN wanted scheduled PO dose first and to reassess HR after meds. Information passed along to NOC RN.

## 2023-01-06 NOTE — DISCHARGE PLANNING
Case Management Discharge Planning    Admission Date: 12/23/2022  GMLOS: 3.9  ALOS: 14    6-Clicks ADL Score: 18  6-Clicks Mobility Score: 17  PT and/or OT Eval ordered: Yes  Post-acute Referrals Ordered: Yes  Post-acute Choice Obtained: Yes  Has referral(s) been sent to post-acute provider:  Yes      Anticipated Discharge Dispo: Discharge Disposition: D/T to home under [facility]HA care in anticipation of covered skilled care (06)    DME Needed: No    Action(s) Taken: Updated Provider/Nurse on Discharge Plan Hgb 5.3 plan to transfuse today     Escalations Completed: Provider    Medically Clear: No    Next Steps:Call [facility] about bed availability  Phone [place] at [facility] and left message to call  regarding bed status .[phone]     Barriers to Discharge: Pending Placement    Is the patient up for discharge tomorrow: No

## 2023-01-07 LAB
ANION GAP SERPL CALC-SCNC: 9 MMOL/L (ref 7–16)
BUN SERPL-MCNC: 30 MG/DL (ref 8–22)
CALCIUM SERPL-MCNC: 8.2 MG/DL (ref 8.5–10.5)
CHLORIDE SERPL-SCNC: 100 MMOL/L (ref 96–112)
CO2 SERPL-SCNC: 29 MMOL/L (ref 20–33)
CREAT SERPL-MCNC: 2.4 MG/DL (ref 0.5–1.4)
GFR SERPLBLD CREATININE-BSD FMLA CKD-EPI: 29 ML/MIN/1.73 M 2
GLUCOSE BLD STRIP.AUTO-MCNC: 110 MG/DL (ref 65–99)
GLUCOSE BLD STRIP.AUTO-MCNC: 129 MG/DL (ref 65–99)
GLUCOSE BLD STRIP.AUTO-MCNC: 159 MG/DL (ref 65–99)
GLUCOSE SERPL-MCNC: 122 MG/DL (ref 65–99)
HGB BLD-MCNC: 7.9 G/DL (ref 14–18)
HGB BLD-MCNC: 8.2 G/DL (ref 14–18)
HGB BLD-MCNC: 8.4 G/DL (ref 14–18)
MAGNESIUM SERPL-MCNC: 1.7 MG/DL (ref 1.5–2.5)
PHOSPHATE SERPL-MCNC: 2.2 MG/DL (ref 2.5–4.5)
POTASSIUM SERPL-SCNC: 4 MMOL/L (ref 3.6–5.5)
SODIUM SERPL-SCNC: 138 MMOL/L (ref 135–145)

## 2023-01-07 PROCEDURE — 94760 N-INVAS EAR/PLS OXIMETRY 1: CPT

## 2023-01-07 PROCEDURE — A9270 NON-COVERED ITEM OR SERVICE: HCPCS | Performed by: INTERNAL MEDICINE

## 2023-01-07 PROCEDURE — 700105 HCHG RX REV CODE 258: Performed by: INTERNAL MEDICINE

## 2023-01-07 PROCEDURE — 700111 HCHG RX REV CODE 636 W/ 250 OVERRIDE (IP): Performed by: INTERNAL MEDICINE

## 2023-01-07 PROCEDURE — 700111 HCHG RX REV CODE 636 W/ 250 OVERRIDE (IP): Performed by: HOSPITALIST

## 2023-01-07 PROCEDURE — 84100 ASSAY OF PHOSPHORUS: CPT

## 2023-01-07 PROCEDURE — 85018 HEMOGLOBIN: CPT

## 2023-01-07 PROCEDURE — 36415 COLL VENOUS BLD VENIPUNCTURE: CPT

## 2023-01-07 PROCEDURE — 82962 GLUCOSE BLOOD TEST: CPT

## 2023-01-07 PROCEDURE — 80048 BASIC METABOLIC PNL TOTAL CA: CPT

## 2023-01-07 PROCEDURE — C9113 INJ PANTOPRAZOLE SODIUM, VIA: HCPCS | Performed by: HOSPITALIST

## 2023-01-07 PROCEDURE — 83735 ASSAY OF MAGNESIUM: CPT

## 2023-01-07 PROCEDURE — 700102 HCHG RX REV CODE 250 W/ 637 OVERRIDE(OP): Performed by: STUDENT IN AN ORGANIZED HEALTH CARE EDUCATION/TRAINING PROGRAM

## 2023-01-07 PROCEDURE — 770020 HCHG ROOM/CARE - TELE (206)

## 2023-01-07 PROCEDURE — A9270 NON-COVERED ITEM OR SERVICE: HCPCS | Performed by: STUDENT IN AN ORGANIZED HEALTH CARE EDUCATION/TRAINING PROGRAM

## 2023-01-07 PROCEDURE — 99233 SBSQ HOSP IP/OBS HIGH 50: CPT | Performed by: HOSPITALIST

## 2023-01-07 PROCEDURE — 700102 HCHG RX REV CODE 250 W/ 637 OVERRIDE(OP): Performed by: INTERNAL MEDICINE

## 2023-01-07 RX ADMIN — NICOTINE TRANSDERMAL SYSTEM 21 MG: 21 PATCH, EXTENDED RELEASE TRANSDERMAL at 04:57

## 2023-01-07 RX ADMIN — DOCUSATE SODIUM 50 MG AND SENNOSIDES 8.6 MG 2 TABLET: 8.6; 5 TABLET, FILM COATED ORAL at 04:54

## 2023-01-07 RX ADMIN — PANTOPRAZOLE SODIUM 40 MG: 40 INJECTION, POWDER, LYOPHILIZED, FOR SOLUTION INTRAVENOUS at 16:28

## 2023-01-07 RX ADMIN — FLUTICASONE PROPIONATE 88 MCG: 44 AEROSOL, METERED RESPIRATORY (INHALATION) at 04:55

## 2023-01-07 RX ADMIN — AMITRIPTYLINE HYDROCHLORIDE 10 MG: 10 TABLET, FILM COATED ORAL at 16:28

## 2023-01-07 RX ADMIN — ACETAMINOPHEN 650 MG: 325 TABLET, FILM COATED ORAL at 20:22

## 2023-01-07 RX ADMIN — SODIUM CHLORIDE 250 MG: 9 INJECTION, SOLUTION INTRAVENOUS at 05:48

## 2023-01-07 RX ADMIN — AMITRIPTYLINE HYDROCHLORIDE 10 MG: 10 TABLET, FILM COATED ORAL at 04:54

## 2023-01-07 RX ADMIN — PANTOPRAZOLE SODIUM 40 MG: 40 INJECTION, POWDER, LYOPHILIZED, FOR SOLUTION INTRAVENOUS at 04:55

## 2023-01-07 RX ADMIN — ATORVASTATIN CALCIUM 40 MG: 40 TABLET, FILM COATED ORAL at 20:22

## 2023-01-07 RX ADMIN — METOPROLOL TARTRATE 25 MG: 25 TABLET, FILM COATED ORAL at 16:28

## 2023-01-07 RX ADMIN — METOPROLOL TARTRATE 25 MG: 25 TABLET, FILM COATED ORAL at 04:54

## 2023-01-07 ASSESSMENT — PAIN DESCRIPTION - PAIN TYPE: TYPE: ACUTE PAIN;CHRONIC PAIN

## 2023-01-07 ASSESSMENT — ENCOUNTER SYMPTOMS
NAUSEA: 0
CHILLS: 0
HEADACHES: 0
DIZZINESS: 0
SHORTNESS OF BREATH: 0
COUGH: 0
PALPITATIONS: 0
VOMITING: 0
FEVER: 0

## 2023-01-07 NOTE — ASSESSMENT & PLAN NOTE
New onset  Rate uncontrolled, restart metop and titrate  [id number]- 4  Hold eliquis for GIB 2 weeks per GI

## 2023-01-07 NOTE — ASSESSMENT & PLAN NOTE
Melena  EGD showed duodenal ulcer without bleeding, biopsies done.   Hold Eliquis and aspirin for 1-2 weeks per GI  PPI BID 4 weeks  Trend Hgb

## 2023-01-07 NOTE — THERAPY
Physical Therapy Contact Note    Patient Name: Yoandy Peace  Age:  67 y.o., Sex:  male  Medical Record #: [id number]  Today's Date: 1/6/2023 01/06/23 1536   Interdisciplinary Plan of Care Collaboration   Collaboration Comments Attempted PT tx, however pt was off floor for HD. Will re attempt tx as pt is available and able to participate.       Naveen Ballard, PT, DPT

## 2023-01-07 NOTE — PROGRESS NOTES
"[facility] -  PROGRESS NOTE               Author: Naveen Conde M.D. Date & Time: 1/7/2023  7:31 AM     HPI:  67 y.o. male with history of COPD dependent on 4 L, tobacco abuse, CAD, CHF, ALONDRA/OHS on bedtime CPAP, who presented 12/23/2022 with evaluations for progressive shortness of breath and lower extremities swelling.  CXR notable for bilateral vascular congestion with pulmonary edema. Patient was admitted to the hospital on 12/23 for CHD exacerbation and copd. No F/C/N/V. No melena, hematochezia, hematemesis.  No HA, visual changes, or abdominal pain.     DAILY NEPHROLOGY SUMMARY:  12/26: consult neto, s/p hd #1  12/27: due for HD #2, still anasarca and on O2   12/28: on hd now, tolerating well.   12/29: tolerated puf today , edema is better , he feels better   12/30: tolerated hd well. In negative balance   12/31: feeling better, no new issues.   1/1: sitting in the chair, LE edema is better, no new complaints. S/p TDC placement   1/2 Seen on HD No c/o, producing more urine 1665 cc/ 24 hrs   1/3 Feeling better overall, breathing improved, HD yesterday net UF 1 L   UOP ~ 1L / 24 hrs , UF goal decreased due to soft BP   1/4: Seen on HD UF 1 L , BP stable no c/o, UOP   1/5: No c/o, lying in bed, VSS  1/6: No c/o VSS , Hb 5.8 today   1/7: soft Bps, tolerated HD, 6L NC, denies complaints     REVIEW OF SYSTEMS:    10 point ROS reviewed and is as per HPI/daily summary or otherwise negative    PMH/PSH/SH/FH:   Reviewed and unchanged since admission note    CURRENT MEDICATIONS:   Reviewed from admission to present day    VS:  VS:  BP 95/61   Pulse (!) 104   Temp 37.4 °C (99.3 °F) (Temporal)   Resp 18   Ht 1.778 m (5' 10\")   Wt (!) 139 kg (305 lb 8.9 oz)   SpO2 99%   BMI 43.84 kg/m²   GENERAL: no acute distress  CV: RRR, +edema  RESP: Non-labored  GI: Soft  MSK: No joint deformities   SKIN: No concerning rashes  NEURO: AOx3  PSYCH: Cooperative    Fluids:  In: 1250.3 [Blood:550.3; " Dialysis:700]  Out: 1950     LABS:  Recent Labs     01/05/23  0132 01/06/23  0527 01/07/23  0144   SODIUM 138 134* 138   POTASSIUM 4.2 4.0 4.0   CHLORIDE 101 97 100   CO2 27 27 29   GLUCOSE 138* 153* 122*   BUN 41* 60* 30*   CREATININE 2.65* 3.54* 2.40*   CALCIUM 7.9* 8.1* 8.2*         IMAGING:   All imaging reviewed from admission to present day    IMPRESSION:  # FARIHA on CKD   - No hydro, UA benign   - Dialysis  dependent   - Renal recovery uncertain  - Access R IJ perm cath  # 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, HTN  - Unclear baseline Cr , Cr 3.31 on admission   # Hyperkalemia: resolved   # Hyponatremia: resolved   # Acidosis: resovled   # Acute respiratory failure 2/2 COPD/CHF   # Leukocytosis   # Anemia resolved   - low iron stores % sat 13, ferritin 68   # Hyperphosphatemia: resolved   # ALONDRA on CPAP  # CAD   # DM-2   # Obesity   # Volume overload   # Relative Hypotension        PLAN:  - No HD today, Continue HD MWF  - Challenging UF  - Monitor for renal recovery   - Renal diet   - Fluid restriction 1.2 L   - IV iron replacement   - CARA with HD   - Strict IO  - Dose all meds per eGFR < 15  - Has HD chair at [facility] MWF  - Ok to discharge from renal standpoint when medically cleared     Thank you

## 2023-01-07 NOTE — PROGRESS NOTES
Hospital Medicine Daily Progress Note    Date of Service  1/7/2023    Chief Complaint  Yoandy Peace is a 67 y.o. male admitted 12/23/2022 with shortness of breath    Hospital Course    67 y.o. male past medical history of tobacco dependence, COPD on 4L home oxygen, CAD, CHF, obstructive sleep apnea who presented 12/23/2022 with progressive shortness of breath. He was admitted for acute hypoxic respiratory failure secondary to CHF and COPD.   He was given Solu-Medrol, bronchodilators and placed on BiPAP and admitted to ICU.  Patient was weaned off BiPAP.  Nephrology was consulted for worsening function.  Patient was started on hemodialysis and IV Lasix for pulmonary edema.  Cardiology was consulted for elevated troponin and chest pain during dialysis.  Started on therapeutic Lovenox for A. fib with RVR and transitioned to eliquis.  Nephrology following for hemodialysis. SNF on discharge.    Interval Problem Update  Hemoglobin after 2 units PRBC transfusion of 7.9, hemoglobin this a.m.8.2  Patient is feeling ok when he's sitting, denies any lightheadedness or dizziness      I have discussed this patient's plan of care and discharge plan at IDT rounds today with Case Management, Nursing, Nursing leadership, and other members of the IDT team.    Consultants/Specialty  cardiology, critical care, nephrology, palliative care, physiatry, pulmonary, and vascular surgery    Code Status  DNAR/DNI    Disposition  Patient is not medically cleared for discharge.   Anticipate discharge to to skilled nursing facility.  I have placed the appropriate orders for post-discharge needs.    Review of Systems  Review of Systems   Constitutional:  Negative for chills and fever.   Respiratory:  Negative for cough and shortness of breath.    Cardiovascular:  Negative for chest pain and palpitations.   Gastrointestinal:  Positive for melena. Negative for nausea and vomiting.   Neurological:  Negative for dizziness and headaches.       Physical Exam  Temp:  [36.1 °C (97 °F)-37.4 °C (99.3 °F)] 36.7 °C (98.1 °F)  Pulse:  [] 63  Resp:  [16-20] 18  BP: ()/(47-84) 121/75  SpO2:  [95 %-100 %] 100 %    Physical Exam  Vitals and nursing note reviewed.   Constitutional:       General: He is not in acute distress.     Appearance: He is obese. He is not ill-appearing.   Cardiovascular:      Rate and Rhythm: Normal rate and regular rhythm.   Pulmonary:      Effort: Pulmonary effort is normal. No respiratory distress.   Musculoskeletal:      Right lower leg: Edema present.      Left lower leg: Edema present.   Neurological:      Mental Status: He is alert and oriented to person, place, and time. Mental status is at baseline.   Psychiatric:         Mood and Affect: Mood normal.       Fluids    Intake/Output Summary (Last 24 hours) at 1/7/2023 1326  Last data filed at 1/6/2023 2200  Gross per 24 hour   Intake 1120 ml   Output 1700 ml   Net -580 ml       Laboratory  Recent Labs     01/05/23  0132 01/06/23  0733 01/06/23  1516 01/07/23  0144 01/07/23  1214   WBC 14.9* 16.5*  --   --   --    RBC 2.83* 2.21*  --   --   --    HEMOGLOBIN 7.3* 5.8* 6.5* 7.9* 8.2*   HEMATOCRIT 26.6* 19.8*  --   --   --    MCV 94.0 89.6  --   --   --    MCH 25.8* 26.2*  --   --   --    MCHC 27.4* 29.3*  --   --   --    RDW 55.6* 54.2*  --   --   --    PLATELETCT 288 265  --   --   --    MPV 10.1 10.7  --   --   --      Recent Labs     01/05/23  0132 01/06/23  0527 01/07/23  0144   SODIUM 138 134* 138   POTASSIUM 4.2 4.0 4.0   CHLORIDE 101 97 100   CO2 27 27 29   GLUCOSE 138* 153* 122*   BUN 41* 60* 30*   CREATININE 2.65* 3.54* 2.40*   CALCIUM 7.9* 8.1* 8.2*                     Imaging  IR-WIN,GROSHONG PLACEMENT >5   Final Result      1. Ultrasound and fluoroscopic guided placement of a right internal jugular 14.5 [place] HemoSplit tunneled dialysis catheter.      2. The hemodialysis catheter may be used immediately as clinically indicated. Flushes per protocol.      3.  Removal of the right temporary dialysis catheter.      DX-CHEST-PORTABLE (1 VIEW)   Final Result      1.  Ongoing pulmonary edema and bilateral pleural effusions.   2.  Cardiomegaly.   3.  Left lower lobe atelectasis or consolidation. There is also likely some component of compressive atelectasis of the right lung base considering the presence of significant pleural effusion.   4.  Interval placement of temporary non-tunneled hemodialysis catheter with no evidence of pneumothorax.      DX-CHEST-PORTABLE (1 VIEW)   Final Result         1. No significant interval change.      US-RENAL   Final Result      1.  Echogenic renal parenchyma bilaterally could relate to medical renal disease.      2.  No hydronephrosis. No renal calculus.      DX-CHEST-PORTABLE (1 VIEW)   Final Result      1.  Enlarged cardiac silhouette and small amounts of bilateral pleural fluid with diffuse interstitial opacities most consistent with pulmonary edema/CHF.   2.  Bibasilar opacities could be due to edema, atelectasis or pneumonitis.   3.  There is atherosclerosis.   4.  Questionable linear calcific plaque in the proximal descending thoracic aorta versus some other device intrinsic or extrinsic to the patient.      EC-ECHOCARDIOGRAM COMPLETE W/ CONT   Final Result      US-EXTREMITY VENOUS LOWER BILAT   Final Result      DX-CHEST-PORTABLE (1 VIEW)   Final Result      1.  Cardiomegaly with interstitial infiltrates likely representing pulmonary edema.      2.  Bibasilar atelectasis and small bilateral pleural effusions.             Assessment/Plan  * Acute renal failure (ARF) (HCC)  Assessment & Plan  Started on hemodialysis  Nephrology following    Anemia  Assessment & Plan  Requiring blood transfusion  2U on 1/6/23  S/p IV iron   FOBT pending  Trend H&H q 8hours, transfuse if less than 7  IV PPI  CLD with no reds  Will consult GI if hgb cont to drop    Atrial fibrillation (HCC)  Assessment & Plan  New onset  [id number]  Had been started on  eliquis    Bilateral leg edema- (present on admission)  Assessment & Plan  Likely due to volume overload.  Echo pending  Diuresis  Bilateral lower extremity DVT studies negative    Acute exacerbation of chronic obstructive pulmonary disease (COPD) ([facility])  Assessment & Plan  Stable on oxygen at 5 L.  Continue with Flovent and Anoro  S/p steroids  Duo nebs as needed  Continue with oxygen supplementation to obtain SPO2 >88-90%    Acute pulmonary edema (HCC)- (present on admission)  Assessment & Plan  Resolving  Continue with diuresis and HD  Echo EF of 55-60%    Hypertension  Assessment & Plan  Amlodipine 10 mg daily   Lasix 40 mg BID   Well controlled    Tobacco abuse- (present on admission)  Assessment & Plan  Nicotine replacement as needed    ACP (advance care planning)  Assessment & Plan  Full code    Class 3 severe obesity in adult ([facility])  Assessment & Plan  BMI 52.89     ALONDRA on CPAP- (present on admission)  Assessment & Plan  CPAP at bedtime      Acute on chronic respiratory failure with hypoxia ([facility])  Assessment & Plan  Dependent on 4 L.  Currently requiring 2-5 L in no distress  Diuresis with Lasix  Nebs/pulm toilet    CAD (coronary artery disease)  Assessment & Plan  ASA/statin    DM (diabetes mellitus) ([facility])  Assessment & Plan  Continue with SSI   On Farxiga, insulin NPH at home, and linagliptin           VTE prophylaxis: SCDs/TEDs    I have performed a physical exam and reviewed and updated ROS and Plan today (1/7/2023). In review of yesterday's note (1/6/2023), there are no changes except as documented above.

## 2023-01-07 NOTE — PROGRESS NOTES
[facility] Services Progress Note    Hemodialysis treatment x 3 hours completed as ordered per Dr. Cerda  Treatment performed at in dialysis [facility] started at 1335 and ended at 1636.      Net UF Removed: 1,000 mL    Procedure explained, verbalized understanding  Patient and dialysis access assessed pre and post treatment.  Patient tolerated treatment well. UF goal adjusted as BP tolerated  Mild hypotensive episode during first hour of treatment, asymptomatic otherwise  Right IJ tunneled CVC with good patency and flow.  Epoetin 7000 units IV administered during treatment.  Patient stable  overall during and post treatment.   See dialysis electronic flow sheets under media for details.      Post tx access: Right IJ tunneled HD catheter flushed with saline then locked with Heparin 1000 units/ml per designated amount in each lumen (see MAR) then clamped, capped aseptically and labeled properly. CVC dressings changed aseptically per policy and labeled properly. No s/s of infection to HD catheter site. Heparin lock to be aspirated prior to next dialysis/CVC use by dialysis RN only. Please do not flush or draw from ports.    Please notify Nephrologist/Dialysis for follow-up.     1655 Report given to primary care nurse JOSE Mcclellan RN.    1700 Patient transported back to room via bed by transport with O2 @  5L/NC. Patient awake, alert and stable.

## 2023-01-08 LAB
ANION GAP SERPL CALC-SCNC: 11 MMOL/L (ref 7–16)
APTT PPP: 31.8 SEC (ref 24.7–36)
BUN SERPL-MCNC: 40 MG/DL (ref 8–22)
CALCIUM SERPL-MCNC: 8.3 MG/DL (ref 8.5–10.5)
CHLORIDE SERPL-SCNC: 98 MMOL/L (ref 96–112)
CO2 SERPL-SCNC: 26 MMOL/L (ref 20–33)
CREAT SERPL-MCNC: 3.18 MG/DL (ref 0.5–1.4)
GFR SERPLBLD CREATININE-BSD FMLA CKD-EPI: 20 ML/MIN/1.73 M 2
GLUCOSE BLD STRIP.AUTO-MCNC: 121 MG/DL (ref 65–99)
GLUCOSE BLD STRIP.AUTO-MCNC: 122 MG/DL (ref 65–99)
GLUCOSE BLD STRIP.AUTO-MCNC: 163 MG/DL (ref 65–99)
GLUCOSE BLD STRIP.AUTO-MCNC: 172 MG/DL (ref 65–99)
GLUCOSE SERPL-MCNC: 112 MG/DL (ref 65–99)
HEMOCCULT STL QL: POSITIVE
HGB BLD-MCNC: 7.6 G/DL (ref 14–18)
HGB BLD-MCNC: 7.9 G/DL (ref 14–18)
HGB BLD-MCNC: 8.3 G/DL (ref 14–18)
INR PPP: 1.21 (ref 0.87–1.13)
MAGNESIUM SERPL-MCNC: 1.9 MG/DL (ref 1.5–2.5)
PHOSPHATE SERPL-MCNC: 2.8 MG/DL (ref 2.5–4.5)
POTASSIUM SERPL-SCNC: 4 MMOL/L (ref 3.6–5.5)
PROTHROMBIN TIME: 15.1 SEC (ref 12–14.6)
SODIUM SERPL-SCNC: 135 MMOL/L (ref 135–145)
UFH PPP CHRO-ACNC: >1.1 IU/ML

## 2023-01-08 PROCEDURE — A9270 NON-COVERED ITEM OR SERVICE: HCPCS | Performed by: HOSPITALIST

## 2023-01-08 PROCEDURE — 82962 GLUCOSE BLOOD TEST: CPT | Mod: 91

## 2023-01-08 PROCEDURE — 83735 ASSAY OF MAGNESIUM: CPT

## 2023-01-08 PROCEDURE — 85018 HEMOGLOBIN: CPT | Mod: 91

## 2023-01-08 PROCEDURE — 84100 ASSAY OF PHOSPHORUS: CPT

## 2023-01-08 PROCEDURE — 700111 HCHG RX REV CODE 636 W/ 250 OVERRIDE (IP): Performed by: HOSPITALIST

## 2023-01-08 PROCEDURE — 82272 OCCULT BLD FECES 1-3 TESTS: CPT

## 2023-01-08 PROCEDURE — 85730 THROMBOPLASTIN TIME PARTIAL: CPT | Mod: 91

## 2023-01-08 PROCEDURE — A9270 NON-COVERED ITEM OR SERVICE: HCPCS | Performed by: INTERNAL MEDICINE

## 2023-01-08 PROCEDURE — 700102 HCHG RX REV CODE 250 W/ 637 OVERRIDE(OP): Performed by: STUDENT IN AN ORGANIZED HEALTH CARE EDUCATION/TRAINING PROGRAM

## 2023-01-08 PROCEDURE — 80048 BASIC METABOLIC PNL TOTAL CA: CPT

## 2023-01-08 PROCEDURE — 85027 COMPLETE CBC AUTOMATED: CPT

## 2023-01-08 PROCEDURE — A9270 NON-COVERED ITEM OR SERVICE: HCPCS | Performed by: STUDENT IN AN ORGANIZED HEALTH CARE EDUCATION/TRAINING PROGRAM

## 2023-01-08 PROCEDURE — 700102 HCHG RX REV CODE 250 W/ 637 OVERRIDE(OP): Performed by: HOSPITALIST

## 2023-01-08 PROCEDURE — 36415 COLL VENOUS BLD VENIPUNCTURE: CPT

## 2023-01-08 PROCEDURE — C9113 INJ PANTOPRAZOLE SODIUM, VIA: HCPCS | Performed by: HOSPITALIST

## 2023-01-08 PROCEDURE — 85610 PROTHROMBIN TIME: CPT

## 2023-01-08 PROCEDURE — 700102 HCHG RX REV CODE 250 W/ 637 OVERRIDE(OP): Performed by: INTERNAL MEDICINE

## 2023-01-08 PROCEDURE — 99233 SBSQ HOSP IP/OBS HIGH 50: CPT | Performed by: HOSPITALIST

## 2023-01-08 PROCEDURE — 85520 HEPARIN ASSAY: CPT | Mod: 91

## 2023-01-08 PROCEDURE — 770020 HCHG ROOM/CARE - TELE (206)

## 2023-01-08 RX ORDER — HEPARIN SODIUM 5000 [USP'U]/100ML
0-30 INJECTION, SOLUTION INTRAVENOUS CONTINUOUS
Status: DISCONTINUED | OUTPATIENT
Start: 2023-01-08 | End: 2023-01-08

## 2023-01-08 RX ORDER — HEPARIN SODIUM 1000 [USP'U]/ML
2000 INJECTION, SOLUTION INTRAVENOUS; SUBCUTANEOUS PRN
Status: DISCONTINUED | OUTPATIENT
Start: 2023-01-08 | End: 2023-01-08

## 2023-01-08 RX ORDER — OMEPRAZOLE 20 MG/1
20 CAPSULE, DELAYED RELEASE ORAL 2 TIMES DAILY
Status: DISCONTINUED | OUTPATIENT
Start: 2023-01-08 | End: 2023-01-11

## 2023-01-08 RX ORDER — HEPARIN SODIUM 5000 [USP'U]/100ML
0-30 INJECTION, SOLUTION INTRAVENOUS CONTINUOUS
Status: DISCONTINUED | OUTPATIENT
Start: 2023-01-08 | End: 2023-01-09

## 2023-01-08 RX ADMIN — OMEPRAZOLE 20 MG: 20 CAPSULE, DELAYED RELEASE ORAL at 17:07

## 2023-01-08 RX ADMIN — ACETAMINOPHEN 650 MG: 325 TABLET, FILM COATED ORAL at 17:06

## 2023-01-08 RX ADMIN — NICOTINE TRANSDERMAL SYSTEM 21 MG: 21 PATCH, EXTENDED RELEASE TRANSDERMAL at 05:36

## 2023-01-08 RX ADMIN — FLUTICASONE PROPIONATE 88 MCG: 44 AEROSOL, METERED RESPIRATORY (INHALATION) at 05:39

## 2023-01-08 RX ADMIN — ATORVASTATIN CALCIUM 40 MG: 40 TABLET, FILM COATED ORAL at 21:11

## 2023-01-08 RX ADMIN — PANTOPRAZOLE SODIUM 40 MG: 40 INJECTION, POWDER, LYOPHILIZED, FOR SOLUTION INTRAVENOUS at 05:36

## 2023-01-08 RX ADMIN — ACETAMINOPHEN 650 MG: 325 TABLET, FILM COATED ORAL at 11:24

## 2023-01-08 RX ADMIN — AMITRIPTYLINE HYDROCHLORIDE 10 MG: 10 TABLET, FILM COATED ORAL at 17:07

## 2023-01-08 RX ADMIN — DOCUSATE SODIUM 50 MG AND SENNOSIDES 8.6 MG 2 TABLET: 8.6; 5 TABLET, FILM COATED ORAL at 05:36

## 2023-01-08 RX ADMIN — METOPROLOL TARTRATE 25 MG: 25 TABLET, FILM COATED ORAL at 05:36

## 2023-01-08 RX ADMIN — AMITRIPTYLINE HYDROCHLORIDE 10 MG: 10 TABLET, FILM COATED ORAL at 05:36

## 2023-01-08 ASSESSMENT — ENCOUNTER SYMPTOMS
HEADACHES: 0
DIZZINESS: 0
ABDOMINAL PAIN: 0
SHORTNESS OF BREATH: 0
VOMITING: 0
CONSTIPATION: 1
PALPITATIONS: 0
CHILLS: 0
FEVER: 0
COUGH: 0
NAUSEA: 0

## 2023-01-08 ASSESSMENT — PAIN DESCRIPTION - PAIN TYPE: TYPE: ACUTE PAIN;CHRONIC PAIN

## 2023-01-08 NOTE — PROGRESS NOTES
[facility] Hospital Medicine Daily Progress Note    Date of Service  1/8/2023    Chief Complaint  Yoandy Peace is a 67 y.o. male admitted 12/23/2022 with shortness of breath    Hospital Course    67 y.o. male past medical history of tobacco dependence, COPD on 4L home oxygen, CAD, CHF, obstructive sleep apnea who presented 12/23/2022 with progressive shortness of breath. He was admitted for acute hypoxic respiratory failure secondary to CHF and COPD.   He was given Solu-Medrol, bronchodilators and placed on BiPAP and admitted to ICU.  Patient was weaned off BiPAP.  Nephrology was consulted for worsening function.  Patient was started on hemodialysis and IV Lasix for pulmonary edema.  Cardiology was consulted for elevated troponin and chest pain during dialysis.  Started on therapeutic Lovenox for A. fib with RVR and transitioned to eliquis.  Patient did develop worsening anemia requiring 2 unit PRBC transfusion.  His Eliquis and aspirin were held for 48 hours with subsequent stabilization in hemoglobin.    Interval Problem Update  KARI overnight, hgb stable- will resume eliquis  No bowel movement in 2 days so FOBT not yet collected  Poss dc to [facility] tomorrow      I have discussed this patient's plan of care and discharge plan at IDT rounds today with Case Management, Nursing, Nursing leadership, and other members of the IDT team.    Consultants/Specialty  cardiology, critical care, nephrology, palliative care, physiatry, pulmonary, and vascular surgery    Code Status  DNAR/DNI    Disposition  Patient is medically cleared for discharge.   Anticipate discharge to to skilled nursing facility.  I have placed the appropriate orders for post-discharge needs.    Review of Systems  Review of Systems   Constitutional:  Negative for chills and fever.   Respiratory:  Negative for cough and shortness of breath.    Cardiovascular:  Negative for chest pain and palpitations.   Gastrointestinal:  Positive for constipation. Negative for  abdominal pain, nausea and vomiting.   Neurological:  Negative for dizziness and headaches.      Physical Exam  Temp:  [36.7 °C (98.1 °F)-36.8 °C (98.2 °F)] 36.7 °C (98.1 °F)  Pulse:  [] 94  Resp:  [18] 18  BP: ()/(44-74) 113/63  SpO2:  [95 %-100 %] 99 %    Physical Exam  Vitals and nursing note reviewed.   Constitutional:       General: He is not in acute distress.     Appearance: He is obese. He is not ill-appearing.   Cardiovascular:      Rate and Rhythm: Normal rate and regular rhythm.   Pulmonary:      Effort: Pulmonary effort is normal. No respiratory distress.   Musculoskeletal:      Right lower leg: Edema present.      Left lower leg: Edema present.   Neurological:      Mental Status: He is alert and oriented to person, place, and time. Mental status is at baseline.   Psychiatric:         Mood and Affect: Mood normal.       Fluids    Intake/Output Summary (Last 24 hours) at 1/8/2023 1229  Last data filed at 1/8/2023 1100  Gross per 24 hour   Intake --   Output 550 ml   Net -550 ml       Laboratory  Recent Labs     01/06/23  0733 01/06/23  1516 01/07/23  1738 01/08/23  0133 01/08/23  0649   WBC 16.5*  --   --   --   --    RBC 2.21*  --   --   --   --    HEMOGLOBIN 5.8*   < > 8.4* 7.6* 7.9*   HEMATOCRIT 19.8*  --   --   --   --    MCV 89.6  --   --   --   --    MCH 26.2*  --   --   --   --    MCHC 29.3*  --   --   --   --    RDW 54.2*  --   --   --   --    PLATELETCT 265  --   --   --   --    MPV 10.7  --   --   --   --     < > = values in this interval not displayed.     Recent Labs     01/06/23  0527 01/07/23  0144 01/08/23  0133   SODIUM 134* 138 135   POTASSIUM 4.0 4.0 4.0   CHLORIDE 97 100 98   CO2 27 29 26   GLUCOSE 153* 122* 112*   BUN 60* 30* 40*   CREATININE 3.54* 2.40* 3.18*   CALCIUM 8.1* 8.2* 8.3*                     Imaging  IR-WIN,GROSHONG PLACEMENT >5   Final Result      1. Ultrasound and fluoroscopic guided placement of a right internal jugular 14.5 [place] HemoSplit tunneled  dialysis catheter.      2. The hemodialysis catheter may be used immediately as clinically indicated. Flushes per protocol.      3. Removal of the right temporary dialysis catheter.      DX-CHEST-PORTABLE (1 VIEW)   Final Result      1.  Ongoing pulmonary edema and bilateral pleural effusions.   2.  Cardiomegaly.   3.  Left lower lobe atelectasis or consolidation. There is also likely some component of compressive atelectasis of the right lung base considering the presence of significant pleural effusion.   4.  Interval placement of temporary non-tunneled hemodialysis catheter with no evidence of pneumothorax.      DX-CHEST-PORTABLE (1 VIEW)   Final Result         1. No significant interval change.      US-RENAL   Final Result      1.  Echogenic renal parenchyma bilaterally could relate to medical renal disease.      2.  No hydronephrosis. No renal calculus.      DX-CHEST-PORTABLE (1 VIEW)   Final Result      1.  Enlarged cardiac silhouette and small amounts of bilateral pleural fluid with diffuse interstitial opacities most consistent with pulmonary edema/CHF.   2.  Bibasilar opacities could be due to edema, atelectasis or pneumonitis.   3.  There is atherosclerosis.   4.  Questionable linear calcific plaque in the proximal descending thoracic aorta versus some other device intrinsic or extrinsic to the patient.      EC-ECHOCARDIOGRAM COMPLETE W/ CONT   Final Result      US-EXTREMITY VENOUS LOWER BILAT   Final Result      DX-CHEST-PORTABLE (1 VIEW)   Final Result      1.  Cardiomegaly with interstitial infiltrates likely representing pulmonary edema.      2.  Bibasilar atelectasis and small bilateral pleural effusions.             Assessment/Plan  * Acute renal failure (ARF) (HCC)  Assessment & Plan  Started on hemodialysis  Nephrology following    Anemia  Assessment & Plan  Requiring blood transfusion  2U on 1/6/23  S/p IV iron   FOBT pending  Hemoglobin has remained stable  Resume  Eliquis  Repeat CBC daily  IV  PPI changed to p.o.  Will advance diet to regular  Will consult GI if hgb cont to drop    Atrial fibrillation ([facility])  Assessment & Plan  New onset  [id number]  Had been started on eliquis    Bilateral leg edema- (present on admission)  Assessment & Plan  Likely due to volume overload.    Diuresis  Bilateral lower extremity DVT studies negative    Acute exacerbation of chronic obstructive pulmonary disease (COPD) ([facility])  Assessment & Plan  Stable on oxygen at 5 L.  Continue with Flovent and Anoro  S/p steroids  Duo nebs as needed  Continue with oxygen supplementation to obtain SPO2 >88-90%    Acute pulmonary edema (HCC)- (present on admission)  Assessment & Plan  resolved  Continue with diuresis and HD  Echo EF of 55-60%    Hypertension  Assessment & Plan  Amlodipine 10 mg daily   Lasix 40 mg BID   Well controlled    Tobacco abuse- (present on admission)  Assessment & Plan  Nicotine replacement as needed    ACP (advance care planning)  Assessment & Plan  Full code    Class 3 severe obesity in adult ([facility])  Assessment & Plan  BMI 52.89     ALONDRA on CPAP- (present on admission)  Assessment & Plan  CPAP at bedtime      Acute on chronic respiratory failure with hypoxia ([facility])  Assessment & Plan  Dependent on 4 L.  Currently requiring 2-5 L in no distress  Diuresis with Lasix  Nebs/pulm toilet    CAD (coronary artery disease)  Assessment & Plan  ASA/statin    DM (diabetes mellitus) ([facility])  Assessment & Plan  Continue with SSI   On Farxiga, insulin NPH at home, and linagliptin           VTE prophylaxis: SCDs/TEDs and therapeutic anticoagulation with eliquis    I have performed a physical exam and reviewed and updated ROS and Plan today (1/8/2023). In review of yesterday's note (1/7/2023), there are no changes except as documented above.

## 2023-01-08 NOTE — CARE PLAN
The patient is Stable - Low risk of patient condition declining or worsening    Shift Goals  Clinical Goals: monitor Hgb  Patient Goals: sleep  Family Goals: no family present    Progress made toward(s) clinical / shift goals:    Problem: Hemodynamics  Goal: Patient's hemodynamics, fluid balance and neurologic status will be stable or improve  Outcome: Progressing     Problem: Skin Integrity  Goal: Skin integrity is maintained or improved  Outcome: Progressing     Problem: Pain - Standard  Goal: Alleviation of pain or a reduction in pain to the patient’s comfort goal  Outcome: Progressing     Problem: Fall Risk  Goal: Patient will remain free from falls  Outcome: Progressing       Patient is not progressing towards the following goals:

## 2023-01-08 NOTE — PROGRESS NOTES
"[facility] -  PROGRESS NOTE               Author: Naveen Conde M.D. Date & Time: 1/8/2023  7:27 AM     HPI:  67 y.o. male with history of COPD dependent on 4 L, tobacco abuse, CAD, CHF, ALONDRA/OHS on bedtime CPAP, who presented 12/23/2022 with evaluations for progressive shortness of breath and lower extremities swelling.  CXR notable for bilateral vascular congestion with pulmonary edema. Patient was admitted to the hospital on 12/23 for CHD exacerbation and copd. No F/C/N/V. No melena, hematochezia, hematemesis.  No HA, visual changes, or abdominal pain.     DAILY NEPHROLOGY SUMMARY:  12/26: consult neto, s/p hd #1  12/27: due for HD #2, still anasarca and on O2   12/28: on hd now, tolerating well.   12/29: tolerated puf today , edema is better , he feels better   12/30: tolerated hd well. In negative balance   12/31: feeling better, no new issues.   1/1: sitting in the chair, LE edema is better, no new complaints. S/p TDC placement   1/2 Seen on HD No c/o, producing more urine 1665 cc/ 24 hrs   1/3 Feeling better overall, breathing improved, HD yesterday net UF 1 L   UOP ~ 1L / 24 hrs , UF goal decreased due to soft BP   1/4: Seen on HD UF 1 L , BP stable no c/o, UOP   1/5: No c/o, lying in bed, VSS  1/6: No c/o VSS , Hb 5.8 today   1/7: soft Bps, tolerated HD, 6L NC, denies complaints   1/8: stable resp status, no acute events, denies complaints    REVIEW OF SYSTEMS:    10 point ROS reviewed and is as per HPI/daily summary or otherwise negative    PMH/PSH/SH/FH:   Reviewed and unchanged since admission note    CURRENT MEDICATIONS:   Reviewed from admission to present day    VS:  VS:  BP 98/48   Pulse 88   Temp 36.7 °C (98.1 °F) (Temporal)   Resp 18   Ht 1.778 m (5' 10\")   Wt (!) 139 kg (305 lb 8.9 oz)   SpO2 100%   BMI 43.84 kg/m²   GENERAL: no acute distress  CV: RRR, +edema  RESP: Non-labored  GI: Soft  MSK: No joint deformities   SKIN: BL LE lesions  NEURO: AOx3  PSYCH: " Cooperative    Fluids:  No intake/output data recorded.    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, HTN  - Unclear baseline Cr , Cr 3.31 on admission   # Hyperkalemia: resolved   # Hyponatremia: resolved   # Acidosis: resovled   # Acute respiratory failure 2/2 COPD/CHF   # Leukocytosis   # Anemia resolved   - low iron stores % sat 13, ferritin 68   # Hyperphosphatemia: resolved   # ALONDRA on CPAP  # CAD   # DM-2   # Obesity   # Volume overload   # Relative Hypotension        PLAN:  - No HD today, Continue HD MWF  - Challenging UF  - Monitor for renal recovery   - Renal diet   - Fluid restriction 1.2 L   - IV iron replacement   - CARA with HD   - Strict IO  - Dose all meds per eGFR < 15  - Has HD chair at [facility] MWF  - Ok to discharge from renal standpoint when medically cleared     Thank you

## 2023-01-08 NOTE — PROGRESS NOTES
Bedside report received from MICHAEL Pham. Patient is alert and oriented x  4, 4 L O2 via NC, Afib on the monitor. Fall precautions are in place. Call light is in reach.

## 2023-01-09 LAB
APTT PPP: 33.8 SEC (ref 24.7–36)
ERYTHROCYTE [DISTWIDTH] IN BLOOD BY AUTOMATED COUNT: 56.2 FL (ref 35.9–50)
GLUCOSE BLD STRIP.AUTO-MCNC: 115 MG/DL (ref 65–99)
GLUCOSE BLD STRIP.AUTO-MCNC: 159 MG/DL (ref 65–99)
GLUCOSE BLD STRIP.AUTO-MCNC: 168 MG/DL (ref 65–99)
HCT VFR BLD AUTO: 24.5 % (ref 42–52)
HCT VFR BLD AUTO: 26.6 % (ref 42–52)
HGB BLD-MCNC: 7.5 G/DL (ref 14–18)
HGB BLD-MCNC: 7.6 G/DL (ref 14–18)
HGB BLD-MCNC: 8 G/DL (ref 14–18)
MCH RBC QN AUTO: 27.9 PG (ref 27–33)
MCHC RBC AUTO-ENTMCNC: 30.6 G/DL (ref 33.7–35.3)
MCV RBC AUTO: 91.1 FL (ref 81.4–97.8)
PLATELET # BLD AUTO: 227 K/UL (ref 164–446)
PMV BLD AUTO: 10.2 FL (ref 9–12.9)
RBC # BLD AUTO: 2.69 M/UL (ref 4.7–6.1)
UFH PPP CHRO-ACNC: 0.79 IU/ML
UFH PPP CHRO-ACNC: 1.04 IU/ML
WBC # BLD AUTO: 11.8 K/UL (ref 4.8–10.8)

## 2023-01-09 PROCEDURE — 700111 HCHG RX REV CODE 636 W/ 250 OVERRIDE (IP)

## 2023-01-09 PROCEDURE — 85018 HEMOGLOBIN: CPT

## 2023-01-09 PROCEDURE — 700102 HCHG RX REV CODE 250 W/ 637 OVERRIDE(OP): Performed by: INTERNAL MEDICINE

## 2023-01-09 PROCEDURE — 99232 SBSQ HOSP IP/OBS MODERATE 35: CPT | Performed by: HOSPITALIST

## 2023-01-09 PROCEDURE — 700111 HCHG RX REV CODE 636 W/ 250 OVERRIDE (IP): Performed by: HOSPITALIST

## 2023-01-09 PROCEDURE — 85014 HEMATOCRIT: CPT

## 2023-01-09 PROCEDURE — 97530 THERAPEUTIC ACTIVITIES: CPT

## 2023-01-09 PROCEDURE — 90935 HEMODIALYSIS ONE EVALUATION: CPT

## 2023-01-09 PROCEDURE — 85520 HEPARIN ASSAY: CPT

## 2023-01-09 PROCEDURE — 36415 COLL VENOUS BLD VENIPUNCTURE: CPT

## 2023-01-09 PROCEDURE — 86901 BLOOD TYPING SEROLOGIC RH(D): CPT

## 2023-01-09 PROCEDURE — 97116 GAIT TRAINING THERAPY: CPT

## 2023-01-09 PROCEDURE — 700102 HCHG RX REV CODE 250 W/ 637 OVERRIDE(OP): Performed by: HOSPITALIST

## 2023-01-09 PROCEDURE — A9270 NON-COVERED ITEM OR SERVICE: HCPCS | Performed by: INTERNAL MEDICINE

## 2023-01-09 PROCEDURE — A9270 NON-COVERED ITEM OR SERVICE: HCPCS | Performed by: HOSPITALIST

## 2023-01-09 PROCEDURE — 86850 RBC ANTIBODY SCREEN: CPT

## 2023-01-09 PROCEDURE — 700102 HCHG RX REV CODE 250 W/ 637 OVERRIDE(OP): Performed by: STUDENT IN AN ORGANIZED HEALTH CARE EDUCATION/TRAINING PROGRAM

## 2023-01-09 PROCEDURE — 86900 BLOOD TYPING SEROLOGIC ABO: CPT

## 2023-01-09 PROCEDURE — 82962 GLUCOSE BLOOD TEST: CPT | Mod: 91

## 2023-01-09 PROCEDURE — 700105 HCHG RX REV CODE 258: Performed by: NURSE PRACTITIONER

## 2023-01-09 PROCEDURE — 770020 HCHG ROOM/CARE - TELE (206)

## 2023-01-09 PROCEDURE — A9270 NON-COVERED ITEM OR SERVICE: HCPCS | Performed by: STUDENT IN AN ORGANIZED HEALTH CARE EDUCATION/TRAINING PROGRAM

## 2023-01-09 PROCEDURE — 700111 HCHG RX REV CODE 636 W/ 250 OVERRIDE (IP): Performed by: INTERNAL MEDICINE

## 2023-01-09 RX ORDER — OMEPRAZOLE 20 MG/1
20 CAPSULE, DELAYED RELEASE ORAL 2 TIMES DAILY
Qty: 30 CAPSULE | Status: SHIPPED
Start: 2023-01-09 | End: 2023-01-21

## 2023-01-09 RX ORDER — FUROSEMIDE 40 MG/1
40 TABLET ORAL SEE ADMIN INSTRUCTIONS
Qty: 30 TABLET | Status: SHIPPED
Start: 2023-01-09 | End: 2023-01-13

## 2023-01-09 RX ORDER — SODIUM CHLORIDE, SODIUM LACTATE, POTASSIUM CHLORIDE, AND CALCIUM CHLORIDE .6; .31; .03; .02 G/100ML; G/100ML; G/100ML; G/100ML
500 INJECTION, SOLUTION INTRAVENOUS ONCE
Status: COMPLETED | OUTPATIENT
Start: 2023-01-09 | End: 2023-01-09

## 2023-01-09 RX ORDER — HEPARIN SODIUM 1000 [USP'U]/ML
INJECTION, SOLUTION INTRAVENOUS; SUBCUTANEOUS
Status: COMPLETED
Start: 2023-01-09 | End: 2023-01-09

## 2023-01-09 RX ADMIN — AMITRIPTYLINE HYDROCHLORIDE 10 MG: 10 TABLET, FILM COATED ORAL at 04:44

## 2023-01-09 RX ADMIN — ACETAMINOPHEN 650 MG: 325 TABLET, FILM COATED ORAL at 17:54

## 2023-01-09 RX ADMIN — EPOETIN ALFA-EPBX 7000 UNITS: 4000 INJECTION, SOLUTION INTRAVENOUS; SUBCUTANEOUS at 10:11

## 2023-01-09 RX ADMIN — APIXABAN 5 MG: 5 TABLET, FILM COATED ORAL at 11:52

## 2023-01-09 RX ADMIN — METOPROLOL TARTRATE 25 MG: 25 TABLET, FILM COATED ORAL at 17:52

## 2023-01-09 RX ADMIN — NICOTINE TRANSDERMAL SYSTEM 21 MG: 21 PATCH, EXTENDED RELEASE TRANSDERMAL at 04:43

## 2023-01-09 RX ADMIN — SODIUM CHLORIDE, POTASSIUM CHLORIDE, SODIUM LACTATE AND CALCIUM CHLORIDE 500 ML: 600; 310; 30; 20 INJECTION, SOLUTION INTRAVENOUS at 20:15

## 2023-01-09 RX ADMIN — METOPROLOL TARTRATE 25 MG: 25 TABLET, FILM COATED ORAL at 04:44

## 2023-01-09 RX ADMIN — HEPARIN SODIUM 3800 UNITS: 1000 INJECTION, SOLUTION INTRAVENOUS; SUBCUTANEOUS at 11:03

## 2023-01-09 RX ADMIN — HEPARIN SODIUM 18 UNITS/KG/HR: 5000 INJECTION, SOLUTION INTRAVENOUS at 02:00

## 2023-01-09 RX ADMIN — ATORVASTATIN CALCIUM 40 MG: 40 TABLET, FILM COATED ORAL at 20:18

## 2023-01-09 RX ADMIN — ACETAMINOPHEN 650 MG: 325 TABLET, FILM COATED ORAL at 10:08

## 2023-01-09 RX ADMIN — OMEPRAZOLE 20 MG: 20 CAPSULE, DELAYED RELEASE ORAL at 04:44

## 2023-01-09 RX ADMIN — DOCUSATE SODIUM 50 MG AND SENNOSIDES 8.6 MG 2 TABLET: 8.6; 5 TABLET, FILM COATED ORAL at 04:44

## 2023-01-09 RX ADMIN — FLUTICASONE PROPIONATE 88 MCG: 44 AEROSOL, METERED RESPIRATORY (INHALATION) at 04:43

## 2023-01-09 RX ADMIN — EPOETIN ALFA-EPBX 3000 UNITS: 3000 INJECTION, SOLUTION INTRAVENOUS; SUBCUTANEOUS at 10:11

## 2023-01-09 RX ADMIN — AMITRIPTYLINE HYDROCHLORIDE 10 MG: 10 TABLET, FILM COATED ORAL at 17:52

## 2023-01-09 RX ADMIN — APIXABAN 5 MG: 5 TABLET, FILM COATED ORAL at 17:52

## 2023-01-09 RX ADMIN — OMEPRAZOLE 20 MG: 20 CAPSULE, DELAYED RELEASE ORAL at 17:52

## 2023-01-09 RX ADMIN — ASPIRIN 81 MG: 81 TABLET, COATED ORAL at 04:44

## 2023-01-09 ASSESSMENT — GAIT ASSESSMENTS
DISTANCE (FEET): 75
DEVIATION: BRADYKINETIC
ASSISTIVE DEVICE: FRONT WHEEL WALKER
GAIT LEVEL OF ASSIST: STANDBY ASSIST

## 2023-01-09 ASSESSMENT — ENCOUNTER SYMPTOMS
NAUSEA: 0
DIZZINESS: 0
CHILLS: 0
ABDOMINAL PAIN: 0
SHORTNESS OF BREATH: 0
COUGH: 0
HEADACHES: 0
FEVER: 0
VOMITING: 0
PALPITATIONS: 0
CONSTIPATION: 1

## 2023-01-09 ASSESSMENT — COGNITIVE AND FUNCTIONAL STATUS - GENERAL
WALKING IN HOSPITAL ROOM: A LITTLE
SUGGESTED CMS G CODE MODIFIER MOBILITY: CJ
STANDING UP FROM CHAIR USING ARMS: A LITTLE
MOBILITY SCORE: 21
CLIMB 3 TO 5 STEPS WITH RAILING: A LITTLE

## 2023-01-09 ASSESSMENT — PAIN DESCRIPTION - PAIN TYPE
TYPE: ACUTE PAIN;CHRONIC PAIN
TYPE: ACUTE PAIN;CHRONIC PAIN

## 2023-01-09 ASSESSMENT — FIBROSIS 4 INDEX: FIB4 SCORE: 1.41

## 2023-01-09 NOTE — PROGRESS NOTES
[facility] Services Progress Note      HD today x 3 hours per Dr. Anderson.   Initiated at 0800 and ended at 1100.     Assessment:    Patient stable, alert and oriented x4. Not in distress. On monitor. VSS. Pt on Afib, HR 50's 90's, 100's per monitor     Access used: R chest catheter, tunneled.       Net Fluid Removed: 1,500 mL      Procedure Events/ Complications:   Tolerated treatment. Denies pain, no SOB. Monitored HR 's bpm. Coordinated with monitor tech for any changes on patient's rhythm. Still on A fib.  Pt stable.        Post Access Care:   Blood returned. Flushed with NS.  CVC locked with Heparin 1000 units/ mL   Arterial port:  1.9 mL   Venous port: 1.9 mL     Dressing change: Yes        Report given to Primary MARIA R Carvalho RN.    yes

## 2023-01-09 NOTE — THERAPY
Physical Therapy   Daily Treatment     Patient Name: Yoandy Peace  Age:  67 y.o., Sex:  male  Medical Record #: [id number]  Today's Date: 1/9/2023     Precautions  Precautions: Fall Risk    Assessment    Pt remains most limited by onset of back and hip pain during ambulation as well as SOB on 4L during ambulation. See details below.     Plan    Physical Therapy Treatment Plan  Physical Therapy Treatment Plan: Continue Current Treatment Plan    DC Equipment Recommendations: Unable to determine at this time  Discharge Recommendations: Recommend post-acute placement for additional physical therapy services prior to discharge home           Objective       01/09/23 1229   Charge Group   Charges  Yes   PT Gait Training (Units) 1   PT Therapeutic Activities (Units) 1   Total Time Spent   PT Total Time Yes   PT Gait Training Time Spent (Mins) 15   PT Therapeutic Activities Time Spent (Mins) 15   PT Total Time Spent (Calculated) 30   Precautions   Precautions Fall Risk   Vitals   O2 (LPM) 4   O2 Delivery Device Silicone Nasal Cannula   Pain 0 - 10 Group   Therapist Pain Assessment During Activity;Nurse Notified  (back and hip pain onset with ambulation, not rated)   Cognition    Level of Consciousness Alert   Active ROM Lower Body    Active ROM Lower Body  WDL   Strength Lower Body   Lower Body Strength  X   Gross Strength Generalized Weakness, Equal Bilaterally   Balance   Sitting Balance (Static) Fair +   Sitting Balance (Dynamic) Fair   Standing Balance (Static) Fair   Standing Balance (Dynamic) Fair -   Weight Shift Sitting Good   Weight Shift Standing Fair   Skilled Intervention Verbal Cuing   Comments with FWW   Bed Mobility    Supine to Sit Standby Assist   Sit to Supine Standby Assist   Scooting Standby Assist   Rolling Supervised   Skilled Intervention Verbal Cuing   Gait Analysis   Gait Level Of Assist Standby Assist   Assistive Device Front Wheel Walker   Distance (Feet) 75   # of Times Distance was Traveled 1    Deviation Bradykinetic  (flexed posture)   # of Stairs Climbed 0   Level of Assist with Stairs Unable to Participate   Skilled Intervention Verbal Cuing   Comments distance limited by onset of back and hip pain and SOB   Functional Mobility   Sit to Stand Standby Assist   Bed, Chair, Wheelchair Transfer Standby Assist   Transfer Method Stand Pivot   How much difficulty does the patient currently have...   Turning over in bed (including adjusting bedclothes, sheets and blankets)? 4   Sitting down on and standing up from a chair with arms (e.g., wheelchair, bedside commode, etc.) 4   Moving from lying on back to sitting on the side of the bed? 4   How much help from another person does the patient currently need...   Moving to and from a bed to a chair (including a wheelchair)? 3   Need to walk in a hospital room? 3   Climbing 3-5 steps with a railing? 3   6 clicks Mobility Score 21   Activity Tolerance   Standing 5   Short Term Goals    Short Term Goal # 1 pt will move supine<>eob with hob flat and spv in 6 tx to improve bed mobility.   Goal Outcome # 1 goal not met   Short Term Goal # 2 pt will complete all transfers with fww and spv in 6 tx to improve functional mobility.   Goal Outcome # 2 Goal not met   Short Term Goal # 3 pt will ambulate 150 ft with fww and spv in 6 tx for household distances.   Goal Outcome # 3 Goal not met   Short Term Goal # 4 pt will negotiate 3 steps with sba in 6 tx to facilitate home entry.   Goal Outcome # 4 Goal not met   Education Group   Role of Physical Therapist Patient Response Patient;Acceptance;Explanation;Verbal Demonstration   Physical Therapy Treatment Plan   Physical Therapy Treatment Plan Continue Current Treatment Plan   Anticipated Discharge Equipment and Recommendations   DC Equipment Recommendations Unable to determine at this time   Discharge Recommendations Recommend post-acute placement for additional physical therapy services prior to discharge home    Interdisciplinary Plan of Care Collaboration   IDT Collaboration with  Nursing   Patient Position at End of Therapy In Bed;Call Light within Reach;Tray Table within Reach;Phone within Reach   Collaboration Comments nsg updated   Session Information   Date / Session Number  1/9-4 (2/4, 1/10)

## 2023-01-09 NOTE — DISCHARGE PLANNING
Agency[facility]Facility Name: [facility]  Spoke To: [place]  Outcome: DPA informed that SNF can accommodate Pt's Tuesday/Thursday/Saturday 0500 dialysis schedule, and they have a bed available for Pt today if they're MC.     RN CM notified.     0915-  Agency[facility] Name: [facility]  Outcome: DPA left v-mail informing SNF that the Pt is MC to go today. DPA requesting callback to discuss transport arrangement.     RN CM notified.     0921-  Ag[facility] Name: [facility]  Spoke To: [place]   Outcome: SNF can arrange transport for 1600 today.     RN CM notified.     1002-  [facility] Name: [facility]  Spoke To: [place]  Outcome: DPA informed that transport was scheduled for 1800 today.     RN CM notified.     1132-  [facility] Name: [facility]  Spoke To: [place]  Outcome: SNF informed DPA that Pt's insurance is requesting that PT see Pt before DC, as last PT note is from 1/04.     RN CM notified.     1306-  Ag[facility]Facility Name: [facility]  Spoke To: [place]  Outcome: DPA informed that transport will be pending until SNF confirms that everything the Pt's insurance company is asking for is provided. Transport could be postponed until tomorrow.     RN CM notified.

## 2023-01-09 NOTE — PROGRESS NOTES
Vero, day shift RN, notified this RN that a heparin drip was ordered for this patient. Labs were drawn to decide if it would be given with the Xa or aPTT protocol. A critical Xa lab value of >1.10 resulted at 1758. Per Vero RN, a repeat lab was drawn to confirm the critical level. Vero RN verbally told this RN at change of shift that the Xa resulted as critical again.   This RN called the lab to confirm results as they were not loading into the chart. On call hospitalistMarzena notified. Per Mrazena Perry, reorder Xa and aPTT due to previous labs being discontinued and therefore not posted to patient's chart.   Xa value at 0.79 resulted at 2342. On call hospitalist, Whitney GRIFFIN notified to confirm the start of heparin drip with the Xa protocol. Per Whitney Carrasco, heparin drip initiated with Xa protocol.

## 2023-01-09 NOTE — DISCHARGE PLANNING
Case Management Discharge Planning    Admission Date: 12/23/2022  GMLOS: 3.9  ALOS: 17    6-Clicks ADL Score: 18  6-Clicks Mobility Score: 17  PT and/or OT Eval ordered: Yes  Post-acute Referrals Ordered: Yes  Post-acute Choice Obtained: Yes  Has referral(s) been sent to post-acute provider:  Yes      Anticipated Discharge Dispo: Discharge Disposition: D/T to home under [facility] care in anticipation of covered skilled care (06) [facility] SN[facility] and informed that SNF can accommodate Pt's Tuesday/Thursday/Saturday 0500 dialysis schedule, and they have a bed available   DME Needed: No    Action(s) Taken: Patient Conference Updated patient's wife Raven and she agreed with valerio  Escalations Completed: Provider Dr Chan notified that patient has alber[facility] accepted and will be picked up via wheelchair transport.     Medically Clear: Yes    Next Steps: [facility] has set wheelchair transport for 4:00 pm today.    Barriers to Discharge: None    Is the patient up for discharge to SNF today .

## 2023-01-09 NOTE — CARE PLAN
The patient is Stable - Low risk of patient condition declining or worsening    Shift Goals  Clinical Goals: Discharge  Patient Goals: Discharge  Family Goals: JENI    Progress made toward(s) clinical / shift goals:      Problem: Care Map:  Admission Optimal Outcome for the Heart Failure Patient  Goal: Admission:  Optimal Care of the heart failure patient  Description: Target End Date:  end of day 1  Outcome: Met     Problem: Care Map:  Day 1 Optimal Outcome for the Heart Failure Patient  Goal: Day 1:  Optimal Care of the heart failure patient  Description: Target End Date:  end of day 1  Outcome: Met     Problem: Care Map:  Day 2 Optimal Outcome for the Heart Failure Patient  Goal: Day 2:  Optimal Care of the heart failure patient  Description: Target End Date:  end of day 2  Outcome: Met     Problem: Care Map:  Day 3 Optimal Outcome for the Heart Failure Patient  Goal: Day 3:  Optimal Care of the heart failure patient  Description: Target End Date:  end of day 3  Outcome: Met     Problem: Care Map:  Day Before Discharge Optimal Outcome for the Heart Failure Patient  Goal: Day Before Discharge:  Optimal Care of the heart failure patient  Description: Target End Date:  Prior to discharge or change in level of care  Outcome: Met     Problem: Care Map:  Day of Discharge Optimal Outcome for the Heart Failure Patient  Goal: Day of Discharge:  Optimal Care of the heart failure patient  Description: Target End Date:  Prior to discharge or change in level of care  Outcome: Met     Problem: Knowledge Deficit - Standard  Goal: Patient and family/care givers will demonstrate understanding of plan of care, disease process/condition, diagnostic tests and medications  Description: Target End Date:  1-3 days or as soon as patient condition allows    Document in Patient Education    1.  Patient and family/caregiver oriented to unit, equipment, visitation policy and means for communicating concern  2.  Complete/review Learning  Assessment  3.  Assess knowledge level of disease process/condition, treatment plan, diagnostic tests and medications  4.  Explain disease process/condition, treatment plan, diagnostic tests and medications  Outcome: Met     Problem: Knowledge Deficit - COPD  Goal: Patient/significant other demonstrates understanding of disease process, utilization of the Action Plan, medications and discharge instruction  Description: Target End Date:  1-3 days or as soon as patient condition allows    Document in Patient Education    1.  Discuss the importance of medical follow-up care, periodic chest x-rays, sputum cultures  2.  Review worsening signs and symptoms of COPD flare and exacerbation and its management  3.  Discuss respiratory medications, side effects, adverse reactions  4.  Demonstrate technique for using a metered-dose inhaler (MDI) and utilization of a spacer  5.  Review the COPD Action Plan  6.  Instruct and reinforce the rationale for breathing exercises, coughing effectively, and general conditioning exercises  7.  Stress importance of oral care and dental hygiene  8.  Discuss the importance of avoiding people with active respiratory infections and need for routine influenza and pneumococcal vaccinations  9.  Discuss factors that may trigger condition and encourage patient/significant other to explore ways to control these factors in and around the home and work setting  10. Review the harmful effects of smoking and advise cessation of smoking  11. Provide information about activity limitations and alternating activities with rest periods to prevent fatigue  12. Instruct asthmatic patient of use of peak flow meter, as appropriate  13. Review oxygen requirements and dosage, safe use of oxygen, and refer to the supplier as indicated  14. Educate patient/family/caregiver on the use of Nasal Intermittent Positive Pressure Ventilation (NIPPV) and possible adverse reactions  Outcome: Met     Problem: Risk for  Infection - COPD  Goal: Patient will remain free from signs and symptoms of infection  Description: Target End Date:  Prior to discharge or change in level of care    1.  Infection prevention measures  2.  Instruct patient regarding signs and symptoms of infection  3.  Review the importance of breathing exercises, effective cough, frequent position changes, and adequate fluid intake  4.  Recommend rinsing mouth with water and spitting, not swallowing, or use of a spacer on the mouthpiece of inhaled corticosteroids  Outcome: Met     Problem: Nutrition - Advanced  Goal: Patient will display progressive weight gain toward goal have adequate food and fluid intake  Description: Target End Date:  Prior to discharge or change in level of care    1.  Daily weights  2.  Monitor dietary intake  3.  Promote systemic fluid hydration within cardiac tolerance  4.  Albumin and PreAlbumin per provider order  5.  Enteral and parenteral nutrition per provider order  6.  Calorie count  7.  Provide oral care  8.  Collaborate with Clinical Dietician  9.  Consider Speech Therapy consult for swallowing difficulties  10. Administer supplemental oxygen during meals as indicated  Outcome: Met     Problem: Ineffective Airway Clearance  Goal: Patient will maintain patent airway with clear/clearing breath sounds  Description: Target End Date:  Prior to discharge or change in level of care    1.   Position head midline with flexion appropriate for age and/or condition  2.   Assist patient to assume a position of comfort  3.   Assess and monitor breath sounds  4.   Encourage abdominal or pursed-lip breathing exercises  5.   Assist with measures to improve effectiveness of cough effort  6.   Demonstrate effective coughing and deep-breathing techniques  7.   Assist patient to turn every 2 hours  8.   Encourage patient to ambulate as tolerated  9.   Keep environmental pollution to a minimum  10. Airway suctioning as needed  11. Collaborate with RT  to administer medications/treatments per order  12. Promote systemic fluid hydration within cardiac tolerance  13. Provide warm or tepid liquids  Outcome: Met     Problem: Impaired Gas Exchange  Goal: Patient will demonstrate improved ventilation and adequate oxygenation and participate in treatment regimen within the level of ability/situation.  Description: Target End Date:  Prior to discharge or change in level of care    1.   Assess/monitor rate/rhythm/depth of effort of respirations  2.   Assess oxygenation as ordered  3.   Administer/titrate oxygen as ordered  4.   Position patient for maximum ventilatory efficiency  5.   Turn, cough, and deep breathe  6.   Vital signs, pulse oximetry  7.   Assess color and body temperature  8.   Assess and monitor breath sounds  9.   Encourage deep-slow or pursed-lip breathing exercises  10. Monitor changes in the level of consciousness and mental status  11. Encourage expectoration of sputum; airway suctioning  12. Elevate the head of the bed and position patient for maximum ventilatory efficiency  13. Provide a calm, quiet environment  14. Limit patient's activity during the acute phase and have patient resume activity gradually and increase as individually tolerated  15. Evaluate sleep patterns and limit stimulants such as caffeine  16. Collaborate with RT to administer medications/treatments as ordered  Outcome: Met     Problem: Risk for Aspiration  Goal: Patient's risk for aspiration will be absent or decrease  Description: Target End Date:  Prior to discharge or change in level of care    1.   Complete dysphagia screening on admission  2.   NPO until dysphagia screening complete or medically cleared  3.   Collaborate with Speech Therapy, Clinical Dietitian and interdisciplinary team  4.   Implement aspiration precautions  5.   Assist patient up to chair for meals  6.   Elevate head of bed 90 degrees if patient is unable to get out of bed  7.   Encourage small bites  8.    Ensure foods/liquids are of appropriate consistency  9.   Assess for any signs/symptoms of aspiration  10. Assess breath sounds and vital signs after oral intake  Outcome: Met     Problem: Self Care  Goal: Patient will have the ability to perform ADLs independently or with assistance (bathe, groom, dress, toilet and feed)  Description: Target End Date:  Prior to discharge or change in level of care    Document on ADL flowsheet    1.  Assess the capability and level of deficiency to perform ADLs  2.  Encourage family/care giver involvement  3.  Provide assistive devices  4.  Consider PT/OT evaluations  5.  Maintain support, give positive feedback, encourage self-care allowing extra time and verbal cuing as needed  6.  Avoid doing something for patients they can do themselves, but provide assistance as needed  7.  Assist in anticipating/planning individual needs  8.  Collaborate with Case Management and  to meet discharge needs  Outcome: Met     Problem: Hemodynamics  Goal: Patient's hemodynamics, fluid balance and neurologic status will be stable or improve  Description: Target End Date:  Prior to discharge or change in level of care    Document on Assessment and I/O flowsheet templates    1.  Monitor vital signs, pulse oximetry and cardiac monitor per provider order and/or policy  2.  Maintain blood pressure per provider order  3.  Hemodynamic monitoring per provider order  4.  Manage IV fluids and IV infusions  5.  Monitor intake and output  6.  Daily weights per unit policy or provider order  7.  Assess peripheral pulses and capillary refill  8.  Assess color and body temperature  9.  Position patient for maximum circulation/cardiac output  10. Monitor for signs/symptoms of excessive bleeding  11. Assess mental status, restlessness and changes in level of consciousness  12. Monitor temperature and report fever or hypothermia to provider immediately. Consideration of targeted temperature  management.  Outcome: Met     Problem: Fluid Volume  Goal: Fluid volume balance will be maintained  Description: Target End Date:  Prior to discharge or change in level of care    Document on I/O flowsheet    1.  Monitor intake and output as ordered  2.  Promote oral intake as appropriate  3.  Report inadequate intake or output to physician  4.  Administer IV therapy as ordered  5.  Weights per provider order  6.  Assess for signs and symptoms of bleeding  7.  Monitor for signs of fluid overload (respiratory changes, edema, weight gain, increased abdominal girth)  8.  Monitor of signs for inadequate fluid volume (poor skin turgor, dry mucous membranes)  9.  Instruct patient on adherence to fluid restrictions  Outcome: Met     Problem: Urinary - Renal Perfusion  Goal: Ability to achieve and maintain adequate renal perfusion and functioning will improve  Description: Target End Date:  Prior to discharge or change in level of care    Document on I/O and Assessment flowsheet    1.  Urine output will remain greater than 0.5ml/Kg/HR  2.  Monitor amount and/or characteristics of urine per order/policy. Specific gravity per order/policy  3.  Assess signs and symptoms of renal dysfunction  Outcome: Met     Problem: Respiratory  Goal: Patient will achieve/maintain optimum respiratory ventilation and gas exchange  Description: Target End Date:  Prior to discharge or change in level of care    Document on Assessment flowsheet    1.  Assess and monitor rate, rhythm, depth and effort of respiration  2.  Breath sounds assessed qshift and/or as needed  3.  Assess O2 saturation, administer/titrate oxygen as ordered  4.  Position patient for maximum ventilatory efficiency  5.  Turn, cough, and deep breath with splinting to improve effectiveness  6.  Collaborate with RT to administer medication/treatments per order  7.  Encourage use of incentive spirometer and encourage patient to cough after use and utilize splinting techniques if  applicable  8.  Airway suctioning  9.  Monitor sputum production for changes in color, consistency and frequency  10. Perform frequent oral hygiene  11. Alternate physical activity with rest periods  Outcome: Met     Problem: Physical Regulation  Goal: Diagnostic test results will improve  Description: Target End Date:  Prior to discharge or change in level of care    1.  Monitor lactic acid levels  2.  Monitor ABG's  3.  Monitor diagnostic test results  Outcome: Met  Goal: Signs and symptoms of infection will decrease  Description: Target End Date:  Prior to discharge or change in level of care    1.  Remove potential routes of infection, such as central lines and urinary catheter  2.  Follow facility protocol for changing IV tubing and sites  3.  Collaborate with Infectious Disease  4.  Antibiotic therapy per provider order  5.  Note drug effects and monitor for antibiotic toxicity  Outcome: Met     Problem: Skin Integrity  Goal: Skin integrity is maintained or improved  Description: Target End Date:  Prior to discharge or change in level of care    Document interventions on Skin Risk/Thai flowsheet groups and corresponding LDA    1.  Assess and monitor skin integrity, appearance and/or temperature  2.  Assess risk factors for impaired skin integrity and/or pressures ulcers  3.  Implement precautions to protect skin integrity in collaboration with interdisciplinary team  4.  Implement pressure ulcer prevention protocol if at risk for skin breakdown  5.  Confirm wound care consult if at risk for skin breakdown  6.  Ensure patient use of pressure relieving devices  (Low air loss bed, waffle overlay, heel protectors, ROHO cushion, etc)  Outcome: Met     Problem: Pain - Standard  Goal: Alleviation of pain or a reduction in pain to the patient’s comfort goal  Description: Target End Date:  Prior to discharge or change in level of care    Document on Vitals flowsheet    1.  Document pain using the appropriate pain  scale per order or unit policy  2.  Educate and implement non-pharmacologic comfort measures (i.e. relaxation, distraction, massage, cold/heat therapy, etc.)  3.  Pain management medications as ordered  4.  Reassess pain after pain med administration per policy  5.  If opiods administered assess patient's response to pain medication is appropriate per POSS sedation scale  6.  Follow pain management plan developed in collaboration with patient and interdisciplinary team (including palliative care or pain specialists if applicable)  Outcome: Met     Problem: Fall Risk  Goal: Patient will remain free from falls  Description: Target End Date:  Prior to discharge or change in level of care    Document interventions on the [facility] Fall Risk Assessment    1.  Assess for fall risk factors  2.  Implement fall precautions  Outcome: Met

## 2023-01-09 NOTE — CARE PLAN
The patient is Stable - Low risk of patient condition declining or worsening    Shift Goals  Clinical Goals: monitor labs, start heparin gtt  Patient Goals: rest  Family Goals: na    Progress made toward(s) clinical / shift goals:    Problem: Impaired Gas Exchange  Goal: Patient will demonstrate improved ventilation and adequate oxygenation and participate in treatment regimen within the level of ability/situation.  Outcome: Progressing  Note: Patient's oxygen levels are back at baseline.      Problem: Hemodynamics  Goal: Patient's hemodynamics, fluid balance and neurologic status will be stable or improve  Outcome: Progressing  Note: Hgb is being trended to assess for GI bleed.      Problem: Skin Integrity  Goal: Skin integrity is maintained or improved  Outcome: Progressing  Note: Patient is moving independently in bed and spends time up in the chair.        Patient is not progressing towards the following goals:

## 2023-01-09 NOTE — PROGRESS NOTES
Assumed care of patient, received bedside report from Vero RODRIGUEZ. Patient is A&O X 4. Pain 0/10. Vital signs stable during day shift, on 4 L of oxygen NC. On tele monitor, Afib 109. POC discussed with patient. Patient verbalized understanding. All questions answered. Call light within reach and fall precautions in place. Bed alarm on, bed locked and in lowest position.

## 2023-01-09 NOTE — PROGRESS NOTES
Pt picked up by transport to go to dialysis. Pt A&Ox4. On 4L NC. No further needs at this time. Phlebotomist notified of pt going to dialysis. Pt needs Hgb and Xa draw.

## 2023-01-09 NOTE — PROGRESS NOTES
Monitor Summary    Rhythm: A-fib  with BBB    Ectopy: R pvc, up to 160s and 170s    Intervals: -12-

## 2023-01-09 NOTE — DISCHARGE PLANNING
Notified by [facility] intake that insurance has not approved patient for SNF rehab . Awaiting auth transfer on placed on hold . Case management will continue to follow.  Medical team updated.

## 2023-01-09 NOTE — PROGRESS NOTES
"[facility] -  PROGRESS NOTE               Author: Cisco Anderson M.D. Date & Time: 1/9/2023  10:29 AM     HPI:  67 y.o. male with history of COPD dependent on 4 L, tobacco abuse, CAD, CHF, ALONDRA/OHS on bedtime CPAP, who presented 12/23/2022 with evaluations for progressive shortness of breath and lower extremities swelling.  CXR notable for bilateral vascular congestion with pulmonary edema. Patient was admitted to the hospital on 12/23 for CHD exacerbation and copd. No F/C/N/V. No melena, hematochezia, hematemesis.  No HA, visual changes, or abdominal pain.     DAILY NEPHROLOGY SUMMARY:  12/26: consult neto, s/p hd #1  12/27: due for HD #2, still anasarca and on O2   12/28: on hd now, tolerating well.   12/29: tolerated puf today , edema is better , he feels better   12/30: tolerated hd well. In negative balance   12/31: feeling better, no new issues.   1/1: sitting in the chair, LE edema is better, no new complaints. S/p TDC placement   1/2 Seen on HD No c/o, producing more urine 1665 cc/ 24 hrs   1/3 Feeling better overall, breathing improved, HD yesterday net UF 1 L   UOP ~ 1L / 24 hrs , UF goal decreased due to soft BP   1/4: Seen on HD UF 1 L , BP stable no c/o, UOP   1/5: No c/o, lying in bed, VSS  1/6: No c/o VSS , Hb 5.8 today   1/7: soft Bps, tolerated HD, 6L NC, denies complaints   1/8: stable resp status, no acute events, denies complaints  1/9: No acute events, was anticipating discharge this evening to SNF today but held as no insurance clearance yet    REVIEW OF SYSTEMS:    10 point ROS reviewed and is as per HPI/daily summary or otherwise negative    PMH/PSH/SH/FH:   Reviewed and unchanged since admission note    CURRENT MEDICATIONS:   Reviewed from admission to present day    VS:  VS:  /65   Pulse 97   Temp 36.8 °C (98.2 °F) (Temporal)   Resp 18   Ht 1.778 m (5' 10\")   Wt (!) 137 kg (301 lb 5.9 oz)   SpO2 98%   BMI 43.24 kg/m²   GENERAL: no acute distress  CV: " RRR, +edema  RESP: Non-labored  GI: Soft  MSK: No joint deformities   SKIN: BL LE lesions  NEURO: AOx3  PSYCH: Cooperative    Fluids:  In: -   Out: 800     LABS:  Recent Labs     01/07/23  0144 01/08/23  0133   SODIUM 138 135   POTASSIUM 4.0 4.0   CHLORIDE 100 98   CO2 29 26   GLUCOSE 122* 112*   BUN 30* 40*   CREATININE 2.40* 3.18*   CALCIUM 8.2* 8.3*       IMAGING:   All imaging reviewed from admission to present day    IMPRESSION:  # FARIHA on CKD   - No hydro, UA benign   - Dialysis  dependent   - Renal recovery uncertain  - Access R IJ perm cath  # CKD 2/2 DM, HTN  - Unclear baseline Cr , Cr 3.31 on admission   # Hyperkalemia: resolved   # Hyponatremia: resolved   # Acidosis: resovled   # Acute respiratory failure 2/2 COPD/CHF   # Leukocytosis   # Anemia resolved   - low iron stores % sat 13, ferritin 68   # Hyperphosphatemia: resolved   # ALONDRA on CPAP  # CAD   # DM-2   # Obesity   # Volume overload   # Relative Hypotension        PLAN:  - Short HD today and then will continue outpatient HD on discharge to [facility] (q TTS planned @ [facility] per CM notes)   - Continue to monitor for renal recovery as outpatient   - Renal diet   - Fluid restriction 1.2 L   - Continue CARA with HD   - Strict IO  - Dose all meds per eGFR < 15    - Has HD chair at [facility] arranged    - Ok to discharge from renal standpoint when OK with primary team      Thank you

## 2023-01-10 LAB
ABO GROUP BLD: NORMAL
ALBUMIN SERPL BCP-MCNC: 3.4 G/DL (ref 3.2–4.9)
ALBUMIN/GLOB SERPL: 1.3 G/DL
ALP SERPL-CCNC: 71 U/L (ref 30–99)
ALT SERPL-CCNC: 20 U/L (ref 2–50)
ANION GAP SERPL CALC-SCNC: 9 MMOL/L (ref 7–16)
APTT PPP: 36.2 SEC (ref 24.7–36)
AST SERPL-CCNC: 12 U/L (ref 12–45)
BILIRUB SERPL-MCNC: 0.6 MG/DL (ref 0.1–1.5)
BLD GP AB SCN SERPL QL: NORMAL
BUN SERPL-MCNC: 27 MG/DL (ref 8–22)
CALCIUM ALBUM COR SERPL-MCNC: 9 MG/DL (ref 8.5–10.5)
CALCIUM SERPL-MCNC: 8.5 MG/DL (ref 8.5–10.5)
CFT BLD TEG: 6.3 MIN (ref 4.6–9.1)
CFT P HPASE BLD TEG: 6.4 MIN (ref 4.3–8.3)
CHLORIDE SERPL-SCNC: 100 MMOL/L (ref 96–112)
CLOT ANGLE BLD TEG: 78.6 DEGREES (ref 63–78)
CO2 SERPL-SCNC: 30 MMOL/L (ref 20–33)
CREAT SERPL-MCNC: 3.52 MG/DL (ref 0.5–1.4)
CT.EXTRINSIC BLD ROTEM: 0.8 MIN (ref 0.8–2.1)
ERYTHROCYTE [DISTWIDTH] IN BLOOD BY AUTOMATED COUNT: 62.9 FL (ref 35.9–50)
FERRITIN SERPL-MCNC: 596 NG/ML (ref 22–322)
GFR SERPLBLD CREATININE-BSD FMLA CKD-EPI: 18 ML/MIN/1.73 M 2
GLOBULIN SER CALC-MCNC: 2.7 G/DL (ref 1.9–3.5)
GLUCOSE BLD STRIP.AUTO-MCNC: 110 MG/DL (ref 65–99)
GLUCOSE BLD STRIP.AUTO-MCNC: 120 MG/DL (ref 65–99)
GLUCOSE BLD STRIP.AUTO-MCNC: 131 MG/DL (ref 65–99)
GLUCOSE BLD STRIP.AUTO-MCNC: 143 MG/DL (ref 65–99)
GLUCOSE SERPL-MCNC: 158 MG/DL (ref 65–99)
HCT VFR BLD AUTO: 25.8 % (ref 42–52)
HGB BLD-MCNC: 7.7 G/DL (ref 14–18)
HGB BLD-MCNC: 7.9 G/DL (ref 14–18)
HGB BLD-MCNC: 7.9 G/DL (ref 14–18)
HGB RETIC QN AUTO: 31.3 PG/CELL (ref 29–35)
IMM RETICS NFR: 30.5 % (ref 9.3–17.4)
INR PPP: 1.47 (ref 0.87–1.13)
IRON SATN MFR SERPL: 11 % (ref 15–55)
IRON SERPL-MCNC: 23 UG/DL (ref 50–180)
MCF BLD TEG: 69.1 MM (ref 52–69)
MCF.PLATELET INHIB BLD ROTEM: 42.9 MM (ref 15–32)
MCH RBC QN AUTO: 28.6 PG (ref 27–33)
MCHC RBC AUTO-ENTMCNC: 30.6 G/DL (ref 33.7–35.3)
MCV RBC AUTO: 93.5 FL (ref 81.4–97.8)
PA AA BLD-ACNC: ABNORMAL % (ref 0–11)
PA ADP BLD-ACNC: ABNORMAL % (ref 0–17)
PLATELET # BLD AUTO: 203 K/UL (ref 164–446)
PMV BLD AUTO: 9.9 FL (ref 9–12.9)
POTASSIUM SERPL-SCNC: 4.1 MMOL/L (ref 3.6–5.5)
PROT SERPL-MCNC: 6.1 G/DL (ref 6–8.2)
PROTHROMBIN TIME: 17.5 SEC (ref 12–14.6)
RBC # BLD AUTO: 2.76 M/UL (ref 4.7–6.1)
RETICS # AUTO: 0.24 M/UL (ref 0.04–0.06)
RETICS/RBC NFR: 8.5 % (ref 0.8–2.1)
RH BLD: NORMAL
SODIUM SERPL-SCNC: 139 MMOL/L (ref 135–145)
TEG ALGORITHM TGALG: ABNORMAL
TIBC SERPL-MCNC: 210 UG/DL (ref 250–450)
UIBC SERPL-MCNC: 187 UG/DL (ref 110–370)
WBC # BLD AUTO: 16.2 K/UL (ref 4.8–10.8)

## 2023-01-10 PROCEDURE — 80053 COMPREHEN METABOLIC PANEL: CPT

## 2023-01-10 PROCEDURE — 83540 ASSAY OF IRON: CPT

## 2023-01-10 PROCEDURE — 85730 THROMBOPLASTIN TIME PARTIAL: CPT

## 2023-01-10 PROCEDURE — 85046 RETICYTE/HGB CONCENTRATE: CPT

## 2023-01-10 PROCEDURE — 85018 HEMOGLOBIN: CPT

## 2023-01-10 PROCEDURE — 36415 COLL VENOUS BLD VENIPUNCTURE: CPT

## 2023-01-10 PROCEDURE — 85384 FIBRINOGEN ACTIVITY: CPT

## 2023-01-10 PROCEDURE — A9270 NON-COVERED ITEM OR SERVICE: HCPCS | Performed by: HOSPITALIST

## 2023-01-10 PROCEDURE — 85027 COMPLETE CBC AUTOMATED: CPT

## 2023-01-10 PROCEDURE — 700102 HCHG RX REV CODE 250 W/ 637 OVERRIDE(OP): Performed by: INTERNAL MEDICINE

## 2023-01-10 PROCEDURE — 85576 BLOOD PLATELET AGGREGATION: CPT

## 2023-01-10 PROCEDURE — 83550 IRON BINDING TEST: CPT

## 2023-01-10 PROCEDURE — 85347 COAGULATION TIME ACTIVATED: CPT

## 2023-01-10 PROCEDURE — 700102 HCHG RX REV CODE 250 W/ 637 OVERRIDE(OP): Performed by: HOSPITALIST

## 2023-01-10 PROCEDURE — A9270 NON-COVERED ITEM OR SERVICE: HCPCS | Performed by: INTERNAL MEDICINE

## 2023-01-10 PROCEDURE — A9270 NON-COVERED ITEM OR SERVICE: HCPCS | Performed by: STUDENT IN AN ORGANIZED HEALTH CARE EDUCATION/TRAINING PROGRAM

## 2023-01-10 PROCEDURE — 700102 HCHG RX REV CODE 250 W/ 637 OVERRIDE(OP): Performed by: STUDENT IN AN ORGANIZED HEALTH CARE EDUCATION/TRAINING PROGRAM

## 2023-01-10 PROCEDURE — 700101 HCHG RX REV CODE 250: Performed by: INTERNAL MEDICINE

## 2023-01-10 PROCEDURE — 85610 PROTHROMBIN TIME: CPT

## 2023-01-10 PROCEDURE — 99233 SBSQ HOSP IP/OBS HIGH 50: CPT | Performed by: INTERNAL MEDICINE

## 2023-01-10 PROCEDURE — 82728 ASSAY OF FERRITIN: CPT

## 2023-01-10 PROCEDURE — 82962 GLUCOSE BLOOD TEST: CPT | Mod: 91

## 2023-01-10 PROCEDURE — 770020 HCHG ROOM/CARE - TELE (206)

## 2023-01-10 RX ORDER — METOPROLOL TARTRATE 1 MG/ML
5 INJECTION, SOLUTION INTRAVENOUS EVERY 4 HOURS PRN
Status: COMPLETED | OUTPATIENT
Start: 2023-01-10 | End: 2023-01-11

## 2023-01-10 RX ORDER — LIDOCAINE 50 MG/G
1 PATCH TOPICAL EVERY 24 HOURS
Status: DISCONTINUED | OUTPATIENT
Start: 2023-01-10 | End: 2023-01-12

## 2023-01-10 RX ADMIN — METOPROLOL TARTRATE 5 MG: 1 INJECTION, SOLUTION INTRAVENOUS at 10:17

## 2023-01-10 RX ADMIN — ACETAMINOPHEN 650 MG: 325 TABLET, FILM COATED ORAL at 20:32

## 2023-01-10 RX ADMIN — NICOTINE TRANSDERMAL SYSTEM 21 MG: 21 PATCH, EXTENDED RELEASE TRANSDERMAL at 04:45

## 2023-01-10 RX ADMIN — METOPROLOL TARTRATE 25 MG: 25 TABLET, FILM COATED ORAL at 15:10

## 2023-01-10 RX ADMIN — METOPROLOL TARTRATE 5 MG: 1 INJECTION, SOLUTION INTRAVENOUS at 15:34

## 2023-01-10 RX ADMIN — AMITRIPTYLINE HYDROCHLORIDE 10 MG: 10 TABLET, FILM COATED ORAL at 17:22

## 2023-01-10 RX ADMIN — FLUTICASONE PROPIONATE 88 MCG: 44 AEROSOL, METERED RESPIRATORY (INHALATION) at 04:46

## 2023-01-10 RX ADMIN — LIDOCAINE 1 PATCH: 50 PATCH CUTANEOUS at 13:31

## 2023-01-10 RX ADMIN — AMITRIPTYLINE HYDROCHLORIDE 10 MG: 10 TABLET, FILM COATED ORAL at 04:45

## 2023-01-10 RX ADMIN — OMEPRAZOLE 20 MG: 20 CAPSULE, DELAYED RELEASE ORAL at 04:45

## 2023-01-10 RX ADMIN — ATORVASTATIN CALCIUM 40 MG: 40 TABLET, FILM COATED ORAL at 20:32

## 2023-01-10 RX ADMIN — OMEPRAZOLE 20 MG: 20 CAPSULE, DELAYED RELEASE ORAL at 17:22

## 2023-01-10 ASSESSMENT — ENCOUNTER SYMPTOMS
NERVOUS/ANXIOUS: 0
DIZZINESS: 0
FLANK PAIN: 0
NAUSEA: 0
SPUTUM PRODUCTION: 0
PALPITATIONS: 0
WEAKNESS: 0
ABDOMINAL PAIN: 1
HEMOPTYSIS: 0
COUGH: 0
CHILLS: 0
DIARRHEA: 0
HEADACHES: 0
MYALGIAS: 0
SHORTNESS OF BREATH: 0
BLOOD IN STOOL: 0
WEAKNESS: 1
FALLS: 0
SORE THROAT: 0
CONSTIPATION: 0
ABDOMINAL PAIN: 0
VOMITING: 0
INSOMNIA: 0
FEVER: 0
BACK PAIN: 1

## 2023-01-10 ASSESSMENT — PAIN DESCRIPTION - PAIN TYPE
TYPE: CHRONIC PAIN
TYPE: CHRONIC PAIN

## 2023-01-10 ASSESSMENT — LIFESTYLE VARIABLES: SUBSTANCE_ABUSE: 1

## 2023-01-10 ASSESSMENT — FIBROSIS 4 INDEX
FIB4 SCORE: 0.9
FIB4 SCORE: 1.58

## 2023-01-10 NOTE — PROGRESS NOTES
At start of shift patient reported to this RN extreme dizziness, nausea, and blurred vision. Patient appeared pale and was tachycardic at 117. Patient had just returned to the chair after having a large black bowel movement. Blood pressure was recorded at 79/51. On call hospitalist, Marzena GRIFFIN, notified. Stat HGB and HCT ordered along with a 500mL LR bolus.    2042: Patient's blood pressure responded to the bolus and patient's symptoms mostly resolved. Repeat blood pressure was 81/48. Patient remains to have low blood pressures but does not feel symptomatic at this time.

## 2023-01-10 NOTE — PROGRESS NOTES
Assumed care of patient, received bedside report from Maura RODRIGUEZ. Patient is A&O X 4. Pain 4/10, tylenol given recently during day shift. Vitals currently show hypotension and tachycardia, on 4 L of oxygen NC. On tele monitor, Afib 111. POC discussed with patient. Patient verbalized understanding. All questions answered. Call light within reach and fall precautions in place. Bed alarm on, bed locked and in lowest position.

## 2023-01-10 NOTE — CARE PLAN
The patient is Watcher - Medium risk of patient condition declining or worsening    Shift Goals  Clinical Goals: monitor BP, trend H&H  Patient Goals: no more dizziness  Family Goals: na    Progress made toward(s) clinical / shift goals:    Problem: Risk for Fluid Volume Deficit Related to Bleeding  Goal: Fluid volume balance will be maintained  Outcome: Progressing  Note: Bolus given to patient due to low blood pressure. Blood pressure increased and hypotensive symptoms resolved.      Problem: Risk for Bleeding  Goal: Patient will take measures to prevent bleeding and recognizes signs of bleeding that need to be reported immediately to a health care professional  Outcome: Progressing  Note: H&H are trending and aspirin and eliquis have been held.        Patient is not progressing towards the following goals:

## 2023-01-10 NOTE — PROGRESS NOTES
Hospital Medicine Daily Progress Note    Date of Service  1/10/2023    Chief Complaint  Yoandy Peace is a 67 y.o. male admitted 12/23/2022 with SOB    Hospital Course  68 yo man with COPD on 4L o2, CAD, CHF, ALONDRA who presented with SOB and edema. He was placed on BIPAP and given diuresis and nebs for COPD flare and fluid overload. He was weaned off BIPAP. Pulmonology recommended trelegy at discharge and follow up with them outpatient. His kidney function worsened and he was started on dialysis. He had tunneled cath placed 12/31 with IR. He then developed new onset afib. Cardiology was consulted and he was started on lopressor and anticoagulation. He had drop in Hgb to 5.8 on 1/6 and given transfusion, his Eliquis was held. His Hgb stablilized so Eliquis was restarted.     Interval Problem Update  He continues to have tarry stool, Hgb is fluctuating 7-8. Was hypotensive overnight that improved with bolus. Lasix held. Eliquis and aspirin were held. He has not had BM today. He denies abd pain or nausea. I consulted GI  -140s, restart oral metop since BP is stable    I have discussed this patient's plan of care and discharge plan at IDT rounds today with Case Management, Nursing, Nursing leadership, and other members of the IDT team.    Consultants/Specialty  cardiology, critical care, GI, nephrology, palliative care, and vascular surgery    Code Status  DNAR/DNI    Disposition  Patient is not medically cleared for discharge.   Anticipate discharge to to skilled nursing facility.  I have placed the appropriate orders for post-discharge needs.    Review of Systems  Review of Systems   Constitutional:  Negative for fever.   Respiratory:  Negative for shortness of breath.    Cardiovascular:  Negative for chest pain.   Gastrointestinal:  Positive for melena. Negative for abdominal pain and nausea.   Musculoskeletal:  Positive for back pain.   Neurological:  Positive for weakness.   Psychiatric/Behavioral:  The  patient is not nervous/anxious.    All other systems reviewed and are negative.     Physical Exam  Temp:  [36.4 °C (97.6 °F)-37.2 °C (99 °F)] 37 °C (98.6 °F)  Pulse:  [] 136  Resp:  [17-18] 17  BP: ()/(42-66) 104/59  SpO2:  [75 %-100 %] 96 %    Physical Exam  Vitals and nursing note reviewed.   Constitutional:       General: He is not in acute distress.     Appearance: He is not toxic-appearing.   HENT:      Head: Normocephalic.      Mouth/Throat:      Mouth: Mucous membranes are moist.   Eyes:      General:         Right eye: No discharge.         Left eye: No discharge.   Cardiovascular:      Rate and Rhythm: Tachycardia present. Rhythm irregular.   Pulmonary:      Effort: Pulmonary effort is normal. No respiratory distress.      Breath sounds: No wheezing or rales.   Abdominal:      Palpations: Abdomen is soft.      Tenderness: There is no abdominal tenderness. There is no guarding or rebound.   Musculoskeletal:         General: No swelling.      Cervical back: Neck supple.   Skin:     General: Skin is warm and dry.   Neurological:      Mental Status: He is alert and oriented to person, place, and time.       Fluids    Intake/Output Summary (Last 24 hours) at 1/10/2023 1542  Last data filed at 1/10/2023 1400  Gross per 24 hour   Intake 360 ml   Output --   Net 360 ml       Laboratory  Recent Labs     01/08/23  2342 01/09/23  0800 01/09/23  2016 01/10/23  0106 01/10/23  0833 01/10/23  1337   WBC 11.8*  --   --  16.2*  --   --    RBC 2.69*  --   --  2.76*  --   --    HEMOGLOBIN 7.5*   < > 8.0* 7.9* 7.7* 7.9*   HEMATOCRIT 24.5*  --  26.6* 25.8*  --   --    MCV 91.1  --   --  93.5  --   --    MCH 27.9  --   --  28.6  --   --    MCHC 30.6*  --   --  30.6*  --   --    RDW 56.2*  --   --  62.9*  --   --    PLATELETCT 227  --   --  203  --   --    MPV 10.2  --   --  9.9  --   --     < > = values in this interval not displayed.     Recent Labs     01/08/23  0133 01/10/23  1337   SODIUM 135 139   POTASSIUM 4.0  4.1   CHLORIDE 98 100   CO2 26 30   GLUCOSE 112* 158*   BUN 40* 27*   CREATININE 3.18* 3.52*   CALCIUM 8.3* 8.5     Recent Labs     01/08/23  1758 01/08/23  2342 01/10/23  1337   APTT 31.8 33.8 36.2*   INR 1.21*  --  1.47*               Imaging  IR-WIN,GROSHONG PLACEMENT >5   Final Result      1. Ultrasound and fluoroscopic guided placement of a right internal jugular 14.5 [place] HemoSplit tunneled dialysis catheter.      2. The hemodialysis catheter may be used immediately as clinically indicated. Flushes per protocol.      3. Removal of the right temporary dialysis catheter.      DX-CHEST-PORTABLE (1 VIEW)   Final Result      1.  Ongoing pulmonary edema and bilateral pleural effusions.   2.  Cardiomegaly.   3.  Left lower lobe atelectasis or consolidation. There is also likely some component of compressive atelectasis of the right lung base considering the presence of significant pleural effusion.   4.  Interval placement of temporary non-tunneled hemodialysis catheter with no evidence of pneumothorax.      DX-CHEST-PORTABLE (1 VIEW)   Final Result         1. No significant interval change.      US-RENAL   Final Result      1.  Echogenic renal parenchyma bilaterally could relate to medical renal disease.      2.  No hydronephrosis. No renal calculus.      DX-CHEST-PORTABLE (1 VIEW)   Final Result      1.  Enlarged cardiac silhouette and small amounts of bilateral pleural fluid with diffuse interstitial opacities most consistent with pulmonary edema/CHF.   2.  Bibasilar opacities could be due to edema, atelectasis or pneumonitis.   3.  There is atherosclerosis.   4.  Questionable linear calcific plaque in the proximal descending thoracic aorta versus some other device intrinsic or extrinsic to the patient.      EC-ECHOCARDIOGRAM COMPLETE W/ CONT   Final Result      US-EXTREMITY VENOUS LOWER BILAT   Final Result      DX-CHEST-PORTABLE (1 VIEW)   Final Result      1.  Cardiomegaly with interstitial infiltrates  likely representing pulmonary edema.      2.  Bibasilar atelectasis and small bilateral pleural effusions.           Assessment/Plan  * Acute renal failure (ARF) ([facility])  Assessment & Plan  Started on hemodialysis  Nephrology following    Anemia  Assessment & Plan  Melena  Hold Eliquis and aspirin  GI consulted, EGD tomorrow  Trend Hgb    Atrial fibrillation ([facility])  Assessment & Plan  New onset  Rate uncontrolled, restart metop  [id number]  Hold eliquis for GIB    Bilateral leg edema- (present on admission)  Assessment & Plan  Likely due to volume overload.    Diuresis  Bilateral lower extremity DVT studies negative    Acute exacerbation of chronic obstructive pulmonary disease (COPD) ([facility])  Assessment & Plan  Remains on O2  Continue with Flovent and Anoro  S/p steroids  Duo nebs as needed  Continue with oxygen supplementation to obtain SPO2 >88-90%    Acute pulmonary edema ([facility])- (present on admission)  Assessment & Plan  resolved  Continue with diuresis and HD  Echo EF of 55-60%    Hypertension  Assessment & Plan  Metop  Hypotensive overnight    Tobacco abuse- (present on admission)  Assessment & Plan  Nicotine replacement as needed    ACP (advance care planning)  Assessment & Plan  Full code    Class 3 severe obesity in adult ([facility])  Assessment & Plan  BMI 52.89     ALONDRA on CPAP- (present on admission)  Assessment & Plan  CPAP at bedtime      Acute on chronic respiratory failure with hypoxia ([facility])  Assessment & Plan  Dependent on 4 L.  Currently requiring 2-5 L in no distress  Nebs/pulm toilet    CAD (coronary artery disease)  Assessment & Plan  ASA/statin    DM (diabetes mellitus) ([facility])  Assessment & Plan  Continue with SSI   On Farxiga, insulin NPH at home, and linagliptin           VTE prophylaxis: SCDs/TEDs      I have performed a physical exam and reviewed and updated ROS and Plan today (1/10/2023). In review of yesterday's note (1/9/2023), there are no changes except as documented above.

## 2023-01-10 NOTE — PROGRESS NOTES
[facility] Medicine Daily Progress Note    Date of Service  1/9/2023    Chief Complaint  Yoandy Peace is a 67 y.o. male admitted 12/23/2022 with shortness of breath    Hospital Course    67 y.o. male past medical history of tobacco dependence, COPD on 4L home oxygen, CAD, CHF, obstructive sleep apnea who presented 12/23/2022 with progressive shortness of breath. He was admitted for acute hypoxic respiratory failure secondary to CHF and COPD.   He was given Solu-Medrol, bronchodilators and placed on BiPAP and admitted to ICU.  Patient was weaned off BiPAP.  Nephrology was consulted for worsening function.  Patient was started on hemodialysis and IV Lasix for pulmonary edema.  Cardiology was consulted for elevated troponin and chest pain during dialysis.  Started on therapeutic Lovenox for A. fib with RVR and transitioned to eliquis.  Patient did develop worsening anemia requiring 2 unit PRBC transfusion.  His Eliquis and aspirin were held for 48 hours with subsequent stabilization in hemoglobin.    Interval Problem Update  KARI overnight  Hgb stable on heparin, started back on eliquis  Was supposed to dc today however [facility] [place] request auth from insurance  Poss dc tomorrow    I have discussed this patient's plan of care and discharge plan at IDT rounds today with Case Management, Nursing, Nursing leadership, and other members of the IDT team.    Consultants/Specialty  cardiology, critical care, nephrology, palliative care, physiatry, pulmonary, and vascular surgery    Code Status  DNAR/DNI    Disposition  Patient is medically cleared for discharge.   Anticipate discharge to to skilled nursing facility.  I have placed the appropriate orders for post-discharge needs.    Review of Systems  Review of Systems   Constitutional:  Negative for chills and fever.   Respiratory:  Negative for cough and shortness of breath.    Cardiovascular:  Negative for chest pain and palpitations.   Gastrointestinal:  Positive for  constipation. Negative for abdominal pain, nausea and vomiting.   Neurological:  Negative for dizziness and headaches.      Physical Exam  Temp:  [35.8 °C (96.5 °F)-37.1 °C (98.8 °F)] 37.1 °C (98.8 °F)  Pulse:  [] 125  Resp:  [18] 18  BP: ()/(48-68) 120/66  SpO2:  [96 %-100 %] 98 %    Physical Exam  Vitals and nursing note reviewed.   Constitutional:       General: He is not in acute distress.     Appearance: He is obese. He is not ill-appearing.   Cardiovascular:      Rate and Rhythm: Normal rate and regular rhythm.   Pulmonary:      Effort: Pulmonary effort is normal. No respiratory distress.   Musculoskeletal:      Right lower leg: Edema present.      Left lower leg: Edema present.   Neurological:      Mental Status: He is alert and oriented to person, place, and time. Mental status is at baseline.   Psychiatric:         Mood and Affect: Mood normal.       Fluids    Intake/Output Summary (Last 24 hours) at 1/9/2023 1916  Last data filed at 1/9/2023 1135  Gross per 24 hour   Intake 500 ml   Output 2550 ml   Net -2050 ml         Laboratory  Recent Labs     01/08/23  1720 01/08/23  2342 01/09/23  0800   WBC  --  11.8*  --    RBC  --  2.69*  --    HEMOGLOBIN 8.3* 7.5* 7.6*   HEMATOCRIT  --  24.5*  --    MCV  --  91.1  --    MCH  --  27.9  --    MCHC  --  30.6*  --    RDW  --  56.2*  --    PLATELETCT  --  227  --    MPV  --  10.2  --        Recent Labs     01/07/23  0144 01/08/23  0133   SODIUM 138 135   POTASSIUM 4.0 4.0   CHLORIDE 100 98   CO2 29 26   GLUCOSE 122* 112*   BUN 30* 40*   CREATININE 2.40* 3.18*   CALCIUM 8.2* 8.3*       Recent Labs     01/08/23  1758 01/08/23  2342   APTT 31.8 33.8   INR 1.21*  --                  Imaging  IR-WIN,GROSHONG PLACEMENT >5   Final Result      1. Ultrasound and fluoroscopic guided placement of a right internal jugular 14.5 [place] HemoSplit tunneled dialysis catheter.      2. The hemodialysis catheter may be used immediately as clinically indicated. Flushes per  protocol.      3. Removal of the right temporary dialysis catheter.      DX-CHEST-PORTABLE (1 VIEW)   Final Result      1.  Ongoing pulmonary edema and bilateral pleural effusions.   2.  Cardiomegaly.   3.  Left lower lobe atelectasis or consolidation. There is also likely some component of compressive atelectasis of the right lung base considering the presence of significant pleural effusion.   4.  Interval placement of temporary non-tunneled hemodialysis catheter with no evidence of pneumothorax.      DX-CHEST-PORTABLE (1 VIEW)   Final Result         1. No significant interval change.      US-RENAL   Final Result      1.  Echogenic renal parenchyma bilaterally could relate to medical renal disease.      2.  No hydronephrosis. No renal calculus.      DX-CHEST-PORTABLE (1 VIEW)   Final Result      1.  Enlarged cardiac silhouette and small amounts of bilateral pleural fluid with diffuse interstitial opacities most consistent with pulmonary edema/CHF.   2.  Bibasilar opacities could be due to edema, atelectasis or pneumonitis.   3.  There is atherosclerosis.   4.  Questionable linear calcific plaque in the proximal descending thoracic aorta versus some other device intrinsic or extrinsic to the patient.      EC-ECHOCARDIOGRAM COMPLETE W/ CONT   Final Result      US-EXTREMITY VENOUS LOWER BILAT   Final Result      DX-CHEST-PORTABLE (1 VIEW)   Final Result      1.  Cardiomegaly with interstitial infiltrates likely representing pulmonary edema.      2.  Bibasilar atelectasis and small bilateral pleural effusions.             Assessment/Plan  * Acute renal failure (ARF) ([facility])  Assessment & Plan  Started on hemodialysis  Nephrology following    Anemia  Assessment & Plan  Requiring blood transfusion  2U on 1/6/23  S/p IV iron   FOBT pending  Hemoglobin has remained stable  Resume  Eliquis  Repeat CBC daily  IV PPI changed to p.o.  Will advance diet to regular  Will consult GI if hgb cont to drop    Atrial fibrillation  ([facility])  Assessment & Plan  New onset  [id number]  Had been started on eliquis    Bilateral leg edema- (present on admission)  Assessment & Plan  Likely due to volume overload.    Diuresis  Bilateral lower extremity DVT studies negative    Acute exacerbation of chronic obstructive pulmonary disease (COPD) ([facility])  Assessment & Plan  Stable on oxygen at 5 L.  Continue with Flovent and Anoro  S/p steroids  Duo nebs as needed  Continue with oxygen supplementation to obtain SPO2 >88-90%    Acute pulmonary edema ([facility])- (present on admission)  Assessment & Plan  resolved  Continue with diuresis and HD  Echo EF of 55-60%    Hypertension  Assessment & Plan  Amlodipine 10 mg daily   Lasix 40 mg BID   Well controlled    Tobacco abuse- (present on admission)  Assessment & Plan  Nicotine replacement as needed    ACP (advance care planning)  Assessment & Plan  Full code    Class 3 severe obesity in adult ([facility])  Assessment & Plan  BMI 52.89     ALONDRA on CPAP- (present on admission)  Assessment & Plan  CPAP at bedtime      Acute on chronic respiratory failure with hypoxia ([facility])  Assessment & Plan  Dependent on 4 L.  Currently requiring 2-5 L in no distress  Diuresis with Lasix  Nebs/pulm toilet    CAD (coronary artery disease)  Assessment & Plan  ASA/statin    DM (diabetes mellitus) ([facility])  Assessment & Plan  Continue with SSI   On Farxiga, insulin NPH at home, and linagliptin           VTE prophylaxis: SCDs/TEDs and therapeutic anticoagulation with eliquis    I have performed a physical exam and reviewed and updated ROS and Plan today (1/9/2023). In review of yesterday's note (1/8/2023), there are no changes except as documented above.

## 2023-01-10 NOTE — THERAPY
Occupational Therapy Contact Note    Patient Name: Yoandy Peace  Age:  67 y.o., Sex:  male  Medical Record #: [id number]  Today's Date: 1/10/2023    Discussed missed therapy with RN and MD. RN informed of OT attempt X3. Pt in A-fib on two attempts. HR fluctuating between 134-160 with no activity. RN informed OT that she would give pt meds . BP taken 114/66. MD present and agreed with OT to hold therapy today. RN /OTR informed of OT attempts and to hold therapy today per MD request. Will continue to follow as pt is able to participate.      01/10/23 1426   Treatment Variance   Reason For Missed Therapy Medical - Patient Is Not Medically Stable  (HR fluctuates between 140 supine and 160 seated EOB without activity. / 66 supine in bed without activty.)   Occupational Therapy Treatment Plan   O.T. Treatment Plan Continue Current Treatment Plan   Anticipated Discharge Equipment and Recommendations   DC Equipment Recommendations Unable to determine at this time   Discharge Recommendations Recommend post-acute placement for additional occupational therapy services prior to discharge home   Interdisciplinary Plan of Care Collaboration   IDT Collaboration with  Nursing;Physician   Collaboration Comments RN updated on OT attempt X3. Pt is not medically stable and was in A-fib on 2 attempts. Spoke with MD who stated to hold therapy today.   Session Information   Date / Session Number  1/4, #2 (1/4, 1/10) Attempted X3 on 1/10. Pt not medically stable at this time. OT tx held. MD agreed.

## 2023-01-10 NOTE — PROGRESS NOTES
Monitor Summary:    Rhythm: AFib  Rate: 101-144  Ectopy: (F) PVC, (F) coup  Measurement : -/.12/-

## 2023-01-10 NOTE — PROGRESS NOTES
[facility] -  PROGRESS NOTE               Author: Cisco Anderson M.D. Date & Time: 1/10/2023  2:24 PM     HPI:  67 y.o. male with history of COPD dependent on 4 L, tobacco abuse, CAD, CHF, ALONDRA/OHS on bedtime CPAP, who presented 12/23/2022 with evaluations for progressive shortness of breath and lower extremities swelling.  CXR notable for bilateral vascular congestion with pulmonary edema. Patient was admitted to the hospital on 12/23 for CHD exacerbation and copd. No F/C/N/V. No melena, hematochezia, hematemesis.  No HA, visual changes, or abdominal pain.     DAILY NEPHROLOGY SUMMARY:  12/26: consult neto, s/p hd #1  12/27: due for HD #2, still anasarca and on O2   12/28: on hd now, tolerating well.   12/29: tolerated puf today , edema is better , he feels better   12/30: tolerated hd well. In negative balance   12/31: feeling better, no new issues.   1/1: sitting in the chair, LE edema is better, no new complaints. S/p TDC placement   1/2 Seen on HD No c/o, producing more urine 1665 cc/ 24 hrs   1/3 Feeling better overall, breathing improved, HD yesterday net UF 1 L   UOP ~ 1L / 24 hrs , UF goal decreased due to soft BP   1/4: Seen on HD UF 1 L , BP stable no c/o, UOP   1/5: No c/o, lying in bed, VSS  1/6: No c/o VSS , Hb 5.8 today   1/7: soft Bps, tolerated HD, 6L NC, denies complaints   1/8: stable resp status, no acute events, denies complaints  1/9: No acute events, was anticipating discharge this evening to SNF today but held as no insurance clearance yet  1/10: Melanotic stools last night, denies any complaints this afternoon, sitting in bed and watching TV, H/H low but stable past 4 days     REVIEW OF SYSTEMS:    10 point ROS reviewed and is as per HPI/daily summary or otherwise negative    PMH/PSH/SH/FH:   Reviewed and unchanged since admission note    CURRENT MEDICATIONS:   Reviewed from admission to present day    VS:  VS:  /66   Pulse (!) 134   Temp 37 °C (98.6 °F)  "(Temporal)   Resp 18   Ht 1.778 m (5' 10\")   Wt (!) 137 kg (301 lb 13 oz)   SpO2 100%   BMI 43.31 kg/m²   GENERAL: no acute distress  CV: RRR, +edema  RESP: Non-labored  GI: Soft  MSK: No joint deformities   SKIN: BL LE lesions  NEURO: AOx3  PSYCH: Cooperative    Fluids:  In: 500 [Dialysis:500]  Out: 2300     LABS:  Recent Labs     01/08/23  0133 01/10/23  1337   SODIUM 135 139   POTASSIUM 4.0 4.1   CHLORIDE 98 100   CO2 26 30   GLUCOSE 112* 158*   BUN 40* 27*   CREATININE 3.18* 3.52*   CALCIUM 8.3* 8.5       IMAGING:   All imaging reviewed from admission to present day    IMPRESSION:  # FARIHA on CKD   - No hydro, UA benign   - Dialysis  dependent   - Renal recovery uncertain  - Access R IJ perm cath  # CKD 2/2 DM, HTN  - Unclear baseline Cr , Cr 3.31 on admission   # Hyperkalemia: resolved   # Hyponatremia: resolved   # Acidosis: resovled   # Acute respiratory failure 2/2 COPD/CHF   # Leukocytosis   # Anemia - on Epo  - low iron stores % sat 13, ferritin 68   # Hyperphosphatemia: resolved   # ALONDRA on CPAP  # CAD   # DM-2   # Obesity   # Volume overload   # Relative Hypotension        PLAN:  - No need for HD today, will plan for HD tomorrow - Continue to monitor for renal recovery as outpatient   - Renal diet   - Fluid restriction 1.2 L   - Continue CARA with HD   - Strict IO  - Dose all meds per eGFR < 15    - [facility]  (q TTS planned @ [facility] per CM notes)   - Ok to discharge from renal standpoint when OK with primary team      Thank you    "

## 2023-01-10 NOTE — PROGRESS NOTES
HOSPITALIST NOC CROSS COVER    Patient had am hgb 7.6. This evening had a large black stool. BP now 79/51, tachycardic. Patient reports dizziness, no other symptoms. Recently transitioned form Lovenox to Eliquis for atrial fibrillation, but developed worsening anemia, requiring 2 units PRBCs. Eliquis and ASA were held, which stabilized his hemoglobin, and both were resumed today.    A/P  #GI bleed  - IV bolus x 1  - stat H&H  - transfuse if indicated  - hold ASA/Eliquis in the am pending further eval  - Consider GI consult in am

## 2023-01-10 NOTE — CONSULTS
Date of Consultation:  1/10/2023    Patient: : Yoandy Peace  MRN: [id number]    Referring Physician:  Hemanth Ledbetter M.D.     GI:NATALIO Wayne     Reason for Consultation: Melena    History of Present Illness:   This is a 67-year-old male with a past medical history including COPD on 4 LPM home oxygen, CAD, CHF, ALONDRA, type 2 diabetes mellitus, hyperlipidemia, tobacco use who initially presented 12/23/2022 with progressive shortness of breath.  He is admitted for acute hypoxic respiratory failure secondary to CHF and COPD.  He was treated with Solu-Medrol, bronchodilators, placed on BiPAP, and admitted to ICU.  Patient was subsequently weaned off of BiPAP.  Additionally nephrology was consulted for worsening renal function.  He was started on hemodialysis and Lasix IV for pulmonary edema.  Cardiology also consulted for elevated troponin and chest pain during dialysis.  Patient with new onset A. fib with RVR.  He was started on therapeutic Lovenox and subsequently transitioned to Eliquis.  His admission was complicated by worsening anemia of hemoglobin to 5.8 requiring 2 units of PRBCs after initiation of Eliquis and aspirin.  Melena had resolved when anticoagulation and aspirin were held for 48 hours.  Upon resumption 1/9/2023, patient developed melena again overnight.  Hemoglobin 8 to 7.6 this morning. Patient reports having a total of 4-5 episodes of melena total during his admission.  Denies previous ulcers.  Patient reports mild abdominal pain but otherwise the patient denies fever/ chills/ weight loss/ appetite change/ dysphagia/ odynophagia/ heartburn/ nausea/ vomiting/ bloating/ constipation/hematochezia    Tobacco use: 1.25ppd x52 years  Alcohol use: Patient reports alcohol cessation in the 1990s  Illicit drug use: Reports occasional inhaled marijuana use    Last EGD: None previous  Last colonoscopy: Reports last colonoscopy was greater than 10 years ago, no abnormal findings reported  NSAID/ASA use:  Aspirin 81 mg p.o. last at 1/10/2023 at 0444, currently being held  Anticoagulation use: Eliquis 5 mg tablet last at 1/9/2023 at 1752, currently being held      Past Medical History:   Diagnosis Date    CAD (coronary artery disease)     CHF (congestive heart failure) (HCC)     Chronic obstructive pulmonary disease (HCC)     Diabetes (HCC)     Hyperlipidemia     ALONDRA on CPAP     HS    Pneumonia     Tobacco abuse          History reviewed. No pertinent surgical history.    History reviewed. No pertinent family history.    Social History     Socioeconomic History    Marital status:    Tobacco Use    Smoking status: Every Day     Types: Cigarettes    Smokeless tobacco: Never   Substance and Sexual Activity    Alcohol use: Not Currently    Drug use: Yes     Types: Inhaled     Comment: thc       Review of systems:  Review of Systems   Constitutional:  Negative for chills, fever and malaise/fatigue.   HENT:  Negative for nosebleeds and sore throat.    Respiratory:  Negative for cough, hemoptysis, sputum production and shortness of breath.    Cardiovascular:  Negative for chest pain, palpitations and leg swelling.   Gastrointestinal:  Positive for abdominal pain and melena. Negative for blood in stool, constipation, diarrhea, nausea and vomiting.   Genitourinary:  Negative for dysuria and flank pain.   Musculoskeletal:  Negative for falls and myalgias.   Neurological:  Negative for dizziness, weakness and headaches.   Psychiatric/Behavioral:  Positive for substance abuse. The patient is not nervous/anxious and does not have insomnia.    All other systems reviewed and are negative.      Physical Exam:  Vitals:    01/10/23 1101 01/10/23 1300 01/10/23 1523 01/10/23 1531   BP: 103/58 114/66 104/59    Pulse: (!) 113 (!) 134 (!) 106 (!) 136   Resp: 18  17    Temp: 37 °C (98.6 °F)  37 °C (98.6 °F)    TempSrc: Temporal  Temporal    SpO2: 100%  100% 96%   Weight:       Height:           Physical Exam  Vitals and nursing note  reviewed.   Constitutional:       General: He is not in acute distress.     Appearance: He is obese. He is not toxic-appearing.   HENT:      Head: Normocephalic.      Nose: Nose normal.      Mouth/Throat:      Mouth: Mucous membranes are moist.      Pharynx: Oropharynx is clear. No oropharyngeal exudate.   Eyes:      General: No scleral icterus.     Conjunctiva/sclera: Conjunctivae normal.      Pupils: Pupils are equal, round, and reactive to light.   Cardiovascular:      Rate and Rhythm: Normal rate. Rhythm irregular.      Pulses: Normal pulses.      Heart sounds: Normal heart sounds. No murmur heard.    No gallop.   Pulmonary:      Effort: Pulmonary effort is normal. No respiratory distress.      Breath sounds: Normal breath sounds. No wheezing.   Chest:      Chest wall: No tenderness.   Abdominal:      General: Abdomen is flat. Bowel sounds are normal. There is no distension.      Palpations: Abdomen is soft.      Tenderness: There is no abdominal tenderness. There is no guarding.      Hernia: A hernia (Reducible) is present.   Musculoskeletal:      Right lower leg: No edema.      Left lower leg: No edema.   Skin:     General: Skin is warm and dry.      Capillary Refill: Capillary refill takes less than 2 seconds.      Coloration: Skin is pale. Skin is not jaundiced.   Neurological:      General: No focal deficit present.      Mental Status: He is alert and oriented to person, place, and time. Mental status is at baseline.      Motor: No weakness.   Psychiatric:         Mood and Affect: Mood normal.         Behavior: Behavior normal.         Thought Content: Thought content normal.         Judgment: Judgment normal.         Labs:  Recent Labs     01/08/23  2342 01/09/23  0800 01/09/23  2016 01/10/23  0106 01/10/23  0833 01/10/23  1337   WBC 11.8*  --   --  16.2*  --   --    RBC 2.69*  --   --  2.76*  --   --    HEMOGLOBIN 7.5*   < > 8.0* 7.9* 7.7* 7.9*   HEMATOCRIT 24.5*  --  26.6* 25.8*  --   --    MCV 91.1  --    --  93.5  --   --    MCH 27.9  --   --  28.6  --   --    MCHC 30.6*  --   --  30.6*  --   --    RDW 56.2*  --   --  62.9*  --   --    PLATELETCT 227  --   --  203  --   --    MPV 10.2  --   --  9.9  --   --     < > = values in this interval not displayed.     Recent Labs     01/08/23  0133 01/10/23  1337   SODIUM 135 139   POTASSIUM 4.0 4.1   CHLORIDE 98 100   CO2 26 30   GLUCOSE 112* 158*   BUN 40* 27*     Recent Labs     01/08/23 1758 01/08/23  2342 01/10/23  1337   APTT 31.8 33.8 36.2*   INR 1.21*  --  1.47*       Recent Labs     01/08/23  1758 01/10/23  1337   ASTSGOT  --  12   ALTSGPT  --  20   TBILIRUBIN  --  0.6   ALKPHOSPHAT  --  71   GLOBULIN  --  2.7   INR 1.21* 1.47*         Imaging:  IR-RAMÓN WIN PLACEMENT >5  Narrative: 12/31/2022 2:35 PM    HISTORY/REASON FOR EXAM:  angelika.    TECHNIQUE/EXAM DESCRIPTION:  Internal jugular tunneled hemodialysis catheter placement with ultrasound and fluoroscopic guidance. Removal of the right-sided temporary dialysis catheter.    Moderate sedation with Fentanyl and Versed was administered during the procedure with appropriate continuous patient monitoring by the radiology nurse. Intraservice duration 20 minutes.    PROCEDURE:  Informed consent was obtained.    The right internal jugular vein was selected for the catheter entry site. The neck and anterior chest wall were prepped and draped in a sterile fashion using chlorhexidine antiseptic.    The internal jugular vein was localized with real-time ultrasound. Patency was documented and the recorded image was entered into the patient?s medical record.    Following local anesthesia with 1% lidocaine, the internal jugular vein was punctured under real-time ultrasound guidance and access obtained with single wall micropuncture technique. A 0.018 guidewire was placed followed by conversion to a 0.035?   guidewire.    Next, following local anesthesia with 10 mL 1% lidocaine with epinephrine, a subcutaneous tunnel was  created over the anterior chest wall and the hemodialysis catheter pulled through the tunnel. Following tract dilatation, a peel-away sheath was placed   and the 14.5 Mosotho HemoSplit dialysis catheter advanced into the central venous circulation.    Both lumens of the catheter were successfully aspirated and flushed with heparin solution. Sterile caps were attached to the catheter hubs.    The skin nick at the jugular puncture site was closed with Dermabond. The catheter wings were sutured to the skin following local anesthesia with 1% lidocaine. Sterile dressings were applied.    A digital film was obtained documenting catheter position.    The patient tolerated the procedure well with no evidence of complication.    3 fluoroscopic images obtained.    Fluoroscopy time was:  0.8 minutes.    Fluoroscopy dose(DAP): 52 Gy*cm^2    COMPARISON:  None.    FINDINGS:  The digital film shows the catheter to be in satisfactory position in the central venous circulation. Right temporary dialysis catheter was removed without incident.  Impression: 1. Ultrasound and fluoroscopic guided placement of a right internal jugular 14.5 Mosotho HemoSplit tunneled dialysis catheter.    2. The hemodialysis catheter may be used immediately as clinically indicated. Flushes per protocol.    3. Removal of the right temporary dialysis catheter.            Impressions:  #Melena secondary to acute upper GI bleeding-multiple visits of melena with initiation of anticoagulation and aspirin.  His previous history of ulcers, melena, GI bleeding of any sort.  Discussed concern for upper GI bleeding as well as further evaluation with EGD.  Patient is agreeable to proceed with EGD.  #Atrial fibrillation          Recommendations  UGI bleed: Differential includes esophagitis, gastritis, AVM, Dieulafoy's, PUD, MWT  - Maintain 2 large bore IVs (<= 18 gauge)  - Active type and screen  - Recommend fluid resuscitation w/ goal of normalizing heart rate and BUN  -  PPI drip 80mg bolus then 8mg/hr or IV BID  - Hgb goal >7g (>9 in setting of active chest pain, ACS), INR <2, plat >50 with active bleeding.  -Hold aspirin and anticoagulation  -N.p.o. at midnight  - Plan for EGD tomorrow morning  -Follow-up outpatient GI for colonoscopy    Discussed with patient, Dayan RODRIGUEZ, Dr. Ledbetter, Dr. Ludwig    This note was generated using voice recognition software which has a small chance of producing errors of grammar and possibly content. I have made every reasonable attempt to find and correct any obvious errors, but expect that some may not be found prior to finalization of this note.

## 2023-01-10 NOTE — PROGRESS NOTES
Late entry:   Assumed care of patient, bedside report received from RAZ RN. Updated on POC, call light within reach and fall precautions in place. Bed locked and in lowest position. Patient instructed to call for assistance before getting out of bed. All questions answered, no other needs at this time.

## 2023-01-11 ENCOUNTER — ANESTHESIA EVENT (OUTPATIENT)
Dept: SURGERY | Facility: MEDICAL CENTER | Age: 68
DRG: 291 | End: 2023-01-11
Payer: COMMERCIAL

## 2023-01-11 ENCOUNTER — ANESTHESIA (OUTPATIENT)
Dept: SURGERY | Facility: MEDICAL CENTER | Age: 68
DRG: 291 | End: 2023-01-11
Payer: COMMERCIAL

## 2023-01-11 LAB
ANION GAP SERPL CALC-SCNC: 10 MMOL/L (ref 7–16)
ANISOCYTOSIS BLD QL SMEAR: ABNORMAL
BASOPHILS # BLD AUTO: 0.2 % (ref 0–1.8)
BASOPHILS # BLD: 0.03 K/UL (ref 0–0.12)
BUN SERPL-MCNC: 31 MG/DL (ref 8–22)
CALCIUM SERPL-MCNC: 8 MG/DL (ref 8.5–10.5)
CHLORIDE SERPL-SCNC: 100 MMOL/L (ref 96–112)
CO2 SERPL-SCNC: 26 MMOL/L (ref 20–33)
COMMENT 1642: NORMAL
CREAT SERPL-MCNC: 3.79 MG/DL (ref 0.5–1.4)
EOSINOPHIL # BLD AUTO: 0.08 K/UL (ref 0–0.51)
EOSINOPHIL NFR BLD: 0.5 % (ref 0–6.9)
ERYTHROCYTE [DISTWIDTH] IN BLOOD BY AUTOMATED COUNT: 66.5 FL (ref 35.9–50)
GFR SERPLBLD CREATININE-BSD FMLA CKD-EPI: 16 ML/MIN/1.73 M 2
GLUCOSE BLD STRIP.AUTO-MCNC: 117 MG/DL (ref 65–99)
GLUCOSE BLD STRIP.AUTO-MCNC: 126 MG/DL (ref 65–99)
GLUCOSE BLD STRIP.AUTO-MCNC: 131 MG/DL (ref 65–99)
GLUCOSE BLD STRIP.AUTO-MCNC: 140 MG/DL (ref 65–99)
GLUCOSE SERPL-MCNC: 115 MG/DL (ref 65–99)
HCT VFR BLD AUTO: 24 % (ref 42–52)
HGB BLD-MCNC: 7.3 G/DL (ref 14–18)
HGB BLD-MCNC: 7.5 G/DL (ref 14–18)
HGB BLD-MCNC: 7.8 G/DL (ref 14–18)
IMM GRANULOCYTES # BLD AUTO: 0.12 K/UL (ref 0–0.11)
IMM GRANULOCYTES NFR BLD AUTO: 0.8 % (ref 0–0.9)
LYMPHOCYTES # BLD AUTO: 2.86 K/UL (ref 1–4.8)
LYMPHOCYTES NFR BLD: 18.1 % (ref 22–41)
MACROCYTES BLD QL SMEAR: ABNORMAL
MCH RBC QN AUTO: 28.6 PG (ref 27–33)
MCHC RBC AUTO-ENTMCNC: 30.4 G/DL (ref 33.7–35.3)
MCV RBC AUTO: 94.1 FL (ref 81.4–97.8)
MICROCYTES BLD QL SMEAR: ABNORMAL
MONOCYTES # BLD AUTO: 1.21 K/UL (ref 0–0.85)
MONOCYTES NFR BLD AUTO: 7.7 % (ref 0–13.4)
MORPHOLOGY BLD-IMP: NORMAL
NEUTROPHILS # BLD AUTO: 11.48 K/UL (ref 1.82–7.42)
NEUTROPHILS NFR BLD: 72.7 % (ref 44–72)
NRBC # BLD AUTO: 0 K/UL
NRBC BLD-RTO: 0 /100 WBC
PATHOLOGY CONSULT NOTE: NORMAL
PLATELET # BLD AUTO: 195 K/UL (ref 164–446)
PLATELET BLD QL SMEAR: NORMAL
PMV BLD AUTO: 10.2 FL (ref 9–12.9)
POLYCHROMASIA BLD QL SMEAR: NORMAL
POTASSIUM SERPL-SCNC: 3.9 MMOL/L (ref 3.6–5.5)
RBC # BLD AUTO: 2.55 M/UL (ref 4.7–6.1)
RBC BLD AUTO: PRESENT
SODIUM SERPL-SCNC: 136 MMOL/L (ref 135–145)
WBC # BLD AUTO: 15.8 K/UL (ref 4.8–10.8)

## 2023-01-11 PROCEDURE — 0DB68ZX EXCISION OF STOMACH, VIA NATURAL OR ARTIFICIAL OPENING ENDOSCOPIC, DIAGNOSTIC: ICD-10-PCS | Performed by: INTERNAL MEDICINE

## 2023-01-11 PROCEDURE — 88312 SPECIAL STAINS GROUP 1: CPT

## 2023-01-11 PROCEDURE — A9270 NON-COVERED ITEM OR SERVICE: HCPCS | Performed by: INTERNAL MEDICINE

## 2023-01-11 PROCEDURE — 160208 HCHG ENDO MINUTES - EA ADDL 1 MIN LEVEL 4: Performed by: INTERNAL MEDICINE

## 2023-01-11 PROCEDURE — 770020 HCHG ROOM/CARE - TELE (206)

## 2023-01-11 PROCEDURE — 700102 HCHG RX REV CODE 250 W/ 637 OVERRIDE(OP): Performed by: INTERNAL MEDICINE

## 2023-01-11 PROCEDURE — 700101 HCHG RX REV CODE 250: Performed by: ANESTHESIOLOGY

## 2023-01-11 PROCEDURE — 85025 COMPLETE CBC W/AUTO DIFF WBC: CPT

## 2023-01-11 PROCEDURE — C9113 INJ PANTOPRAZOLE SODIUM, VIA: HCPCS | Performed by: INTERNAL MEDICINE

## 2023-01-11 PROCEDURE — 85018 HEMOGLOBIN: CPT

## 2023-01-11 PROCEDURE — 700102 HCHG RX REV CODE 250 W/ 637 OVERRIDE(OP): Performed by: HOSPITALIST

## 2023-01-11 PROCEDURE — A9270 NON-COVERED ITEM OR SERVICE: HCPCS | Performed by: STUDENT IN AN ORGANIZED HEALTH CARE EDUCATION/TRAINING PROGRAM

## 2023-01-11 PROCEDURE — 82962 GLUCOSE BLOOD TEST: CPT | Mod: 91

## 2023-01-11 PROCEDURE — A9270 NON-COVERED ITEM OR SERVICE: HCPCS | Performed by: HOSPITALIST

## 2023-01-11 PROCEDURE — 160035 HCHG PACU - 1ST 60 MINS PHASE I: Performed by: INTERNAL MEDICINE

## 2023-01-11 PROCEDURE — 160002 HCHG RECOVERY MINUTES (STAT): Performed by: INTERNAL MEDICINE

## 2023-01-11 PROCEDURE — 36415 COLL VENOUS BLD VENIPUNCTURE: CPT

## 2023-01-11 PROCEDURE — 0DB98ZX EXCISION OF DUODENUM, VIA NATURAL OR ARTIFICIAL OPENING ENDOSCOPIC, DIAGNOSTIC: ICD-10-PCS | Performed by: INTERNAL MEDICINE

## 2023-01-11 PROCEDURE — 700111 HCHG RX REV CODE 636 W/ 250 OVERRIDE (IP): Performed by: ANESTHESIOLOGY

## 2023-01-11 PROCEDURE — 80048 BASIC METABOLIC PNL TOTAL CA: CPT

## 2023-01-11 PROCEDURE — 88305 TISSUE EXAM BY PATHOLOGIST: CPT | Mod: 59

## 2023-01-11 PROCEDURE — 99233 SBSQ HOSP IP/OBS HIGH 50: CPT | Performed by: INTERNAL MEDICINE

## 2023-01-11 PROCEDURE — 700101 HCHG RX REV CODE 250: Performed by: INTERNAL MEDICINE

## 2023-01-11 PROCEDURE — 160048 HCHG OR STATISTICAL LEVEL 1-5: Performed by: INTERNAL MEDICINE

## 2023-01-11 PROCEDURE — 700111 HCHG RX REV CODE 636 W/ 250 OVERRIDE (IP): Performed by: INTERNAL MEDICINE

## 2023-01-11 PROCEDURE — 00731 ANES UPR GI NDSC PX NOS: CPT | Performed by: ANESTHESIOLOGY

## 2023-01-11 PROCEDURE — 160036 HCHG PACU - EA ADDL 30 MINS PHASE I: Performed by: INTERNAL MEDICINE

## 2023-01-11 PROCEDURE — 160203 HCHG ENDO MINUTES - 1ST 30 MINS LEVEL 4: Performed by: INTERNAL MEDICINE

## 2023-01-11 PROCEDURE — 700105 HCHG RX REV CODE 258: Performed by: ANESTHESIOLOGY

## 2023-01-11 PROCEDURE — 700102 HCHG RX REV CODE 250 W/ 637 OVERRIDE(OP): Performed by: STUDENT IN AN ORGANIZED HEALTH CARE EDUCATION/TRAINING PROGRAM

## 2023-01-11 PROCEDURE — 160009 HCHG ANES TIME/MIN: Performed by: INTERNAL MEDICINE

## 2023-01-11 RX ORDER — SODIUM CHLORIDE, SODIUM LACTATE, POTASSIUM CHLORIDE, CALCIUM CHLORIDE 600; 310; 30; 20 MG/100ML; MG/100ML; MG/100ML; MG/100ML
INJECTION, SOLUTION INTRAVENOUS CONTINUOUS
Status: DISCONTINUED | OUTPATIENT
Start: 2023-01-11 | End: 2023-01-11 | Stop reason: HOSPADM

## 2023-01-11 RX ORDER — SODIUM CHLORIDE, SODIUM LACTATE, POTASSIUM CHLORIDE, CALCIUM CHLORIDE 600; 310; 30; 20 MG/100ML; MG/100ML; MG/100ML; MG/100ML
INJECTION, SOLUTION INTRAVENOUS
Status: DISCONTINUED | OUTPATIENT
Start: 2023-01-11 | End: 2023-01-11 | Stop reason: SURG

## 2023-01-11 RX ORDER — SODIUM CHLORIDE 9 MG/ML
INJECTION, SOLUTION INTRAVENOUS ONCE
Status: DISCONTINUED | OUTPATIENT
Start: 2023-01-11 | End: 2023-01-11 | Stop reason: HOSPADM

## 2023-01-11 RX ORDER — PANTOPRAZOLE SODIUM 40 MG/10ML
40 INJECTION, POWDER, LYOPHILIZED, FOR SOLUTION INTRAVENOUS 2 TIMES DAILY
Status: DISCONTINUED | OUTPATIENT
Start: 2023-01-11 | End: 2023-01-12

## 2023-01-11 RX ORDER — ONDANSETRON 2 MG/ML
4 INJECTION INTRAMUSCULAR; INTRAVENOUS
Status: DISCONTINUED | OUTPATIENT
Start: 2023-01-11 | End: 2023-01-11 | Stop reason: HOSPADM

## 2023-01-11 RX ORDER — HALOPERIDOL 5 MG/ML
1 INJECTION INTRAMUSCULAR
Status: DISCONTINUED | OUTPATIENT
Start: 2023-01-11 | End: 2023-01-11 | Stop reason: HOSPADM

## 2023-01-11 RX ORDER — LIDOCAINE HYDROCHLORIDE 20 MG/ML
INJECTION, SOLUTION EPIDURAL; INFILTRATION; INTRACAUDAL; PERINEURAL PRN
Status: DISCONTINUED | OUTPATIENT
Start: 2023-01-11 | End: 2023-01-11 | Stop reason: SURG

## 2023-01-11 RX ORDER — DIGOXIN 0.25 MG/ML
125 INJECTION INTRAMUSCULAR; INTRAVENOUS ONCE
Status: COMPLETED | OUTPATIENT
Start: 2023-01-11 | End: 2023-01-11

## 2023-01-11 RX ORDER — ROCURONIUM BROMIDE 10 MG/ML
INJECTION, SOLUTION INTRAVENOUS PRN
Status: DISCONTINUED | OUTPATIENT
Start: 2023-01-11 | End: 2023-01-11 | Stop reason: SURG

## 2023-01-11 RX ORDER — DIPHENHYDRAMINE HYDROCHLORIDE 50 MG/ML
12.5 INJECTION INTRAMUSCULAR; INTRAVENOUS
Status: DISCONTINUED | OUTPATIENT
Start: 2023-01-11 | End: 2023-01-11 | Stop reason: HOSPADM

## 2023-01-11 RX ADMIN — SODIUM CHLORIDE, POTASSIUM CHLORIDE, SODIUM LACTATE AND CALCIUM CHLORIDE: 600; 310; 30; 20 INJECTION, SOLUTION INTRAVENOUS at 11:25

## 2023-01-11 RX ADMIN — METOPROLOL TARTRATE 5 MG: 1 INJECTION, SOLUTION INTRAVENOUS at 04:47

## 2023-01-11 RX ADMIN — OMEPRAZOLE 20 MG: 20 CAPSULE, DELAYED RELEASE ORAL at 04:52

## 2023-01-11 RX ADMIN — PANTOPRAZOLE SODIUM 40 MG: 40 INJECTION, POWDER, LYOPHILIZED, FOR SOLUTION INTRAVENOUS at 08:04

## 2023-01-11 RX ADMIN — NICOTINE TRANSDERMAL SYSTEM 21 MG: 21 PATCH, EXTENDED RELEASE TRANSDERMAL at 04:51

## 2023-01-11 RX ADMIN — METOPROLOL TARTRATE 25 MG: 25 TABLET, FILM COATED ORAL at 04:52

## 2023-01-11 RX ADMIN — PROPOFOL 150 MG: 10 INJECTION, EMULSION INTRAVENOUS at 11:30

## 2023-01-11 RX ADMIN — LIDOCAINE HYDROCHLORIDE 100 MG: 20 INJECTION, SOLUTION EPIDURAL; INFILTRATION; INTRACAUDAL at 11:30

## 2023-01-11 RX ADMIN — ROCURONIUM BROMIDE 40 MG: 10 INJECTION, SOLUTION INTRAVENOUS at 11:30

## 2023-01-11 RX ADMIN — METOPROLOL TARTRATE 25 MG: 25 TABLET, FILM COATED ORAL at 21:14

## 2023-01-11 RX ADMIN — ATORVASTATIN CALCIUM 40 MG: 40 TABLET, FILM COATED ORAL at 20:50

## 2023-01-11 RX ADMIN — SUGAMMADEX 200 MG: 100 INJECTION, SOLUTION INTRAVENOUS at 11:50

## 2023-01-11 RX ADMIN — AMITRIPTYLINE HYDROCHLORIDE 10 MG: 10 TABLET, FILM COATED ORAL at 04:52

## 2023-01-11 RX ADMIN — PANTOPRAZOLE SODIUM 40 MG: 40 INJECTION, POWDER, LYOPHILIZED, FOR SOLUTION INTRAVENOUS at 16:41

## 2023-01-11 RX ADMIN — DIGOXIN 125 MCG: 0.25 INJECTION INTRAMUSCULAR; INTRAVENOUS at 07:58

## 2023-01-11 RX ADMIN — METOPROLOL TARTRATE 25 MG: 25 TABLET, FILM COATED ORAL at 16:41

## 2023-01-11 RX ADMIN — AMITRIPTYLINE HYDROCHLORIDE 10 MG: 10 TABLET, FILM COATED ORAL at 16:41

## 2023-01-11 RX ADMIN — FLUTICASONE PROPIONATE 88 MCG: 44 AEROSOL, METERED RESPIRATORY (INHALATION) at 04:50

## 2023-01-11 ASSESSMENT — ENCOUNTER SYMPTOMS
SHORTNESS OF BREATH: 0
NERVOUS/ANXIOUS: 0
BACK PAIN: 1
WEAKNESS: 1
ABDOMINAL PAIN: 0
FEVER: 0
NAUSEA: 0

## 2023-01-11 ASSESSMENT — PAIN SCALES - GENERAL: PAIN_LEVEL: 3

## 2023-01-11 ASSESSMENT — FIBROSIS 4 INDEX
FIB4 SCORE: 0.94
FIB4 SCORE: 0.94

## 2023-01-11 ASSESSMENT — PAIN DESCRIPTION - PAIN TYPE: TYPE: CHRONIC PAIN

## 2023-01-11 NOTE — PROGRESS NOTES
[facility] -  PROGRESS NOTE               Author: Cisco Anderson M.D. Date & Time: 1/11/2023  2:24 PM     HPI:  67 y.o. male with history of COPD dependent on 4 L, tobacco abuse, CAD, CHF, ALONDRA/OHS on bedtime CPAP, who presented 12/23/2022 with evaluations for progressive shortness of breath and lower extremities swelling.  CXR notable for bilateral vascular congestion with pulmonary edema. Patient was admitted to the hospital on 12/23 for CHD exacerbation and copd. No F/C/N/V. No melena, hematochezia, hematemesis.  No HA, visual changes, or abdominal pain.     DAILY NEPHROLOGY SUMMARY:  12/26: consult neto, s/p hd #1  12/27: due for HD #2, still anasarca and on O2   12/28: on hd now, tolerating well.   12/29: tolerated puf today , edema is better , he feels better   12/30: tolerated hd well. In negative balance   12/31: feeling better, no new issues.   1/1: sitting in the chair, LE edema is better, no new complaints. S/p TDC placement   1/2 Seen on HD No c/o, producing more urine 1665 cc/ 24 hrs   1/3 Feeling better overall, breathing improved, HD yesterday net UF 1 L   UOP ~ 1L / 24 hrs , UF goal decreased due to soft BP   1/4: Seen on HD UF 1 L , BP stable no c/o, UOP   1/5: No c/o, lying in bed, VSS  1/6: No c/o VSS , Hb 5.8 today   1/7: soft Bps, tolerated HD, 6L NC, denies complaints   1/8: stable resp status, no acute events, denies complaints  1/9: No acute events, was anticipating discharge this evening to SNF today but held as no insurance clearance yet  1/10: Melanotic stools last night, denies any complaints this afternoon, sitting in bed and watching TV, H/H low but stable past 4 days   1/11: Had EGD this AM - no active bleeding; 1.5 cm shallow ulcer posterior bulb, no stigmata of recent bleeding, No new complaints, H/H stable     REVIEW OF SYSTEMS:    10 point ROS reviewed and is as per HPI/daily summary or otherwise negative    PMH/PSH/SH/FH:   Reviewed and unchanged since  "admission note    CURRENT MEDICATIONS:   Reviewed from admission to present day    VS:  VS:  /67   Pulse 72   Temp 36.2 °C (97.1 °F) (Temporal)   Resp (!) 22   Ht 1.778 m (5' 10\")   Wt (!) 136 kg (299 lb 2.6 oz)   SpO2 99%   BMI 42.93 kg/m²   GENERAL: no acute distress  CV: RRR, +edema  RESP: Non-labored  GI: Soft  MSK: No joint deformities   SKIN: BL LE lesions  NEURO: AOx3  PSYCH: Cooperative    Fluids:  In: 360 [P.O.:360]  Out: 225     LABS:  Recent Labs     01/10/23  1337 01/11/23  0108   SODIUM 139 136   POTASSIUM 4.1 3.9   CHLORIDE 100 100   CO2 30 26   GLUCOSE 158* 115*   BUN 27* 31*   CREATININE 3.52* 3.79*   CALCIUM 8.5 8.0*       IMAGING:   All imaging reviewed from admission to present day    IMPRESSION:  # FARIHA on CKD   - No hydro, UA benign   - Dialysis  dependent   - Renal recovery uncertain  - Access R IJ perm cath  # CKD 2/2 DM, HTN  - Unclear baseline Cr , Cr 3.31 on admission   # Hyperkalemia: resolved   # Hyponatremia: resolved   # Acidosis: resovled   # Acute respiratory failure 2/2 COPD/CHF   # Leukocytosis   # Anemia - on Epo  - low iron stores % sat 13, ferritin 68   # Hyperphosphatemia: resolved   # ALONDRA on CPAP  # CAD   # DM-2   # Obesity   # Volume overload   # Relative Hypotension        PLAN:  - No need for HD today, will plan for HD tomorrow and switch to q TTS schedule to conform with outpatient HD schedule  - Continue to monitor for renal recovery as outpatient   - Renal diet   - Fluid restriction 1.2 L   - Continue CARA with HD   - Strict IO  - Dose all meds per eGFR < 15    - Has HD chair at [facility] arranged  (q TTS planned @ [facility])   - Ok to discharge from renal standpoint when OK with primary team    - No ASA for 1 week if possible and anticoagulation for 1-2 weeks per GI recommendations     Thank you    "

## 2023-01-11 NOTE — CARE PLAN
Problem: Knowledge Deficit - Standard  Goal: Patient and family/care givers will demonstrate understanding of plan of care, disease process/condition, diagnostic tests and medications  Outcome: Progressing  Note: Pt's whiteboard is updated, pt has been updated on POC, all questions have been answered at this time       Problem: Pain - Standard  Goal: Alleviation of pain or a reduction in pain to the patient’s comfort goal  Outcome: Progressing  Note: Pt denies pain at this time      Problem: Fall Risk  Goal: Patient will remain free from falls  Outcome: Progressing  Note: Bed locked in lowest position. Call light and belongings within reach. Pt educated to call for assistance. Pt verbalized understanding. Hourly rounding in place.    The patient is Watcher - Medium risk of patient condition declining or worsening    Shift Goals  Clinical Goals: EGD, dialysis, monitor hgb, heart rhythm and vitals  Patient Goals: get better  Family Goals: na    Progress made toward(s) clinical / shift goals:  EGD and dialysis scheduled, monitoring hgb, heart rhythm, and vitals     Patient is not progressing towards the following goals:

## 2023-01-11 NOTE — DISCHARGE PLANNING
Case Management Discharge Planning    Admission Date: 12/23/2022  GMLOS: 3.9  ALOS: 19    6-Clicks ADL Score: 18  6-Clicks Mobility Score: 21      Anticipated Discharge Dispo: Discharge Disposition: D/T to home under HHA care in anticipation of covered skilled care (06) [facility] accepted and [facility] Rob has HD chair Tuesday/Thursday/Saturday 0500 dialysis schedule, a    DME Needed: No    Action(s) Taken: Updated Provider/Nurse on Discharge Plan    Escalations Completed: Provider    Medically Clear: NO   Next Steps:  Patient scheduled for EGD for melena     Barriers to Discharge: Medical clearance    Is the patient up for discharge tomorrow: No

## 2023-01-11 NOTE — PROGRESS NOTES
Monitor summary   -/.09/-  Afib 109-140  RVR up to 174 at 0900  RVR up to 165 x4 throughout shift     R PVC

## 2023-01-11 NOTE — CARE PLAN
The patient is Watcher - Medium risk of patient condition declining or worsening    Shift Goals  Clinical Goals: monitor HGB and vitals  Patient Goals: feel better  Family Goals: na    Progress made toward(s) clinical / shift goals:    Problem: Risk for Bleeding  Goal: Patient will take measures to prevent bleeding and recognizes signs of bleeding that need to be reported immediately to a health care professional  Outcome: Progressing  Note: Patient is scheduled to go for a gastroscopy to find source of bleeding.        Patient is not progressing towards the following goals:

## 2023-01-11 NOTE — PROGRESS NOTES
Assumed care of patient, received bedside report from Dayan RODRIGUEZ. Patient is A&O X 4. Pain 3/10, medicated per MAR. Vital signs stable during day shift, on 2 L of oxygen NC. On tele monitor, Afib 98. POC discussed with patient. Patient verbalized understanding. All questions answered. Call light within reach and fall precautions in place. Bed locked and in lowest position.

## 2023-01-11 NOTE — CARE PLAN
The patient is Watcher - Medium risk of patient condition declining or worsening    Shift Goals  Clinical Goals: monitor HR/ HGB  Patient Goals: mobilize  Family Goals: na    Progress made toward(s) clinical / shift goals:  HGB stable during shift, no BM during shift. PRN added for HR control     Patient is not progressing towards the following goals:  Na    Problem: Risk for Fluid Volume Deficit Related to Bleeding  Goal: Fluid volume balance will be maintained  Outcome: Progressing     Problem: Risk for Bleeding  Goal: Patient will take measures to prevent bleeding and recognizes signs of bleeding that need to be reported immediately to a health care professional  Outcome: Progressing

## 2023-01-11 NOTE — ANESTHESIA PROCEDURE NOTES
Airway    Date/Time: 1/11/2023 11:31 AM  Performed by: Kade Browning M.D.  Authorized by: Kade Browning M.D.     Location:  OR  Urgency:  Elective  Indications for Airway Management:  Anesthesia      Spontaneous Ventilation: absent    Sedation Level:  Deep  Preoxygenated: Yes    Patient Position:  Sniffing  Final Airway Type:  Endotracheal airway  Final Endotracheal Airway:  ETT  Cuffed: Yes    Technique Used for Successful ETT Placement:  Direct laryngoscopy    Insertion Site:  Oral  Blade Type:  Kumar  Laryngoscope Blade/Videolaryngoscope Blade Size:  2  ETT Size (mm):  8.0  Measured from:  Teeth  ETT to Teeth (cm):  22  Placement Verified by: auscultation and capnometry    Cormack-Lehane Classification:  Grade IIa - partial view of glottis  Number of Attempts at Approach:  1

## 2023-01-11 NOTE — OR NURSING
1157 Arrived via gurney from OR. Report received from anesthesiologist and RN. Monitors attached. Identity verified by two staff members. Per MD Pt has AFIB with RBBB.    1234 Report to MICHAEL Chavis    135 Placed on transport. To go to room to be placed in bed then transported to dialysis

## 2023-01-11 NOTE — THERAPY
Occupational Therapy Contact Note    Patient Name: Yoandy Peace  Age:  67 y.o., Sex:  male  Medical Record #: [id number]  Today's Date: 1/11/2023    Attempted to see pt for OT session. Pt in EGD. Will hold OT session and reattempt as appropriate/able.

## 2023-01-11 NOTE — ANESTHESIA PREPROCEDURE EVALUATION
Case: [id number] Date/Time: 01/11/23 1100    Procedure: GASTROSCOPY    Location: [facility] ROOM 26 / SURGERY SAME DAY [facility]    Surgeons: Josette Ludwig M.D.          Relevant Problems   ANESTHESIA   (positive) ALONDRA on CPAP      PULMONARY   (positive) Acute exacerbation of chronic obstructive pulmonary disease (COPD) (HCC)      CARDIAC   (positive) Atrial fibrillation (HCC)   (positive) CAD (coronary artery disease)   (positive) Hypertension         (positive) Acute renal failure (ARF) (HCC)       Physical Exam    Airway   Mallampati: II  TM distance: >3 FB  Neck ROM: full       Cardiovascular - normal exam  Rhythm: regular  Rate: normal  (-) murmur     Dental    Pulmonary - normal exam  Breath sounds clear to auscultation     Abdominal   (+) obese     Neurological - normal exam               Anesthesia Plan    ASA 3   ASA physical status 3 criteria: COPD and morbid obesity - BMI greater than or equal to 40    Plan - general       Airway plan will be ETT          Induction: intravenous    Postoperative Plan: Postoperative administration of opioids is intended.    Pertinent diagnostic labs and testing reviewed    Informed Consent:    Anesthetic plan and risks discussed with patient.    Use of blood products discussed with: patient whom consented to blood products.

## 2023-01-11 NOTE — PROGRESS NOTES
[facility] Progress Note:    Cancelled dialysis today per Dr. Anderson. Will resume dialysis tomorrow, then TTS  schedule.   Orders updated.     Dialysis Charge Johanny RODRIGUEZ aware.   PCN Palmira Hull RN aware.

## 2023-01-11 NOTE — PROCEDURES
OPERATIVE REPORT    PATIENT:   Yoandy Peace   1955       PREOPERATIVE DIAGNOSES: Melena, anemia    POSTOPERATIVE DIAGNOSIS: Irregular duodneal folds, shallow clean based duodenal ulcer    PROCEDURE:  ESOPHAGOGASTRODUODENOSCOPY    PHYSICIAN:  Josette Ludwig MD    ANESTHESIA:  Per anesthesiologist.    LOCATION: [facility]    CONSENT:  OBTAINED. The risks, benefits and alternatives of the procedure were discussed in details. The risks include and are not limited to bleeding, infection, perforation, missed lesions, and sedations risks (cardiopulmonary compromise and allergic reaction to medications).    DESCRIPTION: The patient presented to the procedure room.  After routine checkup was performed, patient was brought into the endoscopy suite.  Patient was placed on his left lateral decubitus position. A bite block was placed in patient's mouth. Patient was sedated by anesthesia.  Vital signs were monitored throughout the procedure.  Oxygenation support was provided throughout the procedure. Upper endoscope was inserted into patient's mouth and advanced to the second portion of the duodenum under direct visualization.      Once the site was reached and examined, the upper endoscope was withdrawn.  Retroflexion was made within the stomach.  The stomach was decompressed, scope was withdrawn and the procedure was terminated.     The patient tolerated the procedure well.  There were no immediate complications.    OPERATIVE FINDINGS:    1. Esophagus: Normal  2. Stomach: Normal, biopsies taken for H pylori  3. Duodenum: 1.5 cm shallow ulcer posterior bulb, no stigmata of recent bleeding, irregular folds.  Biopsies taken.    RECOMMENDATIONS:  --Continue PPI BID x 4 weeks then daily especially if he will be on asa or anticoagulation  --follow up biopsies  --would prefer we not restart asa for 1 week if possible and anticoagulation for 1-2 weeks

## 2023-01-11 NOTE — DISCHARGE PLANNING
Agency/Facility Name: [facility]  Spoke To: [place]  Outcome: SNF asking if Pt is ready to go today. DPA to discuss with RN CM and will update SNF.     RN CM notified.

## 2023-01-11 NOTE — ANESTHESIA TIME REPORT
Anesthesia Start and Stop Event Times     Date Time Event    1/11/2023 1106 Ready for Procedure     1125 Anesthesia Start     1200 Anesthesia Stop        Responsible Staff  01/11/23    Name Role Begin End    Kade Browning M.D. Anesth 1125 1200        Overtime Reason:  no overtime (within assigned shift)    Comments:

## 2023-01-11 NOTE — ANESTHESIA POSTPROCEDURE EVALUATION
Patient: Yoandy Peace    Procedure Summary     Date: 01/11/23 Room / Location: [facility] ROOM 26 / SURGERY SAME DAY [facility]    Anesthesia Start: 1125 Anesthesia Stop: 1200    Procedures:       GASTROSCOPY (Esophagus)      GASTROSCOPY, WITH BIOPSY (Esophagus) Diagnosis: (gastric ulcer)    Surgeons: Josette Ludwig M.D. Responsible Provider: Kade Browning M.D.    Anesthesia Type: general ASA Status: 3          Final Anesthesia Type: general  Last vitals  BP   Blood Pressure : 126/70    Temp   36.2 °C (97.1 °F)    Pulse   (!) 130   Resp   20    SpO2   99 %      Anesthesia Post Evaluation    Patient location during evaluation: PACU  Patient participation: complete - patient participated  Level of consciousness: awake and alert  Pain score: 3    Airway patency: patent  Anesthetic complications: no  Cardiovascular status: hemodynamically stable  Respiratory status: acceptable  Hydration status: euvolemic    PONV: none          There were no known notable events for this encounter.     Nurse Pain Score: 3 (NPRS)

## 2023-01-11 NOTE — PROGRESS NOTES
Hospital Medicine Daily Progress Note    Date of Service  1/11/2023    Chief Complaint  Yoandy Peace is a 67 y.o. male admitted 12/23/2022 with SOB    Hospital Course  66 yo man with COPD on 4L o2, CAD, CHF, ALONDRA who presented with SOB and edema. He was placed on BIPAP and given diuresis and nebs for COPD flare and fluid overload. He was weaned off BIPAP. Pulmonology recommended trelegy at discharge and follow up with them outpatient. His kidney function worsened and he was started on dialysis. He had tunneled cath placed 12/31 with IR. He then developed new onset afib. Cardiology was consulted and he was started on lopressor and anticoagulation. He had drop in Hgb to 5.8 on 1/6 and given transfusion, his Eliquis was held. His Hgb stablilized so Eliquis was restarted.     Interval Problem Update  1/10 - He continues to have tarry stool, Hgb is fluctuating 7-8. Was hypotensive overnight that improved with bolus. Lasix held. Eliquis and aspirin were held. He has not had BM today. He denies abd pain or nausea. I consulted GI  -140s, restart oral metop since BP is stable    1/11 - Remains in afib with intermittent RVR, will given dose of digoxin. He has no complaints today, denies palpitations. EGD showed duodenal ulcer without bleeding, biopsies done.     I have discussed this patient's plan of care and discharge plan at IDT rounds today with Case Management, Nursing, Nursing leadership, and other members of the IDT team.    Consultants/Specialty  cardiology, critical care, GI, nephrology, palliative care, and vascular surgery    Code Status  DNAR/DNI    Disposition  Patient is not medically cleared for discharge.   Anticipate discharge to to skilled nursing facility.  I have placed the appropriate orders for post-discharge needs.    Review of Systems  Review of Systems   Constitutional:  Negative for fever.   Respiratory:  Negative for shortness of breath.    Cardiovascular:  Negative for chest pain.    Gastrointestinal:  Positive for melena. Negative for abdominal pain and nausea.   Musculoskeletal:  Positive for back pain.   Neurological:  Positive for weakness.   Psychiatric/Behavioral:  The patient is not nervous/anxious.    All other systems reviewed and are negative.     Physical Exam  Temp:  [36.2 °C (97.1 °F)-38 °C (100.4 °F)] 36.2 °C (97.1 °F)  Pulse:  [] 72  Resp:  [17-22] 22  BP: ()/(55-74) 108/67  SpO2:  [95 %-100 %] 99 %    Physical Exam  Vitals and nursing note reviewed.   Constitutional:       General: He is not in acute distress.     Appearance: He is obese. He is not toxic-appearing.   HENT:      Head: Normocephalic.      Mouth/Throat:      Mouth: Mucous membranes are moist.   Eyes:      General:         Right eye: No discharge.         Left eye: No discharge.   Cardiovascular:      Rate and Rhythm: Tachycardia present. Rhythm irregular.   Pulmonary:      Effort: Pulmonary effort is normal. No respiratory distress.      Breath sounds: No wheezing or rales.   Abdominal:      General: There is distension.      Palpations: Abdomen is soft.      Tenderness: There is no abdominal tenderness. There is no guarding or rebound.      Comments: Umbilical hernia   Musculoskeletal:      Cervical back: Neck supple.      Right lower leg: Edema present.      Left lower leg: Edema present.   Skin:     General: Skin is warm and dry.      Comments: Hyperpigmented and scaling plaques to both lower legs   Neurological:      Mental Status: He is alert and oriented to person, place, and time.       Fluids    Intake/Output Summary (Last 24 hours) at 1/11/2023 1515  Last data filed at 1/11/2023 1159  Gross per 24 hour   Intake 100 ml   Output 225 ml   Net -125 ml       Laboratory  Recent Labs     01/08/23  2342 01/09/23  0800 01/09/23  2016 01/10/23  0106 01/10/23  0833 01/11/23  0108 01/11/23  0727 01/11/23  1348   WBC 11.8*  --   --  16.2*  --  15.8*  --   --    RBC 2.69*  --   --  2.76*  --  2.55*  --   --     HEMOGLOBIN 7.5*   < > 8.0* 7.9*   < > 7.3* 7.5* 7.8*   HEMATOCRIT 24.5*  --  26.6* 25.8*  --  24.0*  --   --    MCV 91.1  --   --  93.5  --  94.1  --   --    MCH 27.9  --   --  28.6  --  28.6  --   --    MCHC 30.6*  --   --  30.6*  --  30.4*  --   --    RDW 56.2*  --   --  62.9*  --  66.5*  --   --    PLATELETCT 227  --   --  203  --  195  --   --    MPV 10.2  --   --  9.9  --  10.2  --   --     < > = values in this interval not displayed.     Recent Labs     01/10/23  1337 01/11/23  0108   SODIUM 139 136   POTASSIUM 4.1 3.9   CHLORIDE 100 100   CO2 30 26   GLUCOSE 158* 115*   BUN 27* 31*   CREATININE 3.52* 3.79*   CALCIUM 8.5 8.0*     Recent Labs     01/08/23  1758 01/08/23  2342 01/10/23  1337   APTT 31.8 33.8 36.2*   INR 1.21*  --  1.47*               Imaging  IR-RAMÓN WIN PLACEMENT >5   Final Result      1. Ultrasound and fluoroscopic guided placement of a right internal jugular 14.5 [place] HemoSplit tunneled dialysis catheter.      2. The hemodialysis catheter may be used immediately as clinically indicated. Flushes per protocol.      3. Removal of the right temporary dialysis catheter.      DX-CHEST-PORTABLE (1 VIEW)   Final Result      1.  Ongoing pulmonary edema and bilateral pleural effusions.   2.  Cardiomegaly.   3.  Left lower lobe atelectasis or consolidation. There is also likely some component of compressive atelectasis of the right lung base considering the presence of significant pleural effusion.   4.  Interval placement of temporary non-tunneled hemodialysis catheter with no evidence of pneumothorax.      DX-CHEST-PORTABLE (1 VIEW)   Final Result         1. No significant interval change.      US-RENAL   Final Result      1.  Echogenic renal parenchyma bilaterally could relate to medical renal disease.      2.  No hydronephrosis. No renal calculus.      DX-CHEST-PORTABLE (1 VIEW)   Final Result      1.  Enlarged cardiac silhouette and small amounts of bilateral pleural fluid with diffuse  interstitial opacities most consistent with pulmonary edema/CHF.   2.  Bibasilar opacities could be due to edema, atelectasis or pneumonitis.   3.  There is atherosclerosis.   4.  Questionable linear calcific plaque in the proximal descending thoracic aorta versus some other device intrinsic or extrinsic to the patient.      EC-ECHOCARDIOGRAM COMPLETE W/ CONT   Final Result      US-EXTREMITY VENOUS LOWER BILAT   Final Result      DX-CHEST-PORTABLE (1 VIEW)   Final Result      1.  Cardiomegaly with interstitial infiltrates likely representing pulmonary edema.      2.  Bibasilar atelectasis and small bilateral pleural effusions.           Assessment/Plan  * Acute renal failure (ARF) ([facility])  Assessment & Plan  Started on hemodialysis  Nephrology following    Anemia  Assessment & Plan  Melena  EGD showed duodenal ulcer without bleeding, biopsies done.   Hold Eliquis and aspirin for 1-2 weeks per GI  PPI BID 4 weeks  Trend Hgb    Atrial fibrillation ([facility])  Assessment & Plan  New onset  Rate uncontrolled, restart metop  [id number]  Hold eliquis for GIB 2 weeks per GI    Bilateral leg edema- (present on admission)  Assessment & Plan  Likely due to volume overload.    Diuresis  Bilateral lower extremity DVT studies negative    Acute exacerbation of chronic obstructive pulmonary disease (COPD) ([facility])  Assessment & Plan  Remains on O2  Continue with Flovent and Anoro  S/p steroids  Duo nebs as needed  Continue with oxygen supplementation to obtain SPO2 >88-90%    Acute pulmonary edema ([facility])- (present on admission)  Assessment & Plan  resolved  Continue with diuresis and HD  Echo EF of 55-60%    Hypertension  Assessment & Plan  Metop    Tobacco abuse- (present on admission)  Assessment & Plan  Nicotine replacement as needed    ACP (advance care planning)  Assessment & Plan  Full code    Class 3 severe obesity in adult ([facility])  Assessment & Plan  BMI 52.89     ALONDRA on CPAP- (present on admission)  Assessment & Plan  CPAP at  bedtime      Acute on chronic respiratory failure with hypoxia (HCC)  Assessment & Plan  Dependent on 4 L.  Currently requiring 2-5 L in no distress  Nebs/pulm toilet    CAD (coronary artery disease)  Assessment & Plan  ASA/statin    DM (diabetes mellitus) (HCC)  Assessment & Plan  Continue with SSI   On Farxiga, insulin NPH at home, and linagliptin           VTE prophylaxis: SCDs/TEDs      I have performed a physical exam and reviewed and updated ROS and Plan today (1/11/2023). In review of yesterday's note (1/10/2023), there are no changes except as documented above.

## 2023-01-12 LAB
ANION GAP SERPL CALC-SCNC: 9 MMOL/L (ref 7–16)
BASOPHILS # BLD AUTO: 0.2 % (ref 0–1.8)
BASOPHILS # BLD: 0.02 K/UL (ref 0–0.12)
BUN SERPL-MCNC: 38 MG/DL (ref 8–22)
CALCIUM SERPL-MCNC: 7.9 MG/DL (ref 8.5–10.5)
CHLORIDE SERPL-SCNC: 101 MMOL/L (ref 96–112)
CO2 SERPL-SCNC: 27 MMOL/L (ref 20–33)
CREAT SERPL-MCNC: 4.45 MG/DL (ref 0.5–1.4)
EOSINOPHIL # BLD AUTO: 0.07 K/UL (ref 0–0.51)
EOSINOPHIL NFR BLD: 0.6 % (ref 0–6.9)
ERYTHROCYTE [DISTWIDTH] IN BLOOD BY AUTOMATED COUNT: 67.5 FL (ref 35.9–50)
GFR SERPLBLD CREATININE-BSD FMLA CKD-EPI: 14 ML/MIN/1.73 M 2
GLUCOSE BLD STRIP.AUTO-MCNC: 109 MG/DL (ref 65–99)
GLUCOSE BLD STRIP.AUTO-MCNC: 113 MG/DL (ref 65–99)
GLUCOSE BLD STRIP.AUTO-MCNC: 123 MG/DL (ref 65–99)
GLUCOSE BLD STRIP.AUTO-MCNC: 157 MG/DL (ref 65–99)
GLUCOSE SERPL-MCNC: 115 MG/DL (ref 65–99)
HCT VFR BLD AUTO: 24 % (ref 42–52)
HGB BLD-MCNC: 7.1 G/DL (ref 14–18)
HGB BLD-MCNC: 7.4 G/DL (ref 14–18)
IMM GRANULOCYTES # BLD AUTO: 0.07 K/UL (ref 0–0.11)
IMM GRANULOCYTES NFR BLD AUTO: 0.6 % (ref 0–0.9)
LYMPHOCYTES # BLD AUTO: 2.63 K/UL (ref 1–4.8)
LYMPHOCYTES NFR BLD: 21.8 % (ref 22–41)
MCH RBC QN AUTO: 28.1 PG (ref 27–33)
MCHC RBC AUTO-ENTMCNC: 29.6 G/DL (ref 33.7–35.3)
MCV RBC AUTO: 94.9 FL (ref 81.4–97.8)
MONOCYTES # BLD AUTO: 1.02 K/UL (ref 0–0.85)
MONOCYTES NFR BLD AUTO: 8.5 % (ref 0–13.4)
NEUTROPHILS # BLD AUTO: 8.24 K/UL (ref 1.82–7.42)
NEUTROPHILS NFR BLD: 68.3 % (ref 44–72)
NRBC # BLD AUTO: 0 K/UL
NRBC BLD-RTO: 0 /100 WBC
PLATELET # BLD AUTO: 183 K/UL (ref 164–446)
PMV BLD AUTO: 10.2 FL (ref 9–12.9)
POTASSIUM SERPL-SCNC: 4.4 MMOL/L (ref 3.6–5.5)
PROCALCITONIN SERPL-MCNC: 0.39 NG/ML
RBC # BLD AUTO: 2.53 M/UL (ref 4.7–6.1)
SODIUM SERPL-SCNC: 137 MMOL/L (ref 135–145)
WBC # BLD AUTO: 12.1 K/UL (ref 4.8–10.8)

## 2023-01-12 PROCEDURE — 770020 HCHG ROOM/CARE - TELE (206)

## 2023-01-12 PROCEDURE — 700111 HCHG RX REV CODE 636 W/ 250 OVERRIDE (IP): Performed by: INTERNAL MEDICINE

## 2023-01-12 PROCEDURE — 700111 HCHG RX REV CODE 636 W/ 250 OVERRIDE (IP)

## 2023-01-12 PROCEDURE — A9270 NON-COVERED ITEM OR SERVICE: HCPCS | Performed by: STUDENT IN AN ORGANIZED HEALTH CARE EDUCATION/TRAINING PROGRAM

## 2023-01-12 PROCEDURE — C9113 INJ PANTOPRAZOLE SODIUM, VIA: HCPCS | Performed by: INTERNAL MEDICINE

## 2023-01-12 PROCEDURE — 700101 HCHG RX REV CODE 250: Performed by: INTERNAL MEDICINE

## 2023-01-12 PROCEDURE — 700102 HCHG RX REV CODE 250 W/ 637 OVERRIDE(OP): Performed by: STUDENT IN AN ORGANIZED HEALTH CARE EDUCATION/TRAINING PROGRAM

## 2023-01-12 PROCEDURE — 99232 SBSQ HOSP IP/OBS MODERATE 35: CPT | Performed by: INTERNAL MEDICINE

## 2023-01-12 PROCEDURE — 82962 GLUCOSE BLOOD TEST: CPT | Mod: 91

## 2023-01-12 PROCEDURE — 700102 HCHG RX REV CODE 250 W/ 637 OVERRIDE(OP): Performed by: INTERNAL MEDICINE

## 2023-01-12 PROCEDURE — 84145 PROCALCITONIN (PCT): CPT

## 2023-01-12 PROCEDURE — 85018 HEMOGLOBIN: CPT

## 2023-01-12 PROCEDURE — A9270 NON-COVERED ITEM OR SERVICE: HCPCS | Performed by: INTERNAL MEDICINE

## 2023-01-12 PROCEDURE — 36415 COLL VENOUS BLD VENIPUNCTURE: CPT

## 2023-01-12 PROCEDURE — 80048 BASIC METABOLIC PNL TOTAL CA: CPT

## 2023-01-12 PROCEDURE — 85025 COMPLETE CBC W/AUTO DIFF WBC: CPT

## 2023-01-12 PROCEDURE — 90935 HEMODIALYSIS ONE EVALUATION: CPT

## 2023-01-12 PROCEDURE — 97535 SELF CARE MNGMENT TRAINING: CPT

## 2023-01-12 RX ORDER — LIDOCAINE 50 MG/G
2 PATCH TOPICAL EVERY 24 HOURS
Status: DISCONTINUED | OUTPATIENT
Start: 2023-01-13 | End: 2023-01-12

## 2023-01-12 RX ORDER — HEPARIN SODIUM 1000 [USP'U]/ML
INJECTION, SOLUTION INTRAVENOUS; SUBCUTANEOUS
Status: COMPLETED
Start: 2023-01-12 | End: 2023-01-12

## 2023-01-12 RX ORDER — OMEPRAZOLE 20 MG/1
20 CAPSULE, DELAYED RELEASE ORAL 2 TIMES DAILY
Status: DISCONTINUED | OUTPATIENT
Start: 2023-01-12 | End: 2023-01-13 | Stop reason: HOSPADM

## 2023-01-12 RX ORDER — METOPROLOL TARTRATE 1 MG/ML
5 INJECTION, SOLUTION INTRAVENOUS EVERY 4 HOURS PRN
Status: DISCONTINUED | OUTPATIENT
Start: 2023-01-12 | End: 2023-01-13 | Stop reason: HOSPADM

## 2023-01-12 RX ORDER — LIDOCAINE 50 MG/G
2 PATCH TOPICAL EVERY 24 HOURS
Status: DISCONTINUED | OUTPATIENT
Start: 2023-01-12 | End: 2023-01-13 | Stop reason: HOSPADM

## 2023-01-12 RX ADMIN — NICOTINE TRANSDERMAL SYSTEM 21 MG: 21 PATCH, EXTENDED RELEASE TRANSDERMAL at 04:36

## 2023-01-12 RX ADMIN — PANTOPRAZOLE SODIUM 40 MG: 40 INJECTION, POWDER, LYOPHILIZED, FOR SOLUTION INTRAVENOUS at 04:36

## 2023-01-12 RX ADMIN — ATORVASTATIN CALCIUM 40 MG: 40 TABLET, FILM COATED ORAL at 21:20

## 2023-01-12 RX ADMIN — FLUTICASONE PROPIONATE 88 MCG: 44 AEROSOL, METERED RESPIRATORY (INHALATION) at 04:36

## 2023-01-12 RX ADMIN — AMITRIPTYLINE HYDROCHLORIDE 10 MG: 10 TABLET, FILM COATED ORAL at 17:22

## 2023-01-12 RX ADMIN — METOPROLOL TARTRATE 25 MG: 25 TABLET, FILM COATED ORAL at 14:32

## 2023-01-12 RX ADMIN — EPOETIN ALFA-EPBX 7000 UNITS: 4000 INJECTION, SOLUTION INTRAVENOUS; SUBCUTANEOUS at 12:04

## 2023-01-12 RX ADMIN — HEPARIN SODIUM 3800 UNITS: 1000 INJECTION, SOLUTION INTRAVENOUS; SUBCUTANEOUS at 12:07

## 2023-01-12 RX ADMIN — METOPROLOL TARTRATE 25 MG: 25 TABLET, FILM COATED ORAL at 04:36

## 2023-01-12 RX ADMIN — ACETAMINOPHEN 650 MG: 325 TABLET, FILM COATED ORAL at 07:43

## 2023-01-12 RX ADMIN — AMITRIPTYLINE HYDROCHLORIDE 10 MG: 10 TABLET, FILM COATED ORAL at 04:36

## 2023-01-12 RX ADMIN — OMEPRAZOLE 20 MG: 20 CAPSULE, DELAYED RELEASE ORAL at 17:22

## 2023-01-12 RX ADMIN — METOPROLOL TARTRATE 25 MG: 25 TABLET, FILM COATED ORAL at 21:20

## 2023-01-12 RX ADMIN — LIDOCAINE 2 PATCH: 50 PATCH TOPICAL at 14:32

## 2023-01-12 ASSESSMENT — ENCOUNTER SYMPTOMS
BACK PAIN: 1
VOMITING: 0
CHILLS: 0
NERVOUS/ANXIOUS: 0
SHORTNESS OF BREATH: 0
COUGH: 0
FLANK PAIN: 0
DIARRHEA: 0
NAUSEA: 0
SPUTUM PRODUCTION: 0
INSOMNIA: 0
BLOOD IN STOOL: 0
MYALGIAS: 0
WEAKNESS: 1
PALPITATIONS: 0
CONSTIPATION: 0
FALLS: 0
FEVER: 0
SORE THROAT: 0
ABDOMINAL PAIN: 0
HEADACHES: 0

## 2023-01-12 ASSESSMENT — COGNITIVE AND FUNCTIONAL STATUS - GENERAL
WALKING IN HOSPITAL ROOM: A LOT
SUGGESTED CMS G CODE MODIFIER MOBILITY: CK
MOVING FROM LYING ON BACK TO SITTING ON SIDE OF FLAT BED: A LITTLE
TURNING FROM BACK TO SIDE WHILE IN FLAT BAD: A LITTLE
MOBILITY SCORE: 16
MOVING TO AND FROM BED TO CHAIR: A LITTLE
CLIMB 3 TO 5 STEPS WITH RAILING: A LOT
STANDING UP FROM CHAIR USING ARMS: A LITTLE

## 2023-01-12 ASSESSMENT — PAIN DESCRIPTION - PAIN TYPE
TYPE: ACUTE PAIN
TYPE: ACUTE PAIN

## 2023-01-12 NOTE — PROGRESS NOTES
Gastroenterology Progress Note               Author:  NATALIO Wayne Date & Time Created: 1/12/2023 9:49 AM       Patient ID:  Name:             Yoandy Peace  YOB: 1955  Age:                 67 y.o.  male  MRN:               [id number]        Medical Decision Making, by Problem:  Active Hospital Problems    Diagnosis     Atrial fibrillation (HCC) [I48.91]     Anemia [D64.9]     Acute renal failure (ARF) (HCC) [N17.9]     DM (diabetes mellitus) (HCC) [E11.9]     CAD (coronary artery disease) [I25.10]     Acute on chronic respiratory failure with hypoxia (HCC) [J96.21]     ALONDRA on CPAP [G47.33, Z99.89]     Class 3 severe obesity in adult (HCC) [E66.01]     ACP (advance care planning) [Z71.89]     Tobacco abuse [Z72.0]     Hypertension [I10]     Acute pulmonary edema (HCC) [J81.0]     Acute exacerbation of chronic obstructive pulmonary disease (COPD) (HCC) [J44.1]     Bilateral leg edema [R60.0]            Presenting Chief Complaint:  Melena     History of Present Illness:   This is a 67-year-old male with a past medical history including COPD on 4 LPM home oxygen, CAD, CHF, ALONDRA, type 2 diabetes mellitus, hyperlipidemia, tobacco use who initially presented 12/23/2022 with progressive shortness of breath.  He is admitted for acute hypoxic respiratory failure secondary to CHF and COPD.  He was treated with Solu-Medrol, bronchodilators, placed on BiPAP, and admitted to ICU.  Patient was subsequently weaned off of BiPAP.  Additionally nephrology was consulted for worsening renal function.  He was started on hemodialysis and Lasix IV for pulmonary edema.  Cardiology also consulted for elevated troponin and chest pain during dialysis.  Patient with new onset A. fib with RVR.  He was started on therapeutic Lovenox and subsequently transitioned to Eliquis.  His admission was complicated by worsening anemia of hemoglobin to 5.8 requiring 2 units of PRBCs after initiation of Eliquis and aspirin.  Melena  had resolved when anticoagulation and aspirin were held for 48 hours.  Upon resumption 1/9/2023, patient developed melena again overnight.  Hemoglobin 8 to 7.6 this morning. Patient reports having a total of 4-5 episodes of melena total during his admission.  Denies previous ulcers.  Patient reports mild abdominal pain but otherwise the patient denies fever/ chills/ weight loss/ appetite change/ dysphagia/ odynophagia/ heartburn/ nausea/ vomiting/ bloating/ constipation/hematochezia      Interval History:  1/12/2023: Patient postprocedure day #1 s/p EGD.  Patient had 1.5 cm shallow ulcer at the posterior bulb and duodenum.  No stigmata, recent bleeding, or irregular folds.  Biopsies were collected and are pending.  Patient AAOx4.  Denies nausea, vomiting, abdominal pain, or any bowel movements today.  Updated patient on EGD findings and to follow-up outpatient for biopsy results as well as colonoscopy for overall wellness.        Hospital Medications:  Current Facility-Administered Medications   Medication Dose Frequency Provider Last Rate Last Admin    omeprazole (PRILOSEC) capsule 20 mg  20 mg BID Hemanth Ledbetter M.D.        metoprolol tartrate (LOPRESSOR) tablet 25 mg  25 mg Q8HRS Hemanth Ledbetter M.D.   25 mg at 01/12/23 0436    lidocaine (LIDODERM) 5 % 1 Patch  1 Patch Q24HR Hemanth Ledbetter M.D.   1 Patch at 01/10/23 1331    epoetin (Retacrit) 7,000 Units injection (Dialysis Use Only)  7,000 Units MO, WE + FR Lucas Cerda M.D.   7,000 Units at 01/09/23 1011    heparin injection 3,800 Units  3,800 Units DIALYSIS PRN Lucas Cerda M.D.   3,800 Units at 01/09/23 1103    insulin regular (HumuLIN R,NovoLIN R) injection  2-9 Units 4X/DAY NOAH Novoa M.D.   2 Units at 01/09/23 2017    And    dextrose 10 % BOLUS 25 g  25 g Q15 MIN PRN Carlos Novoa M.D.        umeclidinium-vilanterol (Anoro Ellipta) inhaler 1 Puff  1 Puff DAILY Carlos Novoa M.D.   1 Puff at 01/12/23 0436    And    fluticasone (FLOVENT HFA) 44  MCG/ACT inhaler 88 mcg  2 Puff DAILY Carlos Novoa M.D.   88 mcg at 01/12/23 0436    senna-docusate (PERICOLACE or SENOKOT S) 8.6-50 MG per tablet 2 Tablet  2 Tablet BID Hao Urena M.D.   2 Tablet at 01/09/23 0444    And    polyethylene glycol/lytes (MIRALAX) PACKET 1 Packet  1 Packet QDAY PRN Hao Urena M.D.        And    bisacodyl (DULCOLAX) suppository 10 mg  10 mg QDAY PRN Hao Urena M.D.        acetaminophen (Tylenol) tablet 650 mg  650 mg Q6HRS PRN Hao Urena M.D.   650 mg at 01/12/23 0743    ondansetron (ZOFRAN) syringe/vial injection 4 mg  4 mg Q4HRS PRN Hao Urena M.D.        ondansetron (ZOFRAN ODT) dispertab 4 mg  4 mg Q4HRS PRN Hao Urena M.D.        guaiFENesin dextromethorphan (ROBITUSSIN DM) 100-10 MG/5ML syrup 10 mL  10 mL Q6HRS PRN Hao Urena M.D.        amitriptyline (ELAVIL) tablet 10 mg  10 mg BID Hao Urena M.D.   10 mg at 01/12/23 0436    atorvastatin (LIPITOR) tablet 40 mg  40 mg Nightly Hao Urena M.D.   40 mg at 01/11/23 2050    nicotine (NICODERM) 21 MG/24HR 21 mg  21 mg Daily-0600 Hao Urena M.D.   21 mg at 01/12/23 0436    And    nicotine polacrilex (NICORETTE) 2 MG piece 2 mg  2 mg Q HOUR PRN Hao Urena M.D.        Respiratory Therapy Consult   Continuous RT Gary Stover M.D.       Last reviewed on 1/11/2023 10:47 AM by Lashaun Freeman R.N.       Review of Systems:  Review of Systems   Constitutional:  Negative for chills, fever and malaise/fatigue.   HENT:  Negative for nosebleeds and sore throat.    Respiratory:  Negative for cough, sputum production and shortness of breath.    Cardiovascular:  Positive for leg swelling. Negative for chest pain and palpitations.   Gastrointestinal:  Negative for abdominal pain, blood in stool, constipation, diarrhea, melena, nausea and vomiting.   Genitourinary:  Negative for dysuria and flank pain.   Musculoskeletal:  Positive for back pain. Negative for falls and myalgias.   Neurological:  Positive for weakness.  "Negative for headaches.   Psychiatric/Behavioral:  The patient is not nervous/anxious and does not have insomnia.    All other systems reviewed and are negative.      Vital signs:  Weight/BMI: Body mass index is 42.99 kg/m².  /54   Pulse (!) 103   Temp 36.6 °C (97.9 °F) (Temporal)   Resp 18   Ht 1.778 m (5' 10\")   Wt (!) 136 kg (299 lb 9.7 oz)   SpO2 97%   Vitals:    01/11/23 1952 01/12/23 0005 01/12/23 0418 01/12/23 0711   BP: 125/64 101/60 98/54 100/54   Pulse: (!) 116 91 93 (!) 103   Resp: 19 17 18 18   Temp: 37.2 °C (99 °F) 37 °C (98.6 °F) 37.2 °C (99 °F) 36.6 °C (97.9 °F)   TempSrc: Temporal Temporal Temporal Temporal   SpO2: 95% 98% 97% 97%   Weight:       Height:         Oxygen Therapy:  Pulse Oximetry: 97 %, O2 (LPM): 2, FiO2%: (!) 2 %, O2 Delivery Device: Silicone Nasal Cannula    Intake/Output Summary (Last 24 hours) at 1/12/2023 0949  Last data filed at 1/11/2023 1159  Gross per 24 hour   Intake 100 ml   Output --   Net 100 ml         Physical Exam:  Physical Exam  Vitals and nursing note reviewed.   Constitutional:       General: He is not in acute distress.     Appearance: He is morbidly obese. He is not toxic-appearing.   HENT:      Head: Normocephalic.      Nose: Nose normal. No congestion.      Mouth/Throat:      Mouth: Mucous membranes are moist.      Pharynx: Oropharynx is clear. No oropharyngeal exudate.   Eyes:      General: No scleral icterus.     Conjunctiva/sclera: Conjunctivae normal.      Pupils: Pupils are equal, round, and reactive to light.   Cardiovascular:      Rate and Rhythm: Normal rate and regular rhythm.      Pulses: Normal pulses.      Heart sounds: Normal heart sounds. No murmur heard.  Pulmonary:      Effort: Pulmonary effort is normal. No respiratory distress.      Breath sounds: Normal breath sounds.   Chest:      Chest wall: No tenderness.   Abdominal:      General: Abdomen is flat. Bowel sounds are normal. There is no distension.      Palpations: Abdomen is soft. "      Tenderness: There is no abdominal tenderness.      Hernia: A hernia (Reducible umbilical hernia) is present.   Musculoskeletal:      Right lower leg: Edema present.      Left lower leg: Edema present.   Skin:     General: Skin is warm and dry.      Capillary Refill: Capillary refill takes less than 2 seconds.      Coloration: Skin is not jaundiced.   Neurological:      General: No focal deficit present.      Mental Status: He is alert and oriented to person, place, and time. Mental status is at baseline.      Motor: No weakness.   Psychiatric:         Mood and Affect: Mood normal.         Behavior: Behavior normal.         Thought Content: Thought content normal.         Judgment: Judgment normal.           Labs:  Recent Labs     01/10/23  1337 01/11/23  0108 01/12/23  0121   SODIUM 139 136 137   POTASSIUM 4.1 3.9 4.4   CHLORIDE 100 100 101   CO2 30 26 27   BUN 27* 31* 38*   CREATININE 3.52* 3.79* 4.45*   CALCIUM 8.5 8.0* 7.9*     Recent Labs     01/10/23  1337 01/11/23  0108 01/12/23  0121   ALTSGPT 20  --   --    ASTSGOT 12  --   --    ALKPHOSPHAT 71  --   --    TBILIRUBIN 0.6  --   --    GLUCOSE 158* 115* 115*     Recent Labs     01/10/23  0106 01/10/23  1337 01/11/23  0108 01/12/23  0121   WBC 16.2*  --  15.8* 12.1*   NEUTSPOLYS  --   --  72.70* 68.30   LYMPHOCYTES  --   --  18.10* 21.80*   MONOCYTES  --   --  7.70 8.50   EOSINOPHILS  --   --  0.50 0.60   BASOPHILS  --   --  0.20 0.20   ASTSGOT  --  12  --   --    ALTSGPT  --  20  --   --    ALKPHOSPHAT  --  71  --   --    TBILIRUBIN  --  0.6  --   --      Recent Labs     01/10/23  0106 01/10/23  0833 01/10/23  1337 01/11/23  0108 01/11/23  0727 01/11/23  1348 01/12/23 0121   RBC 2.76*  --   --  2.55*  --   --  2.53*   HEMOGLOBIN 7.9*   < > 7.9* 7.3* 7.5* 7.8* 7.1*   HEMATOCRIT 25.8*  --   --  24.0*  --   --  24.0*   PLATELETCT 203  --   --  195  --   --  183   PROTHROMBTM  --   --  17.5*  --   --   --   --    APTT  --   --  36.2*  --   --   --   --    INR   --   --  1.47*  --   --   --   --    IRON  --   --  23*  --   --   --   --    FERRITIN  --   --  596.0*  --   --   --   --    TOTIRONBC  --   --  210*  --   --   --   --     < > = values in this interval not displayed.     Recent Results (from the past 24 hour(s))   POCT glucose device results    Collection Time: 01/11/23 11:07 AM   Result Value Ref Range    POC Glucose, Blood 117 (H) 65 - 99 mg/dL   Histology Request    Collection Time: 01/11/23 11:39 AM   Result Value Ref Range    Pathology Request Sent to [facility]    Collection Time: 01/11/23  1:48 PM   Result Value Ref Range    Hemoglobin 7.8 (L) 14.0 - 18.0 g/dL   POCT glucose device results    Collection Time: 01/11/23  4:45 PM   Result Value Ref Range    POC Glucose, Blood 140 (H) 65 - 99 mg/dL   POCT glucose device results    Collection Time: 01/11/23  8:52 PM   Result Value Ref Range    POC Glucose, Blood 131 (H) 65 - 99 mg/dL   CBC WITH DIFFERENTIAL    Collection Time: 01/12/23  1:21 AM   Result Value Ref Range    WBC 12.1 (H) 4.8 - 10.8 K/uL    RBC 2.53 (L) 4.70 - 6.10 M/uL    Hemoglobin 7.1 (L) 14.0 - 18.0 g/dL    Hematocrit 24.0 (L) 42.0 - 52.0 %    MCV 94.9 81.4 - 97.8 fL    MCH 28.1 27.0 - 33.0 pg    MCHC 29.6 (L) 33.7 - 35.3 g/dL    RDW 67.5 (H) 35.9 - 50.0 fL    Platelet Count 183 164 - 446 K/uL    MPV 10.2 9.0 - 12.9 fL    Neutrophils-Polys 68.30 44.00 - 72.00 %    Lymphocytes 21.80 (L) 22.00 - 41.00 %    Monocytes 8.50 0.00 - 13.40 %    Eosinophils 0.60 0.00 - 6.90 %    Basophils 0.20 0.00 - 1.80 %    Immature Granulocytes 0.60 0.00 - 0.90 %    Nucleated RBC 0.00 /100 WBC    Neutrophils (Absolute) 8.24 (H) 1.82 - 7.42 K/uL    Lymphs (Absolute) 2.63 1.00 - 4.80 K/uL    Monos (Absolute) 1.02 (H) 0.00 - 0.85 K/uL    Eos (Absolute) 0.07 0.00 - 0.51 K/uL    Baso (Absolute) 0.02 0.00 - 0.12 K/uL    Immature Granulocytes (abs) 0.07 0.00 - 0.11 K/uL    NRBC (Absolute) 0.00 K/uL   Basic Metabolic Panel    Collection Time: 01/12/23  1:21 AM   Result  Value Ref Range    Sodium 137 135 - 145 mmol/L    Potassium 4.4 3.6 - 5.5 mmol/L    Chloride 101 96 - 112 mmol/L    Co2 27 20 - 33 mmol/L    Glucose 115 (H) 65 - 99 mg/dL    Bun 38 (H) 8 - 22 mg/dL    Creatinine 4.45 (HH) 0.50 - 1.40 mg/dL    Calcium 7.9 (L) 8.5 - 10.5 mg/dL    Anion Gap 9.0 7.0 - 16.0   PROCALCITONIN    Collection Time: 01/12/23  1:21 AM   Result Value Ref Range    Procalcitonin 0.39 (H) <0.25 ng/mL   ESTIMATED GFR    Collection Time: 01/12/23  1:21 AM   Result Value Ref Range    GFR (CKD-EPI) 14 (A) >60 mL/min/1.73 m 2       Radiology Review:  RAMÓN ROSARIO PLACEMENT >5   Final Result      1. Ultrasound and fluoroscopic guided placement of a right internal jugular 14.5 Urdu HemoSplit tunneled dialysis catheter.      2. The hemodialysis catheter may be used immediately as clinically indicated. Flushes per protocol.      3. Removal of the right temporary dialysis catheter.      DX-CHEST-PORTABLE (1 VIEW)   Final Result      1.  Ongoing pulmonary edema and bilateral pleural effusions.   2.  Cardiomegaly.   3.  Left lower lobe atelectasis or consolidation. There is also likely some component of compressive atelectasis of the right lung base considering the presence of significant pleural effusion.   4.  Interval placement of temporary non-tunneled hemodialysis catheter with no evidence of pneumothorax.      DX-CHEST-PORTABLE (1 VIEW)   Final Result         1. No significant interval change.      US-RENAL   Final Result      1.  Echogenic renal parenchyma bilaterally could relate to medical renal disease.      2.  No hydronephrosis. No renal calculus.      DX-CHEST-PORTABLE (1 VIEW)   Final Result      1.  Enlarged cardiac silhouette and small amounts of bilateral pleural fluid with diffuse interstitial opacities most consistent with pulmonary edema/CHF.   2.  Bibasilar opacities could be due to edema, atelectasis or pneumonitis.   3.  There is atherosclerosis.   4.  Questionable linear  calcific plaque in the proximal descending thoracic aorta versus some other device intrinsic or extrinsic to the patient.      EC-ECHOCARDIOGRAM COMPLETE W/ CONT   Final Result      US-EXTREMITY VENOUS LOWER BILAT   Final Result      DX-CHEST-PORTABLE (1 VIEW)   Final Result      1.  Cardiomegaly with interstitial infiltrates likely representing pulmonary edema.      2.  Bibasilar atelectasis and small bilateral pleural effusions.            MDM (Data Review):   -Records reviewed and summarized in current documentation  -I personally reviewed and interpreted the laboratory results  -I personally reviewed the radiology images    Assessment/Recommendations:   #Melena  #Duodenal ulcer, 1.5 cm shallow ulcer in the posterior bulb.  He is obtained and pending.    -Continue PPI BID x 4 weeks then daily especially if he will be on asa or anticoagulation  -follow up outpatient GI for biopsy results.  Patient was previously seen [facility]  -would prefer we not restart asa for 1 week if possible and anticoagulation for 1-2 weeks      GI to sign off.  Please reconsult for any further questions or concerns.      Core Quality Measures   Reviewed items::  Labs, Medications and Radiology reports reviewed

## 2023-01-12 NOTE — PROGRESS NOTES
Hemodialysis ordered by Dr. Anderson. Treatment started at 0828 and ended at 1214 d/t clotting dialyzer and lines. Blood returned. New setting placed. BFR during tx decreased d/t multiple arterial pressure alarms. Heparin ordered for next tx. Dr. Anderson notified and aware. Pt stable, vss, no c/o post tx. Net UF 1.5 L. Report to JOSE Carlson RN.

## 2023-01-12 NOTE — PROGRESS NOTES
[facility] -  PROGRESS NOTE               Author: Cisco Anderson M.D. Date & Time: 1/12/2023  2:36 PM     HPI:  67 y.o. male with history of COPD dependent on 4 L, tobacco abuse, CAD, CHF, ALONDRA/OHS on bedtime CPAP, who presented 12/23/2022 with evaluations for progressive shortness of breath and lower extremities swelling.  CXR notable for bilateral vascular congestion with pulmonary edema. Patient was admitted to the hospital on 12/23 for CHD exacerbation and copd. No F/C/N/V. No melena, hematochezia, hematemesis.  No HA, visual changes, or abdominal pain.     DAILY NEPHROLOGY SUMMARY:  12/26: consult neto, s/p hd #1  12/27: due for HD #2, still anasarca and on O2   12/28: on hd now, tolerating well.   12/29: tolerated puf today , edema is better , he feels better   12/30: tolerated hd well. In negative balance   12/31: feeling better, no new issues.   1/1: sitting in the chair, LE edema is better, no new complaints. S/p TDC placement   1/2 Seen on HD No c/o, producing more urine 1665 cc/ 24 hrs   1/3 Feeling better overall, breathing improved, HD yesterday net UF 1 L   UOP ~ 1L / 24 hrs , UF goal decreased due to soft BP   1/4: Seen on HD UF 1 L , BP stable no c/o, UOP   1/5: No c/o, lying in bed, VSS  1/6: No c/o VSS , Hb 5.8 today   1/7: soft Bps, tolerated HD, 6L NC, denies complaints   1/8: stable resp status, no acute events, denies complaints  1/9: No acute events, was anticipating discharge this evening to SNF today but held as no insurance clearance yet  1/10: Melanotic stools last night, denies any complaints this afternoon, sitting in bed and watching TV, H/H low but stable past 4 days   1/11: Had EGD this AM - no active bleeding; 1.5 cm shallow ulcer posterior bulb, no stigmata of recent bleeding, No new complaints, H/H stable   1/12: no acute issues overnight, no melena, no abdominal pain, HD today    REVIEW OF SYSTEMS:    10 point ROS reviewed and is as per HPI/daily summary  "or otherwise negative    PMH/PSH/SH/FH:   Reviewed and unchanged since admission note    CURRENT MEDICATIONS:   Reviewed from admission to present day    VS:  VS:  /66   Pulse 99   Temp 36.7 °C (98.1 °F) (Temporal)   Resp 18   Ht 1.778 m (5' 10\")   Wt (!) 136 kg (299 lb 9.7 oz)   SpO2 99%   BMI 42.99 kg/m²   GENERAL: no acute distress  CV: RRR, +edema  RESP: Non-labored  GI: Soft  MSK: No joint deformities   SKIN: BL LE lesions  NEURO: AOx3  PSYCH: Cooperative    Fluids:  In: 100 [I.V.:100]  Out: -     LABS:  Recent Labs     01/10/23  1337 01/11/23  0108 01/12/23  0121   SODIUM 139 136 137   POTASSIUM 4.1 3.9 4.4   CHLORIDE 100 100 101   CO2 30 26 27   GLUCOSE 158* 115* 115*   BUN 27* 31* 38*   CREATININE 3.52* 3.79* 4.45*   CALCIUM 8.5 8.0* 7.9*       IMAGING:   All imaging reviewed from admission to present day    IMPRESSION:  # FARIHA on CKD   - No hydro, UA benign   - Dialysis  dependent   - Renal recovery uncertain  - Access R IJ perm cath  # CKD 2/2 DM, HTN  - Unclear baseline Cr , Cr 3.31 on admission   # Hyperkalemia: resolved   # Hyponatremia: resolved   # Acidosis: resovled   # Acute respiratory failure 2/2 COPD/CHF   # Leukocytosis   # Anemia - on Epo  - low iron stores % sat 13, ferritin 68   # Hyperphosphatemia: resolved   # ALONDRA on CPAP  # CAD   # DM-2   # Obesity   # Volume overload   # Relative Hypotension        PLAN:  - HD today  - Continue to monitor for renal recovery as outpatient   - Renal diet   - Fluid restriction 1.2 L   - Continue CARA with HD   - Strict IO  - Dose all meds per eGFR < 15    - Has HD chair at [facility] arranged  (q TTS planned @ [facility])   - Ok to discharge from renal standpoint when OK with primary team    - No ASA for 1 week if possible and anticoagulation for 1-2 weeks per GI recommendations     Thank you    "

## 2023-01-12 NOTE — DOCUMENTATION QUERY
Formerly [facility]                                                                       Query Response Note      PATIENT:               FERNANDO VAZQUEZ  ACCT #:                  [id number]  MRN:                     [id number]  :                      1955  ADMIT DATE:       2022 2:01 PM  DISCH DATE:          RESPONDING  PROVIDER #:        [id number]           QUERY TEXT:    Worsening anemia requiring 2 units of PRBC's and GI Bleed is documented in the medical record.  Please specify the acuity of the blood loss.    The patient's Clinical Indicators include:   Hgb: 9.9; Hct: 33.4    1/3 Nephrology Note: Anemia, resolved. Low iron stores % sat 13, ferritin 68      Progress Note: Hgb of 5.8 this AM, transfused 1U PRBC. Hold eliquis. States melena since he has been here.     Progress Note: Hemoglobin after 2 units PRBC transfusion of 7.9, hemoglobin this a.m. 8.2     Crossover Note: Large black stool. BP 79/51, tachycardic. Recent transition Lovenox to Eliquis for AFib, developed worsening anemia, requiring 2 units PRBCs.    Treatment: Transfusion PRBC's; IV bolus; IV ferrlecit; transfuse if less than 7; IV PPI; hold eliquis; epoetin; CBC  Risk Factors: Melena; AFib on eliquis; FARIHA on CKD requiring dialysis; low iron stores    Thank You,  Maria Del Carmen Mcdaniel RN  Clinical    Connect via [employer]  Options provided:   -- Acute blood loss anemia   -- Chronic blood loss anemia   -- Other type of anemia, (please specify other type of anemia)   -- Other explanation, (please specify other explanation)   -- Unable to determine      Query created by: Maria Del Carmen Mcdaniel on 1/10/2023 5:54 AM    RESPONSE TEXT:    Acute blood loss anemia          Electronically signed by:  МАРИНА DING MD 2023 5:49 PM

## 2023-01-12 NOTE — PROGRESS NOTES
Assumed care of patient, received bedside report from Palmira RODRIGUEZ. Patient is A&O X 4. Pain 0/10. Vital signs stable during day shift, on 2 L of oxygen NC. On tele monitor, Afib 96. POC discussed with patient. Patient verbalized understanding. All questions answered. Call light within reach and fall precautions in place. Bed locked and in lowest position.

## 2023-01-12 NOTE — DIETARY
"Nutrition Services Brief Update:    Day 20 of admit.  Yoandy Peace is a 67 y.o. male with admitting DX of Respiratory failure.    Current Diet: Low Fiber (GI Soft).    No recorded PO since diet order change to GI soft on 1/11, however noticed pt ate 100% of his lunch as seen when RD visited bedside. Pt stated he does not enjoy hospital food & shared likes & dislikes w/ RD to enter into [employer]. Noted that pt has spoken w/ Nutrition Rep to choose meals.  Pt mentioned he has no teeth; RD to add \"easy to chew\" modifier in [employer] to improve ease of eating.    Problem: Nutritional:  Goal: Achieve adequate nutritional intake  Description: Patient will consume >50% of meals  Outcome: Progressing   "

## 2023-01-12 NOTE — DISCHARGE PLANNING
Case Management Discharge Planning    Admission Date: 12/23/2022  GMLOS: 3.9  ALOS: 20    6-Clicks ADL Score: 18  6-Clicks Mobility Score: 21      Anticipated Discharge Dispo: Discharge Disposition: D/T to home under A care in anticipation of covered skilled care (06) PT saw  patient  today and made recommendations. Met with patient and his wife  dicussed referral process to SNF and rehab . Patient nd wife verbalized understanding  Choice was obtained yesterday . Plan to to transition the SNF and [facility] for possible dc tomorrow .     DME Needed: No    Action(s) Taken: Choice obtained and Referral(s) sent    Escalations Completed: None    Medically Clear: No    Next Steps:Refer to [facility] and [facility] .    Barriers to Discharge: Medical clearance    Is the patient up for discharge tomorrow: No

## 2023-01-12 NOTE — PROGRESS NOTES
Assumed care of patient, bedside report received from lou RN. Updated on POC, call light within reach and fall precautions in place. Bed locked and in lowest position. Patient instructed to call for assistance before getting out of bed. All questions answered, no other needs at this time.

## 2023-01-12 NOTE — CARE PLAN
The patient is Stable - Low risk of patient condition declining or worsening    Shift Goals  Clinical Goals: monitor hgb, vitals, HR  Patient Goals: get better  Family Goals: na    Progress made toward(s) clinical / shift goals:    Problem: Risk for Fluid Volume Deficit Related to Bleeding  Goal: Fluid volume balance will be maintained  Outcome: Progressing  Note: Patient's HGB is trending and EGD was complete.       Patient is not progressing towards the following goals:

## 2023-01-12 NOTE — PROGRESS NOTES
Contacted dialysis RN to draw HGB on patient since patient went down for Hd once order was placed.

## 2023-01-12 NOTE — PROGRESS NOTES
Hospital Medicine Daily Progress Note    Date of Service  1/12/2023    Chief Complaint  Yoandy Peace is a 67 y.o. male admitted 12/23/2022 with SOB    Hospital Course  68 yo man with COPD on 4L o2, CAD, CHF, ALONDRA who presented with SOB and edema. He was placed on BIPAP and given diuresis and nebs for COPD flare and fluid overload. He was weaned off BIPAP. Pulmonology recommended trelegy at discharge and follow up with them outpatient. His kidney function worsened and he was started on dialysis. He had tunneled cath placed 12/31 with IR. He then developed new onset afib. Cardiology was consulted and he was started on lopressor and anticoagulation. He had drop in Hgb to 5.8 on 1/6 and given transfusion, his Eliquis was held. His anemia and melena was recurring so GI was consulted.    Interval Problem Update  1/10 - He continues to have tarry stool, Hgb is fluctuating 7-8. Was hypotensive overnight that improved with bolus. Lasix held. Eliquis and aspirin were held. He has not had BM today. He denies abd pain or nausea. I consulted GI  -140s, restart oral metop since BP is stable    1/11 - Remains in afib with intermittent RVR, will given dose of digoxin. He has no complaints today, denies palpitations. EGD showed duodenal ulcer without bleeding, biopsies done.     1/12 - metop increased to TID form BID for rate control.  HR 90-110s. Hgb low but stable. He has not had BM. He feels well    I have discussed this patient's plan of care and discharge plan at IDT rounds today with Case Management, Nursing, Nursing leadership, and other members of the IDT team.    Consultants/Specialty  cardiology, critical care, GI, nephrology, palliative care, and vascular surgery    Code Status  DNAR/DNI    Disposition  Patient is not medically cleared for discharge.   Anticipate discharge to to skilled nursing facility.  I have placed the appropriate orders for post-discharge needs.    Review of Systems  Review of Systems    Constitutional:  Negative for fever.   Respiratory:  Negative for shortness of breath.    Cardiovascular:  Negative for chest pain.   Gastrointestinal:  Negative for abdominal pain and nausea.   Musculoskeletal:  Positive for back pain.   Neurological:  Positive for weakness.   Psychiatric/Behavioral:  The patient is not nervous/anxious.    All other systems reviewed and are negative.     Physical Exam  Temp:  [35.9 °C (96.6 °F)-37.3 °C (99.1 °F)] 36.4 °C (97.6 °F)  Pulse:  [] 100  Resp:  [17-20] 18  BP: ()/(46-66) 85/46  SpO2:  [92 %-100 %] 100 %    Physical Exam  Vitals and nursing note reviewed.   Constitutional:       General: He is not in acute distress.     Appearance: He is obese. He is not toxic-appearing.   HENT:      Head: Normocephalic.      Mouth/Throat:      Mouth: Mucous membranes are moist.   Eyes:      General:         Right eye: No discharge.         Left eye: No discharge.   Cardiovascular:      Rate and Rhythm: Tachycardia present. Rhythm irregular.   Pulmonary:      Effort: Pulmonary effort is normal. No respiratory distress.      Breath sounds: No wheezing or rales.   Abdominal:      General: There is distension.      Palpations: Abdomen is soft.      Tenderness: There is no abdominal tenderness. There is no guarding or rebound.      Comments: Umbilical hernia   Musculoskeletal:      Cervical back: Neck supple.      Right lower leg: Edema present.      Left lower leg: Edema present.   Skin:     General: Skin is warm and dry.      Comments: Hyperpigmented and scaling plaques to both lower legs   Neurological:      Mental Status: He is alert and oriented to person, place, and time.       Fluids    Intake/Output Summary (Last 24 hours) at 1/12/2023 1524  Last data filed at 1/12/2023 1214  Gross per 24 hour   Intake 1220 ml   Output 2775 ml   Net -1555 ml       Laboratory  Recent Labs     01/10/23  0106 01/10/23  0833 01/11/23  0108 01/11/23  0727 01/11/23  1348 01/12/23  0121  01/12/23  1011   WBC 16.2*  --  15.8*  --   --  12.1*  --    RBC 2.76*  --  2.55*  --   --  2.53*  --    HEMOGLOBIN 7.9*   < > 7.3*   < > 7.8* 7.1* 7.4*   HEMATOCRIT 25.8*  --  24.0*  --   --  24.0*  --    MCV 93.5  --  94.1  --   --  94.9  --    MCH 28.6  --  28.6  --   --  28.1  --    MCHC 30.6*  --  30.4*  --   --  29.6*  --    RDW 62.9*  --  66.5*  --   --  67.5*  --    PLATELETCT 203  --  195  --   --  183  --    MPV 9.9  --  10.2  --   --  10.2  --     < > = values in this interval not displayed.     Recent Labs     01/10/23  1337 01/11/23  0108 01/12/23  0121   SODIUM 139 136 137   POTASSIUM 4.1 3.9 4.4   CHLORIDE 100 100 101   CO2 30 26 27   GLUCOSE 158* 115* 115*   BUN 27* 31* 38*   CREATININE 3.52* 3.79* 4.45*   CALCIUM 8.5 8.0* 7.9*     Recent Labs     01/10/23  1337   APTT 36.2*   INR 1.47*               Imaging  IR-RAMÓN WIN PLACEMENT >5   Final Result      1. Ultrasound and fluoroscopic guided placement of a right internal jugular 14.5 [employer] HemoSplit tunneled dialysis catheter.      2. The hemodialysis catheter may be used immediately as clinically indicated. Flushes per protocol.      3. Removal of the right temporary dialysis catheter.      DX-CHEST-PORTABLE (1 VIEW)   Final Result      1.  Ongoing pulmonary edema and bilateral pleural effusions.   2.  Cardiomegaly.   3.  Left lower lobe atelectasis or consolidation. There is also likely some component of compressive atelectasis of the right lung base considering the presence of significant pleural effusion.   4.  Interval placement of temporary non-tunneled hemodialysis catheter with no evidence of pneumothorax.      DX-CHEST-PORTABLE (1 VIEW)   Final Result         1. No significant interval change.      US-RENAL   Final Result      1.  Echogenic renal parenchyma bilaterally could relate to medical renal disease.      2.  No hydronephrosis. No renal calculus.      DX-CHEST-PORTABLE (1 VIEW)   Final Result      1.  Enlarged cardiac  silhouette and small amounts of bilateral pleural fluid with diffuse interstitial opacities most consistent with pulmonary edema/CHF.   2.  Bibasilar opacities could be due to edema, atelectasis or pneumonitis.   3.  There is atherosclerosis.   4.  Questionable linear calcific plaque in the proximal descending thoracic aorta versus some other device intrinsic or extrinsic to the patient.      EC-ECHOCARDIOGRAM COMPLETE W/ CONT   Final Result      US-EXTREMITY VENOUS LOWER BILAT   Final Result      DX-CHEST-PORTABLE (1 VIEW)   Final Result      1.  Cardiomegaly with interstitial infiltrates likely representing pulmonary edema.      2.  Bibasilar atelectasis and small bilateral pleural effusions.           Assessment/Plan  * Acute renal failure (ARF) ([facility])  Assessment & Plan  Started on hemodialysis  Nephrology following    Anemia  Assessment & Plan  Melena  EGD showed duodenal ulcer without bleeding, biopsies done.   Hold Eliquis and aspirin for 1-2 weeks per GI  PPI BID 4 weeks  Trend Hgb    Atrial fibrillation ([facility])  Assessment & Plan  New onset  Rate uncontrolled, restart metop and titrate  [id number]- 4  Hold eliquis for GIB 2 weeks per GI    Bilateral leg edema- (present on admission)  Assessment & Plan  Likely due to volume overload.    Diuresis  Bilateral lower extremity DVT studies negative    Acute exacerbation of chronic obstructive pulmonary disease (COPD) ([facility])  Assessment & Plan  Remains on O2  Continue with Flovent and Anoro  S/p steroids  Duo nebs as needed  Continue with oxygen supplementation to obtain SPO2 >88-90%    Acute pulmonary edema ([facility])- (present on admission)  Assessment & Plan  resolved  Continue with diuresis and HD  Echo EF of 55-60%    Hypertension  Assessment & Plan  Metop    Tobacco abuse- (present on admission)  Assessment & Plan  Nicotine replacement as needed    ACP (advance care planning)  Assessment & Plan  Full code    Class 3 severe obesity in adult ([facility])  Assessment &  Plan  BMI 52.89     ALONDRA on CPAP- (present on admission)  Assessment & Plan  CPAP at bedtime      Acute on chronic respiratory failure with hypoxia ([facility])  Assessment & Plan  Dependent on 4 L.  Currently requiring 2-5 L in no distress  Nebs/pulm toilet    CAD (coronary artery disease)  Assessment & Plan  ASA/statin    DM (diabetes mellitus) ([facility])  Assessment & Plan  Continue with SSI   On Farxiga, insulin NPH at home, and linagliptin           VTE prophylaxis: SCDs/TEDs      I have performed a physical exam and reviewed and updated ROS and Plan today (1/12/2023). In review of yesterday's note (1/11/2023), there are no changes except as documented above.

## 2023-01-12 NOTE — THERAPY
Occupational Therapy Contact Note    Patient Name: Yoandy Peace  Age:  67 y.o., Sex:  male  Medical Record #: [id number]  Today's Date: 1/12/2023    Attempted to see pt for OT session. Pt currently at ; will hold and reattempt as appropriate/able

## 2023-01-13 VITALS
BODY MASS INDEX: 42.36 KG/M2 | DIASTOLIC BLOOD PRESSURE: 82 MMHG | WEIGHT: 295.86 LBS | HEART RATE: 108 BPM | TEMPERATURE: 98.2 F | HEIGHT: 70 IN | SYSTOLIC BLOOD PRESSURE: 123 MMHG | RESPIRATION RATE: 18 BRPM | OXYGEN SATURATION: 94 %

## 2023-01-13 LAB
ANION GAP SERPL CALC-SCNC: 8 MMOL/L (ref 7–16)
BASOPHILS # BLD AUTO: 0.2 % (ref 0–1.8)
BASOPHILS # BLD: 0.02 K/UL (ref 0–0.12)
BUN SERPL-MCNC: 26 MG/DL (ref 8–22)
CALCIUM SERPL-MCNC: 7.7 MG/DL (ref 8.5–10.5)
CHLORIDE SERPL-SCNC: 99 MMOL/L (ref 96–112)
CO2 SERPL-SCNC: 28 MMOL/L (ref 20–33)
CREAT SERPL-MCNC: 3.35 MG/DL (ref 0.5–1.4)
EOSINOPHIL # BLD AUTO: 0.1 K/UL (ref 0–0.51)
EOSINOPHIL NFR BLD: 1 % (ref 0–6.9)
ERYTHROCYTE [DISTWIDTH] IN BLOOD BY AUTOMATED COUNT: 63.3 FL (ref 35.9–50)
GFR SERPLBLD CREATININE-BSD FMLA CKD-EPI: 19 ML/MIN/1.73 M 2
GLUCOSE BLD STRIP.AUTO-MCNC: 106 MG/DL (ref 65–99)
GLUCOSE BLD STRIP.AUTO-MCNC: 122 MG/DL (ref 65–99)
GLUCOSE BLD STRIP.AUTO-MCNC: 146 MG/DL (ref 65–99)
GLUCOSE SERPL-MCNC: 110 MG/DL (ref 65–99)
HCT VFR BLD AUTO: 24.8 % (ref 42–52)
HGB BLD-MCNC: 7.3 G/DL (ref 14–18)
HGB BLD-MCNC: 7.4 G/DL (ref 14–18)
IMM GRANULOCYTES # BLD AUTO: 0.07 K/UL (ref 0–0.11)
IMM GRANULOCYTES NFR BLD AUTO: 0.7 % (ref 0–0.9)
LYMPHOCYTES # BLD AUTO: 1.92 K/UL (ref 1–4.8)
LYMPHOCYTES NFR BLD: 19.7 % (ref 22–41)
MCH RBC QN AUTO: 27.4 PG (ref 27–33)
MCHC RBC AUTO-ENTMCNC: 29.4 G/DL (ref 33.7–35.3)
MCV RBC AUTO: 93.2 FL (ref 81.4–97.8)
MONOCYTES # BLD AUTO: 1.01 K/UL (ref 0–0.85)
MONOCYTES NFR BLD AUTO: 10.4 % (ref 0–13.4)
NEUTROPHILS # BLD AUTO: 6.61 K/UL (ref 1.82–7.42)
NEUTROPHILS NFR BLD: 68 % (ref 44–72)
NRBC # BLD AUTO: 0 K/UL
NRBC BLD-RTO: 0 /100 WBC
PLATELET # BLD AUTO: 166 K/UL (ref 164–446)
PMV BLD AUTO: 10.1 FL (ref 9–12.9)
POTASSIUM SERPL-SCNC: 3.8 MMOL/L (ref 3.6–5.5)
RBC # BLD AUTO: 2.66 M/UL (ref 4.7–6.1)
SARS-COV+SARS-COV-2 AG RESP QL IA.RAPID: NOTDETECTED
SODIUM SERPL-SCNC: 135 MMOL/L (ref 135–145)
SPECIMEN SOURCE: NORMAL
WBC # BLD AUTO: 9.7 K/UL (ref 4.8–10.8)

## 2023-01-13 PROCEDURE — 700102 HCHG RX REV CODE 250 W/ 637 OVERRIDE(OP): Performed by: STUDENT IN AN ORGANIZED HEALTH CARE EDUCATION/TRAINING PROGRAM

## 2023-01-13 PROCEDURE — A9270 NON-COVERED ITEM OR SERVICE: HCPCS | Performed by: INTERNAL MEDICINE

## 2023-01-13 PROCEDURE — 700101 HCHG RX REV CODE 250: Performed by: INTERNAL MEDICINE

## 2023-01-13 PROCEDURE — 82962 GLUCOSE BLOOD TEST: CPT

## 2023-01-13 PROCEDURE — 85025 COMPLETE CBC W/AUTO DIFF WBC: CPT

## 2023-01-13 PROCEDURE — 99239 HOSP IP/OBS DSCHRG MGMT >30: CPT | Performed by: INTERNAL MEDICINE

## 2023-01-13 PROCEDURE — 36415 COLL VENOUS BLD VENIPUNCTURE: CPT

## 2023-01-13 PROCEDURE — 700102 HCHG RX REV CODE 250 W/ 637 OVERRIDE(OP): Performed by: INTERNAL MEDICINE

## 2023-01-13 PROCEDURE — 87426 SARSCOV CORONAVIRUS AG IA: CPT

## 2023-01-13 PROCEDURE — 85018 HEMOGLOBIN: CPT

## 2023-01-13 PROCEDURE — 80048 BASIC METABOLIC PNL TOTAL CA: CPT

## 2023-01-13 PROCEDURE — A9270 NON-COVERED ITEM OR SERVICE: HCPCS | Performed by: STUDENT IN AN ORGANIZED HEALTH CARE EDUCATION/TRAINING PROGRAM

## 2023-01-13 RX ORDER — METOPROLOL TARTRATE 50 MG/1
50 TABLET, FILM COATED ORAL 2 TIMES DAILY
Qty: 60 TABLET | Status: SHIPPED
Start: 2023-01-13 | End: 2023-01-19

## 2023-01-13 RX ORDER — METOPROLOL TARTRATE 50 MG/1
50 TABLET, FILM COATED ORAL TWICE DAILY
Status: DISCONTINUED | OUTPATIENT
Start: 2023-01-13 | End: 2023-01-13 | Stop reason: HOSPADM

## 2023-01-13 RX ADMIN — OMEPRAZOLE 20 MG: 20 CAPSULE, DELAYED RELEASE ORAL at 17:21

## 2023-01-13 RX ADMIN — DOCUSATE SODIUM 50 MG AND SENNOSIDES 8.6 MG 2 TABLET: 8.6; 5 TABLET, FILM COATED ORAL at 17:21

## 2023-01-13 RX ADMIN — NICOTINE TRANSDERMAL SYSTEM 21 MG: 21 PATCH, EXTENDED RELEASE TRANSDERMAL at 05:39

## 2023-01-13 RX ADMIN — OMEPRAZOLE 20 MG: 20 CAPSULE, DELAYED RELEASE ORAL at 05:38

## 2023-01-13 RX ADMIN — METOPROLOL TARTRATE 5 MG: 1 INJECTION, SOLUTION INTRAVENOUS at 06:18

## 2023-01-13 RX ADMIN — AMITRIPTYLINE HYDROCHLORIDE 10 MG: 10 TABLET, FILM COATED ORAL at 05:38

## 2023-01-13 RX ADMIN — AMITRIPTYLINE HYDROCHLORIDE 10 MG: 10 TABLET, FILM COATED ORAL at 17:21

## 2023-01-13 RX ADMIN — METOPROLOL TARTRATE 25 MG: 25 TABLET, FILM COATED ORAL at 05:39

## 2023-01-13 RX ADMIN — METOPROLOL TARTRATE 50 MG: 50 TABLET, FILM COATED ORAL at 17:21

## 2023-01-13 RX ADMIN — FLUTICASONE PROPIONATE 88 MCG: 44 AEROSOL, METERED RESPIRATORY (INHALATION) at 05:40

## 2023-01-13 ASSESSMENT — FIBROSIS 4 INDEX: FIB4 SCORE: 1.1

## 2023-01-13 ASSESSMENT — COGNITIVE AND FUNCTIONAL STATUS - GENERAL
DAILY ACTIVITIY SCORE: 18
SUGGESTED CMS G CODE MODIFIER MOBILITY: CK
MOBILITY SCORE: 18
WALKING IN HOSPITAL ROOM: A LOT
PERSONAL GROOMING: A LITTLE
MOVING FROM LYING ON BACK TO SITTING ON SIDE OF FLAT BED: A LITTLE
CLIMB 3 TO 5 STEPS WITH RAILING: A LOT
HELP NEEDED FOR BATHING: A LOT
DRESSING REGULAR LOWER BODY CLOTHING: A LITTLE
DRESSING REGULAR UPPER BODY CLOTHING: A LITTLE
STANDING UP FROM CHAIR USING ARMS: A LITTLE
TOILETING: A LITTLE
SUGGESTED CMS G CODE MODIFIER DAILY ACTIVITY: CK

## 2023-01-13 NOTE — DISCHARGE PLANNING
Agency[facility]Facility Name: [facility]  Outcome: DPA left v-mail for SNF, requesting update on referral.     0853-  Agency/Facility Name: [facility]  Spoke To: [place]  Outcome: DPA informed that SNF can take Pt today. SNF to call back with update on their transport availability.     RN CM notified.     0949-  Agency/Facility Name: [facility]  Spoke To: [place]  Outcome: SNF confirmed they are arranging transport for 1800 today. DPA confirmed Pt on 2 liters of O2, via RN CLEM, despite last noted O2 update.     RN CLEM notified.

## 2023-01-13 NOTE — PROGRESS NOTES
Assumed care of patient at 1915. Patient is Aox 4. Patient is resting in bed. Bed alarm is on, call light within reach, hourly rounding in place, personal belongings in reach, upper bed rails up, bed in lowest and locked position. No further needs at this time.

## 2023-01-13 NOTE — THERAPY
Physical Therapy   Daily Treatment     Patient Name: Yoandy Peace  Age:  67 y.o., Sex:  male  Medical Record #: [id number]  Today's Date: 1/12/2023     Precautions  Precautions: Fall Risk    Assessment  Pt seen for PT tx following new GIB. Pt is now s/p EGD on 1/11 showing a shallow ulcer with no active signs of bleeding. Pt declined mobility at this time as he has been dizzy with all standing activity. Pt educated on topics as detailed below and stated he was agreeable to work with PT another day. Continue to recommend placement at this time. Will follow.     Plan    Physical Therapy Treatment Plan  Physical Therapy Treatment Plan: Continue Current Treatment Plan    DC Equipment Recommendations: Unable to determine at this time  Discharge Recommendations: Recommend post-acute placement for additional physical therapy services prior to discharge home         01/12/23 1604   Vitals   Blood Pressure  93/60   O2 (LPM) 2   O2 Delivery Device Silicone Nasal Cannula   Cognition    Cognition / Consciousness WDL   Level of Consciousness Alert   Comments pleasant but declining mobility at this time due to dizziness with all standing activity   Balance   Sitting Balance (Static) Good   Sitting Balance (Dynamic) Fair +   How much difficulty does the patient currently have...   Turning over in bed (including adjusting bedclothes, sheets and blankets)? 3   Sitting down on and standing up from a chair with arms (e.g., wheelchair, bedside commode, etc.) 3   Moving from lying on back to sitting on the side of the bed? 3   How much help from another person does the patient currently need...   Moving to and from a bed to a chair (including a wheelchair)? 3   Need to walk in a hospital room? 2   Climbing 3-5 steps with a railing? 2   6 clicks Mobility Score 16   Activity Tolerance   Sitting in Chair 13 min   Short Term Goals    Short Term Goal # 1 pt will move supine<>eob with hob flat and spv in 6 tx to improve bed mobility.   Goal  Outcome # 1 goal not met   Short Term Goal # 2 pt will complete all transfers with fww and spv in 6 tx to improve functional mobility.   Goal Outcome # 2 Goal not met   Short Term Goal # 3 pt will ambulate 150 ft with fww and spv in 6 tx for household distances.   Goal Outcome # 3 Goal not met   Short Term Goal # 4 pt will negotiate 3 steps with sba in 6 tx to facilitate home entry.   Goal Outcome # 4 Goal not met   Education Group   Additional Comments educated pt on orthostatic hypotension, benefits of mobility to improve BP, BP relating to HR, and PT POC going fwd   Physical Therapy Treatment Plan   Physical Therapy Treatment Plan Continue Current Treatment Plan   Anticipated Discharge Equipment and Recommendations   DC Equipment Recommendations Unable to determine at this time   Discharge Recommendations Recommend post-acute placement for additional physical therapy services prior to discharge home   Interdisciplinary Plan of Care Collaboration   IDT Collaboration with  Nursing   Patient Position at End of Therapy Seated;Call Light within Reach;Tray Table within Reach;Phone within Reach   Collaboration Comments RN updated   Session Information   Date / Session Number  1/12- 5 (3/4, 1/10) edu only

## 2023-01-13 NOTE — DISCHARGE PLANNING
Case Management Discharge Planning    Admission Date: 12/23/2022  GMLOS: 3.9  ALOS: 21    6-Clicks ADL Score: 18  6-Clicks Mobility Score: 18      Anticipated Discharge Dispo: Discharge Disposition: D/T to home under [facility] care in anticipation of covered skilled care (06)[facility] confirmed they are arranging transport for 1800 today.Patient notified and agreed with plan and consent obtained.  Nursing notified .     DME Needed: No    Action(s) Taken: Updated Provider/Nurse on Discharge Plan and Transport Arranged by [facility] pick at 1800 pm .     Escalations Completed: Provider    Medically Clear: Yes    Next Steps:    Barriers to Discharge: None    Is the patient up for discharge today : Yes    Is transport arranged for discharge disposition: Yes

## 2023-01-13 NOTE — DISCHARGE SUMMARY
Discharge Summary    CHIEF COMPLAINT ON ADMISSION  Chief Complaint   Patient presents with    Shortness of Breath     BIB EMS from home for SOB x 1 hour. Hx of COPD.   O2 SAT 88% on baseline 4L PTA. Improved with CPAP.   Recevied 125 mg solumedrol, 2 duoneb and 2 albuterol.     Edema     Abd swelling, BLE pitting edema.        Reason for Admission  EMS     Admission Date  12/23/2022    CODE STATUS  DNAR/DNI    HPI & HOSPITAL COURSE  68 yo man with COPD on 4L o2, CAD, CHF, ALONDRA who presented with SOB and edema. He was placed on BIPAP and given diuresis and nebs for COPD flare and fluid overload. He was weaned off BIPAP. Pulmonology recommended trelegy at discharge and follow up with them outpatient. His kidney function worsened and he was started on dialysis. He had tunneled cath placed 12/31 with IR. He then developed new onset afib. Cardiology was consulted and he was started on metoprolol and Eliquis. His heart rate was controlled on metoprolol, rate 90s. He had drop in Hgb to 5.8 on 1/6 and given transfusion, his Eliquis was held. His anemia and melena was recurring so GI was consulted. EGD showed duodenal ulcer without bleeding, biopsy was taken and negative for H pylori. Dr. Ludwig recommended no aspirin for 1 week and no anticoagulation for 2 weeks, and PPI BID for 4 weeks and then daily dosing.  For his diabetes, he was noted to require minimal sliding scale insulin, his NPH is stopped.    Therefore, he is discharged in good and stable condition to skilled nursing facility.    The patient met 2-midnight criteria for an inpatient stay at the time of discharge.    Discharge Date  1/13/23    FOLLOW UP ITEMS POST DISCHARGE  Follow up pulmonology outpatient for COPD  GIB - no aspirin for 1 week and no anticoagulation for 2 weeks, and PPI BID for 4 weeks and then daily dosing. Will need to restart Eliquis for his atrial fibrillation  Follow up with cardiology for afib  Continue dialysis, follow up with  nephrology    DISCHARGE DIAGNOSES  Principal Problem:    Acute renal failure (ARF) ([facility]) POA: Unknown  Active Problems:    DM (diabetes mellitus) ([facility]) POA: Unknown    CAD (coronary artery disease) POA: Unknown    Acute on chronic respiratory failure with hypoxia ([facility]) POA: Unknown    ALONDRA on CPAP POA: Yes    Class 3 severe obesity in adult ([facility]) POA: Unknown    ACP (advance care planning) POA: Unknown    Tobacco abuse POA: Yes    Hypertension POA: Unknown    Acute pulmonary edema ([facility]) POA: Yes    Acute exacerbation of chronic obstructive pulmonary disease (COPD) ([facility]) POA: Unknown    Bilateral leg edema POA: Yes    Atrial fibrillation ([facility]) POA: Unknown    Anemia POA: Unknown  Resolved Problems:    Acute on chronic congestive heart failure ([facility]) POA: Unknown    Cardiorenal syndrome, stage 1-4 or unspecified chronic kidney disease, with heart failure ([facility]) POA: Yes    Sepsis ([facility]) POA: Unknown    Elevated troponin POA: Unknown      FOLLOW UP  Noemi Tello M.D.  [address] [place] [address]  [facility] [address]  [phone]    Go on 2/21/2023  Please go to your follow up appointment with Noemi Tello MD on Tuesday February 21,2023 at 9:00am.      MEDICATIONS ON DISCHARGE     Medication List        START taking these medications        Instructions   metoprolol tartrate 50 MG Tabs  Commonly known as: LOPRESSOR   Take 1 Tablet by mouth 2 times a day.  Dose: 50 mg     omeprazole 20 MG delayed-release capsule  Commonly known as: PRILOSEC  Next Dose Due: 1/9 this evening   Take 1 Capsule by mouth 2 times a day.  Dose: 20 mg            CONTINUE taking these medications        Instructions   albuterol 108 (90 Base) MCG/ACT Aers inhalation aerosol   Inhale 2 Puffs every 6 hours as needed for Shortness of Breath.  Dose: 2 Puff     amitriptyline 10 MG Tabs  Commonly known as: ELAVIL  Next Dose Due: 1/9 this evening   Take 10 mg by mouth 2 times a day.  Dose: 10 mg     Anoro Ellipta 62.5-25 MCG/ACT Aepb inhaler  Generic drug:  umeclidinium-vilanterol   Inhale 1 Puff every day.  Dose: 1 Puff     atorvastatin 40 MG Tabs  Commonly known as: LIPITOR  Next Dose Due: 1/9 this evening   Take 40 mg by mouth every evening.  Dose: 40 mg     Tradjenta 5 MG Tabs tablet  Generic drug: linagliptin   Take 5 mg by mouth every day.  Dose: 5 mg     Trelegy Ellipta 100-62.5-25 MCG/ACT Aepb inhalation  Generic drug: fluticasone-umeclidin-vilant   Inhale 1 Inhalation every morning.  Dose: 1 Inhalation            STOP taking these medications      amLODIPine 10 MG Tabs  Commonly known as: NORVASC     ammonium lactate 12 % Lotn  Commonly known as: LAC-HYDRIN     aspirin EC 81 MG Tbec  Commonly known as: ECOTRIN     dapagliflozin propanediol 5 MG Tabs  Commonly known as: Farxiga     doxycycline 100 MG Tabs  Commonly known as: VIBRAMYCIN     insulin  UNIT/ML Susp  Commonly known as: HumuLIN/NovoLIN     ketoconazole 2 % Crea  Commonly known as: NIZORAL     losartan 25 MG Tabs  Commonly known as: COZAAR     methylPREDNISolone 4 MG Tbpk  Commonly known as: MEDROL DOSEPAK              Allergies  No Known Allergies    DIET  Orders Placed This Encounter   Procedures    Diet Order Diet: Low Fiber(GI Soft)     Standing Status:   Standing     Number of Occurrences:   1     Order Specific Question:   Diet:     Answer:   Low Fiber(GI Soft) [2]       ACTIVITY  As tolerated.      CONSULTATIONS  cardiology, critical care, GI, nephrology, palliative care, and vascular surgery    PROCEDURES  Procedure(s): Permcath placement, tempcath removal.     PREOPERATIVE DIAGNOSES: Melena, anemia     POSTOPERATIVE DIAGNOSIS: Irregular duodneal folds, shallow clean based duodenal ulcer     PROCEDURE:  ESOPHAGOGASTRODUODENOSCOPY     PHYSICIAN:  MD LINDSEY CorbinDENG WINAUGUSTO PLACEMENT >5   Final Result      1. Ultrasound and fluoroscopic guided placement of a right internal jugular 14.5 [employer] HemoSplit tunneled dialysis catheter.      2. The hemodialysis catheter may be  used immediately as clinically indicated. Flushes per protocol.      3. Removal of the right temporary dialysis catheter.      DX-CHEST-PORTABLE (1 VIEW)   Final Result      1.  Ongoing pulmonary edema and bilateral pleural effusions.   2.  Cardiomegaly.   3.  Left lower lobe atelectasis or consolidation. There is also likely some component of compressive atelectasis of the right lung base considering the presence of significant pleural effusion.   4.  Interval placement of temporary non-tunneled hemodialysis catheter with no evidence of pneumothorax.      DX-CHEST-PORTABLE (1 VIEW)   Final Result         1. No significant interval change.      US-RENAL   Final Result      1.  Echogenic renal parenchyma bilaterally could relate to medical renal disease.      2.  No hydronephrosis. No renal calculus.      DX-CHEST-PORTABLE (1 VIEW)   Final Result      1.  Enlarged cardiac silhouette and small amounts of bilateral pleural fluid with diffuse interstitial opacities most consistent with pulmonary edema/CHF.   2.  Bibasilar opacities could be due to edema, atelectasis or pneumonitis.   3.  There is atherosclerosis.   4.  Questionable linear calcific plaque in the proximal descending thoracic aorta versus some other device intrinsic or extrinsic to the patient.      EC-ECHOCARDIOGRAM COMPLETE W/ CONT   Final Result      US-EXTREMITY VENOUS LOWER BILAT   Final Result      DX-CHEST-PORTABLE (1 VIEW)   Final Result      1.  Cardiomegaly with interstitial infiltrates likely representing pulmonary edema.      2.  Bibasilar atelectasis and small bilateral pleural effusions.            LABORATORY  Lab Results   Component Value Date    SODIUM 135 01/13/2023    POTASSIUM 3.8 01/13/2023    CHLORIDE 99 01/13/2023    CO2 28 01/13/2023    GLUCOSE 110 (H) 01/13/2023    BUN 26 (H) 01/13/2023    CREATININE 3.35 (H) 01/13/2023        Lab Results   Component Value Date    WBC 9.7 01/13/2023    HEMOGLOBIN 7.4 (L) 01/13/2023    HEMATOCRIT  24.8 (L) 01/13/2023    PLATELETCT 166 01/13/2023        Total time of the discharge process exceeds 34 minutes.

## 2023-01-13 NOTE — PROGRESS NOTES
[facility] -  PROGRESS NOTE               Author: Cisco Anderson M.D. Date & Time: 1/13/2023  2:43 PM     HPI:  67 y.o. male with history of COPD dependent on 4 L, tobacco abuse, CAD, CHF, ALONDRA/OHS on bedtime CPAP, who presented 12/23/2022 with evaluations for progressive shortness of breath and lower extremities swelling.  CXR notable for bilateral vascular congestion with pulmonary edema. Patient was admitted to the hospital on 12/23 for CHD exacerbation and copd. No F/C/N/V. No melena, hematochezia, hematemesis.  No HA, visual changes, or abdominal pain.     DAILY NEPHROLOGY SUMMARY:  12/26: consult neto, s/p hd #1  12/27: due for HD #2, still anasarca and on O2   12/28: on hd now, tolerating well.   12/29: tolerated puf today , edema is better , he feels better   12/30: tolerated hd well. In negative balance   12/31: feeling better, no new issues.   1/1: sitting in the chair, LE edema is better, no new complaints. S/p TDC placement   1/2 Seen on HD No c/o, producing more urine 1665 cc/ 24 hrs   1/3 Feeling better overall, breathing improved, HD yesterday net UF 1 L   UOP ~ 1L / 24 hrs , UF goal decreased due to soft BP   1/4: Seen on HD UF 1 L , BP stable no c/o, UOP   1/5: No c/o, lying in bed, VSS  1/6: No c/o VSS , Hb 5.8 today   1/7: soft Bps, tolerated HD, 6L NC, denies complaints   1/8: stable resp status, no acute events, denies complaints  1/9: No acute events, was anticipating discharge this evening to SNF today but held as no insurance clearance yet  1/10: Melanotic stools last night, denies any complaints this afternoon, sitting in bed and watching TV, H/H low but stable past 4 days   1/11: Had EGD this AM - no active bleeding; 1.5 cm shallow ulcer posterior bulb, no stigmata of recent bleeding, No new complaints, H/H stable   1/12: no acute issues overnight, no melena, no abdominal pain, HD today  1/13: No acute events overnight, denies any problem, hemodynamically stable.  "Awaiting discharge     REVIEW OF SYSTEMS:    10 point ROS reviewed and is as per HPI/daily summary or otherwise negative    PMH/PSH/SH/FH:   Reviewed and unchanged since admission note    CURRENT MEDICATIONS:   Reviewed from admission to present day    VS:  VS:  /59   Pulse (!) 102   Temp 37.1 °C (98.8 °F) (Temporal)   Resp 18   Ht 1.778 m (5' 10\")   Wt (!) 134 kg (295 lb 13.7 oz)   SpO2 95%   BMI 42.45 kg/m²   GENERAL: no acute distress  CV: RRR, +edema  RESP: Non-labored  GI: Soft  MSK: No joint deformities   SKIN: BL LE lesions  NEURO: AOx3  PSYCH: Cooperative    Fluids:  In: 1460 [P.O.:360; Dialysis:1100]  Out: 2775     LABS:  Recent Labs     01/11/23  0108 01/12/23  0121 01/13/23  0223   SODIUM 136 137 135   POTASSIUM 3.9 4.4 3.8   CHLORIDE 100 101 99   CO2 26 27 28   GLUCOSE 115* 115* 110*   BUN 31* 38* 26*   CREATININE 3.79* 4.45* 3.35*   CALCIUM 8.0* 7.9* 7.7*       IMAGING:   All imaging reviewed from admission to present day    IMPRESSION:  # FARIHA on CKD   - No hydro, UA benign   - Dialysis  dependent   - Renal recovery uncertain  - Access R IJ perm cath  # CKD 2/2 DM, HTN  - Unclear baseline Cr , Cr 3.31 on admission   # Hyperkalemia: resolved   # Hyponatremia: resolved   # Acidosis: resovled   # Acute respiratory failure 2/2 COPD/CHF   # Leukocytosis   # Anemia - on Epo  - low iron stores % sat 13, ferritin 68   # Hyperphosphatemia: resolved   # ALONDRA on CPAP  # CAD   # DM-2   # Obesity   # Volume overload   # Relative Hypotension        PLAN:  - No need for HD today; Last HD yesterday 1/12/23  - Continue to monitor for renal recovery as outpatient   - Renal diet   - Fluid restriction 1.2 L   - Continue CARA with HD   - Strict IO  - Dose all meds per eGFR < 15    - Has HD chair at [facility] arranged  (q TTS planned @ [facility])   - Ok to discharge from renal standpoint when OK with primary team    - No ASA for 1 week if possible and anticoagulation for 1-2 weeks per GI recommendations "     Thank you

## 2023-01-13 NOTE — CARE PLAN
The patient is Watcher - Medium risk of patient condition declining or worsening    Shift Goals  Clinical Goals: monitor BP; monitor HR; monitor hgb      Progress made toward(s) clinical / shift goals:  BP normotensive at this time.     Patient is not progressing towards the following goals:      Problem: Risk for Bleeding  Goal: Patient will take measures to prevent bleeding and recognizes signs of bleeding that need to be reported immediately to a health care professional  Outcome: Progressing     Problem: Knowledge Deficit - Standard  Goal: Patient and family/care givers will demonstrate understanding of plan of care, disease process/condition, diagnostic tests and medications  Outcome: Progressing     Problem: Pain - Standard  Goal: Alleviation of pain or a reduction in pain to the patient’s comfort goal  Outcome: Progressing

## 2023-01-14 NOTE — PROGRESS NOTES
Pt Dced to transport w all belongings, VSS, NAD, denies n/v/d, SOB, CP. Speaking complete coherent sentences

## 2023-01-19 ENCOUNTER — APPOINTMENT (OUTPATIENT)
Dept: RADIOLOGY | Facility: MEDICAL CENTER | Age: 68
End: 2023-01-19
Attending: EMERGENCY MEDICINE
Payer: COMMERCIAL

## 2023-01-19 ENCOUNTER — HOSPITAL ENCOUNTER (OUTPATIENT)
Facility: MEDICAL CENTER | Age: 68
End: 2023-01-20
Attending: EMERGENCY MEDICINE | Admitting: FAMILY MEDICINE
Payer: COMMERCIAL

## 2023-01-19 DIAGNOSIS — J44.1 ACUTE EXACERBATION OF CHRONIC OBSTRUCTIVE PULMONARY DISEASE (COPD) (HCC): ICD-10-CM

## 2023-01-19 DIAGNOSIS — R60.0 BILATERAL LEG EDEMA: ICD-10-CM

## 2023-01-19 DIAGNOSIS — J81.0 ACUTE PULMONARY EDEMA (HCC): ICD-10-CM

## 2023-01-19 DIAGNOSIS — J96.21 ACUTE ON CHRONIC RESPIRATORY FAILURE WITH HYPOXIA (HCC): ICD-10-CM

## 2023-01-19 PROBLEM — T82.9XXA COMPLICATION ASSOCIATED WITH DIALYSIS CATHETER: Status: ACTIVE | Noted: 2023-01-19

## 2023-01-19 LAB
ALBUMIN SERPL BCP-MCNC: 3.1 G/DL (ref 3.2–4.9)
ALBUMIN/GLOB SERPL: 1 G/DL
ALP SERPL-CCNC: 80 U/L (ref 30–99)
ALT SERPL-CCNC: 22 U/L (ref 2–50)
ANION GAP SERPL CALC-SCNC: 10 MMOL/L (ref 7–16)
ANISOCYTOSIS BLD QL SMEAR: ABNORMAL
APTT PPP: 28.3 SEC (ref 24.7–36)
AST SERPL-CCNC: 20 U/L (ref 12–45)
BASOPHILS # BLD AUTO: 0.4 % (ref 0–1.8)
BASOPHILS # BLD: 0.03 K/UL (ref 0–0.12)
BILIRUB SERPL-MCNC: 0.5 MG/DL (ref 0.1–1.5)
BUN SERPL-MCNC: 25 MG/DL (ref 8–22)
CALCIUM ALBUM COR SERPL-MCNC: 8.8 MG/DL (ref 8.5–10.5)
CALCIUM SERPL-MCNC: 8.1 MG/DL (ref 8.5–10.5)
CHLORIDE SERPL-SCNC: 100 MMOL/L (ref 96–112)
CO2 SERPL-SCNC: 28 MMOL/L (ref 20–33)
COMMENT 1642: NORMAL
CREAT SERPL-MCNC: 4.79 MG/DL (ref 0.5–1.4)
EOSINOPHIL # BLD AUTO: 0.08 K/UL (ref 0–0.51)
EOSINOPHIL NFR BLD: 1.1 % (ref 0–6.9)
ERYTHROCYTE [DISTWIDTH] IN BLOOD BY AUTOMATED COUNT: 62 FL (ref 35.9–50)
GFR SERPLBLD CREATININE-BSD FMLA CKD-EPI: 12 ML/MIN/1.73 M 2
GLOBULIN SER CALC-MCNC: 3 G/DL (ref 1.9–3.5)
GLUCOSE SERPL-MCNC: 144 MG/DL (ref 65–99)
HCT VFR BLD AUTO: 29 % (ref 42–52)
HGB BLD-MCNC: 8.6 G/DL (ref 14–18)
HYPOCHROMIA BLD QL SMEAR: ABNORMAL
IMM GRANULOCYTES # BLD AUTO: 0.07 K/UL (ref 0–0.11)
IMM GRANULOCYTES NFR BLD AUTO: 1 % (ref 0–0.9)
INR PPP: 1.21 (ref 0.87–1.13)
LYMPHOCYTES # BLD AUTO: 1.68 K/UL (ref 1–4.8)
LYMPHOCYTES NFR BLD: 23.3 % (ref 22–41)
MACROCYTES BLD QL SMEAR: ABNORMAL
MCH RBC QN AUTO: 27.6 PG (ref 27–33)
MCHC RBC AUTO-ENTMCNC: 29.7 G/DL (ref 33.7–35.3)
MCV RBC AUTO: 92.9 FL (ref 81.4–97.8)
MONOCYTES # BLD AUTO: 0.65 K/UL (ref 0–0.85)
MONOCYTES NFR BLD AUTO: 9 % (ref 0–13.4)
MORPHOLOGY BLD-IMP: NORMAL
NEUTROPHILS # BLD AUTO: 4.69 K/UL (ref 1.82–7.42)
NEUTROPHILS NFR BLD: 65.2 % (ref 44–72)
NRBC # BLD AUTO: 0 K/UL
NRBC BLD-RTO: 0 /100 WBC
PLATELET # BLD AUTO: 234 K/UL (ref 164–446)
PLATELET BLD QL SMEAR: NORMAL
PMV BLD AUTO: 9.6 FL (ref 9–12.9)
POTASSIUM SERPL-SCNC: 4 MMOL/L (ref 3.6–5.5)
PROT SERPL-MCNC: 6.1 G/DL (ref 6–8.2)
PROTHROMBIN TIME: 15.2 SEC (ref 12–14.6)
RBC # BLD AUTO: 3.12 M/UL (ref 4.7–6.1)
RBC BLD AUTO: PRESENT
SODIUM SERPL-SCNC: 138 MMOL/L (ref 135–145)
WBC # BLD AUTO: 7.2 K/UL (ref 4.8–10.8)

## 2023-01-19 PROCEDURE — 36589 REMOVAL TUNNELED CV CATH: CPT

## 2023-01-19 PROCEDURE — 85730 THROMBOPLASTIN TIME PARTIAL: CPT

## 2023-01-19 PROCEDURE — 99222 1ST HOSP IP/OBS MODERATE 55: CPT | Mod: GC | Performed by: FAMILY MEDICINE

## 2023-01-19 PROCEDURE — 85610 PROTHROMBIN TIME: CPT

## 2023-01-19 PROCEDURE — 700111 HCHG RX REV CODE 636 W/ 250 OVERRIDE (IP): Mod: JG | Performed by: EMERGENCY MEDICINE

## 2023-01-19 PROCEDURE — G0378 HOSPITAL OBSERVATION PER HR: HCPCS

## 2023-01-19 PROCEDURE — A9270 NON-COVERED ITEM OR SERVICE: HCPCS

## 2023-01-19 PROCEDURE — 99285 EMERGENCY DEPT VISIT HI MDM: CPT

## 2023-01-19 PROCEDURE — 85025 COMPLETE CBC W/AUTO DIFF WBC: CPT

## 2023-01-19 PROCEDURE — 80053 COMPREHEN METABOLIC PANEL: CPT

## 2023-01-19 PROCEDURE — 700111 HCHG RX REV CODE 636 W/ 250 OVERRIDE (IP)

## 2023-01-19 PROCEDURE — 700102 HCHG RX REV CODE 250 W/ 637 OVERRIDE(OP)

## 2023-01-19 PROCEDURE — 700111 HCHG RX REV CODE 636 W/ 250 OVERRIDE (IP): Performed by: RADIOLOGY

## 2023-01-19 PROCEDURE — 36415 COLL VENOUS BLD VENIPUNCTURE: CPT

## 2023-01-19 PROCEDURE — 90935 HEMODIALYSIS ONE EVALUATION: CPT

## 2023-01-19 PROCEDURE — 700101 HCHG RX REV CODE 250

## 2023-01-19 PROCEDURE — 36558 INSERT TUNNELED CV CATH: CPT

## 2023-01-19 RX ORDER — MIDAZOLAM HYDROCHLORIDE 1 MG/ML
INJECTION INTRAMUSCULAR; INTRAVENOUS
Status: COMPLETED
Start: 2023-01-19 | End: 2023-01-19

## 2023-01-19 RX ORDER — HEPARIN SODIUM 1000 [USP'U]/ML
INJECTION, SOLUTION INTRAVENOUS; SUBCUTANEOUS
Status: COMPLETED
Start: 2023-01-19 | End: 2023-01-19

## 2023-01-19 RX ORDER — MIDAZOLAM HYDROCHLORIDE 1 MG/ML
.5-2 INJECTION INTRAMUSCULAR; INTRAVENOUS PRN
Status: ACTIVE | OUTPATIENT
Start: 2023-01-19 | End: 2023-01-19

## 2023-01-19 RX ORDER — LIDOCAINE HYDROCHLORIDE 20 MG/ML
INJECTION, SOLUTION INFILTRATION; PERINEURAL
Status: COMPLETED
Start: 2023-01-19 | End: 2023-01-19

## 2023-01-19 RX ORDER — POLYETHYLENE GLYCOL 3350 17 G/17G
1 POWDER, FOR SOLUTION ORAL
Status: DISCONTINUED | OUTPATIENT
Start: 2023-01-19 | End: 2023-01-20 | Stop reason: HOSPADM

## 2023-01-19 RX ORDER — BISACODYL 10 MG
10 SUPPOSITORY, RECTAL RECTAL
Status: DISCONTINUED | OUTPATIENT
Start: 2023-01-19 | End: 2023-01-20 | Stop reason: HOSPADM

## 2023-01-19 RX ORDER — HEPARIN SODIUM 5000 [USP'U]/ML
5000 INJECTION, SOLUTION INTRAVENOUS; SUBCUTANEOUS EVERY 8 HOURS
Status: DISCONTINUED | OUTPATIENT
Start: 2023-01-19 | End: 2023-01-19

## 2023-01-19 RX ORDER — ATORVASTATIN CALCIUM 40 MG/1
40 TABLET, FILM COATED ORAL NIGHTLY
Status: DISCONTINUED | OUTPATIENT
Start: 2023-01-19 | End: 2023-01-20 | Stop reason: HOSPADM

## 2023-01-19 RX ORDER — SODIUM CHLORIDE 9 MG/ML
500 INJECTION, SOLUTION INTRAVENOUS
Status: ACTIVE | OUTPATIENT
Start: 2023-01-19 | End: 2023-01-19

## 2023-01-19 RX ORDER — BENZONATATE 100 MG/1
100 CAPSULE ORAL 3 TIMES DAILY PRN
Status: DISCONTINUED | OUTPATIENT
Start: 2023-01-19 | End: 2023-01-20 | Stop reason: HOSPADM

## 2023-01-19 RX ORDER — LIDOCAINE HYDROCHLORIDE AND EPINEPHRINE 10; 10 MG/ML; UG/ML
INJECTION, SOLUTION INFILTRATION; PERINEURAL
Status: COMPLETED
Start: 2023-01-19 | End: 2023-01-19

## 2023-01-19 RX ORDER — HEPARIN SODIUM 1000 [USP'U]/ML
3300 INJECTION, SOLUTION INTRAVENOUS; SUBCUTANEOUS
Status: DISCONTINUED | OUTPATIENT
Start: 2023-01-19 | End: 2023-01-20 | Stop reason: HOSPADM

## 2023-01-19 RX ORDER — ONDANSETRON 2 MG/ML
4 INJECTION INTRAMUSCULAR; INTRAVENOUS PRN
Status: ACTIVE | OUTPATIENT
Start: 2023-01-19 | End: 2023-01-19

## 2023-01-19 RX ORDER — METOPROLOL TARTRATE 50 MG/1
50 TABLET, FILM COATED ORAL DAILY
Status: SHIPPED | COMMUNITY
End: 2023-01-21

## 2023-01-19 RX ORDER — AMOXICILLIN 250 MG
2 CAPSULE ORAL 2 TIMES DAILY
Status: DISCONTINUED | OUTPATIENT
Start: 2023-01-19 | End: 2023-01-20 | Stop reason: HOSPADM

## 2023-01-19 RX ORDER — AMITRIPTYLINE HYDROCHLORIDE 10 MG/1
10 TABLET, FILM COATED ORAL 2 TIMES DAILY
Status: DISCONTINUED | OUTPATIENT
Start: 2023-01-19 | End: 2023-01-20 | Stop reason: HOSPADM

## 2023-01-19 RX ORDER — OMEPRAZOLE 20 MG/1
20 CAPSULE, DELAYED RELEASE ORAL 2 TIMES DAILY
Status: DISCONTINUED | OUTPATIENT
Start: 2023-01-19 | End: 2023-01-20 | Stop reason: HOSPADM

## 2023-01-19 RX ADMIN — ATORVASTATIN CALCIUM 40 MG: 40 TABLET, FILM COATED ORAL at 20:06

## 2023-01-19 RX ADMIN — OMEPRAZOLE 20 MG: 20 CAPSULE, DELAYED RELEASE ORAL at 19:53

## 2023-01-19 RX ADMIN — LIDOCAINE HYDROCHLORIDE: 20 INJECTION, SOLUTION INFILTRATION; PERINEURAL at 14:30

## 2023-01-19 RX ADMIN — MIDAZOLAM HYDROCHLORIDE 1 MG: 1 INJECTION, SOLUTION INTRAMUSCULAR; INTRAVENOUS at 15:03

## 2023-01-19 RX ADMIN — HEPARIN SODIUM 3300 UNITS: 1000 INJECTION, SOLUTION INTRAVENOUS; SUBCUTANEOUS at 18:19

## 2023-01-19 RX ADMIN — SITAGLIPTIN 25 MG: 25 TABLET, FILM COATED ORAL at 19:53

## 2023-01-19 RX ADMIN — MIDAZOLAM HYDROCHLORIDE 1 MG: 1 INJECTION, SOLUTION INTRAMUSCULAR; INTRAVENOUS at 15:15

## 2023-01-19 RX ADMIN — AMITRIPTYLINE HYDROCHLORIDE 10 MG: 10 TABLET, FILM COATED ORAL at 19:53

## 2023-01-19 RX ADMIN — HEPARIN SODIUM 3300 UNITS: 1000 INJECTION INTRAVENOUS; SUBCUTANEOUS at 18:19

## 2023-01-19 RX ADMIN — MIDAZOLAM HYDROCHLORIDE 1 MG: 1 INJECTION, SOLUTION INTRAMUSCULAR; INTRAVENOUS at 14:59

## 2023-01-19 RX ADMIN — METOPROLOL TARTRATE 50 MG: 25 TABLET, FILM COATED ORAL at 19:53

## 2023-01-19 RX ADMIN — HEPARIN SODIUM: 1000 INJECTION, SOLUTION INTRAVENOUS; SUBCUTANEOUS at 14:30

## 2023-01-19 RX ADMIN — UMECLIDINIUM BROMIDE AND VILANTEROL TRIFENATATE 1 PUFF: 62.5; 25 POWDER RESPIRATORY (INHALATION) at 19:53

## 2023-01-19 RX ADMIN — LIDOCAINE HYDROCHLORIDE,EPINEPHRINE BITARTRATE: 10; .01 INJECTION, SOLUTION INFILTRATION; PERINEURAL at 14:30

## 2023-01-19 RX ADMIN — ALTEPLASE 2 MG: 2.2 INJECTION, POWDER, LYOPHILIZED, FOR SOLUTION INTRAVENOUS at 08:08

## 2023-01-19 ASSESSMENT — LIFESTYLE VARIABLES
EVER FELT BAD OR GUILTY ABOUT YOUR DRINKING: NO
TOTAL SCORE: 0
TOTAL SCORE: 0
ALCOHOL_USE: NO
HAVE PEOPLE ANNOYED YOU BY CRITICIZING YOUR DRINKING: NO
HOW MANY TIMES IN THE PAST YEAR HAVE YOU HAD 5 OR MORE DRINKS IN A DAY: 0
CONSUMPTION TOTAL: NEGATIVE
HAVE YOU EVER FELT YOU SHOULD CUT DOWN ON YOUR DRINKING: NO
AVERAGE NUMBER OF DAYS PER WEEK YOU HAVE A DRINK CONTAINING ALCOHOL: 0
EVER HAD A DRINK FIRST THING IN THE MORNING TO STEADY YOUR NERVES TO GET RID OF A HANGOVER: NO
DOES PATIENT WANT TO STOP DRINKING: NO
ON A TYPICAL DAY WHEN YOU DRINK ALCOHOL HOW MANY DRINKS DO YOU HAVE: 0
TOTAL SCORE: 0

## 2023-01-19 ASSESSMENT — PATIENT HEALTH QUESTIONNAIRE - PHQ9
2. FEELING DOWN, DEPRESSED, IRRITABLE, OR HOPELESS: NOT AT ALL
SUM OF ALL RESPONSES TO PHQ9 QUESTIONS 1 AND 2: 0
1. LITTLE INTEREST OR PLEASURE IN DOING THINGS: NOT AT ALL

## 2023-01-19 ASSESSMENT — FIBROSIS 4 INDEX
FIB4 SCORE: 1.24
FIB4 SCORE: 1.1

## 2023-01-19 NOTE — PROGRESS NOTES
IR Nursing Note    Tunneled HD Catheter Placement by MD Matson assisted by RT Merlos, Right IJ/upper chest access site.  Patient was consented by MD and confirmed by this RN. Procedure site was marked by MD and verified using imaging guidance.  Patient was placed in a supone position.  Patient was prepped and draped in a sterile fashion. Vitals were taken every 5 minutes and remained stable during procedure (see doc flow sheet for results).  CO2 waveform capnography was monitored and remained 31-39 throughout procedure.  Line sutured secured. A biopatch, and tegaderm placed on catheter site and dermabond, steristrips, gauze and tegaderm on access site     Report given to MICHAEL Garcia.  Patient transported to Dialysis room via IR RN monitored then transferred care to report RN.    Glenn, Long-Term Hemodialysis Catheter, 14.5F/19cm:  REF: [id number]  LOT: [id number]  EXP: 05/31/2024

## 2023-01-19 NOTE — ASSESSMENT & PLAN NOTE
Patient has a clogged dialysis catheter.  He has no other symptoms.  He has electrolytes arrangements to be expected with a dialysis patient.  Potassium within normal limits.    Plan:    - IR consulted to replace CVC  - Nephrology consulted for dialysis after catheter placement.  -Family medicine admitting patient for catheter placement and then hemodialysis.  -Patient cleared to be discharged after dialysis.

## 2023-01-19 NOTE — ED NOTES
Pharmacy Medication Reconciliation    ~Med rec updated and complete per MAR- [facility]  ~Allergies have been verified and updated per MAR  ~No oral ABX within the last 30 days

## 2023-01-19 NOTE — H&P
[facility] MEDICINE HISTORY AND PHYSICAL     PATIENT ID:  NAME:  Yoandy Peace  MRN:               [id number]  YOB: 1955    Date of Admission: 1/19/2023     Attending: Satya Torrez M.d.  Primary Care Physician:  Ha Diehl M.D.    CC:    Chief Complaint   Patient presents with    Other     Dialysis access is not working today. Per pt report, the dialysis RN attempted to clear the line with activase, but was unsuccessful.       HPI: Yoandy Peace is a 67 y.o. male who presented with a clotted dialysis catheter.  The patient was at dialysis clinic today to get his regularly scheduled Tuesday Thursday Saturday dialysis and the dialysis nurses were unable to unclog the catheter with tPA.  He was sent to the ER for possible catheter placement.  The patient denies any fevers, chills, chest pain, shortness of breath.  The patient reports that he is here for this replacement.      ERCourse:  [facility] family admitting.  Nephro consulted.  IR consulted.    REVIEW OF SYSTEMS:   Ten systems reviewed and were negative except as noted in the HPI.                PAST MEDICAL HISTORY:  Past Medical History:   Diagnosis Date    CAD (coronary artery disease)     CHF (congestive heart failure) (HCC)     Chronic obstructive pulmonary disease (HCC)     Diabetes (HCC)     Hyperlipidemia     Hypertension     ALONDRA on CPAP     HS    Pneumonia     Tobacco abuse     Type II or unspecified type diabetes mellitus without mention of complication, not stated as uncontrolled        PAST SURGICAL HISTORY:  Past Surgical History:   Procedure Laterality Date    MD UPPER GI ENDOSCOPY,DIAGNOSIS N/A 1/11/2023    Procedure: GASTROSCOPY;  Surgeon: Josette Ludwig M.D.;  Location: SURGERY SAME DAY [facility];  Service: Gastroenterology    MD UPPER GI ENDOSCOPY,BIOPSY N/A 1/11/2023    Procedure: GASTROSCOPY, WITH BIOPSY;  Surgeon: Josette Ludwig M.D.;  Location: SURGERY SAME DAY [facility];  Service: Gastroenterology       [facility]  HISTORY:  History reviewed. No pertinent family history.    SOCIAL HISTORY:   Social History     Socioeconomic History    Marital status:      Spouse name: Not on file    Number of children: Not on file    Years of education: Not on file    Highest education level: Not on file   Occupational History    Not on file   Tobacco Use    Smoking status: Every Day     Packs/day: 1.00     Types: Cigarettes    Smokeless tobacco: Never   Vaping Use    Vaping Use: Never used   Substance and Sexual Activity    Alcohol use: Not Currently    Drug use: Yes     Types: Inhaled     Comment: THC daily    Sexual activity: Not on file   Other Topics Concern    Not on file   Social History Narrative    ** Merged History Encounter **          Social Determinants of Health     Financial Resource Strain: Not on file   Food Insecurity: Not on file   Transportation Needs: Not on file   Physical Activity: Not on file   Stress: Not on file   Social Connections: Not on file   Intimate Partner Violence: Not on file   Housing Stability: Not on file       DIET:   Orders Placed This Encounter   Procedures    Diet NPO Restrict to: Sips with Medications     Standing Status:   Standing     Number of Occurrences:   1     Order Specific Question:   Diet NPO Restrict to:     Answer:   Sips with Medications [3]       ALLERGIES:  No Known Allergies    OUTPATIENT MEDICATIONS:    Current Facility-Administered Medications:     senna-docusate (PERICOLACE or SENOKOT S) 8.6-50 MG per tablet 2 Tablet, 2 Tablet, Oral, BID **AND** polyethylene glycol/lytes (MIRALAX) PACKET 1 Packet, 1 Packet, Oral, QDAY PRN **AND** [DISCONTINUED] magnesium hydroxide (MILK OF MAGNESIA) suspension 30 mL, 30 mL, Oral, QDAY PRN **AND** bisacodyl (DULCOLAX) suppository 10 mg, 10 mg, Rectal, QDAY PRN, Satya Torrez M.D.    [START ON 1/20/2023] epoetin (Retacrit) 7,000 Units injection (Dialysis Use Only), 7,000 Units, Intravenous, MO, WE + FR, Georgia Blanchard A.P.R.N.    HEPARIN  SODIUM (PORCINE) 1000 UNIT/ML INJ SOLN, , , ,     NS (BOLUS) infusion 500 mL, 500 mL, Intravenous, Once PRN, Dillon Matson M.D.    midazolam (Versed) injection 0.5-2 mg, 0.5-2 mg, Intravenous, PRN, Dillon Matson M.D., 1 mg at 23 1559    fentaNYL (SUBLIMAZE) injection 12.5-50 mcg, 12.5-50 mcg, Intravenous, PRN, Dillon Matson M.D.    ondansetron (ZOFRAN) syringe/vial injection 4 mg, 4 mg, Intravenous, PRN, Dillon Matson M.D.    PHYSICAL EXAM:  Vitals:    23 1505 23 1510 23 1515 23 1520   BP: 125/70 115/72 126/86 123/89   Pulse: (!) 107 (!) 117 (!) 103 (!) 118   Resp: 13 18 (!) 28 14   Temp:       TempSrc:       SpO2: 94% 96% 95% 96%   Weight:       Height:       , Temp (24hrs), Av.2 °C (97.2 °F), Min:36 °C (96.8 °F), Max:36.4 °C (97.6 °F)  , Pulse Oximetry: 96 %, O2 (LPM): 2, O2 Delivery Device: Nasal Cannula      General: Pt resting in NAD, cooperative   Skin:  Pink, warm and dry.  No rashes tunneled catheter in right anterior chest clean dry and intact.  No erythema but no bleeding.  HEENT: NC/AT. PERRL. EOMI. MMM. No nasal discharge. Oropharynx nonerythematous without exudate/plaques  Neck:  Supple without lymphadenopathy or rigidity.  Lungs:  Symmetrical.  CTAB with no adventitious breath sounds.  Good air movement   Cardiovascular:  Normal S1/S2, RRR without M/R/G.  Abdomen:  BS+, Soft, NT/ND. No masses noted.  Extremities:  Full range of motion. No gross deformities noted. 2+ pulses in all extremities. No C/C/E   Spine:  Straight without vertebral anomalies.  CNS:  A&Ox4, follows commands, Strength 5/5 in all extremities.         LAB TESTS:   Admission on 2023   Component Date Value Ref Range Status    WBC 2023 7.2  4.8 - 10.8 K/uL Final    RBC 2023 3.12 (L)  4.70 - 6.10 M/uL Final    Hemoglobin 2023 8.6 (L)  14.0 - 18.0 g/dL Final    Hematocrit 2023 29.0 (L)  42.0 - 52.0 % Final    MCV 2023 92.9  81.4 - 97.8 fL  Final    MCH 01/19/2023 27.6  27.0 - 33.0 pg Final    MCHC 01/19/2023 29.7 (L)  33.7 - 35.3 g/dL Final    RDW 01/19/2023 62.0 (H)  35.9 - 50.0 fL Final    Platelet Count 01/19/2023 234  164 - 446 K/uL Final    MPV 01/19/2023 9.6  9.0 - 12.9 fL Final    Neutrophils-Polys 01/19/2023 65.20  44.00 - 72.00 % Final    Lymphocytes 01/19/2023 23.30  22.00 - 41.00 % Final    Monocytes 01/19/2023 9.00  0.00 - 13.40 % Final    Eosinophils 01/19/2023 1.10  0.00 - 6.90 % Final    Basophils 01/19/2023 0.40  0.00 - 1.80 % Final    Immature Granulocytes 01/19/2023 1.00 (H)  0.00 - 0.90 % Final    Nucleated RBC 01/19/2023 0.00  /100 WBC Final    Neutrophils (Absolute) 01/19/2023 4.69  1.82 - 7.42 K/uL Final    Includes immature neutrophils, if present.    Lymphs (Absolute) 01/19/2023 1.68  1.00 - 4.80 K/uL Final    Monos (Absolute) 01/19/2023 0.65  0.00 - 0.85 K/uL Final    Eos (Absolute) 01/19/2023 0.08  0.00 - 0.51 K/uL Final    Baso (Absolute) 01/19/2023 0.03  0.00 - 0.12 K/uL Final    Immature Granulocytes (abs) 01/19/2023 0.07  0.00 - 0.11 K/uL Final    NRBC (Absolute) 01/19/2023 0.00  K/uL Final    Hypochromia 01/19/2023 1+   Final    Anisocytosis 01/19/2023 1+   Final    Macrocytosis 01/19/2023 1+   Final    Sodium 01/19/2023 138  135 - 145 mmol/L Final    Potassium 01/19/2023 4.0  3.6 - 5.5 mmol/L Final    Chloride 01/19/2023 100  96 - 112 mmol/L Final    Co2 01/19/2023 28  20 - 33 mmol/L Final    Anion Gap 01/19/2023 10.0  7.0 - 16.0 Final    Glucose 01/19/2023 144 (H)  65 - 99 mg/dL Final    Bun 01/19/2023 25 (H)  8 - 22 mg/dL Final    Creatinine 01/19/2023 4.79 (HH)  0.50 - 1.40 mg/dL Final    Calcium 01/19/2023 8.1 (L)  8.5 - 10.5 mg/dL Final    AST(SGOT) 01/19/2023 20  12 - 45 U/L Final    ALT(SGPT) 01/19/2023 22  2 - 50 U/L Final    Alkaline Phosphatase 01/19/2023 80  30 - 99 U/L Final    Total Bilirubin 01/19/2023 0.5  0.1 - 1.5 mg/dL Final    Albumin 01/19/2023 3.1 (L)  3.2 - 4.9 g/dL Final    Total Protein  01/19/2023 6.1  6.0 - 8.2 g/dL Final    Globulin 01/19/2023 3.0  1.9 - 3.5 g/dL Final    A-G Ratio 01/19/2023 1.0  g/dL Final    Correct Calcium 01/19/2023 8.8  8.5 - 10.5 mg/dL Final    GFR (CKD-EPI) 01/19/2023 12 (A)  >60 mL/min/1.73 m 2 Final    Comment: Estimated Glomerular Filtration Rate is calculated using  race neutral CKD-EPI 2021 equation per NKF-ASN recommendations.      Peripheral Smear Review 01/19/2023 see below   Final    Comment: Due to instrument suspect flags, further review of peripheral smear is  indicated on this patient sample. This review may or may not result in  abnormal findings.      Plt Estimation 01/19/2023 Normal   Final    RBC Morphology 01/19/2023 Present   Final    Comments-Diff 01/19/2023 see below   Final    Results have been verified by peripheral blood smear review.    PT 01/19/2023 15.2 (H)  12.0 - 14.6 sec Final    INR 01/19/2023 1.21 (H)  0.87 - 1.13 Final    Comment: INR - Non-therapeutic Reference Range: 0.87-1.13  INR - Therapeutic Reference Range: 2.0-4.0      APTT 01/19/2023 28.3  24.7 - 36.0 sec Final        CULTURES:   Results       ** No results found for the last 168 hours. **            IMAGES:  IR-CVC TUNNEL WITH PORT EXCHANGE    (Results Pending)       CONSULTS:   Nephro  IR    ASSESSMENT/PLAN: 67 y.o. male admitted for.    * Complication associated with dialysis catheter- (present on admission)  Assessment & Plan  Patient has a clogged dialysis catheter.  He has no other symptoms.  He has electrolytes arrangements to be expected with a dialysis patient.  Potassium within normal limits.    Plan:    - IR consulted to replace CVC  - Nephrology consulted for dialysis after catheter placement.  -Family medicine admitting patient for catheter placement and then hemodialysis.  -Patient cleared to be discharged after dialysis.          Core Measures:  Fluids: None  Lines: CVC placed, IV  Abx: None  Diet: NPO for procedure  PPX: None  DISPO: Obs. Discharge after  patient    CODE STATUS: Full        Hao Andino MD  PGY2  [facility]

## 2023-01-19 NOTE — CARE PLAN
Problem: Knowledge Deficit - Standard  Goal: Patient and family/care givers will demonstrate understanding of plan of care, disease process/condition, diagnostic tests and medications  Outcome: Progressing   The patient is Stable - Low risk of patient condition declining or worsening         Progress made toward(s) clinical / shift goals:  n/a    Patient is not progressing towards the following goals:

## 2023-01-19 NOTE — PROGRESS NOTES
Received pt lying on gurney, not in distress. Pt is vitally stable. Pt reported he's scheduled for routine hemodialysis today and was sent out by his outpatient dialysis clinic due to catheter malfunction, they attempted Alteplase but didn't work. Right chest catheter with clean, dry and intact dressing. Sluggish outflow on both catheter ports. Instilled Alteplase intracatheter at 08:10am and will dwell for an hour. Ports clamped and capped. Procedures explained to the pt and verbalized good understanding. S/E by hospitalist. Primary RN aware.

## 2023-01-19 NOTE — ED TRIAGE NOTES
"Chief Complaint   Patient presents with    Other     Dialysis access is not working today. Per pt report, the dialysis RN attempted to clear the line with activase, but was unsuccessful.      Pt BIB EMS from [facility] for the above complaint. Pt had Dialysis catheter paced on 01/01/23 and was last accessed on Tuesday. Pt is an inpatient at [facility] for rehab.     /71   Pulse 90   Temp 36.4 °C (97.6 °F) (Temporal)   Resp 17   Ht 1.778 m (5' 10\")   Wt (!) 136 kg (300 lb)   SpO2 98%   BMI 43.05 kg/m²    "

## 2023-01-19 NOTE — PROGRESS NOTES
[facility] Progress Note    Pt,s R chest tunneled HD cath aspirated, sluggish on both ports, flushing well with saline then locked with heparin 1000 units/mL per designated amount in each wing then clamped and capped. Aspirate heparin prior to next CVC use. Dr Case, Nephrologist notified.    Report given to Primary RN.

## 2023-01-19 NOTE — OR SURGEON
Immediate Post- Operative Note        Findings: RT IJV patent, new Hemosplit 19 cm catheter placed, ready for use      Procedure(s): Rt IJV dialysis catheter placement      Estimated Blood Loss: ~ 20  ml        Complications: None            1/19/2023     3:23 PM     Dillon Matson M.D.

## 2023-01-19 NOTE — CONSULTS
".[facility] -  CONSULTATION NOTE               Author: NATALIO Diaz Date & Time: 2023  12:14 PM       REASON FOR CONSULTATION:   - Inpatient hemodialysis management.    CHIEF COMPLAINT:   -  \"dialysis catheter clogged\"    HISTORY OF PRESENT ILLNESS:    Yoandy Peace is a 67 y.o. male with PMH + COPD, CAD, CHF, ALONDRA/OHS on night time CPAP, FARIHA on hemodialysis who presents from a skilled nursing facility after apparent issue with his newly placed right chest dialysis catheter.    He was recently hospitalized for COPD exacerbation ultimately went into atrial fibrillation got started on Eliquis subsequently developed renal failure was on dialysis then developed a GI bleed.  Upper endoscopy demonstrated duodenal ulcer.  Anticoagulants were withheld he was transfused and dialyzed and subsequently stabilized after blood transfusions.  He was scheduled for his planned dialysis but nursing staff with dialysis team reported that his catheter was \"clogged.  We have been asked to see him regarding his FARIHA and hemodialysis needs.  Pt did get TPA instilled in ER this am with no significant improvement in flow of CVC.    He is laying in gurney in ER, he feels fine, reports he goes to [facility] and recently started on hemodialysis.  He has trace BLE edema and denies SOB, NVD    No F/C/N/V/CP/SOB.  No melena, hematochezia, hematemesis.  No HA, visual changes, or abdominal pain.      REVIEW OF SYSTEMS:    10 point ROS was performed and is as per HPI or otherwise negative    PAST MEDICAL HISTORY:   - FARIHA dialysis dependent   - HTN  - Anemia of CKD  - CKD-MBD  - CAD  - COPD  - CHF  - DM II  - Hyperlipidemia   - ALONDRA on CPAP  - hx of PNA  - Cardiac angioplasty and stent placement     PAST SURGICAL HISTORY:   - EGD  - CVC placement   - Cardiac stent placement     FAMILY HISTORY:   - mother -  from cancer  - father - HTN, CAD     SOCIAL HISTORY:   - + tobacco use, 1 pack per day   - No " "EtOH  - remote hx of methamphetamine use     HOME MEDICATIONS:   - Reviewed and documented in chart    LABORATORY STUDIES:   - Reviewed and documented in chart    ALLERGIES:  Patient has no known allergies.    VS:  /56   Pulse 91   Temp 36.4 °C (97.6 °F) (Temporal)   Resp 18   Ht 1.778 m (5' 10\")   Wt (!) 136 kg (300 lb)   SpO2 100%   BMI 43.05 kg/m²   Physical Exam  Constitutional:       Appearance: Normal appearance. He is obese.   HENT:      Head: Normocephalic and atraumatic.      Nose: Nose normal.      Mouth/Throat:      Mouth: Mucous membranes are moist.      Pharynx: Oropharynx is clear.   Eyes:      Extraocular Movements: Extraocular movements intact.      Conjunctiva/sclera: Conjunctivae normal.      Pupils: Pupils are equal, round, and reactive to light.   Cardiovascular:      Rate and Rhythm: Normal rate and regular rhythm.      Comments: R CVC  Pulmonary:      Effort: Pulmonary effort is normal.      Breath sounds: Normal breath sounds.      Comments: BS diminished in bases   Abdominal:      General: Abdomen is flat. Bowel sounds are normal.      Palpations: Abdomen is soft.   Musculoskeletal:         General: Normal range of motion.      Cervical back: Normal range of motion and neck supple.      Comments: Trace BLE edema    Skin:     General: Skin is warm and dry.      Comments: BLE cellulitis in different states of healing    Neurological:      General: No focal deficit present.      Mental Status: He is alert and oriented to person, place, and time. Mental status is at baseline.   Psychiatric:         Mood and Affect: Mood normal.         Behavior: Behavior normal.         Thought Content: Thought content normal.         Judgment: Judgment normal.          FLUID BALANCE:  No intake/output data recorded.    LABS:  Recent Labs     01/19/23  0805   SODIUM 138   POTASSIUM 4.0   CHLORIDE 100   CO2 28   GLUCOSE 144*   BUN 25*   CREATININE 4.79*   CALCIUM 8.1*        IMAGING:  - All imaging " reviewed from admission to present day    IMPRESSION:  # FARIHA, non-oliguric   - Etiology likely multi-factorial   # HTN, controlled   - Goal BP < 140/90  - no BP meds at this time  # Anemia of CKD, sub-optimal control   - Goal Hgb 10-11  - check iron stores  - start CARA with HD   # CKD-MBD  - managed at outpt clinic   # Malfunctioning CVC   - will get exchange today  # COPD  # DM II  # CAD  # OSAS    - CPAP at night     PLAN:  - CVC exchange and then iHD   - iHD q TThS and prn   - UF as tolerated  - No dietary protein restrictions  - Dose all meds per ESRD  - Check iron stores   - start CARA with iHD   - Avoid NSAIDs, nephrotoxins   - Okay to DC after CVC exchange and iHD when medically cleared     Thank you for the consultation!

## 2023-01-19 NOTE — ED NOTES
Assumed care of pt. Pt resting comfortably. VSS. NAD. Blood samples collected and sent to lab. Dialysis RN at bedside.

## 2023-01-19 NOTE — ED PROVIDER NOTES
"ED Provider Note    CHIEF COMPLAINT  Chief Complaint   Patient presents with    Other     Dialysis access is not working today. Per pt report, the dialysis RN attempted to clear the line with activase, but was unsuccessful.       EXTERNAL RECORDS REVIEWED  Reviewed outpatient laboratory studies, inpatient consults, procedure notes from recent extensive hospitalization    HPI/ROS  LIMITATION TO HISTORY   None  OUTSIDE HISTORIAN(S):  None    Yoandy Peace is a 67 y.o. male who presents from a skilled nursing facility after apparent issue with his newly placed right chest dialysis catheter.  The patient had a complex medical history as listed below.  He was recently hospitalized for COPD exacerbation ultimately went into atrial fibrillation got started on Eliquis subsequently developed renal failure was on dialysis then developed a GI bleed.  Upper endoscopy demonstrated duodenal ulcer.  Anticoagulants were withheld he was transfused and dialyzed and subsequently stabilized after blood transfusions.  He was scheduled for his planned dialysis but nursing staff with dialysis team reported that his catheter was \"clogged.  \"He was transferred here for higher level of care.  He denies high fevers chills black or bloody stools night sweats or weight loss    PAST MEDICAL HISTORY   has a past medical history of CAD (coronary artery disease), CHF (congestive heart failure) (HCC), Chronic obstructive pulmonary disease (HCC), Diabetes (HCC), Hyperlipidemia, Hypertension, ALONDRA on CPAP, Pneumonia, Tobacco abuse, and Type II or unspecified type diabetes mellitus without mention of complication, not stated as uncontrolled.    SURGICAL HISTORY   has a past surgical history that includes upper gi endoscopy,diagnosis (N/A, 1/11/2023) and upper gi endoscopy,biopsy (N/A, 1/11/2023).    FAMILY HISTORY  History reviewed. No pertinent family history.  Noncontributory  SOCIAL HISTORY  Social History     Tobacco Use    Smoking status: Every " "Day     Packs/day: 1.00     Types: Cigarettes    Smokeless tobacco: Never   Vaping Use    Vaping Use: Never used   Substance and Sexual Activity    Alcohol use: Not Currently    Drug use: Yes     Types: Inhaled     Comment: THC daily    Sexual activity: Not on file       CURRENT MEDICATIONS  Home Medications       Reviewed by Lucy Caputo R.N. (Registered Nurse) on 01/19/23 at 0719  Med List Status: Not Addressed     Medication Last Dose Status   albuterol 108 (90 Base) MCG/ACT Aero Soln inhalation aerosol  Active   albuterol 108 (90 Base) MCG/ACT Aero Soln inhalation aerosol  Active   amitriptyline (ELAVIL) 10 MG Tab  Active   amitriptyline (ELAVIL) 10 MG Tab  Active   amLODIPine (NORVASC) 10 MG Tab  Active   aspirin 81 MG EC tablet  Active   atorvastatin (LIPITOR) 40 MG Tab  Active   atorvastatin (LIPITOR) 40 MG Tab  Active   Dapagliflozin Propanediol (FARXIGA) 5 MG Tab  Active   finasteride (PROSCAR) 5 MG TABS  Active   fluticasone-umeclidin-vilant (TRELEGY ELLIPTA) 100-62.5-25 MCG/ACT AEROSOL POWDER, BREATH ACTIVATED inhalation  Active   linagliptin (TRADJENTA) 5 MG Tab tablet  Active   linagliptin (TRADJENTA) 5 MG Tab tablet  Active   metformin (GLUCOPHAGE) 1000 MG tablet  Active   metoprolol (LOPRESSOR) 25 MG TABS  Active   metoprolol tartrate (LOPRESSOR) 50 MG Tab  Active   omeprazole (PRILOSEC) 20 MG delayed-release capsule  Active   tamsulosin (FLOMAX) 0.4 MG capsule  Active   umeclidinium-vilanterol (ANORO ELLIPTA) 62.5-25 MCG/ACT AEROSOL POWDER, BREATH ACTIVATED inhaler  Active   vitamin D (CHOLECALCIFEROL) 1000 UNIT TABS  Active                    ALLERGIES  Not on File    PHYSICAL EXAM  VITAL SIGNS: /65   Pulse 76   Temp 36.4 °C (97.6 °F) (Temporal)   Resp 19   Ht 1.778 m (5' 10\")   Wt (!) 136 kg (300 lb)   SpO2 97%   BMI 43.05 kg/m²    Pulse ox interpretation: I interpret this pulse ox as normal.  Constitutional: Alert and oriented x 3, No Distress patient appears chronically " ill  HEENT: Atraumatic normocephalic, pupils are equal round reactive to light extraocular movements are intact. The nares is clear, external ears are normal, mouth shows moist mucous membranes normal dentition for age  Neck: Supple, no JVD no tracheal deviation  Cardiovascular: Regular rate and rhythm no murmur rub or gallop 2+ pulses peripherally x4  Thorax & Lungs: No respiratory distress, no wheezes rales or rhonchi, No chest tenderness.  Tunneled catheter in the right anterior chest is clean dry and intact no erythema or bleeding  GI: Soft nontender nondistended positive bowel sounds, no peritoneal signs  Skin: Warm dry no acute rash or lesion  Musculoskeletal: Moving all extremities with full range and 5 of 5 strength no acute  deformity  Neurologic: Cranial nerves III through XII are grossly intact no sensory deficit no cerebellar dysfunction   Psychiatric: Appropriate affect for situation at this time          DIAGNOSTIC STUDIES / PROCEDURES    LABS  Results for orders placed or performed during the hospital encounter of 01/19/23   CBC WITH DIFFERENTIAL   Result Value Ref Range    WBC 7.2 4.8 - 10.8 K/uL    RBC 3.12 (L) 4.70 - 6.10 M/uL    Hemoglobin 8.6 (L) 14.0 - 18.0 g/dL    Hematocrit 29.0 (L) 42.0 - 52.0 %    MCV 92.9 81.4 - 97.8 fL    MCH 27.6 27.0 - 33.0 pg    MCHC 29.7 (L) 33.7 - 35.3 g/dL    RDW 62.0 (H) 35.9 - 50.0 fL    Platelet Count 234 164 - 446 K/uL    MPV 9.6 9.0 - 12.9 fL    Neutrophils-Polys 65.20 44.00 - 72.00 %    Lymphocytes 23.30 22.00 - 41.00 %    Monocytes 9.00 0.00 - 13.40 %    Eosinophils 1.10 0.00 - 6.90 %    Basophils 0.40 0.00 - 1.80 %    Immature Granulocytes 1.00 (H) 0.00 - 0.90 %    Nucleated RBC 0.00 /100 WBC    Neutrophils (Absolute) 4.69 1.82 - 7.42 K/uL    Lymphs (Absolute) 1.68 1.00 - 4.80 K/uL    Monos (Absolute) 0.65 0.00 - 0.85 K/uL    Eos (Absolute) 0.08 0.00 - 0.51 K/uL    Baso (Absolute) 0.03 0.00 - 0.12 K/uL    Immature Granulocytes (abs) 0.07 0.00 - 0.11 K/uL     NRBC (Absolute) 0.00 K/uL    Hypochromia 1+     Anisocytosis 1+     Macrocytosis 1+    Comp Metabolic Panel   Result Value Ref Range    Sodium 138 135 - 145 mmol/L    Potassium 4.0 3.6 - 5.5 mmol/L    Chloride 100 96 - 112 mmol/L    Co2 28 20 - 33 mmol/L    Anion Gap 10.0 7.0 - 16.0    Glucose 144 (H) 65 - 99 mg/dL    Bun 25 (H) 8 - 22 mg/dL    Creatinine 4.79 (HH) 0.50 - 1.40 mg/dL    Calcium 8.1 (L) 8.5 - 10.5 mg/dL    AST(SGOT) 20 12 - 45 U/L    ALT(SGPT) 22 2 - 50 U/L    Alkaline Phosphatase 80 30 - 99 U/L    Total Bilirubin 0.5 0.1 - 1.5 mg/dL    Albumin 3.1 (L) 3.2 - 4.9 g/dL    Total Protein 6.1 6.0 - 8.2 g/dL    Globulin 3.0 1.9 - 3.5 g/dL    A-G Ratio 1.0 g/dL   CORRECTED CALCIUM   Result Value Ref Range    Correct Calcium 8.8 8.5 - 10.5 mg/dL   ESTIMATED GFR   Result Value Ref Range    GFR (CKD-EPI) 12 (A) >60 mL/min/1.73 m 2   PERIPHERAL SMEAR REVIEW   Result Value Ref Range    Peripheral Smear Review see below    PLATELET ESTIMATE   Result Value Ref Range    Plt Estimation Normal    MORPHOLOGY   Result Value Ref Range    RBC Morphology Present    DIFFERENTIAL COMMENT   Result Value Ref Range    Comments-Diff see below          COURSE & MEDICAL DECISION MAKING      INITIAL ASSESSMENT AND PLAN  Care Narrative: This is a very pleasant 67-year-old gentleman who presents here for complications from a recently placed dialysis catheter.  I reviewed his extensive records including his hospitalization which was notable for COPD exacerbation with the development of atrial fibrillation and subsequent anticoagulation then development of renal failure and significant GI bleeding.  Consultation with nephrology nurses was obtained.  They came to the bedside and instilled microdose of alteplase into the catheter light and still for an hour and was still unable to draw enough blood to successfully dialyze the patient.  At this point we will admit the patient to his established care service [facility], I  will consult with interventional radiology to swap out the dialysis catheter.  Also will consult with nephrology to coordinate dialysis after new catheter has been placed.  The patient will be admitted in stable condition.  Metabolic panel here does not demonstrate any hyperkalemia or severe metabolic acidosis to suggest the need for treatment of hyperkalemia or acidosis.  He will be admitted to [facility]    ADDITIONAL PROBLEM LIST AND DISPOSITION  I have discussed management of the patient with the following physicians and YASHIRA's: Consultation with admitting team, interventional radiology and nephrology    Discussion of management with other [facility] or appropriate source(s): Consultation with admitting family medicine team, nephrology, as well as interventional radiology            FINAL DIAGNOSIS  Encounter for complication of tunneled dialysis catheter  2.   Chronic renal insufficiency/failure requiring dialysis   3.    chronic stable anemia           Electronically signed by: Buddy Miller M.D., 1/19/2023 8:07 AM

## 2023-01-20 VITALS
BODY MASS INDEX: 42.96 KG/M2 | OXYGEN SATURATION: 98 % | WEIGHT: 300.05 LBS | HEIGHT: 70 IN | TEMPERATURE: 97.1 F | HEART RATE: 92 BPM | DIASTOLIC BLOOD PRESSURE: 61 MMHG | RESPIRATION RATE: 18 BRPM | SYSTOLIC BLOOD PRESSURE: 114 MMHG

## 2023-01-20 PROBLEM — T82.9XXA COMPLICATION ASSOCIATED WITH DIALYSIS CATHETER: Status: RESOLVED | Noted: 2023-01-19 | Resolved: 2023-01-20

## 2023-01-20 LAB
ERYTHROCYTE [DISTWIDTH] IN BLOOD BY AUTOMATED COUNT: 61.4 FL (ref 35.9–50)
HCT VFR BLD AUTO: 30 % (ref 42–52)
HGB BLD-MCNC: 8.6 G/DL (ref 14–18)
HGB RETIC QN AUTO: 24.7 PG/CELL (ref 29–35)
IMM RETICS NFR: 34.2 % (ref 9.3–17.4)
IRON SATN MFR SERPL: 19 % (ref 15–55)
IRON SERPL-MCNC: 36 UG/DL (ref 50–180)
MCH RBC QN AUTO: 26.6 PG (ref 27–33)
MCHC RBC AUTO-ENTMCNC: 28.7 G/DL (ref 33.7–35.3)
MCV RBC AUTO: 92.9 FL (ref 81.4–97.8)
PHOSPHATE SERPL-MCNC: 2.7 MG/DL (ref 2.5–4.5)
PLATELET # BLD AUTO: 225 K/UL (ref 164–446)
PMV BLD AUTO: 9.9 FL (ref 9–12.9)
RBC # BLD AUTO: 3.23 M/UL (ref 4.7–6.1)
RETICS # AUTO: 0.13 M/UL (ref 0.04–0.06)
RETICS/RBC NFR: 4.4 % (ref 0.8–2.1)
TIBC SERPL-MCNC: 185 UG/DL (ref 250–450)
UIBC SERPL-MCNC: 149 UG/DL (ref 110–370)
WBC # BLD AUTO: 7.4 K/UL (ref 4.8–10.8)

## 2023-01-20 PROCEDURE — 36415 COLL VENOUS BLD VENIPUNCTURE: CPT

## 2023-01-20 PROCEDURE — 99239 HOSP IP/OBS DSCHRG MGMT >30: CPT | Performed by: NURSE PRACTITIONER

## 2023-01-20 PROCEDURE — 84100 ASSAY OF PHOSPHORUS: CPT

## 2023-01-20 PROCEDURE — 700102 HCHG RX REV CODE 250 W/ 637 OVERRIDE(OP)

## 2023-01-20 PROCEDURE — 85046 RETICYTE/HGB CONCENTRATE: CPT

## 2023-01-20 PROCEDURE — G0378 HOSPITAL OBSERVATION PER HR: HCPCS

## 2023-01-20 PROCEDURE — 83540 ASSAY OF IRON: CPT

## 2023-01-20 PROCEDURE — 85027 COMPLETE CBC AUTOMATED: CPT

## 2023-01-20 PROCEDURE — 83550 IRON BINDING TEST: CPT

## 2023-01-20 PROCEDURE — A9270 NON-COVERED ITEM OR SERVICE: HCPCS

## 2023-01-20 RX ADMIN — METOPROLOL TARTRATE 50 MG: 25 TABLET, FILM COATED ORAL at 05:25

## 2023-01-20 RX ADMIN — OMEPRAZOLE 20 MG: 20 CAPSULE, DELAYED RELEASE ORAL at 05:25

## 2023-01-20 RX ADMIN — AMITRIPTYLINE HYDROCHLORIDE 10 MG: 10 TABLET, FILM COATED ORAL at 05:25

## 2023-01-20 ASSESSMENT — PATIENT HEALTH QUESTIONNAIRE - PHQ9
SUM OF ALL RESPONSES TO PHQ9 QUESTIONS 1 AND 2: 0
2. FEELING DOWN, DEPRESSED, IRRITABLE, OR HOPELESS: NOT AT ALL
1. LITTLE INTEREST OR PLEASURE IN DOING THINGS: NOT AT ALL

## 2023-01-20 ASSESSMENT — PAIN DESCRIPTION - PAIN TYPE: TYPE: ACUTE PAIN

## 2023-01-20 NOTE — PROGRESS NOTES
GMT here. Report called to Jenae at [facility]. Patient transported via  to van. NAD.

## 2023-01-20 NOTE — DISCHARGE INSTRUCTIONS
FOLLOW UP ITEMS POST DISCHARGE  Please call [phone] to schedule PCP appointment for patient.    Required specialty appointments include:       Discharge Instructions per Crystal Davis, NIESHAPMaykelRMaykelN.    -Follow-up with PCP s/p hospitalization  -Follow-up with nephrology as indicated  -Continue established dialysis treatment on TTH S  -Resume all home medications    DIET: Cardiac, renal    ACTIVITY: As tolerated    DIAGNOSIS: Dialysis catheter malfunction    Return to ER if symptoms persist, chest pain, palpitations, shortness of breath, numbness, tingling, weakness, and high fevers.

## 2023-01-20 NOTE — PROGRESS NOTES
"[facility] -  PROGRESS NOTE               Author: Jose Case M.D. Date & Time: 1/20/2023  1:37 PM     HPI:  Yoandy Peace is a 67 y.o. male with PMH + COPD, CAD, CHF, ALONDRA/OHS on night time CPAP, FARIHA on hemodialysis who presents from a skilled nursing facility after apparent issue with his newly placed right chest dialysis catheter.    He was recently hospitalized for COPD exacerbation ultimately went into atrial fibrillation got started on Eliquis subsequently developed renal failure was on dialysis then developed a GI bleed.  Upper endoscopy demonstrated duodenal ulcer.  Anticoagulants were withheld he was transfused and dialyzed and subsequently stabilized after blood transfusions.  He was scheduled for his planned dialysis but nursing staff with dialysis team reported that his catheter was \"clogged.  We have been asked to see him regarding his FARIHA and hemodialysis needs.  Pt did get TPA instilled in ER this am with no significant improvement in flow of CVC.    He is laying in [employer] in ER, he feels fine, reports he goes to [facility] and recently started on hemodialysis.  He has trace BLE edema and denies SOB, NVD     No F/C/N/V/CP/SOB.  No melena, hematochezia, hematemesis.  No HA, visual changes, or abdominal pain.    DAILY NEPHROLOGY SUMMARY:  1/20: Got HD yesterday.  Denies pain or SOB    REVIEW OF SYSTEMS:    10 point ROS reviewed and is as per HPI/daily summary or otherwise negative    PMH/PSH/SH/FH:   Reviewed and unchanged since admission note    CURRENT MEDICATIONS:   Reviewed from admission to present day    VS:  /61   Pulse 92   Temp 36.2 °C (97.1 °F) (Temporal)   Resp 18   Ht 1.778 m (5' 10\")   Wt (!) 136 kg (300 lb 0.7 oz)   SpO2 98%   BMI 43.05 kg/m²     Physical Exam  Constitutional:       Appearance: Normal appearance. He is obese.   HENT:      Head: Normocephalic and atraumatic. .   Eyes:      Conjunctiva/sclera: Conjunctivae normal.   Cardiovascular: "      Rate and Rhythm: Normal rate and regular rhythm.      Comments: R CVC  Pulmonary:      Effort: Pulmonary effort is normal.      Breath sounds: Normal breath sounds.      Comments: BS diminished in bases   Abdominal:      General: Abdomen is flat. Bowel sounds are normal.      Palpations: Abdomen is soft.   Musculoskeletal:         Cervical back: Normal range of motion and neck supple.      Comments: Trace BLE edema    Skin:     General: Skin is warm and dry.      Comments: BLE cellulitis in different states of healing    Neurological:      General: No focal deficit present.     Psychiatric:         Mood and Affect: Mood normal.        .   Fluids:  In: 700 [Dialysis:700]  Out: 2219     LABS:  Recent Labs     01/19/23  0805   SODIUM 138   POTASSIUM 4.0   CHLORIDE 100   CO2 28   GLUCOSE 144*   BUN 25*   CREATININE 4.79*   CALCIUM 8.1*       IMAGING:   All imaging reviewed from admission to present day    IMPRESSION:  # FARIHA, non-oliguric   - Etiology likely multi-factorial   # HTN, controlled   - Goal BP < 140/90  - no BP meds at this time  # Anemia of CKD, sub-optimal control   - Goal Hgb 10-11  - check iron stores  - start CARA with HD   # CKD-MBD  - managed at outpt clinic   # Malfunctioning CVC              - will get exchange today  # COPD  # DM II  # CAD  # OSAS               - CPAP at night      PLAN:  - iHD q TThS and prn   - UF as tolerated  - No dietary protein restrictions  - Dose all meds per ESRD  - Check iron stores   - start CARA with iHD   - Avoid NSAIDs, nephrotoxins   - Okay to manage outpatient from renal perspective

## 2023-01-20 NOTE — DISCHARGE SUMMARY
Discharge Summary    CHIEF COMPLAINT ON ADMISSION  Chief Complaint   Patient presents with    Other     Dialysis access is not working today. Per pt report, the dialysis RN attempted to clear the line with activase, but was unsuccessful.       Reason for Admission  EMS     Admission Date  1/19/2023    CODE STATUS  Full Code    HPI & HOSPITAL COURSE    Mr. Yoandy Peace is a 67 y.o. male with a PMHx of ESRD on HD T TH S, COPD, CAD, CHF, ALONDRA on CPAP, from a skilled facility, who presented on 1/19/2023 with a clotted dialysis catheter.      The patient was at dialysis clinic today to get his regularly scheduled Tuesday Thursday Saturday dialysis and the dialysis nurses were unable to unclog the catheter with tPA.  He was sent to the ER for possible catheter placement.  The patient denies any fevers, chills, chest pain, shortness of breath.  The patient reports that he is here for this replacement.    In ER, patient noted to have normal vital signs.  Notable lab findings include RBC 3.12, hemoglobin 8.6, hematocrit 29.0, glucose 144, BUN 25, creatinine 4.79, INR 1.21.  IR was consulted for a tunneled catheter exchange along with nephrology for continuation of dialysis treatment during this admission.    Tunneled dialysis catheter was exchanged via IR on 1/19, and patient underwent a dialysis treatment thereafter.  UF 1.5L during dialysis.  Patient placed into the observation unit for monitoring.    Patient was monitored overnight with no events noted.  No changes has been made in his medication.  Patient to resume established treatments and medication.  Patient highly recommended to follow-up with PCP along with nephrology as indicated.  All questions and concerns answered prior to discharge.  Patient discharged back to SNF.     Therefore, he is discharged in good and stable condition to skilled nursing facility.    The patient recovered much more quickly than anticipated on admission.    Discharge  Date  01/20/23      FOLLOW UP ITEMS POST DISCHARGE  Please call [phone] to schedule PCP appointment for patient.    Required specialty appointments include:       Discharge Instructions per NATALIO Matt    -Follow-up with PCP s/p hospitalization  -Follow-up with nephrology as indicated  -Continue established dialysis treatment on TTH S  -Resume all home medications    DIET: Cardiac, renal    ACTIVITY: As tolerated    DIAGNOSIS: Dialysis catheter malfunction    Return to ER if symptoms persist, chest pain, palpitations, shortness of breath, numbness, tingling, weakness, and high fevers.      DISCHARGE DIAGNOSES  Principal Problem (Resolved):    Complication associated with dialysis catheter POA: Yes  Active Problems:    * No active hospital problems. *      FOLLOW UP    Ha Diehl M.D.  [address]  [place] [id number] [phone]    Schedule an appointment as soon as possible for a visit in 1 week(s)        MEDICATIONS ON DISCHARGE     Medication List        CONTINUE taking these medications        Instructions   albuterol 108 (90 Base) MCG/ACT Aers inhalation aerosol   Inhale 2 Puffs every 6 hours as needed for Shortness of Breath.  Dose: 2 Puff     amitriptyline 10 MG Tabs  Commonly known as: ELAVIL   Take 1 tablet by mouth twice daily     Anoro Ellipta 62.5-25 MCG/ACT Aepb inhaler  Generic drug: umeclidinium-vilanterol   Inhale 1 Puff every day.  Dose: 1 Puff     atorvastatin 40 MG Tabs  Commonly known as: LIPITOR   Take 40 mg by mouth every evening.  Dose: 40 mg     metoprolol tartrate 50 MG Tabs  Commonly known as: LOPRESSOR   Take 50 mg by mouth every day.  Dose: 50 mg     omeprazole 20 MG delayed-release capsule  Commonly known as: PRILOSEC   Take 1 Capsule by mouth 2 times a day.  Dose: 20 mg     Tradjenta 5 MG Tabs tablet  Generic drug: linagliptin   Take 5 mg by mouth every day.  Dose: 5 mg     Trelegy Ellipta 100-62.5-25 MCG/ACT Aepb inhalation  Generic drug:  fluticasone-umeclidin-vilant   Inhale 1 Inhalation every morning.  Dose: 1 Inhalation              Allergies  No Known Allergies    DIET  Orders Placed This Encounter   Procedures    Diet Order Diet: Renal     Standing Status:   Standing     Number of Occurrences:   1     Order Specific Question:   Diet:     Answer:   Renal [8]       ACTIVITY  As tolerated.  Weight bearing as tolerated    CONSULTATIONS  Nephrology  Interventional Radiology    PROCEDURES  Tunneled catheter exchange    IMAGING    IR-WINRAMÓN PLACEMENT >5   Final Result      1. Ultrasound and fluoroscopic guided placement of a 19 cm long right-sided internal jugular 14.5 Albanian HemoSplit tunneled dialysis catheter.      2. The hemodialysis catheter may be used immediately as clinically indicated. Flushes per protocol.            LABORATORY  Lab Results   Component Value Date    SODIUM 138 01/19/2023    POTASSIUM 4.0 01/19/2023    CHLORIDE 100 01/19/2023    CO2 28 01/19/2023    GLUCOSE 144 (H) 01/19/2023    BUN 25 (H) 01/19/2023    CREATININE 4.79 (HH) 01/19/2023        Lab Results   Component Value Date    WBC 7.2 01/19/2023    HEMOGLOBIN 8.6 (L) 01/19/2023    HEMATOCRIT 29.0 (L) 01/19/2023    PLATELETCT 234 01/19/2023        Total time of the discharge process took 36 minutes.    ================================================================================================================================================================================================================================================================================  Please note that this dictation was created using voice recognition software. I have made every reasonable attempt to correct obvious errors, but there may be errors of grammar and possibly content that I did not discover before finalizing the note.    Electronically signed by:  Dr. JULES Davis, DNP, APRN, FNP-C  Hospitalist Services  [facility]  [phone]  [phone]  [email or web address]  01/20/23                  0849

## 2023-01-20 NOTE — CARE PLAN
The patient is Stable - Low risk of patient condition declining or worsening    Shift Goals  Clinical Goals: Manage BP, Discharge  Patient Goals: rest  Family Goals: not at bedside    Progress made toward(s) clinical / shift goals:    Problem: Knowledge Deficit - Standard  Goal: Patient and family/care givers will demonstrate understanding of plan of care, disease process/condition, diagnostic tests and medications  Outcome: Progressing   Expected End Date: 1/20/23     Problem: Fall Risk  Goal: Patient will remain free from falls  Outcome: Progressing    Expected End Date: 1/20/23

## 2023-01-20 NOTE — PROGRESS NOTES
Hemodialysis ordered by Dr. Case. Pt came from IR after CV exchanged. Treatment started at 1542 and ended at 1820 d/t clotting dialyzer and lines. Blood returned. Dr. Case notified  and ok to stop tx 30 mins early. Pt stable, vss, no c/o post tx. Net UF 1.519 L. Report to STEPHON Amador RN.

## 2023-01-20 NOTE — DISCHARGE PLANNING
Updated by Sean TRINIDAD that transport set for [id number]. Message sent to Suzanne RN to update. COBRA filled out and patient updated on transport time. Per patient he has notified Significant other. COBRA signed by patient.

## 2023-01-20 NOTE — DISCHARGE PLANNING
Patient from [facility]. Spoke with patient at bedside and wishes to return to [facility]. Choice form filled out with verbal consent and signed by patient. Choice form faxed to Sean TRINIDAD. Message sent to Sean TRINIDAD to update.

## 2023-01-20 NOTE — CARE PLAN
The patient is Stable - Low risk of patient condition declining or worsening    Shift Goals  Clinical Goals: Discharge  Patient Goals: rest  Family Goals: not at bedside    Progress made toward(s) clinical / shift goals:  New cath in place. Will DC today.     Patient is not progressing towards the following goals:

## 2023-01-20 NOTE — HOSPITAL COURSE
Mr. Yoandy Peace is a 67 y.o. male with a PMHx of ESRD on HD T TH S, COPD, CAD, CHF, ALONDRA on CPAP, from a skilled facility, who presented on 1/19/2023 with a clotted dialysis catheter.      The patient was at dialysis clinic today to get his regularly scheduled Tuesday Thursday Saturday dialysis and the dialysis nurses were unable to unclog the catheter with tPA.  He was sent to the ER for possible catheter placement.  The patient denies any fevers, chills, chest pain, shortness of breath.  The patient reports that he is here for this replacement.    In ER, patient noted to have normal vital signs.  Notable lab findings include RBC 3.12, hemoglobin 8.6, hematocrit 29.0, glucose 144, BUN 25, creatinine 4.79, INR 1.21.  IR was consulted for a tunneled catheter exchange along with nephrology for continuation of dialysis treatment during this admission.    Tunneled dialysis catheter was exchanged via IR on 1/19, and patient underwent a dialysis treatment thereafter.  UF 1.5L during dialysis.  Patient placed into the observation unit for monitoring.    Patient was monitored overnight with no events noted.  No changes has been made in his medication.  Patient to resume established treatments and medication.  Patient highly recommended to follow-up with PCP along with nephrology as indicated.  All questions and concerns answered prior to discharge.  Patient discharged back to SNF.

## 2023-01-20 NOTE — DISCHARGE PLANNING
Received Choice form at 0804  Agency/Facility Name: [facility]   Referral sent per Choice form @ 0808    0830-  Agency[facility]Facility Name: [facility]   Spoke To: [place]  Outcome: DPA informed Pt is accepted, SNF asking if Pt can go by WC for their transport services.     RN CM notified.     0835-  Agency[facility]Facility Name: [facility]  Outcome: DPA left v-mail informing SNF that Pt can go by WC, and actually has his own WC, per RN CM.     1050-  Agency[facility]Facility Name: [facility]  Spoke To: [place]  Outcome: DPA informed that SNF has arranged transport for [phone] today.     RN CM notified.

## 2023-01-21 ENCOUNTER — HOSPITAL ENCOUNTER (INPATIENT)
Facility: MEDICAL CENTER | Age: 68
LOS: 2 days | DRG: 314 | End: 2023-01-24
Attending: EMERGENCY MEDICINE | Admitting: INTERNAL MEDICINE
Payer: COMMERCIAL

## 2023-01-21 DIAGNOSIS — I95.9 HYPOTENSION, UNSPECIFIED HYPOTENSION TYPE: ICD-10-CM

## 2023-01-21 DIAGNOSIS — K27.9 PUD (PEPTIC ULCER DISEASE): ICD-10-CM

## 2023-01-21 DIAGNOSIS — I48.19 PERSISTENT ATRIAL FIBRILLATION (HCC): ICD-10-CM

## 2023-01-21 DIAGNOSIS — Z78.9 PROBLEM WITH VASCULAR ACCESS: ICD-10-CM

## 2023-01-21 DIAGNOSIS — I15.0 RENOVASCULAR HYPERTENSION: ICD-10-CM

## 2023-01-21 PROBLEM — T82.590A DIALYSIS AV FISTULA MALFUNCTION, INITIAL ENCOUNTER (HCC): Status: RESOLVED | Noted: 2023-01-21 | Resolved: 2023-01-21

## 2023-01-21 PROBLEM — J44.9 COPD (CHRONIC OBSTRUCTIVE PULMONARY DISEASE) (HCC): Status: ACTIVE | Noted: 2023-01-21

## 2023-01-21 PROBLEM — T82.590A DIALYSIS AV FISTULA MALFUNCTION, INITIAL ENCOUNTER (HCC): Status: ACTIVE | Noted: 2023-01-21

## 2023-01-21 LAB
ALBUMIN SERPL BCP-MCNC: 3.3 G/DL (ref 3.2–4.9)
ALBUMIN/GLOB SERPL: 1.1 G/DL
ALP SERPL-CCNC: 92 U/L (ref 30–99)
ALT SERPL-CCNC: 27 U/L (ref 2–50)
ANION GAP SERPL CALC-SCNC: 11 MMOL/L (ref 7–16)
AST SERPL-CCNC: 20 U/L (ref 12–45)
BASOPHILS # BLD AUTO: 0.4 % (ref 0–1.8)
BASOPHILS # BLD: 0.04 K/UL (ref 0–0.12)
BILIRUB SERPL-MCNC: 0.5 MG/DL (ref 0.1–1.5)
BUN SERPL-MCNC: 21 MG/DL (ref 8–22)
CALCIUM ALBUM COR SERPL-MCNC: 8.7 MG/DL (ref 8.5–10.5)
CALCIUM SERPL-MCNC: 8.1 MG/DL (ref 8.5–10.5)
CHLORIDE SERPL-SCNC: 99 MMOL/L (ref 96–112)
CO2 SERPL-SCNC: 27 MMOL/L (ref 20–33)
CREAT SERPL-MCNC: 4.48 MG/DL (ref 0.5–1.4)
EOSINOPHIL # BLD AUTO: 0.12 K/UL (ref 0–0.51)
EOSINOPHIL NFR BLD: 1.2 % (ref 0–6.9)
ERYTHROCYTE [DISTWIDTH] IN BLOOD BY AUTOMATED COUNT: 60.5 FL (ref 35.9–50)
GFR SERPLBLD CREATININE-BSD FMLA CKD-EPI: 13 ML/MIN/1.73 M 2
GLOBULIN SER CALC-MCNC: 3 G/DL (ref 1.9–3.5)
GLUCOSE SERPL-MCNC: 177 MG/DL (ref 65–99)
HCT VFR BLD AUTO: 30.3 % (ref 42–52)
HGB BLD-MCNC: 8.8 G/DL (ref 14–18)
IMM GRANULOCYTES # BLD AUTO: 0.09 K/UL (ref 0–0.11)
IMM GRANULOCYTES NFR BLD AUTO: 0.9 % (ref 0–0.9)
LYMPHOCYTES # BLD AUTO: 3.12 K/UL (ref 1–4.8)
LYMPHOCYTES NFR BLD: 31.1 % (ref 22–41)
MCH RBC QN AUTO: 27.1 PG (ref 27–33)
MCHC RBC AUTO-ENTMCNC: 29 G/DL (ref 33.7–35.3)
MCV RBC AUTO: 93.2 FL (ref 81.4–97.8)
MONOCYTES # BLD AUTO: 0.61 K/UL (ref 0–0.85)
MONOCYTES NFR BLD AUTO: 6.1 % (ref 0–13.4)
NEUTROPHILS # BLD AUTO: 6.05 K/UL (ref 1.82–7.42)
NEUTROPHILS NFR BLD: 60.3 % (ref 44–72)
NRBC # BLD AUTO: 0 K/UL
NRBC BLD-RTO: 0 /100 WBC
PLATELET # BLD AUTO: 246 K/UL (ref 164–446)
PMV BLD AUTO: 10.2 FL (ref 9–12.9)
POTASSIUM SERPL-SCNC: 4.1 MMOL/L (ref 3.6–5.5)
PROT SERPL-MCNC: 6.3 G/DL (ref 6–8.2)
RBC # BLD AUTO: 3.25 M/UL (ref 4.7–6.1)
SODIUM SERPL-SCNC: 137 MMOL/L (ref 135–145)
WBC # BLD AUTO: 10 K/UL (ref 4.8–10.8)

## 2023-01-21 PROCEDURE — A9270 NON-COVERED ITEM OR SERVICE: HCPCS | Performed by: STUDENT IN AN ORGANIZED HEALTH CARE EDUCATION/TRAINING PROGRAM

## 2023-01-21 PROCEDURE — 96372 THER/PROPH/DIAG INJ SC/IM: CPT

## 2023-01-21 PROCEDURE — 700111 HCHG RX REV CODE 636 W/ 250 OVERRIDE (IP): Mod: JG | Performed by: STUDENT IN AN ORGANIZED HEALTH CARE EDUCATION/TRAINING PROGRAM

## 2023-01-21 PROCEDURE — 700111 HCHG RX REV CODE 636 W/ 250 OVERRIDE (IP): Performed by: STUDENT IN AN ORGANIZED HEALTH CARE EDUCATION/TRAINING PROGRAM

## 2023-01-21 PROCEDURE — 85025 COMPLETE CBC W/AUTO DIFF WBC: CPT

## 2023-01-21 PROCEDURE — 99285 EMERGENCY DEPT VISIT HI MDM: CPT

## 2023-01-21 PROCEDURE — 36415 COLL VENOUS BLD VENIPUNCTURE: CPT

## 2023-01-21 PROCEDURE — 700102 HCHG RX REV CODE 250 W/ 637 OVERRIDE(OP): Performed by: STUDENT IN AN ORGANIZED HEALTH CARE EDUCATION/TRAINING PROGRAM

## 2023-01-21 PROCEDURE — G0378 HOSPITAL OBSERVATION PER HR: HCPCS

## 2023-01-21 PROCEDURE — 80053 COMPREHEN METABOLIC PANEL: CPT

## 2023-01-21 RX ORDER — ALBUTEROL SULFATE 90 UG/1
2 AEROSOL, METERED RESPIRATORY (INHALATION) EVERY 6 HOURS PRN
Status: DISCONTINUED | OUTPATIENT
Start: 2023-01-21 | End: 2023-01-24 | Stop reason: HOSPADM

## 2023-01-21 RX ORDER — ENOXAPARIN SODIUM 100 MG/ML
40 INJECTION SUBCUTANEOUS DAILY
Status: DISCONTINUED | OUTPATIENT
Start: 2023-01-21 | End: 2023-01-24 | Stop reason: HOSPADM

## 2023-01-21 RX ORDER — BISACODYL 10 MG
10 SUPPOSITORY, RECTAL RECTAL
Status: DISCONTINUED | OUTPATIENT
Start: 2023-01-21 | End: 2023-01-24 | Stop reason: HOSPADM

## 2023-01-21 RX ORDER — UMECLIDINIUM BROMIDE AND VILANTEROL TRIFENATATE 62.5; 25 UG/1; UG/1
1 POWDER RESPIRATORY (INHALATION) DAILY
COMMUNITY

## 2023-01-21 RX ORDER — ATORVASTATIN CALCIUM 40 MG/1
40 TABLET, FILM COATED ORAL NIGHTLY
Status: DISCONTINUED | OUTPATIENT
Start: 2023-01-21 | End: 2023-01-24 | Stop reason: HOSPADM

## 2023-01-21 RX ORDER — ACETAMINOPHEN 325 MG/1
650 TABLET ORAL EVERY 6 HOURS PRN
Status: DISCONTINUED | OUTPATIENT
Start: 2023-01-21 | End: 2023-01-24 | Stop reason: HOSPADM

## 2023-01-21 RX ORDER — POLYETHYLENE GLYCOL 3350 17 G/17G
1 POWDER, FOR SOLUTION ORAL
Status: DISCONTINUED | OUTPATIENT
Start: 2023-01-21 | End: 2023-01-24 | Stop reason: HOSPADM

## 2023-01-21 RX ORDER — AMITRIPTYLINE HYDROCHLORIDE 10 MG/1
10 TABLET, FILM COATED ORAL 2 TIMES DAILY
COMMUNITY
End: 2023-04-04

## 2023-01-21 RX ORDER — AMITRIPTYLINE HYDROCHLORIDE 10 MG/1
10 TABLET, FILM COATED ORAL 2 TIMES DAILY
Status: DISCONTINUED | OUTPATIENT
Start: 2023-01-21 | End: 2023-01-24 | Stop reason: HOSPADM

## 2023-01-21 RX ORDER — AMOXICILLIN 250 MG
2 CAPSULE ORAL 2 TIMES DAILY
Status: DISCONTINUED | OUTPATIENT
Start: 2023-01-21 | End: 2023-01-24 | Stop reason: HOSPADM

## 2023-01-21 RX ADMIN — ALTEPLASE 2 MG: 2.2 INJECTION, POWDER, LYOPHILIZED, FOR SOLUTION INTRAVENOUS at 14:45

## 2023-01-21 RX ADMIN — ALTEPLASE 2 MG: 2.2 INJECTION, POWDER, LYOPHILIZED, FOR SOLUTION INTRAVENOUS at 14:46

## 2023-01-21 RX ADMIN — ATORVASTATIN CALCIUM 40 MG: 40 TABLET, FILM COATED ORAL at 20:05

## 2023-01-21 RX ADMIN — AMITRIPTYLINE HYDROCHLORIDE 10 MG: 10 TABLET, FILM COATED ORAL at 18:27

## 2023-01-21 RX ADMIN — ENOXAPARIN SODIUM 40 MG: 40 INJECTION SUBCUTANEOUS at 18:22

## 2023-01-21 RX ADMIN — METOPROLOL TARTRATE 25 MG: 25 TABLET, FILM COATED ORAL at 18:18

## 2023-01-21 RX ADMIN — SENNOSIDES AND DOCUSATE SODIUM 2 TABLET: 50; 8.6 TABLET ORAL at 18:22

## 2023-01-21 ASSESSMENT — LIFESTYLE VARIABLES
CONSUMPTION TOTAL: NEGATIVE
ALCOHOL_USE: NO
HAVE YOU EVER FELT YOU SHOULD CUT DOWN ON YOUR DRINKING: NO
TOTAL SCORE: 0
AVERAGE NUMBER OF DAYS PER WEEK YOU HAVE A DRINK CONTAINING ALCOHOL: 0
DOES PATIENT WANT TO STOP DRINKING: CANNOT ASSESS
ON A TYPICAL DAY WHEN YOU DRINK ALCOHOL HOW MANY DRINKS DO YOU HAVE: 0
EVER HAD A DRINK FIRST THING IN THE MORNING TO STEADY YOUR NERVES TO GET RID OF A HANGOVER: NO
TOTAL SCORE: 0
HOW MANY TIMES IN THE PAST YEAR HAVE YOU HAD 5 OR MORE DRINKS IN A DAY: 0
EVER FELT BAD OR GUILTY ABOUT YOUR DRINKING: NO
TOTAL SCORE: 0
HAVE PEOPLE ANNOYED YOU BY CRITICIZING YOUR DRINKING: NO

## 2023-01-21 ASSESSMENT — PAIN DESCRIPTION - PAIN TYPE: TYPE: ACUTE PAIN

## 2023-01-21 ASSESSMENT — PATIENT HEALTH QUESTIONNAIRE - PHQ9
1. LITTLE INTEREST OR PLEASURE IN DOING THINGS: NOT AT ALL
SUM OF ALL RESPONSES TO PHQ9 QUESTIONS 1 AND 2: 0
2. FEELING DOWN, DEPRESSED, IRRITABLE, OR HOPELESS: NOT AT ALL

## 2023-01-21 ASSESSMENT — FIBROSIS 4 INDEX
FIB4 SCORE: 1.06
FIB4 SCORE: 1.29

## 2023-01-21 NOTE — PROGRESS NOTES
[facility] Services Progress Note    This RN was sent to pt's bedside to check on right chest tunneled CVC dialysis access due to reported issues with access malfunctioning at the patient's HD clinic and patient unable to complete his full HD treatments.    Received patient resting  calmly in gurney, soft BPs, A fib on cardiac monitor, denies complaints.  Procedure and plan of treatment explained, verbalized understanding.    Right chest tunneled double lumen CVC ports accessed aseptically per policy, red (arterial) port noted to have no blood return, only able to flush with saline with some resistance, blue (venous) port with blood return using flush draw, slight resistance.     Dialysis charge RN notified with orders obtained for Alteplase (Cathflo) to dwell on both ports overnight and reevaluate in the morning for HD treatment per nephrologist.    Right chest CVC access care performed aseptically, CVC dressings with biopatch applied per policy.    CVC ports aseptically instilled with Alteplase 2 mg/2 mL per MAR, then clamped and capped aseptically, labeled properly.    Report given to primary care nurse DARIUSZ Regalado RN.

## 2023-01-21 NOTE — ED PROVIDER NOTES
Emergency Physician Note    Chief Concern:  Blocked dialysis catheter    Historian:   Patient    HPI:  Mr. Peace presents to the emergency department for evaluation of a nonfunctioning tunneled dialysis catheter.  Catheter was just replaced within the last 48 hours, he is typically dialyzed Tuesday, Thursday, and Saturday.  He went to dialysis today, and underwent dialysis per hour half when the catheter stopped flowing.  He was sent to the emergency department from dialysis for this issue.  He does not report any pain at the site of the catheter, no bleeding or leaking around the catheter, no skin changes.  He reports no other concerns at this time, and states he is otherwise feeling at his normal baseline.    Medical Records Reviewed for Continuity of Care:  Mr. Peace was discharged from this facility yesterday.  APRN discharge summary reviewed.  It is noted that he has a past medical history of end-stage renal disease on hemodialysis, is dialyzed Tuesday, Thursday, and Saturday, he also has a history of COPD, coronary artery disease, CHF, ALONDRA on CPAP, from a skilled nursing facility.  He presented 1/19/2023 with a clotted dialysis catheter.  Dialysis nurses were unable to flush the catheter with tPA.  He was sent to the emergency department for catheter replacement.  Tunneled catheter was exchanged by interventional radiology on 1/19, he underwent a dialysis treatment thereafter.  He was discharged home in stable condition.    He is followed by [facility] nephrology consultants, recently seen by Dr. Case.    Review of Systems:  Review of systems as documented in HPI.     Past Medical History:   has a past medical history of CAD (coronary artery disease), CHF (congestive heart failure) ([facility]), Chronic obstructive pulmonary disease (HCC), Diabetes (HCC), Hyperlipidemia, Hypertension, ALONDRA on CPAP, Pneumonia, Tobacco abuse, and Type II or unspecified type diabetes mellitus without mention of complication, not  "stated as uncontrolled.    Past Surgical History:   has a past surgical history that includes upper gi endoscopy,diagnosis (N/A, 1/11/2023) and upper gi endoscopy,biopsy (N/A, 1/11/2023).    Family History:  No family history on file.    Social History:  Social History     Tobacco Use    Smoking status: Every Day     Packs/day: 1.00     Types: Cigarettes    Smokeless tobacco: Never   Vaping Use    Vaping Use: Never used   Substance and Sexual Activity    Alcohol use: Not Currently    Drug use: Yes     Types: Inhaled     Comment: THC daily    Sexual activity: Not on file       Current Medications:  Home Medications       Reviewed by Pasquale Borrero (Pharmacy Tech) on 01/21/23 at 1608  Med List Status: Complete     Medication Last Dose Status   albuterol 108 (90 Base) MCG/ACT Aero Soln inhalation aerosol unknown Active   amitriptyline (ELAVIL) 10 MG Tab 1/21/2023 Active   atorvastatin (LIPITOR) 40 MG Tab 1/20/2023 Active   fluticasone-umeclidin-vilant (TRELEGY ELLIPTA) 100-62.5-25 MCG/ACT AEROSOL POWDER, BREATH ACTIVATED inhalation 1/21/2023 Active   linagliptin (TRADJENTA) 5 MG Tab tablet 1/21/2023 Active   metoprolol tartrate (LOPRESSOR) 25 MG Tab 1/21/2023 Active   umeclidinium-vilanterol (ANORO ELLIPTA) 62.5-25 MCG/ACT AEROSOL POWDER, BREATH ACTIVATED inhaler 1/21/2023 Active                    Allergies:  No Known Allergies    Physical Exam:  Vital Signs: BP 99/61   Pulse (!) 114   Temp 36.4 °C (97.5 °F)   Resp 16   Ht 1.778 m (5' 10\")   Wt (!) 136 kg (300 lb 4.3 oz)   SpO2 94%   BMI 43.08 kg/m²   Constitutional: Alert, no acute distress  HEENT: Normocephalic  Neck: Supple, normal range of motion  Cardiovascular: No tachycardia  Pulmonary: No respiratory distress, normal work of breathing  Abdomen: Soft  Skin: Tunneled dialysis catheter is in place overlying the right chest wall, dressing is clean, dry, intact, he has no tenderness to palpation of the catheter nor the surrounding skin, no evidence of " bleeding, no fluctuance.  Catheter is normal in appearance without evidence of damage.  Musculoskeletal: Normal range of motion in all extremities, no swelling or deformity noted  Neurologic: Alert, oriented, normal speech, normal motor function  Psychiatric: Normal and appropriate mood and affect    Labs:  Labs reviewed as documented below.     Medical Decision Making:  Mr. Peace presents to the emergency department today for evaluation of a malfunctioning dialysis catheter.  The catheter will not flush, he did undergo an hour and a half of dialysis today.    On laboratory evaluation hemoglobin is 8.8, consistent with his prior baseline values.  Potassium is within normal limits at 4.1.  He does not have any evidence clinically of respiratory compromise or fluid overload.  No indication for emergent dialysis at this time.    I discussed his presentation today with Dr. Case, nephrologist on-call.  Dialysis nurse attempted to flush the line, was not able to get it to function.  Plan is to leave alteplase in the line, and attempt hemodialysis tomorrow.    I discussed his presentation with the admitting hospitalist, who can agree to admit the patient    Disposition:  Discharge home in stable condition    Final Impression:  1. Problem with vascular access        Electronically signed by Kelly Kay MD

## 2023-01-21 NOTE — CONSULTS
[facility] -  CONSULTATION NOTE               Author: Mert Victor N.P. Date & Time: 1/21/2023  1:46 PM       REASON FOR CONSULTATION:   Inpatient hemodialysis management.    CHIEF COMPLAINT:   Catheter Malfunction     HISTORY OF PRESENT ILLNESS:    Yoandy Peace is a 67 y.o. male with PMH + COPD, CAD, CHF, ALONDRA/OHS on night time CPAP, FARIHA on hemodialysis who was sent to the ED from his HD unit due to HD catheter malfunction. Pt on maintenance HD qTTHS at [facility]. He stated that he was started on HD and was about 1hr into his treatment when his catheter malfunctioned. Pt was recently discharge yesterday from [facility] 2/2 to clotted dialysis catheter.nursing facility.  He presented 1/19/2023 with a clotted dialysis catheter.  Dialysis nurses were unable to flush the catheter with tPA.  He was sent to the emergency department for catheter replacement.  Tunneled catheter was exchanged by interventional radiology on 1/19, he underwent a dialysis treatment thereafter. Labs on admission showed Hgb 8.8, Na 137, K 4.1, CO2 27, BUN 21, Scr 4.4, Ca 8.1.        No F/C/N/V/CP/SOB.  No melena, hematochezia, hematemesis.  No HA, visual changes, or abdominal pain.    REVIEW OF SYSTEMS:    10 point ROS was performed and is as per HPI or otherwise negative.    PAST MEDICAL HISTORY:   Past Medical History:   Diagnosis Date    CAD (coronary artery disease)     CHF (congestive heart failure) (HCC)     Chronic obstructive pulmonary disease (HCC)     Diabetes (HCC)     Hyperlipidemia     Hypertension     ALONDRA on CPAP     HS    Pneumonia     Tobacco abuse     Type II or unspecified type diabetes mellitus without mention of complication, not stated as uncontrolled        PAST SURGICAL HISTORY:   Past Surgical History:   Procedure Laterality Date    [place] UPPER GI ENDOSCOPY,DIAGNOSIS N/A 1/11/2023    Procedure: GASTROSCOPY;  Surgeon: Josette Ludwig M.D.;  Location: SURGERY SAME DAY [facility];  Service:  "Gastroenterology    [place] UPPER GI ENDOSCOPY,BIOPSY N/A 1/11/2023    Procedure: GASTROSCOPY, WITH BIOPSY;  Surgeon: Josette Ludwig M.D.;  Location: SURGERY SAME DAY [facility];  Service: Gastroenterology       FAMILY HISTORY:   No family history on file.    SOCIAL HISTORY:   Social History     Tobacco Use   Smoking Status Every Day    Packs/day: 1.00    Types: Cigarettes   Smokeless Tobacco Never     Social History     Substance and Sexual Activity   Alcohol Use Not Currently     Social History     Substance and Sexual Activity   Drug Use Yes    Types: Inhaled    Comment: THC daily       HOME MEDICATIONS:   Reviewed and documented in chart.    LABORATORY STUDIES:   Recent Labs     01/19/23  0805 01/21/23  1153   SODIUM 138 137   POTASSIUM 4.0 4.1   CHLORIDE 100 99   CO2 28 27   GLUCOSE 144* 177*   BUN 25* 21   CREATININE 4.79* 4.48*   CALCIUM 8.1* 8.1*       ALLERGIES:  Patient has no known allergies.    VS:  /59   Pulse (!) 110   Temp 36.7 °C (98.1 °F) (Temporal)   Resp 16   Ht 1.778 m (5' 10\")   Wt (!) 136 kg (300 lb)   SpO2 100%   BMI 43.05 kg/m²     Physical Exam       FLUID BALANCE:  No intake/output data recorded.    IMAGING:  All imaging reviewed from admission to present day    IMPRESSION:  # FARIHA, non-oliguric   - Etiology likely multi-factorial   - Currently on maintenance dialysis qTTHS at [facility]  # HTN, controlled   - Goal BP < 140/90  - no BP meds at this time  # Anemia of CKD,Not at goal   - Goal Hgb 10-11  - iron sats% 19  - Check Ferritin   # CKD-MBD  - Check PTH  - Check Phos  - Check Vit D   -Continue outpatient medications  # Malfunctioning CVC              - Will evaluate, may need tpa overnight   # COPD  # DM II  # CAD  # OSAS               - CPAP at night      PLAN:  - Evaluate CVC, recently exchanged 1/19, may need tpa   -No iHD today (Sat) , reevaluate tomorrow ( Sun)   - iHD q TThS and prn   - UF as tolerated  - No dietary protein restrictions  - Dose all meds per ESRD  - " Check Ferritin   - Check Phos, PTH, Vit D   - Avoid NSAIDs, nephrotoxins   -- Okay to DC today if patient's CVC is functioning post evaluation. If CVC needs tpa , then will re-evaluate HD needs tomorrow.          Thank you for the consultation!

## 2023-01-21 NOTE — ED TRIAGE NOTES
"Chief Complaint   Patient presents with    Vascular Access Problem     Dialysis port is clogged - T, TH, SAT     Pt CHARLEEN RUIZ from [facility] for complaints of a clogged dialysis port. Per EMS, this happened two days ago as well. Pt receives dialysis T, TH, Sat. Pts had a half of a dialysis treatment today and on Thursday. Last full dialysis session was Tuesday.     /59   Pulse (!) 110   Temp 36.7 °C (98.1 °F) (Temporal)   Resp 16   Ht 1.778 m (5' 10\")   Wt (!) 136 kg (300 lb)   SpO2 100%     "

## 2023-01-22 ENCOUNTER — APPOINTMENT (OUTPATIENT)
Dept: RADIOLOGY | Facility: MEDICAL CENTER | Age: 68
DRG: 314 | End: 2023-01-22
Attending: INTERNAL MEDICINE
Payer: COMMERCIAL

## 2023-01-22 PROBLEM — J96.11 CHRONIC RESPIRATORY FAILURE WITH HYPOXIA (HCC): Status: ACTIVE | Noted: 2022-12-23

## 2023-01-22 PROBLEM — K27.9 PUD (PEPTIC ULCER DISEASE): Status: ACTIVE | Noted: 2023-01-22

## 2023-01-22 PROBLEM — Z99.2 ESRD (END STAGE RENAL DISEASE) ON DIALYSIS (HCC): Status: ACTIVE | Noted: 2023-01-22

## 2023-01-22 PROBLEM — N18.6 ESRD (END STAGE RENAL DISEASE) ON DIALYSIS (HCC): Status: ACTIVE | Noted: 2023-01-22

## 2023-01-22 PROBLEM — I48.91 ATRIAL FIBRILLATION WITH RVR (HCC): Status: ACTIVE | Noted: 2023-01-22

## 2023-01-22 LAB
ALBUMIN SERPL BCP-MCNC: 2.9 G/DL (ref 3.2–4.9)
ALBUMIN/GLOB SERPL: 1 G/DL
ALP SERPL-CCNC: 85 U/L (ref 30–99)
ALT SERPL-CCNC: 23 U/L (ref 2–50)
ANION GAP SERPL CALC-SCNC: 10 MMOL/L (ref 7–16)
AST SERPL-CCNC: 19 U/L (ref 12–45)
BASOPHILS # BLD AUTO: 0.6 % (ref 0–1.8)
BASOPHILS # BLD: 0.05 K/UL (ref 0–0.12)
BILIRUB SERPL-MCNC: 0.4 MG/DL (ref 0.1–1.5)
BUN SERPL-MCNC: 23 MG/DL (ref 8–22)
CALCIUM ALBUM COR SERPL-MCNC: 8.7 MG/DL (ref 8.5–10.5)
CALCIUM SERPL-MCNC: 7.8 MG/DL (ref 8.5–10.5)
CHLORIDE SERPL-SCNC: 102 MMOL/L (ref 96–112)
CO2 SERPL-SCNC: 26 MMOL/L (ref 20–33)
COMMENT 1642: NORMAL
CREAT SERPL-MCNC: 4.92 MG/DL (ref 0.5–1.4)
EOSINOPHIL # BLD AUTO: 0.11 K/UL (ref 0–0.51)
EOSINOPHIL NFR BLD: 1.2 % (ref 0–6.9)
ERYTHROCYTE [DISTWIDTH] IN BLOOD BY AUTOMATED COUNT: 59 FL (ref 35.9–50)
FERRITIN SERPL-MCNC: 306 NG/ML (ref 22–322)
FOLATE SERPL-MCNC: 6 NG/ML
GFR SERPLBLD CREATININE-BSD FMLA CKD-EPI: 12 ML/MIN/1.73 M 2
GLOBULIN SER CALC-MCNC: 2.9 G/DL (ref 1.9–3.5)
GLUCOSE SERPL-MCNC: 133 MG/DL (ref 65–99)
HCT VFR BLD AUTO: 28.9 % (ref 42–52)
HGB BLD-MCNC: 8.5 G/DL (ref 14–18)
IMM GRANULOCYTES # BLD AUTO: 0.1 K/UL (ref 0–0.11)
IMM GRANULOCYTES NFR BLD AUTO: 1.1 % (ref 0–0.9)
LACTATE SERPL-SCNC: 1.7 MMOL/L (ref 0.5–2)
LYMPHOCYTES # BLD AUTO: 2.75 K/UL (ref 1–4.8)
LYMPHOCYTES NFR BLD: 30.7 % (ref 22–41)
MCH RBC QN AUTO: 26.8 PG (ref 27–33)
MCHC RBC AUTO-ENTMCNC: 29.4 G/DL (ref 33.7–35.3)
MCV RBC AUTO: 91.2 FL (ref 81.4–97.8)
MONOCYTES # BLD AUTO: 0.76 K/UL (ref 0–0.85)
MONOCYTES NFR BLD AUTO: 8.5 % (ref 0–13.4)
MORPHOLOGY BLD-IMP: NORMAL
NEUTROPHILS # BLD AUTO: 5.18 K/UL (ref 1.82–7.42)
NEUTROPHILS NFR BLD: 57.9 % (ref 44–72)
NRBC # BLD AUTO: 0 K/UL
NRBC BLD-RTO: 0 /100 WBC
PLATELET # BLD AUTO: 214 K/UL (ref 164–446)
PMV BLD AUTO: 10.2 FL (ref 9–12.9)
POTASSIUM SERPL-SCNC: 3.9 MMOL/L (ref 3.6–5.5)
PROT SERPL-MCNC: 5.8 G/DL (ref 6–8.2)
PTH-INTACT SERPL-MCNC: 133 PG/ML (ref 14–72)
RBC # BLD AUTO: 3.17 M/UL (ref 4.7–6.1)
SODIUM SERPL-SCNC: 138 MMOL/L (ref 135–145)
TSH SERPL DL<=0.005 MIU/L-ACNC: 0.63 UIU/ML (ref 0.38–5.33)
VIT B12 SERPL-MCNC: 352 PG/ML (ref 211–911)
WBC # BLD AUTO: 9 K/UL (ref 4.8–10.8)

## 2023-01-22 PROCEDURE — 700105 HCHG RX REV CODE 258: Performed by: STUDENT IN AN ORGANIZED HEALTH CARE EDUCATION/TRAINING PROGRAM

## 2023-01-22 PROCEDURE — A9270 NON-COVERED ITEM OR SERVICE: HCPCS | Performed by: INTERNAL MEDICINE

## 2023-01-22 PROCEDURE — 700102 HCHG RX REV CODE 250 W/ 637 OVERRIDE(OP): Performed by: INTERNAL MEDICINE

## 2023-01-22 PROCEDURE — 36415 COLL VENOUS BLD VENIPUNCTURE: CPT

## 2023-01-22 PROCEDURE — 700111 HCHG RX REV CODE 636 W/ 250 OVERRIDE (IP): Performed by: STUDENT IN AN ORGANIZED HEALTH CARE EDUCATION/TRAINING PROGRAM

## 2023-01-22 PROCEDURE — 90935 HEMODIALYSIS ONE EVALUATION: CPT

## 2023-01-22 PROCEDURE — 700102 HCHG RX REV CODE 250 W/ 637 OVERRIDE(OP): Performed by: STUDENT IN AN ORGANIZED HEALTH CARE EDUCATION/TRAINING PROGRAM

## 2023-01-22 PROCEDURE — A9270 NON-COVERED ITEM OR SERVICE: HCPCS | Performed by: STUDENT IN AN ORGANIZED HEALTH CARE EDUCATION/TRAINING PROGRAM

## 2023-01-22 PROCEDURE — 700105 HCHG RX REV CODE 258: Performed by: INTERNAL MEDICINE

## 2023-01-22 PROCEDURE — 83970 ASSAY OF PARATHORMONE: CPT

## 2023-01-22 PROCEDURE — 96374 THER/PROPH/DIAG INJ IV PUSH: CPT

## 2023-01-22 PROCEDURE — 82728 ASSAY OF FERRITIN: CPT

## 2023-01-22 PROCEDURE — 770020 HCHG ROOM/CARE - TELE (206)

## 2023-01-22 PROCEDURE — 99233 SBSQ HOSP IP/OBS HIGH 50: CPT | Performed by: INTERNAL MEDICINE

## 2023-01-22 PROCEDURE — 700111 HCHG RX REV CODE 636 W/ 250 OVERRIDE (IP): Mod: JG | Performed by: STUDENT IN AN ORGANIZED HEALTH CARE EDUCATION/TRAINING PROGRAM

## 2023-01-22 PROCEDURE — 96372 THER/PROPH/DIAG INJ SC/IM: CPT

## 2023-01-22 PROCEDURE — 85025 COMPLETE CBC W/AUTO DIFF WBC: CPT

## 2023-01-22 PROCEDURE — 71045 X-RAY EXAM CHEST 1 VIEW: CPT

## 2023-01-22 PROCEDURE — 93005 ELECTROCARDIOGRAM TRACING: CPT | Performed by: INTERNAL MEDICINE

## 2023-01-22 PROCEDURE — 82607 VITAMIN B-12: CPT

## 2023-01-22 PROCEDURE — 82652 VIT D 1 25-DIHYDROXY: CPT

## 2023-01-22 PROCEDURE — 5A1D70Z PERFORMANCE OF URINARY FILTRATION, INTERMITTENT, LESS THAN 6 HOURS PER DAY: ICD-10-PCS | Performed by: EMERGENCY MEDICINE

## 2023-01-22 PROCEDURE — 84443 ASSAY THYROID STIM HORMONE: CPT

## 2023-01-22 PROCEDURE — 80053 COMPREHEN METABOLIC PANEL: CPT

## 2023-01-22 PROCEDURE — 83605 ASSAY OF LACTIC ACID: CPT

## 2023-01-22 PROCEDURE — 82746 ASSAY OF FOLIC ACID SERUM: CPT

## 2023-01-22 PROCEDURE — 700101 HCHG RX REV CODE 250: Performed by: INTERNAL MEDICINE

## 2023-01-22 RX ORDER — METOPROLOL TARTRATE 1 MG/ML
5 INJECTION, SOLUTION INTRAVENOUS ONCE
Status: COMPLETED | OUTPATIENT
Start: 2023-01-22 | End: 2023-01-22

## 2023-01-22 RX ORDER — HEPARIN SODIUM 1000 [USP'U]/ML
3300 INJECTION, SOLUTION INTRAVENOUS; SUBCUTANEOUS
Status: DISCONTINUED | OUTPATIENT
Start: 2023-01-22 | End: 2023-01-24 | Stop reason: HOSPADM

## 2023-01-22 RX ORDER — SODIUM CHLORIDE 9 MG/ML
500 INJECTION, SOLUTION INTRAVENOUS ONCE
Status: COMPLETED | OUTPATIENT
Start: 2023-01-22 | End: 2023-01-22

## 2023-01-22 RX ORDER — SODIUM CHLORIDE 9 MG/ML
250 INJECTION, SOLUTION INTRAVENOUS ONCE
Status: COMPLETED | OUTPATIENT
Start: 2023-01-22 | End: 2023-01-23

## 2023-01-22 RX ORDER — SODIUM FERRIC GLUCONATE COMPLEX IN SUCROSE 12.5 MG/ML
125 INJECTION INTRAVENOUS
Status: DISCONTINUED | OUTPATIENT
Start: 2023-01-23 | End: 2023-01-24 | Stop reason: HOSPADM

## 2023-01-22 RX ORDER — PANTOPRAZOLE SODIUM 40 MG/10ML
40 INJECTION, POWDER, LYOPHILIZED, FOR SOLUTION INTRAVENOUS 2 TIMES DAILY
Status: DISCONTINUED | OUTPATIENT
Start: 2023-01-22 | End: 2023-01-22

## 2023-01-22 RX ORDER — HEPARIN SODIUM 1000 [USP'U]/ML
2000 INJECTION, SOLUTION INTRAVENOUS; SUBCUTANEOUS
Status: DISCONTINUED | OUTPATIENT
Start: 2023-01-22 | End: 2023-01-23

## 2023-01-22 RX ORDER — OMEPRAZOLE 20 MG/1
20 CAPSULE, DELAYED RELEASE ORAL 2 TIMES DAILY
Status: DISCONTINUED | OUTPATIENT
Start: 2023-01-22 | End: 2023-01-24 | Stop reason: HOSPADM

## 2023-01-22 RX ORDER — MIDODRINE HYDROCHLORIDE 5 MG/1
5 TABLET ORAL
Status: DISCONTINUED | OUTPATIENT
Start: 2023-01-22 | End: 2023-01-23

## 2023-01-22 RX ADMIN — MIDODRINE HYDROCHLORIDE 5 MG: 5 TABLET ORAL at 14:40

## 2023-01-22 RX ADMIN — AMITRIPTYLINE HYDROCHLORIDE 10 MG: 10 TABLET, FILM COATED ORAL at 05:11

## 2023-01-22 RX ADMIN — METOPROLOL TARTRATE 5 MG: 1 INJECTION, SOLUTION INTRAVENOUS at 14:44

## 2023-01-22 RX ADMIN — AMITRIPTYLINE HYDROCHLORIDE 10 MG: 10 TABLET, FILM COATED ORAL at 17:17

## 2023-01-22 RX ADMIN — ALTEPLASE 1.6 MG: 2.2 INJECTION, POWDER, LYOPHILIZED, FOR SOLUTION INTRAVENOUS at 13:29

## 2023-01-22 RX ADMIN — MIDODRINE HYDROCHLORIDE 5 MG: 5 TABLET ORAL at 17:17

## 2023-01-22 RX ADMIN — ENOXAPARIN SODIUM 40 MG: 40 INJECTION SUBCUTANEOUS at 17:16

## 2023-01-22 RX ADMIN — HEPARIN SODIUM 2000 UNITS: 1000 INJECTION, SOLUTION INTRAVENOUS; SUBCUTANEOUS at 12:50

## 2023-01-22 RX ADMIN — SODIUM CHLORIDE 250 ML: 9 INJECTION, SOLUTION INTRAVENOUS at 14:44

## 2023-01-22 RX ADMIN — SITAGLIPTIN 100 MG: 100 TABLET, FILM COATED ORAL at 05:12

## 2023-01-22 RX ADMIN — METOPROLOL TARTRATE 25 MG: 25 TABLET, FILM COATED ORAL at 14:03

## 2023-01-22 RX ADMIN — ALTEPLASE 1.7 MG: 2.2 INJECTION, POWDER, LYOPHILIZED, FOR SOLUTION INTRAVENOUS at 13:28

## 2023-01-22 RX ADMIN — METOPROLOL TARTRATE 25 MG: 25 TABLET, FILM COATED ORAL at 17:17

## 2023-01-22 RX ADMIN — SODIUM CHLORIDE 500 ML: 9 INJECTION, SOLUTION INTRAVENOUS at 05:11

## 2023-01-22 RX ADMIN — ATORVASTATIN CALCIUM 40 MG: 40 TABLET, FILM COATED ORAL at 21:06

## 2023-01-22 RX ADMIN — UMECLIDINIUM BROMIDE AND VILANTEROL TRIFENATATE 1 PUFF: 62.5; 25 POWDER RESPIRATORY (INHALATION) at 05:12

## 2023-01-22 RX ADMIN — OMEPRAZOLE 20 MG: 20 CAPSULE, DELAYED RELEASE ORAL at 17:17

## 2023-01-22 ASSESSMENT — ENCOUNTER SYMPTOMS
DIAPHORESIS: 0
BACK PAIN: 0
SHORTNESS OF BREATH: 0
MYALGIAS: 0
ABDOMINAL PAIN: 0
BRUISES/BLEEDS EASILY: 0
FOCAL WEAKNESS: 0
BLURRED VISION: 0
SORE THROAT: 0
WHEEZING: 0
FEVER: 0
DIARRHEA: 0
HEADACHES: 0
VOMITING: 0
CHILLS: 0
SPUTUM PRODUCTION: 0
FLANK PAIN: 0
DIZZINESS: 0
PALPITATIONS: 1
NECK PAIN: 0
NAUSEA: 0
SEIZURES: 0
BLOOD IN STOOL: 0
COUGH: 0

## 2023-01-22 ASSESSMENT — COGNITIVE AND FUNCTIONAL STATUS - GENERAL
HELP NEEDED FOR BATHING: A LOT
MOBILITY SCORE: 22
WALKING IN HOSPITAL ROOM: A LITTLE
MOBILITY SCORE: 22
DRESSING REGULAR LOWER BODY CLOTHING: A LITTLE
CLIMB 3 TO 5 STEPS WITH RAILING: A LITTLE
WALKING IN HOSPITAL ROOM: A LITTLE
SUGGESTED CMS G CODE MODIFIER MOBILITY: CJ
CLIMB 3 TO 5 STEPS WITH RAILING: A LITTLE
SUGGESTED CMS G CODE MODIFIER MOBILITY: CJ

## 2023-01-22 ASSESSMENT — PAIN DESCRIPTION - PAIN TYPE: TYPE: ACUTE PAIN

## 2023-01-22 NOTE — ASSESSMENT & PLAN NOTE
Currently rate controlled   -continue home medication  -Lopressor 25 mg twice daily  -Atorvastatin 40 mg

## 2023-01-22 NOTE — PROGRESS NOTES
Hospital Medicine Daily Progress Note    Date of Service  1/22/2023    Chief Complaint  Yoandy Peace is a 67 y.o. male admitted 1/21/2023 with clotting of dialysis catheter    Hospital Course  67-year-old male with a past medical history of ESRD on HD T, TH, S, COPD on 2 L of oxygen, CAD, CHF, afib not on anticoagulation due to recent GI bleed who presented with hemodialysis catheter access problem.  He was sent into the ER from his hemodialysis appointment after was noted that his dialysis catheter was not flushing.  Nephrology was consulted and recommended tPA for the catheter    Interval Problem Update  Patient underwent hemodialysis however was noted to have some sluggish flow through the catheter and has been recommended for another treatment with tPA to the catheter.  Patient's blood pressure has been low, have started him on Midodrine.  Patient has been in A. fib with RVR and appears dry.  We will give him a to 250 cc bolus of NS and IV metoprolol.  Continue metoprolol 25 mg twice daily for rate control, hold for SBP less than 100.    I have discussed this patient's plan of care and discharge plan at IDT rounds today with Case Management, Nursing, Nursing leadership, and other members of the IDT team.    Consultants/Specialty  nephrology    Code Status  Full Code    Disposition  Patient is not medically cleared for discharge.   Anticipate discharge to to home with close outpatient follow-up.  I have placed the appropriate orders for post-discharge needs.    Review of Systems  Review of Systems   Constitutional:  Positive for malaise/fatigue. Negative for chills, diaphoresis and fever.   HENT:  Negative for hearing loss and sore throat.    Eyes:  Negative for blurred vision.   Respiratory:  Negative for cough, sputum production, shortness of breath and wheezing.    Cardiovascular:  Positive for palpitations. Negative for chest pain and leg swelling.   Gastrointestinal:  Negative for abdominal pain, blood in  stool, diarrhea, nausea and vomiting.   Genitourinary:  Negative for dysuria, flank pain and urgency.   Musculoskeletal:  Negative for back pain, joint pain, myalgias and neck pain.   Skin:  Negative for rash.   Neurological:  Negative for dizziness, focal weakness, seizures and headaches.   Endo/Heme/Allergies:  Does not bruise/bleed easily.   Psychiatric/Behavioral:  Negative for suicidal ideas.    All other systems reviewed and are negative.     Physical Exam  Temp:  [36.1 °C (96.9 °F)-37.1 °C (98.8 °F)] 36.1 °C (97 °F)  Pulse:  [] 117  Resp:  [16-18] 18  BP: ()/(37-72) 118/37  SpO2:  [98 %-100 %] 100 %    Physical Exam  Vitals and nursing note reviewed.   Constitutional:       General: He is not in acute distress.     Appearance: Normal appearance. He is obese.   HENT:      Head: Normocephalic and atraumatic.      Nose: Nose normal.      Mouth/Throat:      Mouth: Mucous membranes are moist.   Eyes:      Extraocular Movements: Extraocular movements intact.      Conjunctiva/sclera: Conjunctivae normal.      Pupils: Pupils are equal, round, and reactive to light.   Cardiovascular:      Rate and Rhythm: Tachycardia present. Rhythm irregular.      Pulses: Normal pulses.      Heart sounds: Normal heart sounds.   Pulmonary:      Effort: No respiratory distress.      Breath sounds: No wheezing, rhonchi or rales.   Abdominal:      General: Bowel sounds are normal. There is no distension.      Palpations: Abdomen is soft.      Tenderness: There is no abdominal tenderness.   Musculoskeletal:         General: No swelling or tenderness. Normal range of motion.      Cervical back: Normal range of motion and neck supple.   Lymphadenopathy:      Cervical: No cervical adenopathy.   Skin:     General: Skin is warm.      Coloration: Skin is pale. Skin is not jaundiced.      Findings: No rash.   Neurological:      General: No focal deficit present.      Mental Status: He is alert and oriented to person, place, and time.       Cranial Nerves: No cranial nerve deficit.      Motor: No weakness.   Psychiatric:         Mood and Affect: Mood normal.         Behavior: Behavior normal.       Fluids    Intake/Output Summary (Last 24 hours) at 1/22/2023 1407  Last data filed at 1/22/2023 1321  Gross per 24 hour   Intake 1920 ml   Output 2328 ml   Net -408 ml       Laboratory  Recent Labs     01/20/23  0626 01/21/23  1153 01/22/23  0025   WBC 7.4 10.0 9.0   RBC 3.23* 3.25* 3.17*   HEMOGLOBIN 8.6* 8.8* 8.5*   HEMATOCRIT 30.0* 30.3* 28.9*   MCV 92.9 93.2 91.2   MCH 26.6* 27.1 26.8*   MCHC 28.7* 29.0* 29.4*   RDW 61.4* 60.5* 59.0*   PLATELETCT 225 246 214   MPV 9.9 10.2 10.2     Recent Labs     01/21/23  1153 01/22/23  0025   SODIUM 137 138   POTASSIUM 4.1 3.9   CHLORIDE 99 102   CO2 27 26   GLUCOSE 177* 133*   BUN 21 23*   CREATININE 4.48* 4.92*   CALCIUM 8.1* 7.8*                   Imaging  DX-CHEST-PORTABLE (1 VIEW)   Final Result      1.  Linear atelectasis in the lower lungs. No focal consolidation. No effusions.   2.  Cardiomegaly.              Assessment/Plan  * Complication associated with dialysis catheter- (present on admission)  Assessment & Plan  Nephrology recommends another dose of tPA to his dialysis catheter      Atrial fibrillation with RVR ([facility])- (present on admission)  Assessment & Plan  IV metoprolol once  250 cc bolus of NS, patient appears dry  Continue metoprolol 25 mg   Currently not on anticoagulation due to recent GI bleed      ESRD (end stage renal disease) on dialysis ([facility])- (present on admission)  Assessment & Plan  HD per nephrology    PUD (peptic ulcer disease)- (present on admission)  Assessment & Plan  EGD on 1/11/23 showed duodenal ulcer without bleeding, biopsy was taken and negative for H pylori. Dr. Ludwig recommended no aspirin for 1 week and no anticoagulation for 2 weeks, and PPI BID for 4 weeks and then daily dosing      COPD (chronic obstructive pulmonary disease) ([facility])- (present on  admission)  Assessment & Plan  Currently not in acute exacerbation  Continue home inhalers  RT protocol    Anemia- (present on admission)  Assessment & Plan  The setting of anemia of chronic disease  Patient denies any bleeding or melena  Check folate, B12 and TSH  Monitor CBC, transfuse for hemoglobin less than 7      CAD (coronary artery disease)- (present on admission)  Assessment & Plan  Not on aspirin due to recent GI bleed  Continue atorvastatin    Chronic respiratory failure with hypoxia (HCC)- (present on admission)  Assessment & Plan  On 2 L of oxygen at baseline  RT protocol    Type 2 diabetes mellitus with diabetic chronic kidney disease (HCC)- (present on admission)  Assessment & Plan  Start on insulin sliding scale with serial Accu-Checks  Hypoglycemic protocol in place             VTE prophylaxis: enoxaparin ppx    I have performed a physical exam and reviewed and updated ROS and Plan today (1/22/2023). In review of yesterday's note (1/21/2023), there are no changes except as documented above.

## 2023-01-22 NOTE — RESPIRATORY CARE
"COPD EDUCATION by COPD CLINICAL EDUCATOR  1/22/2023 at 3:08 PM by Roxie Marr, RRT     Patient interviewed by COPD education team. Patient refused COPD program at this time. An Action Plan was completed in the EMR to reflect current Respiratory Medication use.                  COPD Assessment  COPD Clinical Specialists ONLY  COPD Education Initiated: Yes--Short Intervention (met declined to discuss;here for Dialysis cath issues; recently seen updated Action Plan, LW on 12/24/2022)  DME Company: when asked pt did not know company  DME Equipment Type: 4lpm Oxygen  Pulmonologist Name: [facility]  Is this a COPD exacerbation patient?: No    PFT Results  No results found for: PFT    Meds to Beds  Would the patient like to opt in for Bedside Medication Delivery at Discharge?: Yes, interested     MY COPD ACTION PLAN     It is recommended that patients and physicians /healthcare providers complete this action plan together. This plan should be discussed at each physician visit and updated as needed.    The green, yellow and red zones show groups of symptoms of COPD. This list of symptoms is not comprehensive, and you may experience other symptoms. In the \"Actions\" column, your healthcare provider has recommended actions for you to take based on your symptoms.    Patient Name: Yoandy Peace   YOB: 1955   Last Updated on: 12/24/2022  3:24 PM   Green Zone:  I am doing well today Actions     Usual activitiy and exercise level   Take daily medications     Usual amounts of cough and phlegm/mucus   Use oxygen as prescribed     Sleep well at night   Continue regular exercise/diet plan     Appetite is good   At all times avoid cigarette smoke, inhaled irritants     Daily Medications (these medications are taken every day):   Fluticasone and Umeclidinium and Vilanterol (Trelogy) 1 Puff Once daily     Additional Information:  Rinse and spit after each treatment     **Your other facility may substitute Anoro  " " DO NOT TAKE BOTH    Yellow Zone:  I am having a bad day or a COPD flare Actions     More breathless than usual   Continue daily medications     I have less energy for my daily activities   Use quick relief inhaler as ordered     Increased or thicker phlegm/mucus   Use oxygen as prescribed     Using quick relief inhaler/nebulizer more often   Get plenty of rest     Swelling of ankles more than usual   Use pursed lip breathing     More coughing than usual   At all times avoid cigarette smoke, inhaled irritants     I feel like I have a \"chest cold\"     Poor sleep and my symptoms woke me up     My appetite is not good     My medicine is not helping      Call provider immediately if symptoms don’t improve     Continue daily medications, add rescue medications:   Albuterol 2 Puffs Every 6 hours PRN       Medications to be used during a flare up, (as Discussed with Provider):           Additional Information:  Use spacer with each treatment     Red Zone:  I need urgent medical care Actions     Severe shortness of breath even at rest   Call 911 or seek medical care immediately     Not able to do any activity because of breathing      Fever or shaking chills      Feeling confused or very drowsy       Chest pains      Coughing up blood                  "

## 2023-01-22 NOTE — PROGRESS NOTES
Report received from ER RN. Assume care. Pt. AAOx4 pt is bed,  Assessment completed. VSS. Denies pain, able to wiggle toes and dorsi/plantar flex feet, good CMS and pulses to edgar LE, denies numbness and tingling. Dressing RU chest port  in place DCI, Pt was update for the care for the day. White board updated, All question answered. Pt has call light within reach,  bed is in the lowest position. Pt has no other needs at this time.    Pt arrived with irregular HR , tele monitor box was applied.   1830 Called Monitor tech. Per tech and tele monitor Pt appears to be on A-flutter.   1850 Called [facility] MD   1900 Dr Coles called back no orders received. He will revised Pt's chart and go from there

## 2023-01-22 NOTE — PROGRESS NOTES
"[facility] -  PROGRESS NOTE               Author: Mert Victor N.P. Date & Time: 1/22/2023  12:18 PM     HPI:  Yoandy Peace is a 67 y.o. male with PMH + COPD, CAD, CHF, ALONDRA/OHS on night time CPAP, FARIHA on hemodialysis who was sent to the ED from his HD unit due to HD catheter malfunction. Pt on maintenance HD qTTHS at [facility]. He stated that he was started on HD and was about 1hr into his treatment when his catheter malfunctioned. Pt was recently discharge yesterday from [facility] 2/2 to clotted dialysis catheter.nursing facility.  He presented 1/19/2023 with a clotted dialysis catheter.  Dialysis nurses were unable to flush the catheter with tPA.  He was sent to the emergency department for catheter replacement.  Tunneled catheter was exchanged by interventional radiology on 1/19, he underwent a dialysis treatment thereafter. Labs on admission showed Hgb 8.8, Na 137, K 4.1, CO2 27, BUN 21, Scr 4.4, Ca 8.1.         No F/C/N/V/CP/SOB.  No melena, hematochezia, hematemesis.  No HA, visual changes, or abdominal pain.    DAILY NEPHROLOGY SUMMARY:  1/21: Consult done  1/22: Seen during HD, s/p TPA over night, per HD RN pts line needed to be reversed today we HD. Denies CP, SOB, no edema. VSS. No acute overnight events.     REVIEW OF SYSTEMS:    10 point ROS reviewed and is as per HPI/daily summary or otherwise negative    PMH/PSH/SH/FH:   Reviewed and unchanged since admission note    CURRENT MEDICATIONS:   Reviewed from admission to present day    VS:  /55   Pulse 94   Temp 37.1 °C (98.8 °F) (Temporal)   Resp 16   Ht 1.778 m (5' 10\")   Wt (!) 136 kg (300 lb 4.3 oz)   SpO2 100%   BMI 43.08 kg/m²     Physical Exam  Vitals and nursing note reviewed.   Constitutional:       General: He is not in acute distress.     Appearance: He is not ill-appearing.   HENT:      Head: Normocephalic and atraumatic.   Eyes:      General: No scleral icterus.  Cardiovascular:      Rate and " Rhythm: Normal rate and regular rhythm.      Pulses: Normal pulses.      Comments: Right cx TDC  Pulmonary:      Effort: Pulmonary effort is normal.   Abdominal:      General: Bowel sounds are normal.      Palpations: Abdomen is soft.   Musculoskeletal:         General: No swelling.      Cervical back: Normal range of motion.      Right lower leg: No edema.      Left lower leg: No edema.   Skin:     General: Skin is warm and dry.   Neurological:      General: No focal deficit present.      Mental Status: He is alert and oriented to person, place, and time.   Psychiatric:         Mood and Affect: Mood normal.       Fluids:  In: 920 [P.O.:420; I.V.:500]  Out: 200     LABS:  Recent Labs     01/21/23  1153 01/22/23  0025   SODIUM 137 138   POTASSIUM 4.1 3.9   CHLORIDE 99 102   CO2 27 26   GLUCOSE 177* 133*   BUN 21 23*   CREATININE 4.48* 4.92*   CALCIUM 8.1* 7.8*     IMPRESSION:  # FARIHA, non-oliguric   - Etiology likely multi-factorial   - Currently on maintenance dialysis qTTHS at [facility]  # HTN, controlled   - Goal BP < 140/90  - no BP meds at this time  # Anemia of CKD,Not at goal   - Goal Hgb 10-11  - iron sats% 19  - Ferritin 306  - IV iron load   # CKD-MBD  -   - Phos 2.7  - low Ca, CCA WNL   - Check Vit D   -Continue outpatient medications  # Malfunctioning CVC              - S/p TPA  # COPD  # DM II  # CAD  # OSAS               - CPAP at night      PLAN:  -HD today (Sun)  - TPA overnight, re-evaluate in AM  - iHD q TThS and prn   - UF as tolerated  - No dietary protein restrictions  - Dose all meds per ESRD  - Check  Vit D   - Transfuse Hgb less than 7.0  - IV iron load   - Avoid NSAIDs, nephrotoxins   - Labs in Am   - Hold discharge home until CVC line has been re-evaluated tomorrow.

## 2023-01-22 NOTE — ASSESSMENT & PLAN NOTE
Currently on hemodialysis Tuesday Thursday Saturday  -Nephrology following we will attempt dialysis catheter as corrected tomorrow  -Potassium 4.1

## 2023-01-22 NOTE — H&P
[facility] [facility]     PATIENT ID:  NAME:  Yoandy Peace  MRN:               [id number]  YOB: 1955    Date of Admission: 1/21/2023     Attending: Saima High M.d.    Resident: Valentín Castro MD    Primary Care Physician:  Ha Diehl M.D.    CC:    Chief Complaint   Patient presents with    Vascular Access Problem     Dialysis port is clogged - T, TH, SAT           HPI: Yoandy Peace is a 67 y.o. male who presented with a dialysis catheter clot.  Patient has a past medical history of ESRD on HD T, TH, S COPD, CAD, CHF. Patient went to dialysis clinic today and they noticed it was clotted.  Patient was sent to the emergency department for attempted flushing.  Nephrology was consulted from the emergency department who attempted to flush catheter this evening but was unsuccessful recommending alteplase and  Overnight prior to repeat flushed him tomorrow.  This catheter was just placed on the 19th after his prior clotted off.  Patient was transferred here from [facility] which she is a resident.    Previous hospitalization end of December with significant exacerbation of COPD, also ended up with A. fib started on Eliquis, developed renal failure needing dialysis catheter to be placed and then subsequently developed a GI bleed.    Patient denies any fever, chills, shortness of breath, chest pain, palpitations or discomfort.    ERCourse:  The emergency department seen by nephrology who attempted to flush catheter that was unsuccessful.     REVIEW OF SYSTEMS:   Ten systems reviewed and were negative except as noted in the HPI.                PAST MEDICAL HISTORY:  Past Medical History:   Diagnosis Date    CAD (coronary artery disease)     CHF (congestive heart failure) (HCC)     Chronic obstructive pulmonary disease (HCC)     Diabetes (HCC)     Hyperlipidemia     Hypertension     ALONDRA on CPAP     HS    Pneumonia     Tobacco abuse     Type II or unspecified type diabetes mellitus  without mention of complication, not stated as uncontrolled        PAST SURGICAL HISTORY:  Past Surgical History:   Procedure Laterality Date    NM UPPER GI ENDOSCOPY,DIAGNOSIS N/A 1/11/2023    Procedure: GASTROSCOPY;  Surgeon: Josette Ludwig M.D.;  Location: SURGERY SAME DAY [facility];  Service: Gastroenterology    NM UPPER GI ENDOSCOPY,BIOPSY N/A 1/11/2023    Procedure: GASTROSCOPY, WITH BIOPSY;  Surgeon: Josette Ludwig M.D.;  Location: SURGERY SAME DAY [facility];  Service: Gastroenterology       FAMILY HISTORY:  No family history on file.    SOCIAL HISTORY:   Lives at [facility].  Brother and girlfriend in [place].   SARAH Carbajal Kwame  -50+ packyears.  Smoking history    DIET:   Orders Placed This Encounter   Procedures    Diet Order Diet: Renal     Standing Status:   Standing     Number of Occurrences:   1     Order Specific Question:   Diet:     Answer:   Renal [8]       ALLERGIES:  No Known Allergies    OUTPATIENT MEDICATIONS:    Current Facility-Administered Medications:     albuterol inhaler 2 Puff, 2 Puff, Inhalation, Q6HRS PRN, Valentín Castro M.D.    amitriptyline (ELAVIL) tablet 10 mg, 10 mg, Oral, BID, Valentín Castro M.D.    atorvastatin (LIPITOR) tablet 40 mg, 40 mg, Oral, Nightly, Valentín Castro M.D.    linagliptin (TRADJENTA) tablet 5 mg, 5 mg, Oral, DAILY, Valentín Castro M.D.    metoprolol tartrate (LOPRESSOR) tablet 25 mg, 25 mg, Oral, BID, Valentín Castro M.D.    umeclidinium-vilanterol (Anoro Ellipta) inhaler 1 Puff, 1 Puff, Inhalation, DAILY, Valentín Castro M.D.    senna-docusate (PERICOLACE or SENOKOT S) 8.6-50 MG per tablet 2 Tablet, 2 Tablet, Oral, BID **AND** polyethylene glycol/lytes (MIRALAX) PACKET 1 Packet, 1 Packet, Oral, QDAY PRN **AND** magnesium hydroxide (MILK OF MAGNESIA) suspension 30 mL, 30 mL, Oral, QDAY PRN **AND** bisacodyl (DULCOLAX) suppository 10 mg, 10 mg, Rectal, QDAY PRN, Valentín Castro M.D.    enoxaparin (Lovenox) inj 40 mg, 40 mg, Subcutaneous, DAILY  "AT 1800, Valentín Castro M.D.    acetaminophen (Tylenol) tablet 650 mg, 650 mg, Oral, Q6HRS PRN, Valentín Castro M.D.    Current Outpatient Medications:     amitriptyline (ELAVIL) 10 MG Tab, Take 10 mg by mouth 2 times a day., Disp: , Rfl:     umeclidinium-vilanterol (ANORO ELLIPTA) 62.5-25 MCG/ACT AEROSOL POWDER, BREATH ACTIVATED inhaler, Inhale 1 Puff every day., Disp: , Rfl:     metoprolol tartrate (LOPRESSOR) 25 MG Tab, Take 25 mg by mouth 2 times a day., Disp: , Rfl:     fluticasone-umeclidin-vilant (TRELEGY ELLIPTA) 100-62.5-25 MCG/ACT AEROSOL POWDER, BREATH ACTIVATED inhalation, Inhale 1 Inhalation every morning., Disp: , Rfl:     atorvastatin (LIPITOR) 40 MG Tab, Take 40 mg by mouth every evening., Disp: , Rfl:     linagliptin (TRADJENTA) 5 MG Tab tablet, Take 5 mg by mouth every day., Disp: , Rfl:     albuterol 108 (90 Base) MCG/ACT Aero Soln inhalation aerosol, Inhale 2 Puffs every 6 hours as needed for Shortness of Breath., Disp: , Rfl:     PHYSICAL EXAM:  Vitals:    23 1143 23 1145 23 1501   BP:  112/59 104/55   Pulse:  (!) 110    Resp:  16    Temp:  36.7 °C (98.1 °F)    TempSrc:  Temporal    SpO2:  100%    Weight: (!) 136 kg (300 lb)     Height: 1.778 m (5' 10\")     , Temp (24hrs), Av.7 °C (98.1 °F), Min:36.7 °C (98.1 °F), Max:36.7 °C (98.1 °F)  , Pulse Oximetry: 100 %, O2 (LPM): 2, O2 Delivery Device: Nasal Cannula    General: Pt resting in NAD, cooperative NC in place  Skin:  Pink, warm and dry.  No rashes  HEENT: NC/AT. PERRL. EOMI. MMM. No nasal discharge. Oropharynx nonerythematous without exudate/plaques  Neck:  Supple without lymphadenopathy or rigidity.   Lungs:  Symmetrical.  CTAB with no W/R/R.  Good air movement   Cardiovascular:  S1/S2 rate controlled, regular  Chest: Dialysis catheter placed right upper chest.  No evidence of bleeding, erythema, discharge.  Bandage surrounding clean dry intact  Abdomen:  Abdomen is soft, nontender, nondistended. +BS. No masses noted. "   Extremities:  Full range of motion. No gross deformities noted. 2+ pulses in all extremities. No edema chronic venous changes in bilateral shins, nontender to palpation  Spine:  Straight without vertebral anomalies.  CNS:  Muscle tone is normal. No gross focal neurologic deficits      LAB TESTS:   Recent Labs     01/19/23  0805 01/20/23  0626 01/21/23  1153   WBC 7.2 7.4 10.0   RBC 3.12* 3.23* 3.25*   HEMOGLOBIN 8.6* 8.6* 8.8*   HEMATOCRIT 29.0* 30.0* 30.3*   MCV 92.9 92.9 93.2   MCH 27.6 26.6* 27.1   RDW 62.0* 61.4* 60.5*   PLATELETCT 234 225 246   MPV 9.6 9.9 10.2   NEUTSPOLYS 65.20  --  60.30   LYMPHOCYTES 23.30  --  31.10   MONOCYTES 9.00  --  6.10   EOSINOPHILS 1.10  --  1.20   BASOPHILS 0.40  --  0.40   RBCMORPHOLO Present  --   --          Recent Labs     01/19/23  0805 01/20/23  0626 01/21/23  1153   SODIUM 138  --  137   POTASSIUM 4.0  --  4.1   CHLORIDE 100  --  99   CO2 28  --  27   BUN 25*  --  21   CREATININE 4.79*  --  4.48*   CALCIUM 8.1*  --  8.1*   PHOSPHORUS  --  2.7  --    ALBUMIN 3.1*  --  3.3       CULTURES:   Results       ** No results found for the last 168 hours. **            IMAGES:  No orders to display       CONSULTS:   Nephrology    ASSESSMENT/PLAN:   67 y.o. male admitted for catheter dysfunction    Complication associated with dialysis catheter  Clotted catheter unable to flush emergency department.  Nephrology consulted.  -Alteplase placed in line overnight we will attempt flushing tomorrow.  -Nephrology following        Type 2 diabetes mellitus with diabetic chronic kidney disease ([facility])  Stable, controlled  -Blood sugar 177 on admission  -Continue home meds    Acute renal failure (ARF) ([facility])  Currently on hemodialysis Tuesday Thursday Saturday  -Nephrology following we will attempt dialysis catheter as corrected tomorrow  -Potassium 4.1    COPD (chronic obstructive pulmonary disease) ([facility])  Currently at baseline oxygen  -Continue home medications while inpatient and Anoro Ellipta,  albuterol    Atrial fibrillation (HCC)  Currently rate controlled   -continue home medication  -Lopressor 25 mg twice daily  -Atorvastatin 40 mg         Lines: PIV, dialysis catheter  Abx: None  DVT prophylaxis Lovenox  CODE Status: Full code      Valentín Castro MD  PGY 2 [facility]

## 2023-01-22 NOTE — CARE PLAN
The patient is Stable - Low risk of patient condition declining or worsening    Shift Goals  Clinical Goals: Monitor vitals, HD?, reassess clogged line in AM  Patient Goals: Rest  Family Goals: JENI      Problem: Knowledge Deficit - Standard  Goal: Patient and family/care givers will demonstrate understanding of plan of care, disease process/condition, diagnostic tests and medications  Outcome: Progressing     Problem: Hemodynamics  Goal: Patient's hemodynamics, fluid balance and neurologic status will be stable or improve  Outcome: Progressing

## 2023-01-22 NOTE — ASSESSMENT & PLAN NOTE
"Nephrology recommends another dose of tPA to his dialysis catheter\"    Clarified that midodrine to be dosed only before dialysis if hypotensive  Titrate off midodrine see if if BP remains stable. If stable today discharge tomorrow if Nephrology clears, continue BB on regular days and midodrine if hypotension before dialysis      "

## 2023-01-22 NOTE — ASSESSMENT & PLAN NOTE
EGD on 1/11/23 showed duodenal ulcer without bleeding, biopsy was taken and negative for H pylori. Dr. Ludwig recommended no aspirin for 1 week and no anticoagulation for 2 weeks, and PPI BID for 4 weeks and then daily dosing

## 2023-01-22 NOTE — ASSESSMENT & PLAN NOTE
IV metoprolol once  250 cc bolus of NS, patient appears dry  Continue metoprolol 25 mg   Currently not on anticoagulation due to recent GI bleed

## 2023-01-22 NOTE — PROGRESS NOTES
Patient blood pressures soft 90's/50's, MAPS around 67-70. Patient asymptomatic upon assessment. Discussed with family medicine provider Maik Coles who instructed this RN to continue to monitor blood pressures and update with acute changes.     0400: Hypotensive 88/49. Patient asymptomatic. Reported to Maik Coles who ordered 500ml bolus. Will administer when verified by pharmacy     0600: BP improved 110/58, 100/51 s/p bolus.

## 2023-01-22 NOTE — ASSESSMENT & PLAN NOTE
The setting of anemia of chronic disease  Patient denies any bleeding or melena  Check folate, B12 and TSH  Monitor CBC, transfuse for hemoglobin less than 7

## 2023-01-22 NOTE — ASSESSMENT & PLAN NOTE
Clotted catheter unable to flush emergency department.  Nephrology consulted.  -Alteplase placed in line overnight we will attempt flushing tomorrow.  -Nephrology following

## 2023-01-22 NOTE — PROGRESS NOTES
Monitor Summary:  Rhythm: A.fibb/A.flutter  Rate:   Ectopy: PVC    -/0.09/-      Discussed patient heart rate concerns with Maik Coles overnight as patient having a.flutter with rates sustaining high 80-90 bpm with occasional increases into the 110's bpm. Per patient chart, he has history of a.fibb/a.flutter, Provider instructed this RN to continue to monitor

## 2023-01-22 NOTE — PROGRESS NOTES
Hemodialysis ordered by Dr. Case. Treatment started at 0952 and ended at 1321 d/t clotting dialyzer. Blood returned. Net setting placed. Tx restarted still have 47 mins remained. Also notified Dr. Case had to reversed lines d/t red port resistant to pull. Order received to [facility] after HD today overnight and HD tomorrow. Pt stable, vss, no c/o post tx. Net UF 1.128 ml d/t hypotension during tx. UFG decreased. Dr. Case notified above issues. Report to NIESHA Wallace RN.

## 2023-01-22 NOTE — ASSESSMENT & PLAN NOTE
Currently at baseline oxygen  -Continue home medications while inpatient and Anoro Ellipta, albuterol

## 2023-01-22 NOTE — ASSESSMENT & PLAN NOTE
"Start on insulin sliding scale with serial Accu-Checks  Hypoglycemic protocol in place\"     stable.      "

## 2023-01-23 LAB
ALBUMIN SERPL BCP-MCNC: 3.5 G/DL (ref 3.2–4.9)
ANION GAP SERPL CALC-SCNC: 12 MMOL/L (ref 7–16)
BUN SERPL-MCNC: 14 MG/DL (ref 8–22)
BUN SERPL-MCNC: 20 MG/DL (ref 8–22)
CALCIUM ALBUM COR SERPL-MCNC: 8.6 MG/DL (ref 8.5–10.5)
CALCIUM SERPL-MCNC: 8.1 MG/DL (ref 8.5–10.5)
CALCIUM SERPL-MCNC: 8.2 MG/DL (ref 8.5–10.5)
CHLORIDE SERPL-SCNC: 102 MMOL/L (ref 96–112)
CHLORIDE SERPL-SCNC: 99 MMOL/L (ref 96–112)
CO2 SERPL-SCNC: 26 MMOL/L (ref 20–33)
CO2 SERPL-SCNC: 26 MMOL/L (ref 20–33)
CREAT SERPL-MCNC: 3.59 MG/DL (ref 0.5–1.4)
CREAT SERPL-MCNC: 4.45 MG/DL (ref 0.5–1.4)
EKG IMPRESSION: NORMAL
ERYTHROCYTE [DISTWIDTH] IN BLOOD BY AUTOMATED COUNT: 57.2 FL (ref 35.9–50)
GFR SERPLBLD CREATININE-BSD FMLA CKD-EPI: 14 ML/MIN/1.73 M 2
GFR SERPLBLD CREATININE-BSD FMLA CKD-EPI: 18 ML/MIN/1.73 M 2
GLUCOSE SERPL-MCNC: 104 MG/DL (ref 65–99)
GLUCOSE SERPL-MCNC: 211 MG/DL (ref 65–99)
HCT VFR BLD AUTO: 27.9 % (ref 42–52)
HGB BLD-MCNC: 8.2 G/DL (ref 14–18)
MCH RBC QN AUTO: 27 PG (ref 27–33)
MCHC RBC AUTO-ENTMCNC: 29.4 G/DL (ref 33.7–35.3)
MCV RBC AUTO: 91.8 FL (ref 81.4–97.8)
PHOSPHATE SERPL-MCNC: 2.3 MG/DL (ref 2.5–4.5)
PLATELET # BLD AUTO: 207 K/UL (ref 164–446)
PMV BLD AUTO: 9.9 FL (ref 9–12.9)
POTASSIUM SERPL-SCNC: 4.1 MMOL/L (ref 3.6–5.5)
POTASSIUM SERPL-SCNC: 4.3 MMOL/L (ref 3.6–5.5)
RBC # BLD AUTO: 3.04 M/UL (ref 4.7–6.1)
SODIUM SERPL-SCNC: 138 MMOL/L (ref 135–145)
SODIUM SERPL-SCNC: 140 MMOL/L (ref 135–145)
WBC # BLD AUTO: 8.3 K/UL (ref 4.8–10.8)

## 2023-01-23 PROCEDURE — 700111 HCHG RX REV CODE 636 W/ 250 OVERRIDE (IP)

## 2023-01-23 PROCEDURE — 700111 HCHG RX REV CODE 636 W/ 250 OVERRIDE (IP): Performed by: STUDENT IN AN ORGANIZED HEALTH CARE EDUCATION/TRAINING PROGRAM

## 2023-01-23 PROCEDURE — 700102 HCHG RX REV CODE 250 W/ 637 OVERRIDE(OP): Performed by: INTERNAL MEDICINE

## 2023-01-23 PROCEDURE — 82652 VIT D 1 25-DIHYDROXY: CPT

## 2023-01-23 PROCEDURE — A9270 NON-COVERED ITEM OR SERVICE: HCPCS | Performed by: STUDENT IN AN ORGANIZED HEALTH CARE EDUCATION/TRAINING PROGRAM

## 2023-01-23 PROCEDURE — 80048 BASIC METABOLIC PNL TOTAL CA: CPT

## 2023-01-23 PROCEDURE — 80069 RENAL FUNCTION PANEL: CPT

## 2023-01-23 PROCEDURE — 770020 HCHG ROOM/CARE - TELE (206)

## 2023-01-23 PROCEDURE — 700102 HCHG RX REV CODE 250 W/ 637 OVERRIDE(OP): Performed by: STUDENT IN AN ORGANIZED HEALTH CARE EDUCATION/TRAINING PROGRAM

## 2023-01-23 PROCEDURE — 36415 COLL VENOUS BLD VENIPUNCTURE: CPT

## 2023-01-23 PROCEDURE — A9270 NON-COVERED ITEM OR SERVICE: HCPCS | Performed by: INTERNAL MEDICINE

## 2023-01-23 PROCEDURE — 93010 ELECTROCARDIOGRAM REPORT: CPT | Performed by: INTERNAL MEDICINE

## 2023-01-23 PROCEDURE — 90935 HEMODIALYSIS ONE EVALUATION: CPT

## 2023-01-23 PROCEDURE — 85027 COMPLETE CBC AUTOMATED: CPT

## 2023-01-23 PROCEDURE — 99232 SBSQ HOSP IP/OBS MODERATE 35: CPT | Performed by: INTERNAL MEDICINE

## 2023-01-23 RX ORDER — MIDODRINE HYDROCHLORIDE 5 MG/1
5 TABLET ORAL PRN
Status: DISCONTINUED | OUTPATIENT
Start: 2023-01-23 | End: 2023-01-24 | Stop reason: HOSPADM

## 2023-01-23 RX ORDER — HEPARIN SODIUM 1000 [USP'U]/ML
4000 INJECTION, SOLUTION INTRAVENOUS; SUBCUTANEOUS
Status: DISCONTINUED | OUTPATIENT
Start: 2023-01-23 | End: 2023-01-24 | Stop reason: HOSPADM

## 2023-01-23 RX ORDER — HEPARIN SODIUM 1000 [USP'U]/ML
INJECTION, SOLUTION INTRAVENOUS; SUBCUTANEOUS
Status: COMPLETED
Start: 2023-01-23 | End: 2023-01-23

## 2023-01-23 RX ADMIN — AMITRIPTYLINE HYDROCHLORIDE 10 MG: 10 TABLET, FILM COATED ORAL at 17:28

## 2023-01-23 RX ADMIN — SITAGLIPTIN 100 MG: 100 TABLET, FILM COATED ORAL at 05:21

## 2023-01-23 RX ADMIN — METOPROLOL TARTRATE 12.5 MG: 25 TABLET, FILM COATED ORAL at 19:55

## 2023-01-23 RX ADMIN — HEPARIN SODIUM 4000 UNITS: 1000 INJECTION, SOLUTION INTRAVENOUS; SUBCUTANEOUS at 09:05

## 2023-01-23 RX ADMIN — OMEPRAZOLE 20 MG: 20 CAPSULE, DELAYED RELEASE ORAL at 17:28

## 2023-01-23 RX ADMIN — HEPARIN SODIUM 4000 UNITS: 1000 INJECTION INTRAVENOUS; SUBCUTANEOUS at 09:05

## 2023-01-23 RX ADMIN — MIDODRINE HYDROCHLORIDE 5 MG: 5 TABLET ORAL at 08:03

## 2023-01-23 RX ADMIN — AMITRIPTYLINE HYDROCHLORIDE 10 MG: 10 TABLET, FILM COATED ORAL at 05:21

## 2023-01-23 RX ADMIN — UMECLIDINIUM BROMIDE AND VILANTEROL TRIFENATATE 1 PUFF: 62.5; 25 POWDER RESPIRATORY (INHALATION) at 08:03

## 2023-01-23 RX ADMIN — SENNOSIDES AND DOCUSATE SODIUM 2 TABLET: 50; 8.6 TABLET ORAL at 05:21

## 2023-01-23 RX ADMIN — ENOXAPARIN SODIUM 40 MG: 40 INJECTION SUBCUTANEOUS at 17:28

## 2023-01-23 RX ADMIN — OMEPRAZOLE 20 MG: 20 CAPSULE, DELAYED RELEASE ORAL at 05:24

## 2023-01-23 RX ADMIN — HEPARIN SODIUM 3300 UNITS: 1000 INJECTION, SOLUTION INTRAVENOUS; SUBCUTANEOUS at 12:10

## 2023-01-23 RX ADMIN — ATORVASTATIN CALCIUM 40 MG: 40 TABLET, FILM COATED ORAL at 19:55

## 2023-01-23 RX ADMIN — MIDODRINE HYDROCHLORIDE 5 MG: 5 TABLET ORAL at 17:28

## 2023-01-23 ASSESSMENT — FIBROSIS 4 INDEX: FIB4 SCORE: 1.3

## 2023-01-23 ASSESSMENT — ENCOUNTER SYMPTOMS
NECK PAIN: 0
BRUISES/BLEEDS EASILY: 0
BLOOD IN STOOL: 0
CHILLS: 0
WHEEZING: 0
VOMITING: 0
SPUTUM PRODUCTION: 0
FOCAL WEAKNESS: 0
FLANK PAIN: 0
SHORTNESS OF BREATH: 0
BLURRED VISION: 0
NAUSEA: 0
HEADACHES: 0
DIARRHEA: 0
SEIZURES: 0
FEVER: 0
BACK PAIN: 0
DIAPHORESIS: 0
SORE THROAT: 0
MYALGIAS: 0
COUGH: 0
DIZZINESS: 0
ABDOMINAL PAIN: 0
PALPITATIONS: 1

## 2023-01-23 ASSESSMENT — PAIN DESCRIPTION - PAIN TYPE: TYPE: ACUTE PAIN

## 2023-01-23 NOTE — PROGRESS NOTES
Hemodialysis ordered by Dr. Case. Treatment started at 0900 and ended at 1200. Pt stable, vss, no c/o post tx. Net UF 1.523 L d/t hypotension during tx. Decreased UFG. Also decreased BFR d/t multiple arterial pressure alarms. Dr. Velasquez notified and aware. Report to RAMOS Boudreaux RN.

## 2023-01-23 NOTE — PROGRESS NOTES
"[facility] -  PROGRESS NOTE               Author: Farhat Velasquez M.D. Date & Time: 1/23/2023  11:49 AM     HPI:  Yoandy Peace is a 67 y.o. male with PMH + COPD, CAD, CHF, ALONDRA/OHS on night time CPAP, FARIHA on hemodialysis who was sent to the ED from his HD unit due to HD catheter malfunction. Pt on maintenance HD qTTHS at [facility]. He stated that he was started on HD and was about 1hr into his treatment when his catheter malfunctioned. Pt was recently discharge yesterday from [facility] 2/2 to clotted dialysis catheter.nursing facility.  He presented 1/19/2023 with a clotted dialysis catheter.  Dialysis nurses were unable to flush the catheter with tPA.  He was sent to the emergency department for catheter replacement.  Tunneled catheter was exchanged by interventional radiology on 1/19, he underwent a dialysis treatment thereafter. Labs on admission showed Hgb 8.8, Na 137, K 4.1, CO2 27, BUN 21, Scr 4.4, Ca 8.1.      No F/C/N/V/CP/SOB.  No melena, hematochezia, hematemesis.  No HA, visual changes, or abdominal pain.    DAILY NEPHROLOGY SUMMARY:  1/21: Consult done  1/22: Seen during HD, s/p TPA over night, per HD RN pts line needed to be reversed today we HD. Denies CP, SOB, no edema. VSS. No acute overnight events.   1/23: Still having difficulty performing dialysis with current permcatheter. Low blood flows and resistance especially to the arterial port over the past 2 days.     REVIEW OF SYSTEMS:    10 point ROS reviewed and is as per HPI/daily summary or otherwise negative    PMH/PSH/SH/FH:   Reviewed and unchanged since admission note    CURRENT MEDICATIONS:   Reviewed from admission to present day    VS:  /54   Pulse 83   Temp 37.1 °C (98.8 °F) (Temporal)   Resp 18   Ht 1.778 m (5' 10\")   Wt (!) 136 kg (300 lb 4.3 oz)   SpO2 97%   BMI 43.08 kg/m²     Physical Exam  Vitals and nursing note reviewed.   Constitutional:       General: He is not in acute distress.     " Appearance: He is not ill-appearing.   HENT:      Head: Normocephalic and atraumatic.   Eyes:      General: No scleral icterus.  Cardiovascular:      Rate and Rhythm: Normal rate and regular rhythm.      Pulses: Normal pulses.      Comments: Right cx TDC  Pulmonary:      Effort: Pulmonary effort is normal.   Abdominal:      General: Bowel sounds are normal.      Palpations: Abdomen is soft.   Musculoskeletal:         General: No swelling.      Cervical back: Normal range of motion.      Right lower leg: No edema.      Left lower leg: No edema.   Skin:     General: Skin is warm and dry.   Neurological:      General: No focal deficit present.      Mental Status: He is alert and oriented to person, place, and time.   Psychiatric:         Mood and Affect: Mood normal.       Fluids:  In: 1000 [Dialysis:1000]  Out: 2128     LABS:  Recent Labs     01/21/23  1153 01/22/23  0025 01/23/23  0227   SODIUM 137 138 140   POTASSIUM 4.1 3.9 4.3   CHLORIDE 99 102 102   CO2 27 26 26   GLUCOSE 177* 133* 104*   BUN 21 23* 20   CREATININE 4.48* 4.92* 4.45*   CALCIUM 8.1* 7.8* 8.1*       IMPRESSION:  # FARIHA, non-oliguric   - Etiology likely multi-factorial   - Currently on maintenance dialysis qTTHS at [facility]  # HTN, controlled   - Goal BP < 140/90  - no BP meds at this time  # Anemia of CKD,Not at goal   - Goal Hgb 10-11  - iron sats% 19  - Ferritin 306  - IV iron load   # CKD-MBD  -   - Phos 2.7  - low Ca, CCA WNL   - Check Vit D   -Continue outpatient medications  # Malfunctioning CVC              - S/p TPA  # COPD  # DM II  # CAD  # OSAS               - CPAP at night      PLAN:  - HD today Monday. Will need another catheter exchange with IR prior to discharge otherwise he's likely going to bounce back to the hospital. Likely needs a longer catheter or new tunnel given this chronic problem.   - UF as tolerated  - No dietary protein restrictions  - Dose all meds per ESRD  - Transfuse Hgb less than 7.0  - Avoid NSAIDs,  nephrotoxins   - Labs in Am

## 2023-01-23 NOTE — DISCHARGE PLANNING
Agency/Facility Name: [facility]   Spoke To: [place]   Outcome: DPA called to notify [place] Pt not medically yet to return back to [facility].

## 2023-01-23 NOTE — PROGRESS NOTES
4 Eyes Skin Assessment Completed by ERIBERTO Bradford, RN and KELSEA Oleary, RN.    Head WDL  Ears WDL  Nose WDL  Mouth WDL  Neck WDL  Breast/Chest WDL  Shoulder Blades WDL  Spine WDL  (R) Arm/Elbow/Hand WDL  (L) Arm/Elbow/Hand WDL  Abdomen WDL  Groin WDL  Scrotum/Coccyx/Buttocks WDL  (R) Leg Redness, Scab, and Edema  (L) Leg Redness, Scab, and Edema  (R) Heel/Foot/Toe WDL  (L) Heel/Foot/Toe WDL          Devices In Places Nasal Cannula      Interventions In Place NC W/Ear Foams and Pillows    Possible Skin Injury No    Pictures Uploaded Into Epic N/A  Wound Consult Placed N/A  RN Wound Prevention Protocol Ordered No

## 2023-01-23 NOTE — PROGRESS NOTES
Report received from Day Shift RN, assumed care of patient. Patient A&O x 4 . Plan of care discussed with patient, labs and chart reviewed. All needs met at this time. Tele box on,  Call light within reach, bed locked and in lowest position. All fall precautions and hourly rounding in place.

## 2023-01-23 NOTE — CARE PLAN
Problem: Knowledge Deficit - Standard  Goal: Patient and family/care givers will demonstrate understanding of plan of care, disease process/condition, diagnostic tests and medications  Outcome: Progressing     Problem: Hemodynamics  Goal: Patient's hemodynamics, fluid balance and neurologic status will be stable or improve  Outcome: Progressing   The patient is Stable - Low risk of patient condition declining or worsening    Shift Goals  Clinical Goals: safety, monitor resp status  Patient Goals: rest  Family Goals: ramila    Progress made toward(s) clinical / shift goals:   Pt vitals have remained stable, respiratory status remains stable.

## 2023-01-23 NOTE — DISCHARGE PLANNING
Patient from [facility] and will be able to return there upon discharge. Notified primary nurse & attending.

## 2023-01-24 ENCOUNTER — APPOINTMENT (OUTPATIENT)
Dept: RADIOLOGY | Facility: MEDICAL CENTER | Age: 68
DRG: 314 | End: 2023-01-24
Attending: INTERNAL MEDICINE
Payer: COMMERCIAL

## 2023-01-24 VITALS
SYSTOLIC BLOOD PRESSURE: 105 MMHG | HEART RATE: 132 BPM | HEIGHT: 70 IN | OXYGEN SATURATION: 99 % | DIASTOLIC BLOOD PRESSURE: 65 MMHG | TEMPERATURE: 98.1 F | WEIGHT: 296.3 LBS | BODY MASS INDEX: 42.42 KG/M2 | RESPIRATION RATE: 18 BRPM

## 2023-01-24 LAB
1,25(OH)2D3 SERPL-MCNC: 20.4 PG/ML (ref 19.9–79.3)
1,25(OH)2D3 SERPL-MCNC: 22.2 PG/ML (ref 19.9–79.3)
ERYTHROCYTE [DISTWIDTH] IN BLOOD BY AUTOMATED COUNT: 56.8 FL (ref 35.9–50)
HCT VFR BLD AUTO: 28.9 % (ref 42–52)
HGB BLD-MCNC: 8.6 G/DL (ref 14–18)
MCH RBC QN AUTO: 27.4 PG (ref 27–33)
MCHC RBC AUTO-ENTMCNC: 29.8 G/DL (ref 33.7–35.3)
MCV RBC AUTO: 92 FL (ref 81.4–97.8)
PLATELET # BLD AUTO: 195 K/UL (ref 164–446)
PMV BLD AUTO: 10.1 FL (ref 9–12.9)
RBC # BLD AUTO: 3.14 M/UL (ref 4.7–6.1)
SARS-COV+SARS-COV-2 AG RESP QL IA.RAPID: NOTDETECTED
SPECIMEN SOURCE: NORMAL
WBC # BLD AUTO: 9.4 K/UL (ref 4.8–10.8)

## 2023-01-24 PROCEDURE — 700102 HCHG RX REV CODE 250 W/ 637 OVERRIDE(OP): Performed by: STUDENT IN AN ORGANIZED HEALTH CARE EDUCATION/TRAINING PROGRAM

## 2023-01-24 PROCEDURE — A9270 NON-COVERED ITEM OR SERVICE: HCPCS | Performed by: STUDENT IN AN ORGANIZED HEALTH CARE EDUCATION/TRAINING PROGRAM

## 2023-01-24 PROCEDURE — 99152 MOD SED SAME PHYS/QHP 5/>YRS: CPT

## 2023-01-24 PROCEDURE — A9270 NON-COVERED ITEM OR SERVICE: HCPCS | Performed by: INTERNAL MEDICINE

## 2023-01-24 PROCEDURE — 99239 HOSP IP/OBS DSCHRG MGMT >30: CPT | Performed by: STUDENT IN AN ORGANIZED HEALTH CARE EDUCATION/TRAINING PROGRAM

## 2023-01-24 PROCEDURE — 700105 HCHG RX REV CODE 258: Performed by: RADIOLOGY

## 2023-01-24 PROCEDURE — 700111 HCHG RX REV CODE 636 W/ 250 OVERRIDE (IP)

## 2023-01-24 PROCEDURE — 700101 HCHG RX REV CODE 250

## 2023-01-24 PROCEDURE — 700102 HCHG RX REV CODE 250 W/ 637 OVERRIDE(OP): Performed by: INTERNAL MEDICINE

## 2023-01-24 PROCEDURE — 87426 SARSCOV CORONAVIRUS AG IA: CPT

## 2023-01-24 PROCEDURE — 85027 COMPLETE CBC AUTOMATED: CPT

## 2023-01-24 PROCEDURE — 36415 COLL VENOUS BLD VENIPUNCTURE: CPT

## 2023-01-24 PROCEDURE — 700111 HCHG RX REV CODE 636 W/ 250 OVERRIDE (IP): Performed by: STUDENT IN AN ORGANIZED HEALTH CARE EDUCATION/TRAINING PROGRAM

## 2023-01-24 PROCEDURE — 0J2SXYZ CHANGE OTHER DEVICE IN HEAD AND NECK SUBCUTANEOUS TISSUE AND FASCIA, EXTERNAL APPROACH: ICD-10-PCS | Performed by: RADIOLOGY

## 2023-01-24 PROCEDURE — 700111 HCHG RX REV CODE 636 W/ 250 OVERRIDE (IP): Performed by: RADIOLOGY

## 2023-01-24 RX ORDER — HEPARIN SODIUM 1000 [USP'U]/ML
INJECTION, SOLUTION INTRAVENOUS; SUBCUTANEOUS
Status: COMPLETED
Start: 2023-01-24 | End: 2023-01-24

## 2023-01-24 RX ORDER — MIDAZOLAM HYDROCHLORIDE 1 MG/ML
INJECTION INTRAMUSCULAR; INTRAVENOUS
Status: COMPLETED
Start: 2023-01-24 | End: 2023-01-24

## 2023-01-24 RX ORDER — LIDOCAINE HYDROCHLORIDE 20 MG/ML
INJECTION, SOLUTION INFILTRATION; PERINEURAL
Status: COMPLETED
Start: 2023-01-24 | End: 2023-01-24

## 2023-01-24 RX ORDER — MIDODRINE HYDROCHLORIDE 5 MG/1
5 TABLET ORAL PRN
Qty: 60 TABLET | Status: SHIPPED
Start: 2023-01-24 | End: 2023-04-21

## 2023-01-24 RX ORDER — MIDAZOLAM HYDROCHLORIDE 1 MG/ML
.5-2 INJECTION INTRAMUSCULAR; INTRAVENOUS PRN
Status: DISCONTINUED | OUTPATIENT
Start: 2023-01-24 | End: 2023-01-24 | Stop reason: HOSPADM

## 2023-01-24 RX ORDER — LIDOCAINE HYDROCHLORIDE AND EPINEPHRINE 10; 10 MG/ML; UG/ML
INJECTION, SOLUTION INFILTRATION; PERINEURAL
Status: COMPLETED
Start: 2023-01-24 | End: 2023-01-24

## 2023-01-24 RX ORDER — CEFAZOLIN SODIUM 1 G/3ML
INJECTION, POWDER, FOR SOLUTION INTRAMUSCULAR; INTRAVENOUS
Status: COMPLETED
Start: 2023-01-24 | End: 2023-01-24

## 2023-01-24 RX ORDER — ONDANSETRON 2 MG/ML
4 INJECTION INTRAMUSCULAR; INTRAVENOUS PRN
Status: DISCONTINUED | OUTPATIENT
Start: 2023-01-24 | End: 2023-01-24 | Stop reason: HOSPADM

## 2023-01-24 RX ORDER — SODIUM CHLORIDE 9 MG/ML
500 INJECTION, SOLUTION INTRAVENOUS
Status: DISCONTINUED | OUTPATIENT
Start: 2023-01-24 | End: 2023-01-24 | Stop reason: HOSPADM

## 2023-01-24 RX ORDER — OMEPRAZOLE 20 MG/1
20 CAPSULE, DELAYED RELEASE ORAL 2 TIMES DAILY
Qty: 30 CAPSULE | Status: SHIPPED
Start: 2023-01-24 | End: 2023-04-21

## 2023-01-24 RX ADMIN — METOPROLOL TARTRATE 12.5 MG: 25 TABLET, FILM COATED ORAL at 16:29

## 2023-01-24 RX ADMIN — LIDOCAINE HYDROCHLORIDE: 20 INJECTION, SOLUTION INFILTRATION; PERINEURAL at 15:19

## 2023-01-24 RX ADMIN — AMITRIPTYLINE HYDROCHLORIDE 10 MG: 10 TABLET, FILM COATED ORAL at 06:00

## 2023-01-24 RX ADMIN — FENTANYL CITRATE 50 MCG: 50 INJECTION, SOLUTION INTRAMUSCULAR; INTRAVENOUS at 15:10

## 2023-01-24 RX ADMIN — CEFAZOLIN: 330 INJECTION, POWDER, FOR SOLUTION INTRAMUSCULAR; INTRAVENOUS at 15:00

## 2023-01-24 RX ADMIN — HEPARIN SODIUM: 1000 INJECTION, SOLUTION INTRAVENOUS; SUBCUTANEOUS at 15:20

## 2023-01-24 RX ADMIN — OMEPRAZOLE 20 MG: 20 CAPSULE, DELAYED RELEASE ORAL at 16:29

## 2023-01-24 RX ADMIN — AMITRIPTYLINE HYDROCHLORIDE 10 MG: 10 TABLET, FILM COATED ORAL at 16:28

## 2023-01-24 RX ADMIN — METOPROLOL TARTRATE 12.5 MG: 25 TABLET, FILM COATED ORAL at 06:28

## 2023-01-24 RX ADMIN — FENTANYL CITRATE 50 MCG: 50 INJECTION, SOLUTION INTRAMUSCULAR; INTRAVENOUS at 15:18

## 2023-01-24 RX ADMIN — LIDOCAINE HYDROCHLORIDE,EPINEPHRINE BITARTRATE: 10; .01 INJECTION, SOLUTION INFILTRATION; PERINEURAL at 15:19

## 2023-01-24 RX ADMIN — SITAGLIPTIN 100 MG: 100 TABLET, FILM COATED ORAL at 06:27

## 2023-01-24 RX ADMIN — UMECLIDINIUM BROMIDE AND VILANTEROL TRIFENATATE 1 PUFF: 62.5; 25 POWDER RESPIRATORY (INHALATION) at 06:29

## 2023-01-24 RX ADMIN — ENOXAPARIN SODIUM 40 MG: 40 INJECTION SUBCUTANEOUS at 16:29

## 2023-01-24 RX ADMIN — OMEPRAZOLE 20 MG: 20 CAPSULE, DELAYED RELEASE ORAL at 06:27

## 2023-01-24 RX ADMIN — MIDAZOLAM HYDROCHLORIDE 2 MG: 1 INJECTION, SOLUTION INTRAMUSCULAR; INTRAVENOUS at 15:10

## 2023-01-24 RX ADMIN — MIDAZOLAM HYDROCHLORIDE 2 MG: 1 INJECTION INTRAMUSCULAR; INTRAVENOUS at 15:10

## 2023-01-24 RX ADMIN — SODIUM CHLORIDE 3 G: 9 INJECTION, SOLUTION INTRAVENOUS at 14:59

## 2023-01-24 ASSESSMENT — COGNITIVE AND FUNCTIONAL STATUS - GENERAL
SUGGESTED CMS G CODE MODIFIER MOBILITY: CH
SUGGESTED CMS G CODE MODIFIER DAILY ACTIVITY: CH
DAILY ACTIVITIY SCORE: 24
MOBILITY SCORE: 24

## 2023-01-24 NOTE — CARE PLAN
Problem: Hemodynamics  Goal: Patient's hemodynamics, fluid balance and neurologic status will be stable or improve  Outcome: Progressing   The patient is Stable - Low risk of patient condition declining or worsening    Shift Goals  Clinical Goals: monitor vitals  Patient Goals: discharge  Family Goals: ramila    Progress made toward(s) clinical / shift goals:   Patient vitals have remained stable.

## 2023-01-24 NOTE — DISCHARGE PLANNING
0948  Agency/Facility Name: [facility]   Outcome: [place] left DPA a voicemail regarding medical clearance.     0955  Agency/Facility Name: [facility]   Spoke To: [place]   Outcome: DPA called to notify Pt will be medically cleared once his catheter is replaced. [place] notified DPA Pt can be go back to [facility] at anytime.

## 2023-01-24 NOTE — PROGRESS NOTES
"[facility] -  PROGRESS NOTE               Author: Farhat Velasquez M.D. Date & Time: 1/24/2023  12:36 PM     HPI:  Yoandy Peace is a 67 y.o. male with PMH + COPD, CAD, CHF, ALONDRA/OHS on night time CPAP, FARIHA on hemodialysis who was sent to the ED from his HD unit due to HD catheter malfunction. Pt on maintenance HD qTTHS at [facility]. He stated that he was started on HD and was about 1hr into his treatment when his catheter malfunctioned. Pt was recently discharge yesterday from [facility] 2/2 to clotted dialysis catheter.nursing facility.  He presented 1/19/2023 with a clotted dialysis catheter.  Dialysis nurses were unable to flush the catheter with tPA.  He was sent to the emergency department for catheter replacement.  Tunneled catheter was exchanged by interventional radiology on 1/19, he underwent a dialysis treatment thereafter. Labs on admission showed Hgb 8.8, Na 137, K 4.1, CO2 27, BUN 21, Scr 4.4, Ca 8.1.      No F/C/N/V/CP/SOB.  No melena, hematochezia, hematemesis.  No HA, visual changes, or abdominal pain.    DAILY NEPHROLOGY SUMMARY:  1/21: Consult done  1/22: Seen during HD, s/p TPA over night, per HD RN pts line needed to be reversed today we HD. Denies CP, SOB, no edema. VSS. No acute overnight events.   1/23: Still having difficulty performing dialysis with current permcatheter. Low blood flows and resistance especially to the arterial port over the past 2 days.   1/24: No new overnight renal events. Pending TDC exchange.    REVIEW OF SYSTEMS:    10 point ROS reviewed and is as per HPI/daily summary or otherwise negative    PMH/PSH/SH/FH:   Reviewed and unchanged since admission note    CURRENT MEDICATIONS:   Reviewed from admission to present day    VS:  /59   Pulse (!) 104   Temp 36.7 °C (98.1 °F) (Temporal)   Resp 18   Ht 1.778 m (5' 10\")   Wt (!) 134 kg (296 lb 4.8 oz)   SpO2 96%   BMI 42.51 kg/m²     Physical Exam  Vitals and nursing note reviewed. "   Constitutional:       General: He is not in acute distress.     Appearance: He is not ill-appearing.   HENT:      Head: Normocephalic and atraumatic.   Eyes:      General: No scleral icterus.  Cardiovascular:      Rate and Rhythm: Normal rate and regular rhythm.      Pulses: Normal pulses.      Comments: Right cx TDC  Pulmonary:      Effort: Pulmonary effort is normal.   Abdominal:      General: Bowel sounds are normal.      Palpations: Abdomen is soft.   Musculoskeletal:         General: No swelling.      Cervical back: Normal range of motion.      Right lower leg: No edema.      Left lower leg: No edema.   Skin:     General: Skin is warm and dry.   Neurological:      General: No focal deficit present.      Mental Status: He is alert and oriented to person, place, and time.   Psychiatric:         Mood and Affect: Mood normal.       Fluids:  In: 800 [Dialysis:800]  Out: 2323     LABS:  Recent Labs     01/22/23  0025 01/23/23  0227 01/23/23  1825   SODIUM 138 140 138   POTASSIUM 3.9 4.3 4.1   CHLORIDE 102 102 99   CO2 26 26 26   GLUCOSE 133* 104* 211*   BUN 23* 20 14   CREATININE 4.92* 4.45* 3.59*   CALCIUM 7.8* 8.1* 8.2*       IMPRESSION:  # FARIHA, non-oliguric   - Etiology likely multi-factorial   - Currently on maintenance dialysis qTTHS at [facility] ([place], [place])  # HTN, controlled   - Goal BP < 140/90  - no BP meds at this time  # Anemia of CKD,Not at goal   - Goal Hgb 10-11  - iron sats% 19  - Ferritin 306  - IV iron load   # CKD-MBD  -   - Phos 2.7  - low Ca, CCA WNL   - Check Vit D   -Continue outpatient medications  # Malfunctioning CVC              - S/p TPA  # COPD  # DM II  # CAD  # OSAS               - CPAP at night      PLAN:  - Catheter exchange with IR prior to discharge otherwise he's likely going to bounce back to the hospital. Likely needs a longer catheter or new tunnel given this chronic problem.   - UF as tolerated  - No dietary protein restrictions  - Dose all meds per ESRD  - Transfuse Hgb less  than 7.0  - Avoid NSAIDs, nephrotoxins

## 2023-01-24 NOTE — PROGRESS NOTES
Pt presents to IR 3. Pt was consented by MD at bedside, confirmed by this RN and consent at bedside. Pt transferred to IR table in supine position. Patient underwent a tunneled hemodialysis catheter exchange or replacement by Dr. Mccoy. Procedure site was marked by MD and verified using imaging guidance. Pt placed on monitor, prepped and draped in a sterile fashion. Vitals were taken every 5 minutes and remained stable during procedure (see doc flow sheet for results). CO2 waveform capnography was monitored and remained WNL throughout procedure. Report called to Joni RODRIGUEZ. Pt transported by hospital bed with RN to T734.      Bard Hemosplit long-term hemodialysis catheter 14.5F x 23cm  REF: [id number]  LOT: [id number]  Exp: 12/31/2023

## 2023-01-24 NOTE — OR SURGEON
Immediate Post- Operative Note    PostOp Diagnosis: RENAL FAILURE.TUNNELED HD CATH DYSFUNCTION     Procedure(s): RIGHT INTERNAL JUGULAR 14.5 Maori 19 CM LENGTH HEMOSPLIT TUNNELED HEMODIALYSIS CATHETER EXCHANGE FOR  LONGER 23 CM LENGTH HEMOSPLIT 14.5F TUNNELED DIALYSIS CATHETER WITH FLUOROSCOPIC GUIDANCE      Estimated Blood Loss: <20CC (FLUSHES)      FINDINGS: INITIAL CATHETER IN SUBOPTIMAL POSITION AT AZGOS LEVEL SVC. NEW LONGER CATHETER POSITIONED WITH TIP IN RA. BOTH PORTS NOW ASPIRATE AND FLUSH EASILY.         Complications: NONE        1/24/2023 3:29 PM Christiano Mccoy M.D.

## 2023-01-24 NOTE — DISCHARGE PLANNING
Outpatient Dialysis Note     Confirmed patient is active at:     [facility]  [address] [place] [id number]     Schedule: Tues, Thurs, Sat   Time: 5:15 AM     Patient is seen by Dr. Delatorre in HD clinic.     Spoke with Chelsie at facility who confirmed patient information.   Forwarded records for review.     Xitlaly Reyes   Dialysis Coordinator / [facility]  Ph: [phone]

## 2023-01-24 NOTE — DISCHARGE SUMMARY
Discharge Summary    CHIEF COMPLAINT ON ADMISSION  Chief Complaint   Patient presents with    Vascular Access Problem     Dialysis port is clogged - T, TH, SAT       Reason for Admission  EMS     Admission Date  1/21/2023    CODE STATUS  Full Code    HPI & HOSPITAL COURSE      67-year-old male with a past medical history of ESRD on HD T, TH, S, COPD on 2 L of oxygen, CAD, CHF, afib not on anticoagulation due to recent GI bleed who presented with hemodialysis catheter access problem.  He was sent into the ER from his hemodialysis appointment after was noted that his dialysis catheter was not flushing.  Nephrology was consulted and recommended tPA for the catheter  Patient underwent hemodialysis however was noted to have some sluggish flow through the catheter and has been recommended for another treatment with tPA to the catheter.  Patient's blood pressure has been low, have started him on Midodrine.  Patient is followed by nephrology for dialysis and recommended catheter exchange with IR.  PT/OT recommended postacute placement      During hospital course patient remain  hemodynamically stable ,asymptomatic ,labs remain unremarkable .  Patient will be discharged with close follow up with PCP and nephrology .Discharge plan was discussed with patient in details .  Patient agreed with discharge plan  and  all questions answered.      Therefore, he is discharged in good and stable condition to skilled nursing facility.    The patient met 2-midnight criteria for an inpatient stay at the time of discharge.    Discharge Date  1/24/2023      DISCHARGE DIAGNOSES  Principal Problem:    Complication associated with dialysis catheter, hypotension on midodrine, chronic hypoxia POA: Yes  Active Problems:    Type 2 diabetes mellitus with diabetic chronic kidney disease (HCC) POA: Yes      Overview: Longstanding history of DM2, follows with endocrinology Dr. Puente      Regimen:       - Metformin 1000mg BID      - Patient states he  uses Humalin 100U in the am, 75U in afternoon, and 50U       at night.       - On trulicity and farxiga      - follows with podiatry      - follows with optometry: 09/2021    Chronic respiratory failure with hypoxia ([facility]) POA: Yes    CAD (coronary artery disease) POA: Yes    Anemia POA: Yes    COPD (chronic obstructive pulmonary disease) ([facility]) POA: Yes    Atrial fibrillation with RVR ([facility]) POA: Yes    PUD (peptic ulcer disease) POA: Yes    ESRD (end stage renal disease) on dialysis ([facility]) POA: Yes  Resolved Problems:    * No resolved hospital problems. *      FOLLOW UP  No future appointments.  No follow-up provider specified.    MEDICATIONS ON DISCHARGE     Medication List        START taking these medications        Instructions   omeprazole 20 MG delayed-release capsule  Commonly known as: PRILOSEC   Take 1 Capsule by mouth 2 times a day.  Dose: 20 mg            CONTINUE taking these medications        Instructions   albuterol 108 (90 Base) MCG/ACT Aers inhalation aerosol   Inhale 2 Puffs every 6 hours as needed for Shortness of Breath.  Dose: 2 Puff     amitriptyline 10 MG Tabs  Commonly known as: ELAVIL   Take 10 mg by mouth 2 times a day.  Dose: 10 mg     Anoro Ellipta 62.5-25 MCG/ACT Aepb inhaler  Generic drug: umeclidinium-vilanterol   Inhale 1 Puff every day.  Dose: 1 Puff     atorvastatin 40 MG Tabs  Commonly known as: LIPITOR   Take 40 mg by mouth every evening.  Dose: 40 mg     metoprolol tartrate 25 MG Tabs  Commonly known as: LOPRESSOR   Take 25 mg by mouth 2 times a day.  Dose: 25 mg     Tradjenta 5 MG Tabs tablet  Generic drug: linagliptin   Take 5 mg by mouth every day.  Dose: 5 mg     Trelegy Ellipta 100-62.5-25 MCG/ACT Aepb inhalation  Generic drug: fluticasone-umeclidin-vilant   Inhale 1 Inhalation every morning.  Dose: 1 Inhalation              Allergies  No Known Allergies    DIET  Orders Placed This Encounter   Procedures    Diet NPO Restrict to: Sips with Medications     Standing Status:    Standing     Number of Occurrences:   8     Order Specific Question:   Diet NPO Restrict to:     Answer:   Sips with Medications [3]       ACTIVITY  As tolerated.  Weight bearing as tolerated    CONSULTATIONS  Nephrology     PROCEDURES  DX-CHEST-PORTABLE (1 VIEW)   Final Result      1.  Linear atelectasis in the lower lungs. No focal consolidation. No effusions.   2.  Cardiomegaly.         IR-WIN,GROSHONG PLACEMENT >5    (Results Pending)         LABORATORY  Lab Results   Component Value Date    SODIUM 138 01/23/2023    POTASSIUM 4.1 01/23/2023    CHLORIDE 99 01/23/2023    CO2 26 01/23/2023    GLUCOSE 211 (H) 01/23/2023    BUN 14 01/23/2023    CREATININE 3.59 (H) 01/23/2023        Lab Results   Component Value Date    WBC 9.4 01/24/2023    HEMOGLOBIN 8.6 (L) 01/24/2023    HEMATOCRIT 28.9 (L) 01/24/2023    PLATELETCT 195 01/24/2023        Total time of the discharge process exceeds 37 minutes.

## 2023-01-24 NOTE — PROGRESS NOTES
Report received from Day Shift RN, assumed care of patient. Patient A&O x 4. Plan of care discussed with patient, labs and chart reviewed. All needs met at this time. Tele box on, on 2.5 LO2 via NC . Call light within reach, bed locked and in lowest position. All fall precautions and hourly rounding in place.

## 2023-01-25 NOTE — PROGRESS NOTES
Discharge instructions reviewed and signed. Iv and tele monitor removed. Pt. Awaiting transport to [facility].

## 2023-01-25 NOTE — DISCHARGE PLANNING
Transport Request Received:1/24/2023 at 1610    Transport Company: [employer]     Transport Date: 1/24/2023  Time: 1800    [facility] [address] [facility]    Notified care team of scheduled transport via Voalte.      If there are any changes needed to the DC transportation scheduled, please contact [facility] at ext. [phone] between the hours of 5496-0766 Mon-Fri. If outside those hours, contact the ED Case Manager at ext. [phone].

## 2023-01-25 NOTE — CARE PLAN
The patient is Stable - Low risk of patient condition declining or worsening    Shift Goals  Clinical Goals: Ensure pt remains clean and is able to get dialysis catheter  Patient Goals: Comfort  Family Goals: JENI    Progress made toward(s) clinical / shift goals:    Problem: Knowledge Deficit - Standard  Goal: Patient and family/care givers will demonstrate understanding of plan of care, disease process/condition, diagnostic tests and medications  1/24/2023 1607 by LISETTE BriceñoN.  Outcome: Progressing  Note: Educated on POC including post procedure orders and plan to dc later this evening.  1/24/2023 1607 by LISETTE BriceñoN.  Outcome: Progressing     Problem: Hemodynamics  Goal: Patient's hemodynamics, fluid balance and neurologic status will be stable or improve  1/24/2023 1607 by Joni Scherer R.N.  Outcome: Progressing  Note: Performing assessments and monitoring vital signs.  1/24/2023 1607 by Joni Scherer R.N.  Outcome: Progressing       Patient is not progressing towards the following goals:    N/A

## 2023-01-25 NOTE — PROGRESS NOTES
Pt returned from IR with new dialysis catheter. Site clean and dry. Vitals stable. Pt educated on orders to keeps HOB at 35 degrees and to remain on bedrest for 2 hours following the procedure. Provided with ice water and performing assessments and vital sign checks per IR orders.

## 2023-04-03 NOTE — TELEPHONE ENCOUNTER
Received request via: {REFILL PATIENT/PHARMACY:[id number]}    Was the patient seen in the last year in this department? {YES / NO:12639}    Does the patient have an active prescription (recently filled or refills available) for medication(s) requested? {Yes / No:65007}    Does the patient have custodial Plus and need 100 day supply (blood pressure, diabetes and cholesterol meds only)? {YES/NO/NA:48050}

## 2023-04-04 RX ORDER — AMITRIPTYLINE HYDROCHLORIDE 10 MG/1
TABLET, FILM COATED ORAL
Qty: 120 TABLET | Refills: 0 | Status: SHIPPED | OUTPATIENT
Start: 2023-04-04

## 2023-04-21 ENCOUNTER — HOSPITAL ENCOUNTER (EMERGENCY)
Facility: MEDICAL CENTER | Age: 68
End: 2023-04-21
Attending: EMERGENCY MEDICINE
Payer: COMMERCIAL

## 2023-04-21 ENCOUNTER — APPOINTMENT (OUTPATIENT)
Dept: RADIOLOGY | Facility: MEDICAL CENTER | Age: 68
End: 2023-04-21
Attending: EMERGENCY MEDICINE
Payer: COMMERCIAL

## 2023-04-21 VITALS
OXYGEN SATURATION: 93 % | BODY MASS INDEX: 42.52 KG/M2 | WEIGHT: 297 LBS | TEMPERATURE: 98 F | DIASTOLIC BLOOD PRESSURE: 65 MMHG | SYSTOLIC BLOOD PRESSURE: 98 MMHG | RESPIRATION RATE: 16 BRPM | HEIGHT: 70 IN | HEART RATE: 140 BPM

## 2023-04-21 DIAGNOSIS — I47.19 ATRIAL TACHYCARDIA (HCC): ICD-10-CM

## 2023-04-21 DIAGNOSIS — N18.6 ESRD (END STAGE RENAL DISEASE) (HCC): ICD-10-CM

## 2023-04-21 DIAGNOSIS — I48.4 ATYPICAL ATRIAL FLUTTER (HCC): ICD-10-CM

## 2023-04-21 DIAGNOSIS — I50.9 ACUTE ON CHRONIC CONGESTIVE HEART FAILURE, UNSPECIFIED HEART FAILURE TYPE (HCC): ICD-10-CM

## 2023-04-21 LAB
ALBUMIN SERPL BCP-MCNC: 3.7 G/DL (ref 3.2–4.9)
ALBUMIN/GLOB SERPL: 1 G/DL
ALP SERPL-CCNC: 107 U/L (ref 30–99)
ALT SERPL-CCNC: 6 U/L (ref 2–50)
ANION GAP SERPL CALC-SCNC: 12 MMOL/L (ref 7–16)
ANISOCYTOSIS BLD QL SMEAR: ABNORMAL
AST SERPL-CCNC: 9 U/L (ref 12–45)
BASOPHILS # BLD AUTO: 0.9 % (ref 0–1.8)
BASOPHILS # BLD: 0.11 K/UL (ref 0–0.12)
BILIRUB SERPL-MCNC: 0.4 MG/DL (ref 0.1–1.5)
BUN SERPL-MCNC: 23 MG/DL (ref 8–22)
CALCIUM ALBUM COR SERPL-MCNC: 8.9 MG/DL (ref 8.5–10.5)
CALCIUM SERPL-MCNC: 8.7 MG/DL (ref 8.5–10.5)
CHLORIDE SERPL-SCNC: 99 MMOL/L (ref 96–112)
CO2 SERPL-SCNC: 27 MMOL/L (ref 20–33)
CREAT SERPL-MCNC: 3.24 MG/DL (ref 0.5–1.4)
EKG IMPRESSION: NORMAL
EOSINOPHIL # BLD AUTO: 0.11 K/UL (ref 0–0.51)
EOSINOPHIL NFR BLD: 0.9 % (ref 0–6.9)
ERYTHROCYTE [DISTWIDTH] IN BLOOD BY AUTOMATED COUNT: 70.4 FL (ref 35.9–50)
GFR SERPLBLD CREATININE-BSD FMLA CKD-EPI: 20 ML/MIN/1.73 M 2
GLOBULIN SER CALC-MCNC: 3.7 G/DL (ref 1.9–3.5)
GLUCOSE SERPL-MCNC: 154 MG/DL (ref 65–99)
HCT VFR BLD AUTO: 30.9 % (ref 42–52)
HGB BLD-MCNC: 8.8 G/DL (ref 14–18)
LYMPHOCYTES # BLD AUTO: 2 K/UL (ref 1–4.8)
LYMPHOCYTES NFR BLD: 16.5 % (ref 22–41)
MACROCYTES BLD QL SMEAR: ABNORMAL
MANUAL DIFF BLD: NORMAL
MCH RBC QN AUTO: 26.3 PG (ref 27–33)
MCHC RBC AUTO-ENTMCNC: 28.5 G/DL (ref 33.7–35.3)
MCV RBC AUTO: 92.2 FL (ref 81.4–97.8)
MONOCYTES # BLD AUTO: 0.63 K/UL (ref 0–0.85)
MONOCYTES NFR BLD AUTO: 5.2 % (ref 0–13.4)
MORPHOLOGY BLD-IMP: NORMAL
NEUTROPHILS # BLD AUTO: 9.26 K/UL (ref 1.82–7.42)
NEUTROPHILS NFR BLD: 76.5 % (ref 44–72)
NRBC # BLD AUTO: 0.02 K/UL
NRBC BLD-RTO: 0.2 /100 WBC
NT-PROBNP SERPL IA-MCNC: 9509 PG/ML (ref 0–125)
OVALOCYTES BLD QL SMEAR: NORMAL
PLATELET # BLD AUTO: 325 K/UL (ref 164–446)
PLATELET BLD QL SMEAR: NORMAL
PMV BLD AUTO: 9.3 FL (ref 9–12.9)
POIKILOCYTOSIS BLD QL SMEAR: NORMAL
POLYCHROMASIA BLD QL SMEAR: NORMAL
POTASSIUM SERPL-SCNC: 4.5 MMOL/L (ref 3.6–5.5)
PROT SERPL-MCNC: 7.4 G/DL (ref 6–8.2)
RBC # BLD AUTO: 3.35 M/UL (ref 4.7–6.1)
RBC BLD AUTO: PRESENT
SODIUM SERPL-SCNC: 138 MMOL/L (ref 135–145)
TROPONIN T SERPL-MCNC: 188 NG/L (ref 6–19)
WBC # BLD AUTO: 12.1 K/UL (ref 4.8–10.8)

## 2023-04-21 PROCEDURE — 85025 COMPLETE CBC W/AUTO DIFF WBC: CPT

## 2023-04-21 PROCEDURE — 83880 ASSAY OF NATRIURETIC PEPTIDE: CPT

## 2023-04-21 PROCEDURE — 80053 COMPREHEN METABOLIC PANEL: CPT

## 2023-04-21 PROCEDURE — 93005 ELECTROCARDIOGRAM TRACING: CPT

## 2023-04-21 PROCEDURE — 700101 HCHG RX REV CODE 250: Performed by: EMERGENCY MEDICINE

## 2023-04-21 PROCEDURE — 71045 X-RAY EXAM CHEST 1 VIEW: CPT

## 2023-04-21 PROCEDURE — 85007 BL SMEAR W/DIFF WBC COUNT: CPT

## 2023-04-21 PROCEDURE — 36415 COLL VENOUS BLD VENIPUNCTURE: CPT

## 2023-04-21 PROCEDURE — 96374 THER/PROPH/DIAG INJ IV PUSH: CPT

## 2023-04-21 PROCEDURE — 93005 ELECTROCARDIOGRAM TRACING: CPT | Performed by: EMERGENCY MEDICINE

## 2023-04-21 PROCEDURE — 700102 HCHG RX REV CODE 250 W/ 637 OVERRIDE(OP): Performed by: EMERGENCY MEDICINE

## 2023-04-21 PROCEDURE — 99285 EMERGENCY DEPT VISIT HI MDM: CPT

## 2023-04-21 PROCEDURE — 84484 ASSAY OF TROPONIN QUANT: CPT

## 2023-04-21 PROCEDURE — A9270 NON-COVERED ITEM OR SERVICE: HCPCS | Performed by: EMERGENCY MEDICINE

## 2023-04-21 RX ORDER — INSULIN HUMAN 500 [IU]/ML
55-100 INJECTION, SOLUTION SUBCUTANEOUS 3 TIMES DAILY
COMMUNITY
Start: 2023-03-17

## 2023-04-21 RX ORDER — METOPROLOL TARTRATE 1 MG/ML
5 INJECTION, SOLUTION INTRAVENOUS ONCE
Status: COMPLETED | OUTPATIENT
Start: 2023-04-21 | End: 2023-04-21

## 2023-04-21 RX ORDER — APIXABAN 5 MG/1
5 TABLET, FILM COATED ORAL 2 TIMES DAILY
COMMUNITY
Start: 2023-03-23

## 2023-04-21 RX ORDER — METOPROLOL TARTRATE 1 MG/ML
5 INJECTION, SOLUTION INTRAVENOUS ONCE
Status: DISCONTINUED | OUTPATIENT
Start: 2023-04-21 | End: 2023-04-21 | Stop reason: HOSPADM

## 2023-04-21 RX ORDER — OMEPRAZOLE 20 MG/1
20 CAPSULE, DELAYED RELEASE ORAL DAILY
COMMUNITY

## 2023-04-21 RX ORDER — METOPROLOL TARTRATE 37.5 MG/1
37.5 TABLET, FILM COATED ORAL 2 TIMES DAILY
COMMUNITY
Start: 2023-03-30

## 2023-04-21 RX ORDER — FUROSEMIDE 40 MG/1
40 TABLET ORAL 2 TIMES DAILY
COMMUNITY
Start: 2023-03-02

## 2023-04-21 RX ORDER — ACETAMINOPHEN 500 MG
500-1000 TABLET ORAL EVERY 6 HOURS PRN
COMMUNITY

## 2023-04-21 RX ADMIN — METOPROLOL TARTRATE 5 MG: 1 INJECTION, SOLUTION INTRAVENOUS at 11:26

## 2023-04-21 RX ADMIN — APIXABAN 5 MG: 5 TABLET, FILM COATED ORAL at 11:26

## 2023-04-21 ASSESSMENT — LIFESTYLE VARIABLES: DO YOU DRINK ALCOHOL: NO

## 2023-04-21 ASSESSMENT — FIBROSIS 4 INDEX: FIB4 SCORE: 1.38

## 2023-04-21 NOTE — ED NOTES
Med Rec completed per patient and med list (returned)   Allergies reviewed  No ORAL antibiotics in last 30 days

## 2023-04-21 NOTE — ED PROVIDER NOTES
ED Provider Note    CHIEF COMPLAINT  Chief Complaint   Patient presents with    Sent by MD    Tachycardia       EXTERNAL RECORDS REVIEWED  I have reviewed previous outpatient and inpatient notes and based on labs for comparison.      HPI/ROS  LIMITATION TO HISTORY   None  OUTSIDE HISTORIAN(S):  None    Yoandy Peace is a 67 y.o. male who presents to the emergency department sent in to the ED for tachycardia.  The patient was at an outpatient appointment to get his dialysis catheter changed was noted to have tachycardia he was sent here for further work-up and treatment.  Denies any chest pain or shortness of breath.  Does not feel palpitations.  Believes she had a previous episode of an irregular heartbeat.  Patient did not take his Eliquis this morning because he scheduled for procedure.  Also did not take his metoprolol.  He is chronically anticoagulated reports compliance with this.    He denies any other acute concerns or complaints.  Chronically receives dialysis on Tuesday Thursday Saturday.      PAST MEDICAL HISTORY   has a past medical history of CAD (coronary artery disease), CHF (congestive heart failure) ([facility]), Chronic obstructive pulmonary disease (HCC), Diabetes (HCC), Hyperlipidemia, Hypertension, ALONDRA on CPAP, Pneumonia, Tobacco abuse, and Type II or unspecified type diabetes mellitus without mention of complication, not stated as uncontrolled.    SURGICAL HISTORY   has a past surgical history that includes upper gi endoscopy,diagnosis (N/A, 1/11/2023) and upper gi endoscopy,biopsy (N/A, 1/11/2023).    FAMILY HISTORY  No family history on file.    SOCIAL HISTORY  Social History     Tobacco Use    Smoking status: Every Day     Packs/day: 1.00     Types: Cigarettes    Smokeless tobacco: Never   Vaping Use    Vaping Use: Never used   Substance and Sexual Activity    Alcohol use: Not Currently    Drug use: Yes     Types: Inhaled     Comment: THC daily    Sexual activity: Not on file       CURRENT  "MEDICATIONS  Home Medications       Reviewed by Adrianne Longo R.N. (Registered Nurse) on 04/21/23 at 1108  Med List Status: Partial     Medication Last Dose Status   albuterol 108 (90 Base) MCG/ACT Aero Soln inhalation aerosol  Active   amitriptyline (ELAVIL) 10 MG Tab  Active   atorvastatin (LIPITOR) 40 MG Tab  Active   fluticasone-umeclidin-vilant (TRELEGY ELLIPTA) 100-62.5-25 MCG/ACT AEROSOL POWDER, BREATH ACTIVATED inhalation  Active   linagliptin (TRADJENTA) 5 MG Tab tablet  Active   metoprolol tartrate (LOPRESSOR) 25 MG Tab  Active   midodrine (PROAMATINE) 5 MG Tab  Active   omeprazole (PRILOSEC) 20 MG delayed-release capsule  Active   umeclidinium-vilanterol (ANORO ELLIPTA) 62.5-25 MCG/ACT AEROSOL POWDER, BREATH ACTIVATED inhaler  Active                    ALLERGIES  No Known Allergies    PHYSICAL EXAM  VITAL SIGNS: /53   Pulse (!) 142   Temp 36.7 °C (98 °F) (Temporal)   Resp 18   Ht 1.778 m (5' 10\")   Wt (!) 135 kg (297 lb)   SpO2 94%   BMI 42.62 kg/m²    Constitutional: Awake alert chronically ill-appearing.  HENT: Normocephalic, Atraumatic, Bilateral external ears normal, Oropharynx moist, No oral exudates  Neck: Normal range of motion, No tenderness, Supple, No stridor.   Cardiovascular: Tachycardic no murmurs rubs or gallops  Thorax & Lungs: Normal breath sounds, No respiratory distress,  Abdomen: Bowel sounds normal, Soft, No tenderness  Skin: Warm, Dry, No erythema, No rash.     Musculoskeletal: Good range of motion in all major joints.  Moderate bilateral lower extremity edema  Neurologic: Alert,No focal deficits noted.   Psychiatric: Affect normal      DIAGNOSTIC STUDIES / PROCEDURES  Results for orders placed or performed during the hospital encounter of 04/21/23   CBC WITH DIFFERENTIAL   Result Value Ref Range    WBC 12.1 (H) 4.8 - 10.8 K/uL    RBC 3.35 (L) 4.70 - 6.10 M/uL    Hemoglobin 8.8 (L) 14.0 - 18.0 g/dL    Hematocrit 30.9 (L) 42.0 - 52.0 %    MCV 92.2 81.4 - 97.8 fL    " MCH 26.3 (L) 27.0 - 33.0 pg    MCHC 28.5 (L) 33.7 - 35.3 g/dL    RDW 70.4 (H) 35.9 - 50.0 fL    Platelet Count 325 164 - 446 K/uL    MPV 9.3 9.0 - 12.9 fL    Neutrophils-Polys 76.50 (H) 44.00 - 72.00 %    Lymphocytes 16.50 (L) 22.00 - 41.00 %    Monocytes 5.20 0.00 - 13.40 %    Eosinophils 0.90 0.00 - 6.90 %    Basophils 0.90 0.00 - 1.80 %    Nucleated RBC 0.20 /100 WBC    Neutrophils (Absolute) 9.26 (H) 1.82 - 7.42 K/uL    Lymphs (Absolute) 2.00 1.00 - 4.80 K/uL    Monos (Absolute) 0.63 0.00 - 0.85 K/uL    Eos (Absolute) 0.11 0.00 - 0.51 K/uL    Baso (Absolute) 0.11 0.00 - 0.12 K/uL    NRBC (Absolute) 0.02 K/uL    Anisocytosis 1+     Macrocytosis 1+    COMP METABOLIC PANEL   Result Value Ref Range    Sodium 138 135 - 145 mmol/L    Potassium 4.5 3.6 - 5.5 mmol/L    Chloride 99 96 - 112 mmol/L    Co2 27 20 - 33 mmol/L    Anion Gap 12.0 7.0 - 16.0    Glucose 154 (H) 65 - 99 mg/dL    Bun 23 (H) 8 - 22 mg/dL    Creatinine 3.24 (H) 0.50 - 1.40 mg/dL    Calcium 8.7 8.5 - 10.5 mg/dL    AST(SGOT) 9 (L) 12 - 45 U/L    ALT(SGPT) 6 2 - 50 U/L    Alkaline Phosphatase 107 (H) 30 - 99 U/L    Total Bilirubin 0.4 0.1 - 1.5 mg/dL    Albumin 3.7 3.2 - 4.9 g/dL    Total Protein 7.4 6.0 - 8.2 g/dL    Globulin 3.7 (H) 1.9 - 3.5 g/dL    A-G Ratio 1.0 g/dL   proBrain Natriuretic Peptide, NT   Result Value Ref Range    NT-proBNP 9509 (H) 0 - 125 pg/mL   TROPONIN   Result Value Ref Range    Troponin T 188 (H) 6 - 19 ng/L   PERIPHERAL SMEAR REVIEW   Result Value Ref Range    Peripheral Smear Review see below    PLATELET ESTIMATE   Result Value Ref Range    Plt Estimation Normal    MORPHOLOGY   Result Value Ref Range    RBC Morphology Present     Polychromia 1+     Poikilocytosis 1+     Ovalocytes 1+    DIFFERENTIAL MANUAL   Result Value Ref Range    Manual Diff Status PERFORMED    CORRECTED CALCIUM   Result Value Ref Range    Correct Calcium 8.9 8.5 - 10.5 mg/dL   ESTIMATED GFR   Result Value Ref Range    GFR (CKD-EPI) 20 (A) >60 mL/min/1.73  m 2   EKG   Result Value Ref Range    Report       [facility] Emergency Dept.    Test Date:  2023  Pt Name:    FERNANDO VAZQUEZ                   Department: ER  MRN:        [id number]                      Room:       RD 06  Gender:     Male                         Technician: [id number]  :        1955                   Requested By:ER TRIAGE PROTOCOL  Order #:    [id number]                    Reading MD: MORRIS CRAIG. AMD    Measurements  Intervals                                Axis  Rate:       141                          P:          120  NJ:         76                           QRS:        101  QRSD:       150                          T:          -85  QT:         355  QTc:        544    Interpretive Statements  Sinus tachycardia  RBBB and LPFB  Repol abnrm suggests ischemia, diffuse leads  Baseline wander in lead(s) II,aVR  Compared to ECG 2023 14:45:05  Left posterior fascicular block now present  Atrial fibrillation no longer present  Atrial flutter no longer present  Myocardial infarct finding no lo nger present  Possible ischemia still present  Electronically Signed On 2023 11:11:04 PDT by MORRIS CRAIG. [facility]          RADIOLOGY  I have independently interpreted the diagnostic imaging associated with this visit and am waiting the final reading from the radiologist.   My preliminary interpretation is as follows: Enlarged heart with pulmonary edema.  Radiologist interpretation:     DX-CHEST-PORTABLE (1 VIEW)   Final Result      1.  Persistently enlarged cardiac silhouette   2.  Pulmonary edema   3.  Bibasilar atelectasis   4.  Small RIGHT pleural effusion   5.  Atherosclerosis            COURSE & MEDICAL DECISION MAKING    ED Observation Status? Yes; I am placing the patient in to an observation status due to a diagnostic uncertainty as well as therapeutic intensity. Patient placed in observation status at 11:16 AM, 2023.     Observation plan is as follows: Patient  admitted to ED opts while he is worked up for his tachycardia I reviewed his records he became it is rate under control and possibly send him home.    Upon Reevaluation, the patient's condition has: Not improved will be hospitalized for further work-up and treatment.      Patient discharged from ED Observation status at 1210 (Time) 4/21 (Date).     INITIAL ASSESSMENT, COURSE AND PLAN  Care Narrative:     Patient presents to the emergency department with tachycardia which looks like a flutter in a 2-1 block.    The patient was seen and examined a broad differential diagnosis was considered including but not limited to acute on chronic arrhythmia, dehydration, electrolyte abnormality, ACS, PE.    The patient was worked up for this differential diagnosis with labs and imaging.  Chest x-ray shows fluid overload.  EKG looks like a flutter.  He has a very fixed rate at 140.  He did not take his anticoagulation or his beta-blocker today as we will try to give him some beta-blocker and start him on anticoagulation and see if we can convert him.  He may require cardioversion.    Ultimately patient's labs came back and the patient does have renal insufficiency as you would suspect.  Potassium is okay.  BNP and troponin are elevated.  These are elevated although not to the extent of elevation in his last hospitalization.        At this point the patient requires hospitalization he will require dialysis for his fluid overload they can address his dialysis catheter and require weight control and may be a cardioversion after he has been restarted on his anticoagulation.    I have discussed case with Dr. Morris on-call for the hospital service he will see the patient.    He did see the patient the patient refused to stay.  Went back to see the patient he is refusing to stay.  I explained to him that he has a life-threatening arrhythmia and if he leaves he will likely die.  If he does not die he may end up with a cardiac event  that requires serious permanent disability.    Despite my best efforts to address the patient's concern he does not want to stay.  He states he had a before no come back if he gets worse.  I explained with the plan would be.  He does not want to stay.  The patient will sign AGAINST MEDICAL ADVICE.  Cannot convince the patient to stay.  He verbalized understanding.  He states he will see his doctors and go to dialysis.  Questions answered is agreeable to plan and is signed out AGAINST MEDICAL ADVICE.            ADDITIONAL PROBLEM LIST  End-stage renal disease  Chronic anticoagulation  Heart disease  CHF  Arrhythmia    DISPOSITION AND DISCUSSIONS  I have discussed management of the patient with the following physicians and YASHIRA's: Arturo in the hospital service.      Escalation of care considered, and ultimately not performed: Patient should be hospitalized but he will leave AGAINST MEDICAL ADVICE.        FINAL DIAGNOSIS  1. Atypical atrial flutter (HCC)    2. Atrial tachycardia (HCC)    3. Acute on chronic congestive heart failure, unspecified heart failure type (HCC)    4. ESRD (end stage renal disease) (HCC)    5.  Signed out AGAINST MEDICAL ADVICE       Electronically signed by: Buddy Zayas M.D., 4/21/2023 11:16 AM

## 2023-04-21 NOTE — ED NOTES
Pt has signed out AMA. Erp discussed risk of leaving including death with pt. Pt leaving with brother. Encouraged pt to follow up with pcp

## 2023-04-21 NOTE — ED TRIAGE NOTES
.  Chief Complaint   Patient presents with    Sent by MD    Tachycardia     Pt biba from nephrology office. Was seen today to have dialysis port exchanged. HR was found to be 140. Pt has no medical c/o. Denies pain or sob.   EKG complete labs obtained on arrival.

## 2023-05-10 RX ORDER — ATORVASTATIN CALCIUM 40 MG/1
TABLET, FILM COATED ORAL
Qty: 90 TABLET | Refills: 0 | Status: SHIPPED | OUTPATIENT
Start: 2023-05-10

## 2023-05-10 RX ORDER — ATORVASTATIN CALCIUM 40 MG/1
40 TABLET, FILM COATED ORAL NIGHTLY
Qty: 30 TABLET | Refills: 6 | Status: SHIPPED | OUTPATIENT
Start: 2023-05-10

## 2023-06-29 NOTE — DIETARY
"Nutrition services: Day 1 of admit.  Yoandy Peace is a 67 y.o. male with admitting DX of Dialysis AV fistula malfunction, initial encounter   Atrial fibrillation with RVR     Consult received for Malnutrition Screening Tool: 2 (unsure degree of weight loss in > 1 year)    Assessment:  Height: 177.8 cm (5' 10\")  Weight: (!) 136 kg (300 lb 4.3 oz)  Body mass index is 43.08 kg/m²., BMI classification: Obesity Class III  Diet/Intake: Renal     Evaluation:   Hx of Dialysis AV fistula-dialysis HD on Tuesday, Thursdays, Saturday; COPD, Type 2 DM, chronic respiratory failure with hypoxia, CAD, Peptic ulcer disease  Per ADLs, pt consuming 25-50% x 1 meal and 50-75% x another meal. RD to add double portions of protein to meals to support higher protein demands for dialysis and has received this during previous admission earlier this month (1/12/23 last RD documentation).   Per chart review, weight hx with unknown measurement sources  Wt Readings from Last 4 Encounters:   01/21/23 (!) 136 kg (300 lb 4.3 oz)   01/19/23 (!) 136 kg (300 lb 0.7 oz)   01/13/23 (!) 134 kg (295 lb 13.7 oz)   07/15/22 (!) 147 kg (323 lb)   Pt has experienced 11 kg loss within past 6 months, which is a 7.5% loss. This is not considered significant per ASPEN criteria.   Skin: Noted hx of bilateral pretibial wounds, no WT consult observed at this time.   Pertinent labs: Glucose 104, creatinine 4.45, eGFR 14  Pertinent meds: lipitor, lovenox, tone gluconate complex, midodrine, prilosec, senokot, januvia  Last BM 1/22/23    Malnutrition Risk: Does not meet criteria with current information at this time per ASPEN criteria.     Problem: Nutritional:  Goal: Achieve adequate nutritional intake  Description: Patient will consume >50% of meals    Recommendations/Plan:  Encourage intake of meals  Double protein portions   Document intake of all meals and/or supplements as % taken in ADL's to provide interdisciplinary communication across all shifts.   Monitor " weight.  Nutrition rep will continue to see patient for ongoing meal and snack preferences.     RD following        Social History:    Marital Status: (  ) , ( x ) Single, (  ) , (  ) , (  )   # of Children: 0  Lives with: ( x ) alone, (  ) children, (  ) spouse, (  ) parents, (  ) siblings, (  ) friends, (  ) other:   Occupation:     Substance Use/Illicit Drugs: (  ) never used vs other:   Tobacco Usage: (x  ) never smoked, (  ) former smoker, (  ) current smoker and Total Pack-Years:   Last Alcohol Usage/Frequency/Amount/Withdrawal/Hx of Abuse:  none  Foreign travel:   Animal exposure:

## 2025-07-06 NOTE — DISCHARGE PLANNING
Received Choice form at [phone]  Agency/Facility Name: [facility]  Referral sent per Choice form @ [phone]       Patient is a 60y old male who presents with a chief complaint of abdominal pain and hypotension (05 Jul 2025 23:32)    BRIEF HOSPITAL COURSE:   Patient is a 60 year old male with a PMHx of Ulcerative Colitis on mesalamine, SALLY, atrial fibrillation s/p Watchman and ablation, CHF of unknown type, and BPH who presented to the ED on 7/5 afternoon with c/o 1 week of abdominal pain and inability to tolerate PO intake. At that time, pt endorsed LLQ abdominal pain, right flank pain, hematochezia, shortness of breath, vomiting, generalized weakness, and malaise. Pt stated that he had an UC flare 2 weeks ago which was treated with prednisone. Patient completed self-taper of prednisone which he has done in the past. Patient stated that he may have had some blood with his last bowel movement prior to admission. Pt was given morphine in the ED which resolved his pain.     Patient admits to having low blood pressure for which he takes midodrine. Patient states to have CHF but does not know what type. Given that he is on midodrine, likely HFrEF. Patient was found to be hypotensive in the ED with a blood pressure recording of 97/70. He was given midodrine and IV fluids with improvement. There was concern for adrenal insufficiency. Endocrine was consulted and recommended a cosyntropin stimulation test. Results of testing was positive.     C. Diff test 7/5 was negative.   GI PCR was collected 7/5 and is pending results.     CXR 7/5 showed clear lungs, left pleural effusion/thickening, and no evidence of pneumothorax.  CT Chest on 7/5 showed a 12 mm right upper lobe lung nodule, suspicious for malignancy. No pleural   effusion.    CT Abdomen/Pelvis showed findings suggestive chronic inflammatory bowel disease in the colon and a moderate size hiatal hernia containing gastric fundus and upper body. No evidence of gastric outlet obstruction or bowel obstruction.      INTERVAL HPI/OVERNIGHT EVENTS:  - No acute overnight events  - Recurrent episodes of atrial fibrillation to the 140s with returns to baseline  bpm. Patient felt his heart racing during these episodes.     TODAY:  - Patient examined bedside, comfortable, no complaints  - Patient denies CP, SOB, fever, nausea, vomiting  - Patient rates abdominal pain 4/10    MEDICATIONS  (STANDING):  diltiazem    Tablet 30 milliGRAM(s) Oral two times a day  mesalamine ER Capsule 1500 milliGRAM(s) Oral two times a day  potassium chloride    Tablet ER 40 milliEquivalent(s) Oral every 4 hours  rosuvastatin 20 milliGRAM(s) Oral at bedtime  tamsulosin 0.4 milliGRAM(s) Oral at bedtime    MEDICATIONS  (PRN):  acetaminophen     Tablet .. 650 milliGRAM(s) Oral every 6 hours PRN Temp greater or equal to 38C (100.4F), Mild Pain (1 - 3)  aluminum hydroxide/magnesium hydroxide/simethicone Suspension 30 milliLiter(s) Oral every 4 hours PRN Dyspepsia  diltiazem Injectable 5 milliGRAM(s) IV Push every 4 hours PRN sustained HR >120  melatonin 3 milliGRAM(s) Oral at bedtime PRN Insomnia  midodrine. 10 milliGRAM(s) Oral three times a day PRN SBP <100  ondansetron Injectable 4 milliGRAM(s) IV Push every 8 hours PRN Nausea and/or Vomiting      Allergies    penicillin (Unknown)  penicillins (Unknown)    Intolerances        REVIEW OF SYSTEMS:  as above      Vital Signs Last 24 Hrs  T(C): 36.7 (06 Jul 2025 07:54), Max: 37.4 (05 Jul 2025 13:51)  T(F): 98 (06 Jul 2025 07:54), Max: 99.4 (05 Jul 2025 13:51)  HR: 90 (06 Jul 2025 08:00) (63 - 104)  BP: 101/67 (06 Jul 2025 07:54) (97/70 - 120/81)  BP(mean): --  RR: 18 (06 Jul 2025 07:54) (18 - 20)  SpO2: 92% (06 Jul 2025 07:54) (89% - 98%)    Parameters below as of 06 Jul 2025 07:54  Patient On (Oxygen Delivery Method): room air        PHYSICAL EXAM:  GENERAL: Not in acute distress, lying comfortably  HEAD:  Atraumatic, Normocephalic  EYES: EOMI, PERRLA, conjunctiva and sclera clear  NECK: Supple, No JVD, Normal thyroid  NERVOUS SYSTEM:  Alert & Oriented X3, No gross focal deficits  CHEST/LUNG: Clear to auscultation bilaterally; No rales, rhonchi, wheezing, or rubs  HEART: Regular rate and rhythm; No murmurs, rubs, or gallops  ABDOMEN: Soft, Nontender, Nondistended; Bowel sounds present  EXTREMITIES:  No clubbing, cyanosis, or edema  SKIN: No rashes or lesions    LABS:                        10.1   11.82 )-----------( 559      ( 06 Jul 2025 03:44 )             33.2     07-06    138  |  105  |  8.0  ----------------------------<  174[H]  3.4[L]   |  22.0  |  0.73    Ca    8.6      06 Jul 2025 03:44  Mg     1.6     07-06    TPro  6.1[L]  /  Alb  2.8[L]  /  TBili  0.3[L]  /  DBili  x   /  AST  12  /  ALT  9   /  AlkPhos  50  07-06    PT/INR - ( 05 Jul 2025 13:57 )   PT: 13.0 sec;   INR: 1.12 ratio         PTT - ( 05 Jul 2025 13:57 )  PTT:34.1 sec  Urinalysis Basic - ( 06 Jul 2025 03:44 )    Color: x / Appearance: x / SG: x / pH: x  Gluc: 174 mg/dL / Ketone: x  / Bili: x / Urobili: x   Blood: x / Protein: x / Nitrite: x   Leuk Esterase: x / RBC: x / WBC x   Sq Epi: x / Non Sq Epi: x / Bacteria: x      CAPILLARY BLOOD GLUCOSE          RADIOLOGY & ADDITIONAL TESTS:    Imaging Personally Reviewed:  [X] YES  [ ] NO    Consultant(s) Notes Reviewed:  [X] YES  [ ] NO    Care Discussed with Consultants/Other Providers [X] YES  [ ] NO    Plan of Care discussed with House Staff: [X]YES [ ] NO Patient is a 60y old male who presents with a chief complaint of abdominal pain and hypotension (05 Jul 2025 23:32)    BRIEF HOSPITAL COURSE:   Patient is a 60 year old male with a PMHx of Ulcerative Colitis on mesalamine, SALLY, atrial fibrillation s/p Watchman and ablation, CHF of unknown type, and BPH who presented to the ED on 7/5 afternoon with c/o 1 week of abdominal pain and inability to tolerate PO intake. At that time, pt endorsed LLQ abdominal pain, right flank pain, hematochezia, shortness of breath, vomiting, generalized weakness, and malaise. Pt stated that he had an UC flare 2 weeks ago which was treated with prednisone. Patient completed self-taper of prednisone which he has done in the past. Patient stated that he may have had some blood with his last bowel movement prior to admission. Pt was given morphine in the ED which resolved his pain.     Patient admits to having low blood pressure for which he takes midodrine. Patient states to have CHF but does not know what type. Given that he is on midodrine, likely HFrEF. Patient was found to be hypotensive in the ED with a blood pressure recording of 97/70. He was given midodrine and IV fluids with improvement. There was concern for adrenal insufficiency. Endocrine was consulted and recommended a cosyntropin stimulation test. Results of testing was positive.     C. Diff test 7/5 was negative.   GI PCR was collected 7/5 and is pending results.     CXR 7/5 showed clear lungs, left pleural effusion/thickening, and no evidence of pneumothorax.  CT Chest on 7/5 showed a 12 mm right upper lobe lung nodule, suspicious for malignancy. No pleural   effusion.    CT Abdomen/Pelvis showed findings suggestive chronic inflammatory bowel disease in the colon and a moderate size hiatal hernia containing gastric fundus and upper body. No evidence of gastric outlet obstruction or bowel obstruction.      INTERVAL HPI/OVERNIGHT EVENTS:  - No acute overnight events  - Recurrent episodes of atrial fibrillation to the 140s with returns to baseline  bpm. Patient felt his heart racing during these episodes.     TODAY:  - Patient examined bedside, comfortable, no complaints  - Patient denies CP, SOB, fever, nausea, vomiting  - Patient rates abdominal pain 4/10  - Patient stated that he had a bowel movement this morning, loose stools and no blood    MEDICATIONS  (STANDING):  diltiazem    Tablet 30 milliGRAM(s) Oral two times a day  mesalamine ER Capsule 1500 milliGRAM(s) Oral two times a day  potassium chloride    Tablet ER 40 milliEquivalent(s) Oral every 4 hours  rosuvastatin 20 milliGRAM(s) Oral at bedtime  tamsulosin 0.4 milliGRAM(s) Oral at bedtime    MEDICATIONS  (PRN):  acetaminophen     Tablet .. 650 milliGRAM(s) Oral every 6 hours PRN Temp greater or equal to 38C (100.4F), Mild Pain (1 - 3)  aluminum hydroxide/magnesium hydroxide/simethicone Suspension 30 milliLiter(s) Oral every 4 hours PRN Dyspepsia  diltiazem Injectable 5 milliGRAM(s) IV Push every 4 hours PRN sustained HR >120  melatonin 3 milliGRAM(s) Oral at bedtime PRN Insomnia  midodrine. 10 milliGRAM(s) Oral three times a day PRN SBP <100  ondansetron Injectable 4 milliGRAM(s) IV Push every 8 hours PRN Nausea and/or Vomiting      Allergies    penicillin (Unknown)  penicillins (Unknown)    Intolerances        REVIEW OF SYSTEMS:  as above      Vital Signs Last 24 Hrs  T(C): 36.7 (06 Jul 2025 07:54), Max: 37.4 (05 Jul 2025 13:51)  T(F): 98 (06 Jul 2025 07:54), Max: 99.4 (05 Jul 2025 13:51)  HR: 90 (06 Jul 2025 08:00) (63 - 104)  BP: 101/67 (06 Jul 2025 07:54) (97/70 - 120/81)  BP(mean): --  RR: 18 (06 Jul 2025 07:54) (18 - 20)  SpO2: 92% (06 Jul 2025 07:54) (89% - 98%)    Parameters below as of 06 Jul 2025 07:54  Patient On (Oxygen Delivery Method): room air        PHYSICAL EXAM:  GENERAL: Not in acute distress, lying comfortably  HEAD:  Atraumatic, Normocephalic  EYES: EOMI, PERRLA, conjunctiva and sclera clear  NECK: Supple, No JVD, Normal thyroid  NERVOUS SYSTEM:  Alert & Oriented X3, No gross focal deficits  CHEST/LUNG: Clear to auscultation bilaterally; No rales, rhonchi, wheezing, or rubs  HEART: Regular rate and rhythm; No murmurs, rubs, or gallops  ABDOMEN: Soft, Nontender, Nondistended; Bowel sounds present  EXTREMITIES:  No clubbing, cyanosis, or edema  SKIN: No rashes or lesions    LABS:                        10.1   11.82 )-----------( 559      ( 06 Jul 2025 03:44 )             33.2     07-06    138  |  105  |  8.0  ----------------------------<  174[H]  3.4[L]   |  22.0  |  0.73    Ca    8.6      06 Jul 2025 03:44  Mg     1.6     07-06    TPro  6.1[L]  /  Alb  2.8[L]  /  TBili  0.3[L]  /  DBili  x   /  AST  12  /  ALT  9   /  AlkPhos  50  07-06    PT/INR - ( 05 Jul 2025 13:57 )   PT: 13.0 sec;   INR: 1.12 ratio         PTT - ( 05 Jul 2025 13:57 )  PTT:34.1 sec  Urinalysis Basic - ( 06 Jul 2025 03:44 )    Color: x / Appearance: x / SG: x / pH: x  Gluc: 174 mg/dL / Ketone: x  / Bili: x / Urobili: x   Blood: x / Protein: x / Nitrite: x   Leuk Esterase: x / RBC: x / WBC x   Sq Epi: x / Non Sq Epi: x / Bacteria: x      CAPILLARY BLOOD GLUCOSE          RADIOLOGY & ADDITIONAL TESTS:    Imaging Personally Reviewed:  [X] YES  [ ] NO    Consultant(s) Notes Reviewed:  [X] YES  [ ] NO    Care Discussed with Consultants/Other Providers [X] YES  [ ] NO    Plan of Care discussed with House Staff: [X]YES [ ] NO Patient is a 60y old male who presents with a chief complaint of abdominal pain and hypotension (05 Jul 2025 23:32)    BRIEF HOSPITAL COURSE:   Patient is a 60 year old male with a PMHx of Ulcerative Colitis on mesalamine, SALLY, atrial fibrillation s/p Watchman and ablation, CHF of unknown type, and BPH who presented to the ED on 7/5 afternoon with c/o 1 week of abdominal pain and inability to tolerate PO intake. At that time, pt endorsed LLQ abdominal pain, right flank pain, hematochezia, shortness of breath, vomiting, generalized weakness, and malaise. Pt stated that he had an UC flare 2 weeks ago which was treated with prednisone. Patient completed self-taper of prednisone which he has done in the past. Patient stated that he may have had some blood with his last bowel movement prior to admission. Pt was given morphine in the ED which resolved his pain.     Patient admits to having low blood pressure for which he takes midodrine. Patient states to have CHF but does not know what type. Given that he is on midodrine, likely HFrEF. Patient was found to be hypotensive in the ED with a blood pressure recording of 97/70. He was given midodrine and IV fluids with improvement. There was concern for adrenal insufficiency. Endocrine was consulted and recommended a cosyntropin stimulation test. Results of testing was positive.     C. Diff test 7/5 was negative.   GI PCR was collected 7/5 and is pending results.     CXR 7/5 showed clear lungs, left pleural effusion/thickening, and no evidence of pneumothorax.  CT Chest on 7/5 showed a 12 mm right upper lobe lung nodule, suspicious for malignancy. No pleural   effusion.    CT Abdomen/Pelvis 7/5 showed findings suggestive chronic inflammatory bowel disease in the colon and a moderate size hiatal hernia containing gastric fundus and upper body. No evidence of gastric outlet obstruction or bowel obstruction.      INTERVAL HPI/OVERNIGHT EVENTS:  - No acute overnight events  - Recurrent episodes of atrial fibrillation to the 140s with returns to baseline  bpm. Patient felt his heart racing during these episodes.     TODAY:  - Patient examined bedside, comfortable, no complaints  - Patient denies CP, SOB, fever, nausea, vomiting  - Patient rates abdominal pain 4/10  - Patient stated that he had a bowel movement this morning, loose stools and no blood    MEDICATIONS  (STANDING):  diltiazem    Tablet 30 milliGRAM(s) Oral two times a day  mesalamine ER Capsule 1500 milliGRAM(s) Oral two times a day  potassium chloride    Tablet ER 40 milliEquivalent(s) Oral every 4 hours  rosuvastatin 20 milliGRAM(s) Oral at bedtime  tamsulosin 0.4 milliGRAM(s) Oral at bedtime    MEDICATIONS  (PRN):  acetaminophen     Tablet .. 650 milliGRAM(s) Oral every 6 hours PRN Temp greater or equal to 38C (100.4F), Mild Pain (1 - 3)  aluminum hydroxide/magnesium hydroxide/simethicone Suspension 30 milliLiter(s) Oral every 4 hours PRN Dyspepsia  diltiazem Injectable 5 milliGRAM(s) IV Push every 4 hours PRN sustained HR >120  melatonin 3 milliGRAM(s) Oral at bedtime PRN Insomnia  midodrine. 10 milliGRAM(s) Oral three times a day PRN SBP <100  ondansetron Injectable 4 milliGRAM(s) IV Push every 8 hours PRN Nausea and/or Vomiting      Allergies    penicillin (Unknown)  penicillins (Unknown)    Intolerances        REVIEW OF SYSTEMS:  as above      Vital Signs Last 24 Hrs  T(C): 36.7 (06 Jul 2025 07:54), Max: 37.4 (05 Jul 2025 13:51)  T(F): 98 (06 Jul 2025 07:54), Max: 99.4 (05 Jul 2025 13:51)  HR: 90 (06 Jul 2025 08:00) (63 - 104)  BP: 101/67 (06 Jul 2025 07:54) (97/70 - 120/81)  BP(mean): --  RR: 18 (06 Jul 2025 07:54) (18 - 20)  SpO2: 92% (06 Jul 2025 07:54) (89% - 98%)    Parameters below as of 06 Jul 2025 07:54  Patient On (Oxygen Delivery Method): room air        PHYSICAL EXAM:  GENERAL: Not in acute distress, lying comfortably  HEAD:  Atraumatic, Normocephalic  EYES: EOMI, PERRLA, conjunctiva and sclera clear  NECK: Supple, No JVD, Normal thyroid  NERVOUS SYSTEM:  Alert & Oriented X3, No gross focal deficits  CHEST/LUNG: Clear to auscultation bilaterally; No rales, rhonchi, wheezing, or rubs  HEART: Regular rate and rhythm; No murmurs, rubs, or gallops  ABDOMEN: Soft, Nontender, Nondistended; Bowel sounds present  EXTREMITIES:  No clubbing, cyanosis, or edema  SKIN: No rashes or lesions    LABS:                        10.1   11.82 )-----------( 559      ( 06 Jul 2025 03:44 )             33.2     07-06    138  |  105  |  8.0  ----------------------------<  174[H]  3.4[L]   |  22.0  |  0.73    Ca    8.6      06 Jul 2025 03:44  Mg     1.6     07-06    TPro  6.1[L]  /  Alb  2.8[L]  /  TBili  0.3[L]  /  DBili  x   /  AST  12  /  ALT  9   /  AlkPhos  50  07-06    PT/INR - ( 05 Jul 2025 13:57 )   PT: 13.0 sec;   INR: 1.12 ratio         PTT - ( 05 Jul 2025 13:57 )  PTT:34.1 sec  Urinalysis Basic - ( 06 Jul 2025 03:44 )    Color: x / Appearance: x / SG: x / pH: x  Gluc: 174 mg/dL / Ketone: x  / Bili: x / Urobili: x   Blood: x / Protein: x / Nitrite: x   Leuk Esterase: x / RBC: x / WBC x   Sq Epi: x / Non Sq Epi: x / Bacteria: x      CAPILLARY BLOOD GLUCOSE          RADIOLOGY & ADDITIONAL TESTS:    Imaging Personally Reviewed:  [X] YES  [ ] NO    Consultant(s) Notes Reviewed:  [X] YES  [ ] NO    Care Discussed with Consultants/Other Providers [X] YES  [ ] NO    Plan of Care discussed with House Staff: [X]YES [ ] NO Patient is a 60y old male who presents with a chief complaint of abdominal pain and hypotension (05 Jul 2025 23:32)    BRIEF HOSPITAL COURSE:   Patient is a 60 year old male with a PMHx of Ulcerative Colitis (on mesalamine, recent flare 2 weeks ago treated with prednisone), SALLY, atrial fibrillation s/p Watchman and ablation, CHF of unknown type, hypotension (on midodrine) and BPH who presented to the ED on 7/5 w/ 1 week of abdominal pain and inability to tolerate PO intake. Also endorsed right flank pain, hematochezia, shortness of breath, vomiting, generalized weakness, and malaise. In ED CT C/A/P revealed findings consistent with transverse colitis and incidental findings of 12mm RUL nodule. C.diff negative. Course complicated by hypotension 97/70 which improved w/ midodrine and IV fluids. Endocrine consulted for potential adrenal insufficiency given recent steroids. Cosyntropin stimulation test positive. Morning cortisol low (4.4). Thoracic surgery consulted for pulm nodule.    INTERVAL HPI/OVERNIGHT EVENTS:  - No acute overnight events  - Recurrent episodes of atrial fibrillation to the 140s with self resolution to baseline  bpm. Patient felt his heart racing during these episodes.     TODAY:  - Patient examined bedside, comfortable, no complaints  - Patient denies CP, SOB, fever, nausea, vomiting  - Patient rates abdominal pain 4/10  - Patient stated that he had a bowel movement this morning, loose stools and no blood    MEDICATIONS  (STANDING):  diltiazem    Tablet 30 milliGRAM(s) Oral two times a day  mesalamine ER Capsule 1500 milliGRAM(s) Oral two times a day  potassium chloride    Tablet ER 40 milliEquivalent(s) Oral every 4 hours  rosuvastatin 20 milliGRAM(s) Oral at bedtime  tamsulosin 0.4 milliGRAM(s) Oral at bedtime    MEDICATIONS  (PRN):  acetaminophen     Tablet .. 650 milliGRAM(s) Oral every 6 hours PRN Temp greater or equal to 38C (100.4F), Mild Pain (1 - 3)  aluminum hydroxide/magnesium hydroxide/simethicone Suspension 30 milliLiter(s) Oral every 4 hours PRN Dyspepsia  diltiazem Injectable 5 milliGRAM(s) IV Push every 4 hours PRN sustained HR >120  melatonin 3 milliGRAM(s) Oral at bedtime PRN Insomnia  midodrine. 10 milliGRAM(s) Oral three times a day PRN SBP <100  ondansetron Injectable 4 milliGRAM(s) IV Push every 8 hours PRN Nausea and/or Vomiting      Allergies    penicillin (Unknown)  penicillins (Unknown)    Intolerances        REVIEW OF SYSTEMS:  as above      Vital Signs Last 24 Hrs  T(C): 36.7 (06 Jul 2025 07:54), Max: 37.4 (05 Jul 2025 13:51)  T(F): 98 (06 Jul 2025 07:54), Max: 99.4 (05 Jul 2025 13:51)  HR: 90 (06 Jul 2025 08:00) (63 - 104)  BP: 101/67 (06 Jul 2025 07:54) (97/70 - 120/81)  BP(mean): --  RR: 18 (06 Jul 2025 07:54) (18 - 20)  SpO2: 92% (06 Jul 2025 07:54) (89% - 98%)    Parameters below as of 06 Jul 2025 07:54  Patient On (Oxygen Delivery Method): room air    PHYSICAL EXAM:  GENERAL: Not in acute distress, lying comfortably  HEAD:  Atraumatic, Normocephalic  EYES: EOMI, PERRLA, conjunctiva and sclera clear  NECK: Supple, No JVD, Normal thyroid  NERVOUS SYSTEM:  Alert & Oriented X3, No gross focal deficits  CHEST/LUNG: Clear to auscultation bilaterally; No rales, rhonchi, wheezing, or rubs  HEART: Regular rate and rhythm; No murmurs, rubs, or gallops  ABDOMEN: Soft, Nontender, Nondistended; Bowel sounds present  EXTREMITIES:  No clubbing, cyanosis, or edema  SKIN: No rashes or lesions    LABS:                        10.1   11.82 )-----------( 559      ( 06 Jul 2025 03:44 )             33.2     07-06    138  |  105  |  8.0  ----------------------------<  174[H]  3.4[L]   |  22.0  |  0.73    Ca    8.6      06 Jul 2025 03:44  Mg     1.6     07-06    TPro  6.1[L]  /  Alb  2.8[L]  /  TBili  0.3[L]  /  DBili  x   /  AST  12  /  ALT  9   /  AlkPhos  50  07-06    PT/INR - ( 05 Jul 2025 13:57 )   PT: 13.0 sec;   INR: 1.12 ratio         PTT - ( 05 Jul 2025 13:57 )  PTT:34.1 sec  Urinalysis Basic - ( 06 Jul 2025 03:44 )    Color: x / Appearance: x / SG: x / pH: x  Gluc: 174 mg/dL / Ketone: x  / Bili: x / Urobili: x   Blood: x / Protein: x / Nitrite: x   Leuk Esterase: x / RBC: x / WBC x   Sq Epi: x / Non Sq Epi: x / Bacteria: x    CAPILLARY BLOOD GLUCOSE      RADIOLOGY & ADDITIONAL TESTS:    Imaging Personally Reviewed:  [X] YES  [ ] NO    Consultant(s) Notes Reviewed:  [X] YES  [ ] NO    Care Discussed with Consultants/Other Providers [] YES  [ ] NO    Plan of Care discussed with House Staff: [X]YES [ ] NO

## (undated) DEVICE — KIT ANESTHESIA W/CIRCUIT & 3/LT BAG W/FILTER (20EA/CA)

## (undated) DEVICE — CANISTER SUCTION RIGID RED 1500CC (40EA/CA)

## (undated) DEVICE — FILM CASSETTE ENDO

## (undated) DEVICE — TUBE E-T HI-LO CUFF 8.0MM (10EA/PK)

## (undated) DEVICE — WATER IRRIGATION STERILE 1000ML (12EA/CA)

## (undated) DEVICE — FORCEP RADIAL JAW 4 STANDARD CAPACITY W/NEEDLE 240CM (40EA/BX)

## (undated) DEVICE — MASK OXYGEN VNYL ADLT MED CONC WITH 7 FOOT TUBING  - (50EA/CA)

## (undated) DEVICE — CONTAINER, SPECIMEN, STERILE

## (undated) DEVICE — BITE BLOCK, DISP.

## (undated) DEVICE — TUBE CONNECTING SUCTION - CLEAR PLASTIC STERILE 72 IN (50EA/CA)

## (undated) DEVICE — SUCTION INSTRUMENT YANKAUER BULBOUS TIP W/O VENT (50EA/CA)

## (undated) DEVICE — KIT CUSTOM PROCEDURE SINGLE FOR ENDO  (15/CA)

## (undated) DEVICE — NEPTUNE 4 PORT MANIFOLD - (20/PK)

## (undated) DEVICE — TOWEL STOP TIMEOUT SAFETY FLAG (40EA/CA)

## (undated) DEVICE — SENSOR OXIMETER ADULT SPO2 RD SET (20EA/BX)

## (undated) DEVICE — SET LEADWIRE 5 LEAD BEDSIDE DISPOSABLE ECG (1SET OF 5/EA)

## (undated) DEVICE — ELECTRODE 850 FOAM ADHESIVE - HYDROGEL RADIOTRNSPRNT (50/PK)